# Patient Record
Sex: FEMALE | Race: BLACK OR AFRICAN AMERICAN | NOT HISPANIC OR LATINO | Employment: FULL TIME | URBAN - METROPOLITAN AREA
[De-identification: names, ages, dates, MRNs, and addresses within clinical notes are randomized per-mention and may not be internally consistent; named-entity substitution may affect disease eponyms.]

---

## 2019-05-04 ENCOUNTER — HOSPITAL ENCOUNTER (EMERGENCY)
Facility: HOSPITAL | Age: 59
Discharge: HOME | End: 2019-05-04
Attending: EMERGENCY MEDICINE
Payer: COMMERCIAL

## 2019-05-04 VITALS
BODY MASS INDEX: 28.13 KG/M2 | TEMPERATURE: 98 F | HEART RATE: 73 BPM | OXYGEN SATURATION: 96 % | WEIGHT: 179.25 LBS | HEIGHT: 67 IN | RESPIRATION RATE: 16 BRPM | DIASTOLIC BLOOD PRESSURE: 92 MMHG | SYSTOLIC BLOOD PRESSURE: 155 MMHG

## 2019-05-04 DIAGNOSIS — S80.00XA CONTUSION OF KNEE, UNSPECIFIED LATERALITY, INITIAL ENCOUNTER: Primary | ICD-10-CM

## 2019-05-04 DIAGNOSIS — S60.512A ABRASION OF LEFT HAND, INITIAL ENCOUNTER: ICD-10-CM
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
## 2019-05-04 PROCEDURE — 99284 EMERGENCY DEPT VISIT MOD MDM: CPT

## 2019-05-04 NOTE — ED PROVIDER NOTES
SCRIBE #1 NOTE: I, Ana Mcclain, am scribing for, and in the presence of, MARAH Jalloh. I have scribed the entire note.      History      Chief Complaint   Patient presents with    Fall     fell while at work onto concrete. c/o pain to both knees and arms, multiple bruises and abrasions to left arm.       Review of patient's allergies indicates:  No Known Allergies     HPI   HPI    2019, 2:57 PM   History obtained from the patient      History of Present Illness: Salvatore Phillips is a 58 y.o. female patient who presents to the Emergency Department for evaluation after fall which onset 2 days ago. Symptoms are constant and moderate in severity. Pt reports she was walking to her car, on her cell phone after work and fell onto the concrete. She is not sure what she tripped or slipped on.  No mitigating or exacerbating factors reported. Associated sxs include bilateral knee pain (L worse than R), L wrist pain, and abrasion to L wrist. Patient denies any syncope, CP, SOB, fever, vision changes, LOC, HA, abd pain, n/v/d, back pain, neck pain, numbness/weakness, bowel/bladder incontinence, and all other sxs at this time. Pt is UTD on tetanus. No further complaints or concerns at this time.           Arrival mode: Personal vehicle     PCP: Primary Doctor No       Past Medical History:  Past Medical History:   Diagnosis Date    Asthma     Hypertension     Sarcoidosis of lung     Sleep apnea     maintained on CPAP    Upper respiratory disease     URI (upper respiratory infection)        Past Surgical History:  Past Surgical History:   Procedure Laterality Date    ABDOMINAL SURGERY      BREAST SURGERY       SECTION      GASTRIC BYPASS      HYSTERECTOMY      KNEE SURGERY           Family History:  History reviewed. No pertinent family history.    Social History:  Social History     Tobacco Use    Smoking status: Current Every Day Smoker     Packs/day: 0.50     Types: Cigarettes    Tobacco comment:  started age 18   Substance and Sexual Activity    Alcohol use: Yes     Comment: occ    Drug use: Not given    Sexual activity: Not given       ROS   Review of Systems   Constitutional: Negative for chills and fever.   HENT: Negative for sore throat.    Respiratory: Negative for shortness of breath.    Cardiovascular: Negative for chest pain.   Gastrointestinal: Negative for nausea.   Genitourinary: Negative for dysuria.   Musculoskeletal: Negative for back pain and neck pain.        (+) L wrist pain  (+) Bilateral knee pain   Skin: Negative for rash.        (+) Abrasion to L wrist   Neurological: Negative for dizziness, syncope, weakness, numbness and headaches.   Hematological: Does not bruise/bleed easily.   All other systems reviewed and are negative.      Physical Exam      Initial Vitals [05/04/19 1450]   BP Pulse Resp Temp SpO2   (!) 155/92 73 16 97.9 °F (36.6 °C) 96 %      MAP       --          Physical Exam  Nursing Notes and Vital Signs Reviewed.  Constitutional: Patient is in no acute distress. Awake and alert. Appropriate for age.   Head: Atraumatic. No facial instability or step-offs.   Eyes: PERRL. EOM normal. Conjunctivae normal.   HENT: Moist mucous membranes. No epistaxis. Patent airway.   Neck: No midline bony tenderness, deformities, or step-offs   Cardiovascular: Regular rate and rhythm. Heart sounds are normal. Intact distal pulses   Pulmonary/Chest: No respiratory distress. Breath sounds are normal. No decreased breath sounds. Chest wall is stable.   Abdominal: Soft and non-distended. Non-tender.   Back: No abrasions or ecchymosis. No midline bony tenderness to the T-spine or L-spine. No deformities or step-offs.   Musculoskeletal: Full range of motion in bilateral extremities. No obvious deformities.   LUE: has no evident deformity. L upper extremity full ROM of shoulder elbow, wrist. No bony tenderness. Cap refill distally is <2 seconds. Radial pulses are equal and 2+ bilaterally. No motor  "deficit. No distal sensory deficit.  Bilateral Knee:  No obvious deformity. Bilateral knees with mild anterior tenderness L>R. Full ROM of bilateral knees. No increased warmth, erythema, induration or fluctuance.  No ligament laxity. DP and PT pulses are 2+.  Normal capillary refill.  Distal sensation is intact.  Skin: Normal color. No cyanosis. 1 x 1 cm abrasion to dorsal L hand  Neurological: Awake and alert. Appropriate for age. GCS 15. Normal speech. Motor strength is normal at 5/5 bilaterally. Non-focal neurological examination.    ED Course    Procedures  ED Vital Signs:  Vitals:    05/04/19 1450   BP: (!) 155/92   Pulse: 73   Resp: 16   Temp: 97.9 °F (36.6 °C)   TempSrc: Oral   SpO2: 96%   Weight: 81.3 kg (179 lb 3.7 oz)   Height: 5' 7" (1.702 m)       Imaging Results:  Imaging Results          X-Ray Wrist Complete Left (Final result)  Result time 05/04/19 15:37:16    Final result by Dia Lara MD (Timothy) (05/04/19 15:37:16)                 Impression:      Negative exam.      Electronically signed by: Dia Lara MD  Date:    05/04/2019  Time:    15:37             Narrative:    EXAMINATION:  XR WRIST COMPLETE 3 VIEWS LEFT    CLINICAL HISTORY:  Unspecified fall, initial encounter    TECHNIQUE:  Standard radiography performed.    COMPARISON:  None    FINDINGS:  Bone density and architecture are normal.  No acute findings.                               X-Ray Knee Complete 4 or More Views Left (Final result)  Result time 05/04/19 15:36:40    Final result by Dia Lara MD (Timothy) (05/04/19 15:36:40)                 Impression:      No fractures.  Moderate DJD.      Electronically signed by: Dia Lara MD  Date:    05/04/2019  Time:    15:36             Narrative:    EXAMINATION:  XR KNEE COMP 4 OR MORE VIEWS LEFT    CLINICAL HISTORY:  Unspecified fall, initial encounter    TECHNIQUE:  Standard radiography performed.    COMPARISON:  None    FINDINGS:  Bone density and architecture are " normal.  No acute findings.  Moderate degenerative changes.                                        The Emergency Provider reviewed the vital signs and test results, which are outlined above.    ED Discussion     3:41 PM Reassessed pt at this time.  Pt is awake, alert, and in NAD at this time. Discussed with pt all pertinent ED information and results. Discussed pt dx and plan of tx. Gave pt all f/u and return to the ED instructions. All questions and concerns were addressed at this time. Pt expresses understanding of information and instructions, and is comfortable with plan to discharge. Pt is stable for discharge.    Trauma precautions were discussed with patient and/or family/caretaker; I do not specifically detect any abdominal, thoracic, CNS, orthopedic, or other emergent or life threatening condition and that patient is safe to be discharged.  It was also discussed that despite an unrevealing examination and negative radiographic examination for serious or life threatening injury, these conditions may still exist.  As such, patient should return to ED immediately should they experience, severe or worsening pain, shortness of breath, abdominal pain, headache, vomiting, or any other concern.  It was also discussed that not infrequently, injuries may not be diagnosed during the initial ED visit (such as fractures) and that if the patient discovers a new area of concern, a new area of injury that was not evaluated in the ED, they should return for evaluation as they may have an injury that requires treatment.    ED Medication(s):  Medications - No data to display    Discharge Medication List as of 5/4/2019  3:46 PM          Follow-up Information     [facility] In 2 days.    Contact information:  [address] [facility] [address] [phone]                     Medical Decision Making    Medical Decision Making:   Clinical Tests:   Radiological Study: Ordered and Reviewed           Scribe  Attestation:   Scribe #1: I performed the above scribed service and the documentation accurately describes the services I performed. I attest to the accuracy of the note.    Attending:   Physician Attestation Statement for Scribe #1: I, MARAH Jalloh, personally performed the services described in this documentation, as scribed by Ana Mcclain, in my presence, and it is both accurate and complete.          Clinical Impression       ICD-10-CM ICD-9-CM   1. Contusion of knee, unspecified laterality, initial encounter S80.00XA 924.11   2. Fall W19.XXXA E888.9   3. Abrasion of left hand, initial encounter S60.512A 914.0       Disposition:   Disposition: Discharged  Condition: Stable         MARHA Yang-HEIDI  05/04/19 7949

## 2019-07-06 ENCOUNTER — HOSPITAL ENCOUNTER (EMERGENCY)
Facility: HOSPITAL | Age: 59
Discharge: HOME | End: 2019-07-06
Attending: EMERGENCY MEDICINE
Payer: COMMERCIAL

## 2019-07-06 VITALS
BODY MASS INDEX: 27.39 KG/M2 | TEMPERATURE: 98 F | DIASTOLIC BLOOD PRESSURE: 80 MMHG | HEART RATE: 74 BPM | WEIGHT: 174.5 LBS | SYSTOLIC BLOOD PRESSURE: 137 MMHG | OXYGEN SATURATION: 97 % | RESPIRATION RATE: 16 BRPM | HEIGHT: 67 IN

## 2019-07-06 DIAGNOSIS — R05.9 COUGH: ICD-10-CM

## 2019-07-06 DIAGNOSIS — J40 BRONCHITIS: Primary | ICD-10-CM

## 2019-07-06 DIAGNOSIS — R06.2 WHEEZE: ICD-10-CM

## 2019-07-06 PROCEDURE — 25000003 PHARM REV CODE 250: Performed by: PHYSICIAN ASSISTANT

## 2019-07-06 PROCEDURE — 63600175 PHARM REV CODE 636 W HCPCS: Performed by: PHYSICIAN ASSISTANT

## 2019-07-06 PROCEDURE — 99284 EMERGENCY DEPT VISIT MOD MDM: CPT | Mod: 25

## 2019-07-06 PROCEDURE — 25000242 PHARM REV CODE 250 ALT 637 W/ HCPCS: Performed by: PHYSICIAN ASSISTANT

## 2019-07-06 PROCEDURE — 94640 AIRWAY INHALATION TREATMENT: CPT

## 2019-07-06 RX ORDER — AZITHROMYCIN 250 MG/1
500 TABLET, FILM COATED ORAL
Status: COMPLETED | OUTPATIENT
Start: 2019-07-06 | End: 2019-07-06

## 2019-07-06 RX ORDER — AZITHROMYCIN 250 MG/1
250 TABLET, FILM COATED ORAL DAILY
Qty: 6 TABLET | Refills: 0 | Status: SHIPPED | OUTPATIENT
Start: 2019-07-06 | End: 2019-07-16

## 2019-07-06 RX ORDER — IPRATROPIUM BROMIDE AND ALBUTEROL SULFATE 2.5; .5 MG/3ML; MG/3ML
3 SOLUTION RESPIRATORY (INHALATION) EVERY 6 HOURS PRN
Qty: 1 BOX | Refills: 0 | Status: SHIPPED | OUTPATIENT
Start: 2019-07-06 | End: 2019-08-05 | Stop reason: SDUPTHER

## 2019-07-06 RX ORDER — PREDNISONE 20 MG/1
60 TABLET ORAL
Status: COMPLETED | OUTPATIENT
Start: 2019-07-06 | End: 2019-07-06

## 2019-07-06 RX ORDER — IPRATROPIUM BROMIDE AND ALBUTEROL SULFATE 2.5; .5 MG/3ML; MG/3ML
3 SOLUTION RESPIRATORY (INHALATION)
Status: COMPLETED | OUTPATIENT
Start: 2019-07-06 | End: 2019-07-06

## 2019-07-06 RX ORDER — ALBUTEROL SULFATE 90 UG/1
1-2 AEROSOL, METERED RESPIRATORY (INHALATION) EVERY 6 HOURS PRN
Qty: 1 INHALER | Refills: 0 | Status: SHIPPED | OUTPATIENT
Start: 2019-07-06 | End: 2019-08-09 | Stop reason: SDUPTHER

## 2019-07-06 RX ORDER — PREDNISONE 20 MG/1
40 TABLET ORAL DAILY
Qty: 8 TABLET | Refills: 0 | Status: SHIPPED | OUTPATIENT
Start: 2019-07-06 | End: 2019-07-10

## 2019-07-06 RX ADMIN — AZITHROMYCIN 500 MG: 250 TABLET, FILM COATED ORAL at 03:07

## 2019-07-06 RX ADMIN — IPRATROPIUM BROMIDE AND ALBUTEROL SULFATE 3 ML: .5; 3 SOLUTION RESPIRATORY (INHALATION) at 03:07

## 2019-07-06 RX ADMIN — PREDNISONE 60 MG: 20 TABLET ORAL at 03:07

## 2019-07-06 NOTE — ED PROVIDER NOTES
"SCRIBE #1 NOTE: I, Christine Kaiser, am scribing for, and in the presence of, MARAH Jalloh. I have scribed the entire note.       History     Chief Complaint   Patient presents with    Cough     productive cough with green sputum, congestion, shortness of breath "Im just sick"      Review of patient's allergies indicates:  No Known Allergies      History of Present Illness     HPI    2019, 3:00 PM  History obtained from the patient      History of Present Illness: Salvatore Phillips is a 58 y.o. female patient with a PMHx of asthma, sarcoidosis of lung, HTN who presents to the Emergency Department for evaluation of a productive cough with thick, green phlegm which she has been having for the past 2 weeks. She has not been evaluated for the cough previously. The cough has been constant in course and mild in severity. She reports nothing that improves or worsen her cough. Patient has no c/o fever, chills, CP, SOB, sore throat, vomiting, diarrhea, and all other sxs at this time. She would like a refill of her inhaler.    Arrival mode: Personal vehicle    PCP: Primary Doctor No        Past Medical History:  Past Medical History:   Diagnosis Date    Asthma     Hypertension     Sarcoidosis of lung     Sleep apnea     maintained on CPAP    Upper respiratory disease     URI (upper respiratory infection)        Past Surgical History:  Past Surgical History:   Procedure Laterality Date    ABDOMINAL SURGERY      BREAST SURGERY       SECTION      GASTRIC BYPASS      HYSTERECTOMY      KNEE SURGERY           Family History:  History reviewed. No pertinent family history.    Social History:  Social History     Tobacco Use    Smoking status: Current Every Day Smoker     Packs/day: 0.50     Types: Cigarettes    Tobacco comment: started age 18   Substance and Sexual Activity    Alcohol use: Yes     Comment: occ    Drug use: Unknown    Sexual activity: Unknown        Review of Systems     Review of Systems " "  Constitutional: Negative for chills and fever.   HENT: Negative for sore throat, trouble swallowing and voice change.    Respiratory: Positive for cough. Negative for shortness of breath.    Cardiovascular: Negative for chest pain.   Gastrointestinal: Negative for abdominal pain, diarrhea and vomiting.   Genitourinary: Negative for dysuria.   Musculoskeletal: Negative for back pain.   Skin: Negative for rash.   Neurological: Negative for dizziness, weakness and light-headedness.   Hematological: Does not bruise/bleed easily.   All other systems reviewed and are negative.     Physical Exam     Initial Vitals [07/06/19 1443]   BP Pulse Resp Temp SpO2   137/80 81 18 98.3 °F (36.8 °C) 95 %      MAP       --          Physical Exam  Nursing Notes and Vital Signs Reviewed.  Constitutional: Patient is in no acute distress. Well-developed and well-nourished.  Head: Atraumatic. Normocephalic.  Eyes: PERRL. EOM intact. Conjunctivae are not pale. No scleral icterus.  ENT: Mucous membranes are moist. Oropharynx is clear and symmetric.    Neck: Supple. Full ROM.   Cardiovascular: Regular rate. Regular rhythm. No murmurs, rubs, or gallops. Distal pulses are 2+ and symmetric.  Pulmonary/Chest: No respiratory distress. Bilateral inspiratory and expiratory wheezing. No rhonchi or rales.  Abdominal: Soft and non-distended.  There is no tenderness.  No rebound, guarding, or rigidity.  Musculoskeletal: Moves all extremities. No obvious deformities.   Skin: Warm and dry.  Neurological:  Alert, awake, and appropriate.  Normal speech.  No acute focal neurological deficits are appreciated.  Psychiatric: Normal affect. Good eye contact. Appropriate in content.     ED Course   Procedures  ED Vital Signs:  Vitals:    07/06/19 1443 07/06/19 1517   BP: 137/80    Pulse: 81 74   Resp: 18 16   Temp: 98.3 °F (36.8 °C)    TempSrc: Oral    SpO2: 95% 97%   Weight: 79.2 kg (174 lb 7.9 oz)    Height: 5' 7" (1.702 m)      Imaging Results:  Imaging " Results          X-Ray Chest PA And Lateral (Final result)  Result time 07/06/19 15:07:06    Final result by WALTER Read Sr., MD (07/06/19 15:07:06)                 Impression:      1. There is a moderate amount of scarring in both lungs. There is no evidence of an acute pulmonary process.  2. There are surgical clips in the abdomen.  .      Electronically signed by: Ramon Read MD  Date:    07/06/2019  Time:    15:07             Narrative:    EXAMINATION:  XR CHEST PA AND LATERAL    CLINICAL HISTORY:  Cough    COMPARISON:  11/25/2018    FINDINGS:  The size and contour of the heart are normal.  There is a moderate amount of scarring in both lungs.  There is no evidence of an acute pulmonary process.  There is no pneumothorax or pleural effusion.  There are surgical clips in the abdomen.                                   The Emergency Provider reviewed the vital signs and test results, which are outlined above.     ED Discussion     3:17 PM:  Breath sounds much improved since neb treatment.  Pt ready for discharge.  Pt informed of CXR results. Discussed pt dx of bronchitis and plan of tx. Informed patient plan to refill her inhaler. Will prescribe Prednisone and abx. Gave pt all f/u and return to the ED instructions. All questions and concerns were addressed at this time. Pt expresses understanding of information and instructions, and is comfortable with plan to discharge. Pt is stable for discharge.    I discussed with patient and/or family/caretaker that evaluation in the ED does not suggest any emergent or life threatening medical conditions requiring immediate intervention beyond what was provided in the ED, and I believe patient is safe for discharge.  Regardless, an unremarkable evaluation in the ED does not preclude the development or presence of a serious of life threatening condition. As such, patient was instructed to return immediately for any worsening or change in current symptoms.    ED  Medication(s):  Medications   albuterol-ipratropium 2.5 mg-0.5 mg/3 mL nebulizer solution 3 mL (3 mLs Nebulization Given 7/6/19 0257)   predniSONE tablet 60 mg (60 mg Oral Given 7/6/19 1533)   azithromycin tablet 500 mg (500 mg Oral Given 7/6/19 1533)       Discharge Medication List as of 7/6/2019  3:16 PM      START taking these medications    Details   albuterol (PROVENTIL/VENTOLIN HFA) 90 mcg/actuation inhaler Inhale 1-2 puffs into the lungs every 6 (six) hours as needed for Wheezing., Starting Sat 7/6/2019, Until Sun 7/5/2020, Print      azithromycin (Z-KAIA) 250 MG tablet Take 1 tablet (250 mg total) by mouth once daily. Take first 2 tablets together, then 1 every day until finished. for 10 days, Starting Sat 7/6/2019, Until Tue 7/16/2019, Print      predniSONE (DELTASONE) 20 MG tablet Take 2 tablets (40 mg total) by mouth once daily. for 4 days, Starting Sat 7/6/2019, Until Wed 7/10/2019, Print             Follow-up Information     [facility] In 2 days.    Specialty:  Internal Medicine  Contact information:  [address] [facility] [address] [phone]              Medical Decision Making:   Clinical Tests:   Radiological Study: Ordered and Reviewed           Scribe Attestation:   Scribe #1: I performed the above scribed service and the documentation accurately describes the services I performed. I attest to the accuracy of the note.     Attending:   Physician Attestation Statement for Scribe #1: I, MARAH Jalloh, personally performed the services described in this documentation, as scribed by Christine Kaiser, in my presence, and it is both accurate and complete.           Clinical Impression       ICD-10-CM ICD-9-CM   1. Bronchitis J40 490   2. Cough R05 786.2   3. Wheeze R06.2 786.07       Disposition:   Disposition: Discharged  Condition: Stable         Meghan Johnson PA-C  07/06/19 9467

## 2019-08-05 ENCOUNTER — OFFICE VISIT (OUTPATIENT)
Dept: INTERNAL MEDICINE | Facility: CLINIC | Age: 59
End: 2019-08-05
Payer: COMMERCIAL

## 2019-08-05 ENCOUNTER — PATIENT OUTREACH (OUTPATIENT)
Dept: ADMINISTRATIVE | Facility: HOSPITAL | Age: 59
End: 2019-08-05

## 2019-08-05 VITALS
DIASTOLIC BLOOD PRESSURE: 78 MMHG | BODY MASS INDEX: 26.89 KG/M2 | RESPIRATION RATE: 18 BRPM | HEART RATE: 87 BPM | OXYGEN SATURATION: 97 % | TEMPERATURE: 98 F | HEIGHT: 67 IN | SYSTOLIC BLOOD PRESSURE: 126 MMHG | WEIGHT: 171.31 LBS

## 2019-08-05 DIAGNOSIS — Z23 NEED FOR TETANUS BOOSTER: ICD-10-CM

## 2019-08-05 DIAGNOSIS — G47.30 SLEEP APNEA, UNSPECIFIED TYPE: ICD-10-CM

## 2019-08-05 DIAGNOSIS — I10 ESSENTIAL HYPERTENSION: ICD-10-CM

## 2019-08-05 DIAGNOSIS — Z00.00 ENCOUNTER FOR MEDICAL EXAMINATION TO ESTABLISH CARE: Primary | ICD-10-CM

## 2019-08-05 DIAGNOSIS — F51.01 PRIMARY INSOMNIA: ICD-10-CM

## 2019-08-05 DIAGNOSIS — N39.44 NOCTURNAL ENURESIS: ICD-10-CM

## 2019-08-05 DIAGNOSIS — Z12.31 SCREENING MAMMOGRAM, ENCOUNTER FOR: ICD-10-CM

## 2019-08-05 DIAGNOSIS — D86.9 SARCOIDOSIS: ICD-10-CM

## 2019-08-05 DIAGNOSIS — R06.2 WHEEZING: ICD-10-CM

## 2019-08-05 DIAGNOSIS — Z11.59 NEED FOR HEPATITIS C SCREENING TEST: ICD-10-CM

## 2019-08-05 DIAGNOSIS — J45.20 MILD INTERMITTENT ASTHMA WITHOUT COMPLICATION: ICD-10-CM

## 2019-08-05 DIAGNOSIS — R09.82 POSTNASAL DRIP: ICD-10-CM

## 2019-08-05 PROCEDURE — 3078F PR MOST RECENT DIASTOLIC BLOOD PRESSURE < 80 MM HG: ICD-10-PCS | Mod: CPTII,S$GLB,, | Performed by: FAMILY MEDICINE

## 2019-08-05 PROCEDURE — 99204 PR OFFICE/OUTPT VISIT, NEW, LEVL IV, 45-59 MIN: ICD-10-PCS | Mod: S$GLB,,, | Performed by: FAMILY MEDICINE

## 2019-08-05 PROCEDURE — 99999 PR PBB SHADOW E&M-EST. PATIENT-LVL V: CPT | Mod: PBBFAC,,, | Performed by: FAMILY MEDICINE

## 2019-08-05 PROCEDURE — 99204 OFFICE O/P NEW MOD 45 MIN: CPT | Mod: S$GLB,,, | Performed by: FAMILY MEDICINE

## 2019-08-05 PROCEDURE — 99999 PR PBB SHADOW E&M-EST. PATIENT-LVL V: ICD-10-PCS | Mod: PBBFAC,,, | Performed by: FAMILY MEDICINE

## 2019-08-05 PROCEDURE — 3008F PR BODY MASS INDEX (BMI) DOCUMENTED: ICD-10-PCS | Mod: CPTII,S$GLB,, | Performed by: FAMILY MEDICINE

## 2019-08-05 PROCEDURE — 3008F BODY MASS INDEX DOCD: CPT | Mod: CPTII,S$GLB,, | Performed by: FAMILY MEDICINE

## 2019-08-05 PROCEDURE — 3074F SYST BP LT 130 MM HG: CPT | Mod: CPTII,S$GLB,, | Performed by: FAMILY MEDICINE

## 2019-08-05 PROCEDURE — 3074F PR MOST RECENT SYSTOLIC BLOOD PRESSURE < 130 MM HG: ICD-10-PCS | Mod: CPTII,S$GLB,, | Performed by: FAMILY MEDICINE

## 2019-08-05 PROCEDURE — 3078F DIAST BP <80 MM HG: CPT | Mod: CPTII,S$GLB,, | Performed by: FAMILY MEDICINE

## 2019-08-05 RX ORDER — ZOLPIDEM TARTRATE 10 MG/1
10 TABLET ORAL NIGHTLY PRN
Qty: 30 TABLET | Refills: 0 | Status: SHIPPED | OUTPATIENT
Start: 2019-08-05 | End: 2019-09-09 | Stop reason: SDUPTHER

## 2019-08-05 RX ORDER — FLUTICASONE PROPIONATE 50 MCG
1 SPRAY, SUSPENSION (ML) NASAL DAILY
Qty: 1 BOTTLE | Refills: 0 | Status: SHIPPED | OUTPATIENT
Start: 2019-08-05 | End: 2019-08-09 | Stop reason: SDUPTHER

## 2019-08-05 RX ORDER — AMLODIPINE BESYLATE 10 MG/1
10 TABLET ORAL DAILY
Qty: 90 TABLET | Refills: 1 | Status: SHIPPED | OUTPATIENT
Start: 2019-08-05 | End: 2019-10-09 | Stop reason: SDUPTHER

## 2019-08-05 RX ORDER — PREDNISONE 20 MG/1
20 TABLET ORAL DAILY
Qty: 10 TABLET | Refills: 0 | Status: SHIPPED | OUTPATIENT
Start: 2019-08-05 | End: 2019-08-09

## 2019-08-05 RX ORDER — IPRATROPIUM BROMIDE AND ALBUTEROL SULFATE 2.5; .5 MG/3ML; MG/3ML
3 SOLUTION RESPIRATORY (INHALATION) EVERY 6 HOURS PRN
Qty: 1 BOX | Refills: 0 | Status: SHIPPED | OUTPATIENT
Start: 2019-08-05 | End: 2019-08-09 | Stop reason: SDUPTHER

## 2019-08-05 RX ORDER — OXYBUTYNIN CHLORIDE 5 MG/1
5 TABLET ORAL NIGHTLY
Qty: 90 TABLET | Refills: 0 | Status: SHIPPED | OUTPATIENT
Start: 2019-08-05 | End: 2021-01-29

## 2019-08-05 NOTE — PROGRESS NOTES
Subjective:       Patient ID: Salvatore Phillips is a 58 y.o. female.    Chief Complaint: Establish Care    HPI Ms. Phillips presents today to establish care. She has a medical history as listed below.   Recently moved back to the area from [place] to help her sister care for their mother.  Needs Refills on medication  CPAP machine it has been in storage since moving.     Urinary incontinence at night   Coughing up mucus   Using rescue more often   Seen in the ED on 19 for bronchitis   Chest isn't hurting as much   z dacia and predisone given at that time     Review of Systems   Constitutional: Positive for activity change and unexpected weight change.   HENT: Positive for rhinorrhea. Negative for hearing loss and trouble swallowing.    Eyes: Positive for visual disturbance. Negative for discharge.   Respiratory: Positive for chest tightness and wheezing.    Cardiovascular: Negative for chest pain and palpitations.   Gastrointestinal: Positive for constipation and diarrhea. Negative for blood in stool and vomiting.   Endocrine: Positive for polydipsia and polyuria.   Genitourinary: Positive for dysuria. Negative for difficulty urinating, hematuria and menstrual problem.   Musculoskeletal: Positive for arthralgias, joint swelling and neck pain.   Neurological: Positive for weakness and headaches.   Psychiatric/Behavioral: Positive for dysphoric mood. Negative for confusion.         Past Medical History:   Diagnosis Date    Asthma     Hypertension     Sarcoidosis of lung     Sleep apnea     maintained on CPAP    Upper respiratory disease     URI (upper respiratory infection)      Past Surgical History:   Procedure Laterality Date    ABDOMINAL SURGERY      BREAST SURGERY       SECTION      x3    COLON SURGERY      exploratory procedure      GASTRIC BYPASS      HYSTERECTOMY      JOINT REPLACEMENT  2013    KNEE SURGERY      SMALL INTESTINE SURGERY      tummy tuck       Family History   Problem  Relation Age of Onset    Alzheimer's disease Mother     Cancer Father         lung    Crohn's disease Brother     Kidney disease Brother     Arthritis Sister     Hypertension Son      Social History     Socioeconomic History    Marital status: Single     Spouse name: Not on file    Number of children: Not on file    Years of education: Not on file    Highest education level: Not on file   Occupational History    Not on file   Social Needs    Financial resource strain: Somewhat hard    Food insecurity:     Worry: Sometimes true     Inability: Sometimes true    Transportation needs:     Medical: No     Non-medical: No   Tobacco Use    Smoking status: Current Every Day Smoker     Packs/day: 1.50     Years: 15.00     Pack years: 22.50     Types: Cigarettes    Smokeless tobacco: Never Used    Tobacco comment: started age 18   Substance and Sexual Activity    Alcohol use: Not Currently     Frequency: Monthly or less     Drinks per session: 1 or 2     Binge frequency: Never     Comment: occ    Drug use: Never    Sexual activity: Never   Lifestyle    Physical activity:     Days per week: 0 days     Minutes per session: 0 min    Stress: Very much   Relationships    Social connections:     Talks on phone: Not on file     Gets together: Once a week     Attends [employer] service: Not on file     Active member of club or organization: No     Attends meetings of clubs or organizations: Never     Relationship status:    Other Topics Concern    Not on file   Social History Narrative    Not on file       Objective:        Physical Exam   Constitutional: She is oriented to person, place, and time. She appears well-nourished. No distress.   HENT:   Head: Normocephalic and atraumatic.   Right Ear: External ear normal.   Left Ear: External ear normal.   Nose: Nose normal.   Mouth/Throat: Oropharynx is clear and moist.   Enlarged submandibular node on the left   Eyes: Pupils are equal, round, and  reactive to light. EOM are normal. Right eye exhibits no discharge. Left eye exhibits no discharge.   Neck: Normal range of motion. Neck supple. No thyromegaly present.   Cardiovascular: Normal rate, regular rhythm and normal heart sounds.   No murmur heard.  Pulmonary/Chest: Effort normal and breath sounds normal. No respiratory distress. She has no wheezes.   Abdominal: Soft. Bowel sounds are normal. She exhibits no distension. There is no tenderness.   Musculoskeletal: Normal range of motion. She exhibits no edema.   Neurological: She is alert and oriented to person, place, and time. Coordination normal.   Skin: Skin is warm and dry. No rash noted.   Psychiatric: She has a normal mood and affect. Her behavior is normal.   Nursing note and vitals reviewed.        Results for orders placed or performed during the hospital encounter of 11/25/18   CBC auto differential   Result Value Ref Range    WBC 7.04 3.90 - 12.70 K/uL    RBC 4.66 4.00 - 5.40 M/uL    Hemoglobin 12.6 12.0 - 16.0 g/dL    Hematocrit 40.5 37.0 - 48.5 %    Mean Corpuscular Volume 87 82 - 98 fL    Mean Corpuscular Hemoglobin 27.0 27.0 - 31.0 pg    Mean Corpuscular Hemoglobin Conc 31.1 (L) 32.0 - 36.0 g/dL    RDW 16.0 (H) 11.5 - 14.5 %    Platelets 215 150 - 350 K/uL    MPV 10.3 9.2 - 12.9 fL    Gran # (ANC) 4.8 1.8 - 7.7 K/uL    Lymph # 1.5 1.0 - 4.8 K/uL    Mono # 0.5 0.3 - 1.0 K/uL    Eos # 0.3 0.0 - 0.5 K/uL    Baso # 0.03 0.00 - 0.20 K/uL    nRBC 0 0 /100 WBC    Gran% 67.6 38.0 - 73.0 %    Lymph% 21.0 18.0 - 48.0 %    Mono% 7.0 4.0 - 15.0 %    Eosinophil% 4.0 0.0 - 8.0 %    Basophil% 0.4 0.0 - 1.9 %    Differential Method Automated    Comprehensive metabolic panel (CMP)   Result Value Ref Range    Sodium 140 136 - 145 mmol/L    Potassium 4.3 3.5 - 5.1 mmol/L    Chloride 107 95 - 110 mmol/L    CO2 26 22 - 31 mmol/L    Glucose 94 70 - 110 mg/dL    BUN, Bld 17 7 - 18 mg/dL    Creatinine 0.87 0.50 - 1.40 mg/dL    Calcium 8.6 8.4 - 10.2 mg/dL    Total  Protein 7.2 6.0 - 8.4 g/dL    Albumin 3.6 3.5 - 5.2 g/dL    Total Bilirubin 0.2 0.2 - 1.3 mg/dL    Alkaline Phosphatase 108 38 - 145 U/L    AST 32 14 - 36 U/L    ALT 21 10 - 44 U/L    Anion Gap 7 (L) 8 - 16 mmol/L    eGFR if African American >60 >60 mL/min/1.73 m^2    eGFR if non African American >60 >60 mL/min/1.73 m^2   Troponin I   Result Value Ref Range    Troponin I <0.012 0.012 - 0.034 ng/mL   NT-Pro Natriuretic Peptide   Result Value Ref Range    NT-proBNP 980 (H) 5 - 900 pg/mL       Assessment/Plan:     Encounter for medical examination to establish care  -     CBC auto differential; Future; Expected date: 08/05/2019    Essential hypertension  -     Lipid panel; Future; Expected date: 08/05/2019  -     Comprehensive metabolic panel; Future; Expected date: 08/05/2019    Need for hepatitis C screening test  -     Hepatitis C antibody; Future; Expected date: 08/05/2019    Sarcoidosis  -     Ambulatory Referral to Pulmonology  -     (In Office Administered) Tdap Vaccine    Mild intermittent asthma without complication  -     Ambulatory Referral to Pulmonology  -     albuterol-ipratropium (DUO-NEB) 2.5 mg-0.5 mg/3 mL nebulizer solution; Take 3 mLs by nebulization every 6 (six) hours as needed for Wheezing. Rescue  Dispense: 1 Box; Refill: 0  -     predniSONE (DELTASONE) 20 MG tablet; Take 1 tablet (20 mg total) by mouth once daily. for 10 days  Dispense: 10 tablet; Refill: 0  -     (In Office Administered) Tdap Vaccine    Sleep apnea, unspecified type  -     Ambulatory Referral to Pulmonology    Nocturnal enuresis  -     Ambulatory Referral to Urology  -     oxybutynin (DITROPAN) 5 MG Tab; Take 1 tablet (5 mg total) by mouth every evening.  Dispense: 90 tablet; Refill: 0    Screening mammogram, encounter for  -     Mammo Digital Screening Bilateral With CAD; Future; Expected date: 08/05/2019    Primary insomnia  -     zolpidem (AMBIEN) 10 mg Tab; Take 1 tablet (10 mg total) by mouth nightly as needed (insomnia).   Dispense: 30 tablet; Refill: 0    Postnasal drip  -     fluticasone propionate (FLONASE) 50 mcg/actuation nasal spray; 1 spray (50 mcg total) by Each Nostril route once daily.  Dispense: 1 Bottle; Refill: 0    Wheezing  -     albuterol-ipratropium (DUO-NEB) 2.5 mg-0.5 mg/3 mL nebulizer solution; Take 3 mLs by nebulization every 6 (six) hours as needed for Wheezing. Rescue  Dispense: 1 Box; Refill: 0  -     predniSONE (DELTASONE) 20 MG tablet; Take 1 tablet (20 mg total) by mouth once daily. for 10 days  Dispense: 10 tablet; Refill: 0    Need for tetanus booster  -     (In Office Administered) Tdap Vaccine    Other orders  -     amLODIPine (NORVASC) 10 MG tablet; Take 1 tablet (10 mg total) by mouth once daily.  Dispense: 90 tablet; Refill: 1      Follow up in about 3 months (around 11/5/2019), or if symptoms worsen or fail to improve.    Viviana Cuba MD  [facility]   Family Medicine

## 2019-08-09 ENCOUNTER — LAB VISIT (OUTPATIENT)
Dept: LAB | Facility: HOSPITAL | Age: 59
End: 2019-08-09
Payer: COMMERCIAL

## 2019-08-09 ENCOUNTER — OFFICE VISIT (OUTPATIENT)
Dept: PULMONOLOGY | Facility: CLINIC | Age: 59
End: 2019-08-09
Payer: COMMERCIAL

## 2019-08-09 VITALS
RESPIRATION RATE: 16 BRPM | HEART RATE: 72 BPM | BODY MASS INDEX: 28.22 KG/M2 | WEIGHT: 179.81 LBS | DIASTOLIC BLOOD PRESSURE: 78 MMHG | SYSTOLIC BLOOD PRESSURE: 124 MMHG | HEIGHT: 67 IN | OXYGEN SATURATION: 97 %

## 2019-08-09 DIAGNOSIS — J45.41 MODERATE PERSISTENT ASTHMA WITH ACUTE EXACERBATION: ICD-10-CM

## 2019-08-09 DIAGNOSIS — D86.0 SARCOIDOSIS OF LUNG: Primary | ICD-10-CM

## 2019-08-09 DIAGNOSIS — G47.33 OSA (OBSTRUCTIVE SLEEP APNEA): ICD-10-CM

## 2019-08-09 DIAGNOSIS — Z00.00 ENCOUNTER FOR MEDICAL EXAMINATION TO ESTABLISH CARE: ICD-10-CM

## 2019-08-09 DIAGNOSIS — R09.82 POSTNASAL DRIP: ICD-10-CM

## 2019-08-09 DIAGNOSIS — Z11.59 NEED FOR HEPATITIS C SCREENING TEST: ICD-10-CM

## 2019-08-09 DIAGNOSIS — F17.200 SMOKING: ICD-10-CM

## 2019-08-09 DIAGNOSIS — I10 ESSENTIAL HYPERTENSION: ICD-10-CM

## 2019-08-09 DIAGNOSIS — J45.20 MILD INTERMITTENT ASTHMA WITHOUT COMPLICATION: ICD-10-CM

## 2019-08-09 DIAGNOSIS — R06.2 WHEEZING: ICD-10-CM

## 2019-08-09 DIAGNOSIS — G47.33 OSA ON CPAP: ICD-10-CM

## 2019-08-09 LAB
ALBUMIN SERPL BCP-MCNC: 3.1 G/DL (ref 3.5–5.2)
ALP SERPL-CCNC: 97 U/L (ref 55–135)
ALT SERPL W/O P-5'-P-CCNC: 16 U/L (ref 10–44)
ANION GAP SERPL CALC-SCNC: 6 MMOL/L (ref 8–16)
AST SERPL-CCNC: 22 U/L (ref 10–40)
BASOPHILS # BLD AUTO: 0.06 K/UL (ref 0–0.2)
BASOPHILS NFR BLD: 1.1 % (ref 0–1.9)
BILIRUB SERPL-MCNC: 0.2 MG/DL (ref 0.1–1)
BUN SERPL-MCNC: 16 MG/DL (ref 6–20)
CALCIUM SERPL-MCNC: 8.7 MG/DL (ref 8.7–10.5)
CHLORIDE SERPL-SCNC: 106 MMOL/L (ref 95–110)
CHOLEST SERPL-MCNC: 167 MG/DL (ref 120–199)
CHOLEST/HDLC SERPL: 2.8 {RATIO} (ref 2–5)
CO2 SERPL-SCNC: 27 MMOL/L (ref 23–29)
CREAT SERPL-MCNC: 0.8 MG/DL (ref 0.5–1.4)
DIFFERENTIAL METHOD: ABNORMAL
EOSINOPHIL # BLD AUTO: 0.3 K/UL (ref 0–0.5)
EOSINOPHIL NFR BLD: 5 % (ref 0–8)
ERYTHROCYTE [DISTWIDTH] IN BLOOD BY AUTOMATED COUNT: 19 % (ref 11.5–14.5)
EST. GFR  (AFRICAN AMERICAN): >60 ML/MIN/1.73 M^2
EST. GFR  (NON AFRICAN AMERICAN): >60 ML/MIN/1.73 M^2
GLUCOSE SERPL-MCNC: 69 MG/DL (ref 70–110)
HCT VFR BLD AUTO: 41.6 % (ref 37–48.5)
HDLC SERPL-MCNC: 60 MG/DL (ref 40–75)
HDLC SERPL: 35.9 % (ref 20–50)
HGB BLD-MCNC: 12.5 G/DL (ref 12–16)
IMM GRANULOCYTES # BLD AUTO: 0.01 K/UL (ref 0–0.04)
IMM GRANULOCYTES NFR BLD AUTO: 0.2 % (ref 0–0.5)
LDLC SERPL CALC-MCNC: 95.2 MG/DL (ref 63–159)
LYMPHOCYTES # BLD AUTO: 1.1 K/UL (ref 1–4.8)
LYMPHOCYTES NFR BLD: 20.3 % (ref 18–48)
MCH RBC QN AUTO: 27.6 PG (ref 27–31)
MCHC RBC AUTO-ENTMCNC: 30 G/DL (ref 32–36)
MCV RBC AUTO: 92 FL (ref 82–98)
MONOCYTES # BLD AUTO: 0.4 K/UL (ref 0.3–1)
MONOCYTES NFR BLD: 6.7 % (ref 4–15)
NEUTROPHILS # BLD AUTO: 3.6 K/UL (ref 1.8–7.7)
NEUTROPHILS NFR BLD: 66.7 % (ref 38–73)
NONHDLC SERPL-MCNC: 107 MG/DL
NRBC BLD-RTO: 0 /100 WBC
PLATELET # BLD AUTO: 248 K/UL (ref 150–350)
PMV BLD AUTO: 9.7 FL (ref 9.2–12.9)
POTASSIUM SERPL-SCNC: 4.2 MMOL/L (ref 3.5–5.1)
PROT SERPL-MCNC: 6.6 G/DL (ref 6–8.4)
RBC # BLD AUTO: 4.53 M/UL (ref 4–5.4)
SODIUM SERPL-SCNC: 139 MMOL/L (ref 136–145)
TRIGL SERPL-MCNC: 59 MG/DL (ref 30–150)
WBC # BLD AUTO: 5.41 K/UL (ref 3.9–12.7)

## 2019-08-09 PROCEDURE — 3008F PR BODY MASS INDEX (BMI) DOCUMENTED: ICD-10-PCS | Mod: CPTII,S$GLB,, | Performed by: INTERNAL MEDICINE

## 2019-08-09 PROCEDURE — 99205 PR OFFICE/OUTPT VISIT, NEW, LEVL V, 60-74 MIN: ICD-10-PCS | Mod: S$GLB,,, | Performed by: INTERNAL MEDICINE

## 2019-08-09 PROCEDURE — 99999 PR PBB SHADOW E&M-EST. PATIENT-LVL III: CPT | Mod: PBBFAC,,, | Performed by: INTERNAL MEDICINE

## 2019-08-09 PROCEDURE — 99999 PR PBB SHADOW E&M-EST. PATIENT-LVL III: ICD-10-PCS | Mod: PBBFAC,,, | Performed by: INTERNAL MEDICINE

## 2019-08-09 PROCEDURE — 80061 LIPID PANEL: CPT

## 2019-08-09 PROCEDURE — 3008F BODY MASS INDEX DOCD: CPT | Mod: CPTII,S$GLB,, | Performed by: INTERNAL MEDICINE

## 2019-08-09 PROCEDURE — 80053 COMPREHEN METABOLIC PANEL: CPT

## 2019-08-09 PROCEDURE — 36415 COLL VENOUS BLD VENIPUNCTURE: CPT

## 2019-08-09 PROCEDURE — 99205 OFFICE O/P NEW HI 60 MIN: CPT | Mod: S$GLB,,, | Performed by: INTERNAL MEDICINE

## 2019-08-09 PROCEDURE — 85025 COMPLETE CBC W/AUTO DIFF WBC: CPT

## 2019-08-09 PROCEDURE — 86803 HEPATITIS C AB TEST: CPT

## 2019-08-09 RX ORDER — FLUTICASONE PROPIONATE 50 MCG
2 SPRAY, SUSPENSION (ML) NASAL DAILY
Qty: 1 BOTTLE | Refills: 11 | Status: SHIPPED | OUTPATIENT
Start: 2019-08-09 | End: 2019-10-03 | Stop reason: SDUPTHER

## 2019-08-09 RX ORDER — PREDNISONE 20 MG/1
TABLET ORAL
Qty: 20 TABLET | Refills: 1 | Status: SHIPPED | OUTPATIENT
Start: 2019-08-09 | End: 2019-10-03 | Stop reason: SDUPTHER

## 2019-08-09 RX ORDER — ALBUTEROL SULFATE 90 UG/1
1-2 AEROSOL, METERED RESPIRATORY (INHALATION) EVERY 6 HOURS PRN
Qty: 1 INHALER | Refills: 0 | Status: SHIPPED | OUTPATIENT
Start: 2019-08-09 | End: 2019-08-09

## 2019-08-09 RX ORDER — IPRATROPIUM BROMIDE AND ALBUTEROL SULFATE 2.5; .5 MG/3ML; MG/3ML
3 SOLUTION RESPIRATORY (INHALATION) EVERY 6 HOURS PRN
Qty: 1 BOX | Refills: 0 | Status: SHIPPED | OUTPATIENT
Start: 2019-08-09 | End: 2019-08-09

## 2019-08-09 RX ORDER — ALBUTEROL SULFATE 90 UG/1
1-2 AEROSOL, METERED RESPIRATORY (INHALATION) EVERY 6 HOURS PRN
Qty: 1 INHALER | Refills: 11 | Status: SHIPPED | OUTPATIENT
Start: 2019-08-09 | End: 2019-08-09 | Stop reason: SDUPTHER

## 2019-08-09 RX ORDER — ALBUTEROL SULFATE 90 UG/1
1-2 AEROSOL, METERED RESPIRATORY (INHALATION) EVERY 6 HOURS PRN
Qty: 1 INHALER | Refills: 11 | Status: SHIPPED | OUTPATIENT
Start: 2019-08-09 | End: 2020-01-16 | Stop reason: SDUPTHER

## 2019-08-09 RX ORDER — ZAFIRLUKAST 20 MG/1
20 TABLET, FILM COATED ORAL
COMMUNITY
Start: 2016-01-03 | End: 2019-08-09 | Stop reason: SDUPTHER

## 2019-08-09 RX ORDER — AZITHROMYCIN 250 MG/1
250 TABLET, FILM COATED ORAL DAILY
Qty: 6 TABLET | Refills: 1 | Status: SHIPPED | OUTPATIENT
Start: 2019-08-09 | End: 2020-01-16

## 2019-08-09 RX ORDER — FLUTICASONE PROPIONATE 50 MCG
1 SPRAY, SUSPENSION (ML) NASAL DAILY
Qty: 1 BOTTLE | Refills: 0 | Status: SHIPPED | OUTPATIENT
Start: 2019-08-09 | End: 2019-08-09

## 2019-08-09 RX ORDER — FLUTICASONE PROPIONATE AND SALMETEROL 250; 50 UG/1; UG/1
1 POWDER RESPIRATORY (INHALATION) 2 TIMES DAILY
Qty: 60 EACH | Refills: 11 | Status: SHIPPED | OUTPATIENT
Start: 2019-08-09 | End: 2019-10-03 | Stop reason: SDUPTHER

## 2019-08-09 RX ORDER — IPRATROPIUM BROMIDE AND ALBUTEROL SULFATE 2.5; .5 MG/3ML; MG/3ML
3 SOLUTION RESPIRATORY (INHALATION) EVERY 6 HOURS PRN
Qty: 1 BOX | Refills: 11 | Status: SHIPPED | OUTPATIENT
Start: 2019-08-09 | End: 2019-10-03 | Stop reason: SDUPTHER

## 2019-08-09 RX ORDER — ZAFIRLUKAST 20 MG/1
20 TABLET, FILM COATED ORAL 2 TIMES DAILY
Qty: 60 TABLET | Refills: 11 | Status: SHIPPED | OUTPATIENT
Start: 2019-08-09 | End: 2020-01-16 | Stop reason: SDUPTHER

## 2019-08-09 NOTE — PROGRESS NOTES
Subjective:       Patient ID: Salvatore Phillips is a 58 y.o. female.    Chief Complaint: She       Asthma and Sarcoidosis    HPI     Sarcoidosis: diagnosis at 31 y/o  At [facility] - mediastinal lymph node biopsy    Dyspnea  Patient complains of shortness of breath. Symptoms occur after more than one flight stairs, with more than one block walking. Symptoms began 5 months ago, gradually worsening since. Associated symptoms include  constant cough, difficulty breathing, drainage from nose, dyspnea on exertion, morning cough, post nasal drip, productive cough, shortness of breath, sputum production and wheezing. He denies chest pain, located left chest. He has had recent travel. Weight has been stable. Symptoms are exacerbated by any exercise. Symptoms are alleviated by medication(s) (albuterol).     History of sarcoidosis. No iritis, eye irritation, no new skin lesions, no arthralgias.        Asthma  She presents for initial evaluation of asthma, chronic breathing problems and a history of wheezing. The patient has been previously diagnosed with asthma. [unfilled] was diagnosed with asthma at age 30 . The patient has been admitted to the hospital for asthma; the last admission was last year. The patient is currently having symptoms / an exacerbation. Current symptoms include dyspnea, productive cough and wheezing. Symptoms have been present since several months ago and have been gradually worsening. She denies dyspnea, non-productive cough and wheezing. Associated symptoms include poor exercise tolerance.  This episode appears to have been triggered by no identifiable factor. Treatments tried for the current exacerbation include inhaled corticosteroids, leukotriene inhibitors and short-acting inhaled beta-adrenergic agonists, which have provided some relief of symptoms. The patient has been having similar episodes for approximately many years.    Current Disease Severity  The patient is having daytime symptoms  throughout the day. The patient is having daytime symptoms more than once per week, but not nightly. The patient is using short-acting beta agonists for symptom control several times per day. She has exacerbations requiring oral systemic corticosteroids 2 times per year. Current limitations in activity from asthma: none. Number of days of school or work missed in the last month: not applicable. Number of urgent/emergent visit in last year: 1.  The patient is not using a spacer with MDIs. Her best peak flow rate is na. She is not monitoring peak flow rates at home.    Sleep Apnea  She presents for a sleep evaluation. She complains of snoring, snorting, periods of not breathing, decreased concentration, excessive daytime sleepiness, falling asleep while watching television, congested nose, feels sleepy during the day.  Symptoms began many years years ago, gradually worsening since that time.  She goes to sleep at 11 weekdays and  weekends. She awakens 6 weekdays and  weekends. She falls asleep in 10 minutes.  Collar size na. She denies knees buckling with laughing, completely or partially paralyzed while falling asleep or waking up. Previous evaluation and treatment has included PSG and PSG with C PAP titration.  Hx of Obstructive Sleep Apnea  Sleep study : [facility]hot[facility]  [facility] 12    Past Medical History:   Diagnosis Date    Asthma     Hypertension     Sarcoidosis of lung     Sleep apnea     maintained on CPAP    Upper respiratory disease     URI (upper respiratory infection)      Past Surgical History:   Procedure Laterality Date    ABDOMINAL SURGERY      BREAST SURGERY       SECTION      x3    COLON SURGERY      exploratory procedure      GASTRIC BYPASS      HYSTERECTOMY      JOINT REPLACEMENT  2013    KNEE SURGERY      SMALL INTESTINE SURGERY      tummy tuck       Social History     Socioeconomic History    Marital status: Single     Spouse name: Not on file    Number of children: Not on  file    Years of education: Not on file    Highest education level: Not on file   Occupational History    Not on file   Social Needs    Financial resource strain: Somewhat hard    Food insecurity:     Worry: Sometimes true     Inability: Sometimes true    Transportation needs:     Medical: No     Non-medical: No   Tobacco Use    Smoking status: Current Every Day Smoker     Packs/day: 1.50     Years: 15.00     Pack years: 22.50     Types: Cigarettes     Start date: 1/1/1979    Smokeless tobacco: Never Used    Tobacco comment: started age 18   Substance and Sexual Activity    Alcohol use: Not Currently     Frequency: Monthly or less     Drinks per session: 1 or 2     Binge frequency: Never     Comment: occ    Drug use: Never    Sexual activity: Never   Lifestyle    Physical activity:     Days per week: 0 days     Minutes per session: 0 min    Stress: Very much   Relationships    Social connections:     Talks on phone: Not on file     Gets together: Once a week     Attends Mandaeism service: Not on file     Active member of club or organization: No     Attends meetings of clubs or organizations: Never     Relationship status:    Other Topics Concern    Not on file   Social History Narrative    Not on file     Review of Systems   Constitutional: Positive for fatigue. Negative for fever.   HENT: Positive for postnasal drip, rhinorrhea and congestion.    Eyes: Negative for redness and itching.   Respiratory: Positive for cough, sputum production, shortness of breath, dyspnea on extertion, use of rescue inhaler and Paroxysmal Nocturnal Dyspnea.    Cardiovascular: Negative for chest pain, palpitations and leg swelling.   Genitourinary: Negative for difficulty urinating and hematuria.   Endocrine: Negative for cold intolerance and heat intolerance.    Skin: Negative for rash.   Gastrointestinal: Negative for nausea and abdominal pain.   Neurological: Negative for dizziness, syncope, weakness and  "light-headedness.   Hematological: Negative for adenopathy. Does not bruise/bleed easily.   Psychiatric/Behavioral: Negative for sleep disturbance. The patient is not nervous/anxious.        Objective:      /78   Pulse 72   Resp 16   Ht 5' 7" (1.702 m)   Wt 81.5 kg (179 lb 12.6 oz)   SpO2 97%   BMI 28.16 kg/m²   Physical Exam   Constitutional: She is oriented to person, place, and time. She appears well-developed and well-nourished.   HENT:   Head: Normocephalic and atraumatic.   Mouth/Throat: Oropharyngeal exudate present.   Eyes: Pupils are equal, round, and reactive to light. Conjunctivae are normal.   Neck: Neck supple. No JVD present. No tracheal deviation present. No thyromegaly present.   Cardiovascular: Normal rate, regular rhythm and normal heart sounds.   Pulmonary/Chest: Effort normal. No respiratory distress. She has decreased breath sounds. She has wheezes in the right lower field and the left lower field. She has no rhonchi. She has no rales. She exhibits no tenderness.   Abdominal: Soft. Bowel sounds are normal.   Musculoskeletal: Normal range of motion. She exhibits no edema.   Lymphadenopathy:     She has no cervical adenopathy.   Neurological: She is alert and oriented to person, place, and time.   Skin: Skin is warm and dry.   Nursing note and vitals reviewed.    Personal Diagnostic Review  Chest x-ray: pulmonary fibrosis  CT Head Without Contrast  Narrative: EXAMINATION:  CT HEAD WITHOUT CONTRAST    CLINICAL HISTORY:  Dizziness;Headache, acute, norm neuro exam;    TECHNIQUE:  Routine noncontrast head CT.    COMPARISON:  10/03/2018.    FINDINGS:  No change.  There is no acute intracranial hemorrhage or abnormal extra-axial fluid collection.  There is no abnormal increased or decreased density within the brain parenchyma.  Gray-white differentiation preserved.  Normal ventricles.  There is no intracranial mass.  The calvarium is intact.  Visualized paranasal sinuses and mastoids are " well aerated.  Impression: Normal head CT.    All CT scans at this facility are performed  using dose modulation techniques as appropriate to performed exam including the following:  automated exposure control; adjustment of mA and/or kV according to the patients size (this includes techniques or standardized protocols for targeted exams where dose is matched to indication/reason for exam: i.e. extremities or head);  iterative reconstruction technique.    Electronically signed by: Artis Cota MD  Date:    08/24/2019  Time:    16:49      Office Spirometry Results:     No flowsheet data found.  Pulmonary Studies Review 8/24/2019   SpO2 96   Height -   Weight -   BMI (Calculated) -   Predicted Distance -   Predicted Distance Meters (Calculated) -         Assessment:       Sarcoidosis of lung    Moderate persistent asthma with acute exacerbation  -     NEBULIZER FOR HOME USE  -     NEBULIZER KIT (SUPPLIES) FOR HOME USE  -     predniSONE (DELTASONE) 20 MG tablet; Prednisone 60 mg/ day for 3 days, 40 mg/day for 3 days,20 mg/ day for 3 days, (1/2 tablet )10 mg a day for 3 days.  Dispense: 20 tablet; Refill: 1  -     azithromycin (ZITHROMAX Z-KAIA) 250 MG tablet; Take 1 tablet (250 mg total) by mouth once daily. 500 mg on day 1 (two tablets) followed by 250 mg once daily on days 2-5  Dispense: 6 tablet; Refill: 1  -     Spirometry with/without bronchodilator; Future; Expected date: 02/09/2020  -     fluticasone-salmeterol diskus inhaler 250-50 mcg; Inhale 1 puff into the lungs 2 (two) times daily.  Dispense: 60 each; Refill: 11  -     Discontinue: albuterol-ipratropium (DUO-NEB) 2.5 mg-0.5 mg/3 mL nebulizer solution; Take 3 mLs by nebulization every 6 (six) hours as needed for Wheezing. Rescue  Dispense: 1 Box; Refill: 0  -     Discontinue: albuterol (PROVENTIL/VENTOLIN HFA) 90 mcg/actuation inhaler; Inhale 1-2 puffs into the lungs every 6 (six) hours as needed for Wheezing.  Dispense: 1 Inhaler; Refill: 0  -      zafirlukast (ACCOLATE) 20 MG tablet; Take 1 tablet (20 mg total) by mouth 2 (two) times daily.  Dispense: 60 tablet; Refill: 11  -     Discontinue: albuterol (PROVENTIL/VENTOLIN HFA) 90 mcg/actuation inhaler; Inhale 1-2 puffs into the lungs every 6 (six) hours as needed for Wheezing.  Dispense: 1 Inhaler; Refill: 11  -     albuterol-ipratropium (DUO-NEB) 2.5 mg-0.5 mg/3 mL nebulizer solution; Take 3 mLs by nebulization every 6 (six) hours as needed for Wheezing or Shortness of Breath. Rescue. Corrected prescription with refills  Dispense: 1 Box; Refill: 11  -     albuterol (PROVENTIL/VENTOLIN HFA) 90 mcg/actuation inhaler; Inhale 1-2 puffs into the lungs every 6 (six) hours as needed for Wheezing.  Dispense: 1 Inhaler; Refill: 11    NENA on CPAP  -     CPAP/BIPAP SUPPLIES    Postnasal drip  -     Discontinue: fluticasone propionate (FLONASE) 50 mcg/actuation nasal spray; 1 spray (50 mcg total) by Each Nostril route once daily.  Dispense: 1 Bottle; Refill: 0  -     zafirlukast (ACCOLATE) 20 MG tablet; Take 1 tablet (20 mg total) by mouth 2 (two) times daily.  Dispense: 60 tablet; Refill: 11  -     fluticasone propionate (FLONASE) 50 mcg/actuation nasal spray; 2 sprays (100 mcg total) by Each Nostril route once daily. Corrected prescription with refills  Dispense: 1 Bottle; Refill: 11    Wheezing  -     Discontinue: albuterol-ipratropium (DUO-NEB) 2.5 mg-0.5 mg/3 mL nebulizer solution; Take 3 mLs by nebulization every 6 (six) hours as needed for Wheezing. Rescue  Dispense: 1 Box; Refill: 0  -     albuterol-ipratropium (DUO-NEB) 2.5 mg-0.5 mg/3 mL nebulizer solution; Take 3 mLs by nebulization every 6 (six) hours as needed for Wheezing or Shortness of Breath. Rescue. Corrected prescription with refills  Dispense: 1 Box; Refill: 11    Smoking  -     Ambulatory referral to Smoking Cessation Program    NENA (obstructive sleep apnea)          Outpatient Encounter Medications as of 8/9/2019   Medication Sig Dispense Refill     albuterol (PROVENTIL/VENTOLIN HFA) 90 mcg/actuation inhaler Inhale 1-2 puffs into the lungs every 6 (six) hours as needed for Wheezing. 1 Inhaler 11    albuterol-ipratropium (DUO-NEB) 2.5 mg-0.5 mg/3 mL nebulizer solution Take 3 mLs by nebulization every 6 (six) hours as needed for Wheezing or Shortness of Breath. Rescue. Corrected prescription with refills 1 Box 11    amLODIPine (NORVASC) 10 MG tablet Take 1 tablet (10 mg total) by mouth once daily. 90 tablet 1    fluticasone propionate (FLONASE) 50 mcg/actuation nasal spray 2 sprays (100 mcg total) by Each Nostril route once daily. Corrected prescription with refills 1 Bottle 11    fluticasone-salmeterol diskus inhaler 250-50 mcg Inhale 1 puff into the lungs 2 (two) times daily. 60 each 11    oxybutynin (DITROPAN) 5 MG Tab Take 1 tablet (5 mg total) by mouth every evening. 90 tablet 0    zafirlukast (ACCOLATE) 20 MG tablet Take 1 tablet (20 mg total) by mouth 2 (two) times daily. 60 tablet 11    [DISCONTINUED] albuterol (PROVENTIL/VENTOLIN HFA) 90 mcg/actuation inhaler Inhale 1-2 puffs into the lungs every 6 (six) hours as needed for Wheezing. 1 Inhaler 0    [DISCONTINUED] albuterol (PROVENTIL/VENTOLIN HFA) 90 mcg/actuation inhaler Inhale 1-2 puffs into the lungs every 6 (six) hours as needed for Wheezing. 1 Inhaler 0    [DISCONTINUED] albuterol (PROVENTIL/VENTOLIN HFA) 90 mcg/actuation inhaler Inhale 1-2 puffs into the lungs every 6 (six) hours as needed for Wheezing. 1 Inhaler 11    [DISCONTINUED] albuterol-ipratropium (DUO-NEB) 2.5 mg-0.5 mg/3 mL nebulizer solution Take 3 mLs by nebulization every 6 (six) hours as needed for Wheezing. Rescue 1 Box 0    [DISCONTINUED] albuterol-ipratropium (DUO-NEB) 2.5 mg-0.5 mg/3 mL nebulizer solution Take 3 mLs by nebulization every 6 (six) hours as needed for Wheezing. Rescue 1 Box 0    [DISCONTINUED] fluticasone propionate (FLONASE) 50 mcg/actuation nasal spray 1 spray (50 mcg total) by Each Nostril route  once daily. 1 Bottle 0    [DISCONTINUED] fluticasone propionate (FLONASE) 50 mcg/actuation nasal spray 1 spray (50 mcg total) by Each Nostril route once daily. 1 Bottle 0    [DISCONTINUED] fluticasone-salmeterol 250-50 mcg/dose (ADVAIR) 250-50 mcg/dose diskus inhaler Inhale 1 puff into the lungs 2 (two) times daily.      [DISCONTINUED] predniSONE (DELTASONE) 20 MG tablet Take 1 tablet (20 mg total) by mouth once daily. for 10 days 10 tablet 0    [DISCONTINUED] zafirlukast (ACCOLATE) 20 MG tablet Take 20 mg by mouth.      [DISCONTINUED] zolpidem (AMBIEN) 10 mg Tab Take 1 tablet (10 mg total) by mouth nightly as needed (insomnia). 30 tablet 0    azithromycin (ZITHROMAX Z-KAIA) 250 MG tablet Take 1 tablet (250 mg total) by mouth once daily. 500 mg on day 1 (two tablets) followed by 250 mg once daily on days 2-5 6 tablet 1    predniSONE (DELTASONE) 20 MG tablet Prednisone 60 mg/ day for 3 days, 40 mg/day for 3 days,20 mg/ day for 3 days, (1/2 tablet )10 mg a day for 3 days. 20 tablet 1     No facility-administered encounter medications on file as of 2019.      Plan:       Requested Prescriptions     Signed Prescriptions Disp Refills    predniSONE (DELTASONE) 20 MG tablet 20 tablet 1     Sig: Prednisone 60 mg/ day for 3 days, 40 mg/day for 3 days,20 mg/ day for 3 days, (1/2 tablet )10 mg a day for 3 days.    azithromycin (ZITHROMAX Z-KAIA) 250 MG tablet 6 tablet 1     Sig: Take 1 tablet (250 mg total) by mouth once daily. 500 mg on day 1 (two tablets) followed by 250 mg once daily on days 2-5    fluticasone-salmeterol diskus inhaler 250-50 mcg 60 each 11     Sig: Inhale 1 puff into the lungs 2 (two) times daily.    zafirlukast (ACCOLATE) 20 MG tablet 60 tablet 11     Sig: Take 1 tablet (20 mg total) by mouth 2 (two) times daily.    fluticasone propionate (FLONASE) 50 mcg/actuation nasal spray 1 Bottle 11     Si sprays (100 mcg total) by Each Nostril route once daily. Corrected prescription with refills     albuterol-ipratropium (DUO-NEB) 2.5 mg-0.5 mg/3 mL nebulizer solution 1 Box 11     Sig: Take 3 mLs by nebulization every 6 (six) hours as needed for Wheezing or Shortness of Breath. Rescue. Corrected prescription with refills    albuterol (PROVENTIL/VENTOLIN HFA) 90 mcg/actuation inhaler 1 Inhaler 11     Sig: Inhale 1-2 puffs into the lungs every 6 (six) hours as needed for Wheezing.     Problem List Items Addressed This Visit     Asthma    Relevant Medications    predniSONE (DELTASONE) 20 MG tablet    azithromycin (ZITHROMAX Z-KAIA) 250 MG tablet    fluticasone-salmeterol diskus inhaler 250-50 mcg    zafirlukast (ACCOLATE) 20 MG tablet    albuterol-ipratropium (DUO-NEB) 2.5 mg-0.5 mg/3 mL nebulizer solution    albuterol (PROVENTIL/VENTOLIN HFA) 90 mcg/actuation inhaler    Other Relevant Orders    NEBULIZER FOR HOME USE    NEBULIZER KIT (SUPPLIES) FOR HOME USE    Spirometry with/without bronchodilator (Completed)    NENA on CPAP    Overview     maintained on CPAP         Relevant Orders    CPAP/BIPAP SUPPLIES    Postnasal drip    Relevant Medications    zafirlukast (ACCOLATE) 20 MG tablet    fluticasone propionate (FLONASE) 50 mcg/actuation nasal spray    Sarcoidosis of lung - Primary    Wheezing    Relevant Medications    albuterol-ipratropium (DUO-NEB) 2.5 mg-0.5 mg/3 mL nebulizer solution      Other Visit Diagnoses     Smoking        Relevant Orders    Ambulatory referral to Smoking Cessation Program    NENA (obstructive sleep apnea)                 Follow up in about 4 weeks (around 9/6/2019) for Release Medical Records - Sleep Study, Review rashida.    MEDICAL DECISION MAKING: Moderate to high complexity.  Overall, the multiple problems listed are of moderate to high severity that may impact quality of life and activities of daily living. Side effects of medications, treatment plan as well as options and alternatives reviewed and discussed with patient. There was counseling of patient concerning these  issues.    Total time spent in face to face counseling and coordination of care - 60  minutes over 50% of time was used in discussion of prognosis, risks, benefits of treatment, instructions and compliance with regimen . Discussion with other physicians or health care providers (DME, NP, pharmacy, respiratory therapy) occurred.

## 2019-08-09 NOTE — LETTER
August 9, 2019      Viviana Cuba MD  [address] [place] [address] [address] [id number]           [facility]  [address] [place] [address] [address]  [place] [address]  Phone: [phone]  Fax: [phone]          Patient: Salvatore Phillips   MR Number: [id number]   YOB: 1960   Date of Visit: 8/9/2019       Dear Dr. Viviana Cuba:    Thank you for referring Salvatore Phillips to me for evaluation. Attached you will find relevant portions of my assessment and plan of care.    If you have questions, please do not hesitate to call me. I look forward to following Salvatore Phillips along with you.    Sincerely,    Terrance Morales MD    Enclosure  CC:  No Recipients    If you would like to receive this communication electronically, please contact [email or web address] or [phone] to request more information on [employer] Link access.    For providers and/or their staff who would like to refer a patient to [facility], please contact us through our one-stop-shop provider referral line, [facility], at [phone].    If you feel you have received this communication in error or would no longer like to receive these types of communications, please e-mail [email or web address]

## 2019-08-12 LAB — HCV AB SERPL QL IA: NEGATIVE

## 2019-08-23 ENCOUNTER — HOSPITAL ENCOUNTER (EMERGENCY)
Facility: HOSPITAL | Age: 59
Discharge: HOME | End: 2019-08-24
Attending: FAMILY MEDICINE
Payer: COMMERCIAL

## 2019-08-23 DIAGNOSIS — R42 DIZZINESS: ICD-10-CM

## 2019-08-23 DIAGNOSIS — R51.9 ACUTE NONINTRACTABLE HEADACHE, UNSPECIFIED HEADACHE TYPE: Primary | ICD-10-CM

## 2019-08-23 DIAGNOSIS — R05.9 COUGH: ICD-10-CM

## 2019-08-23 LAB
ALBUMIN SERPL BCP-MCNC: 3.3 G/DL (ref 3.5–5.2)
ALP SERPL-CCNC: 80 U/L (ref 55–135)
ALT SERPL W/O P-5'-P-CCNC: 16 U/L (ref 10–44)
ANION GAP SERPL CALC-SCNC: 11 MMOL/L (ref 8–16)
AST SERPL-CCNC: 19 U/L (ref 10–40)
BASOPHILS # BLD AUTO: 0.05 K/UL (ref 0–0.2)
BASOPHILS NFR BLD: 0.7 % (ref 0–1.9)
BILIRUB SERPL-MCNC: 0.2 MG/DL (ref 0.1–1)
BUN SERPL-MCNC: 25 MG/DL (ref 6–20)
CALCIUM SERPL-MCNC: 8.7 MG/DL (ref 8.7–10.5)
CHLORIDE SERPL-SCNC: 105 MMOL/L (ref 95–110)
CO2 SERPL-SCNC: 26 MMOL/L (ref 23–29)
CREAT SERPL-MCNC: 1.3 MG/DL (ref 0.5–1.4)
DIFFERENTIAL METHOD: ABNORMAL
EOSINOPHIL # BLD AUTO: 0.1 K/UL (ref 0–0.5)
EOSINOPHIL NFR BLD: 0.7 % (ref 0–8)
ERYTHROCYTE [DISTWIDTH] IN BLOOD BY AUTOMATED COUNT: 20.4 % (ref 11.5–14.5)
EST. GFR  (AFRICAN AMERICAN): 53 ML/MIN/1.73 M^2
EST. GFR  (NON AFRICAN AMERICAN): 46 ML/MIN/1.73 M^2
GLUCOSE SERPL-MCNC: 80 MG/DL (ref 70–110)
HCT VFR BLD AUTO: 40.8 % (ref 37–48.5)
HGB BLD-MCNC: 12.7 G/DL (ref 12–16)
LYMPHOCYTES # BLD AUTO: 2.2 K/UL (ref 1–4.8)
LYMPHOCYTES NFR BLD: 30.5 % (ref 18–48)
MCH RBC QN AUTO: 27.5 PG (ref 27–31)
MCHC RBC AUTO-ENTMCNC: 31.1 G/DL (ref 32–36)
MCV RBC AUTO: 88 FL (ref 82–98)
MONOCYTES # BLD AUTO: 0.6 K/UL (ref 0.3–1)
MONOCYTES NFR BLD: 8.8 % (ref 4–15)
NEUTROPHILS # BLD AUTO: 4.2 K/UL (ref 1.8–7.7)
NEUTROPHILS NFR BLD: 59.9 % (ref 38–73)
PLATELET # BLD AUTO: 221 K/UL (ref 150–350)
PMV BLD AUTO: 9.5 FL (ref 9.2–12.9)
POTASSIUM SERPL-SCNC: 4.3 MMOL/L (ref 3.5–5.1)
PROT SERPL-MCNC: 6.9 G/DL (ref 6–8.4)
RBC # BLD AUTO: 4.62 M/UL (ref 4–5.4)
SODIUM SERPL-SCNC: 142 MMOL/L (ref 136–145)
WBC # BLD AUTO: 7.14 K/UL (ref 3.9–12.7)

## 2019-08-23 PROCEDURE — 94640 AIRWAY INHALATION TREATMENT: CPT

## 2019-08-23 PROCEDURE — 96374 THER/PROPH/DIAG INJ IV PUSH: CPT

## 2019-08-23 PROCEDURE — 93010 EKG 12-LEAD: ICD-10-PCS | Mod: ,,, | Performed by: INTERNAL MEDICINE

## 2019-08-23 PROCEDURE — 80053 COMPREHEN METABOLIC PANEL: CPT

## 2019-08-23 PROCEDURE — 63600175 PHARM REV CODE 636 W HCPCS: Performed by: FAMILY MEDICINE

## 2019-08-23 PROCEDURE — 85025 COMPLETE CBC W/AUTO DIFF WBC: CPT

## 2019-08-23 PROCEDURE — 99285 EMERGENCY DEPT VISIT HI MDM: CPT | Mod: 25

## 2019-08-23 PROCEDURE — 93010 ELECTROCARDIOGRAM REPORT: CPT | Mod: ,,, | Performed by: INTERNAL MEDICINE

## 2019-08-23 PROCEDURE — 93005 ELECTROCARDIOGRAM TRACING: CPT

## 2019-08-23 PROCEDURE — 36415 COLL VENOUS BLD VENIPUNCTURE: CPT

## 2019-08-23 PROCEDURE — 25000242 PHARM REV CODE 250 ALT 637 W/ HCPCS: Performed by: FAMILY MEDICINE

## 2019-08-23 RX ORDER — IPRATROPIUM BROMIDE AND ALBUTEROL SULFATE 2.5; .5 MG/3ML; MG/3ML
3 SOLUTION RESPIRATORY (INHALATION)
Status: COMPLETED | OUTPATIENT
Start: 2019-08-23 | End: 2019-08-23

## 2019-08-23 RX ORDER — KETOROLAC TROMETHAMINE 30 MG/ML
30 INJECTION, SOLUTION INTRAMUSCULAR; INTRAVENOUS
Status: COMPLETED | OUTPATIENT
Start: 2019-08-23 | End: 2019-08-23

## 2019-08-23 RX ADMIN — IPRATROPIUM BROMIDE AND ALBUTEROL SULFATE 3 ML: .5; 3 SOLUTION RESPIRATORY (INHALATION) at 09:08

## 2019-08-23 RX ADMIN — KETOROLAC TROMETHAMINE 30 MG: 30 INJECTION, SOLUTION INTRAMUSCULAR at 09:08

## 2019-08-24 ENCOUNTER — HOSPITAL ENCOUNTER (EMERGENCY)
Facility: HOSPITAL | Age: 59
Discharge: HOME | End: 2019-08-24
Attending: EMERGENCY MEDICINE
Payer: COMMERCIAL

## 2019-08-24 VITALS
TEMPERATURE: 97 F | DIASTOLIC BLOOD PRESSURE: 68 MMHG | OXYGEN SATURATION: 96 % | BODY MASS INDEX: 27.82 KG/M2 | HEART RATE: 90 BPM | SYSTOLIC BLOOD PRESSURE: 132 MMHG | HEIGHT: 67 IN | WEIGHT: 177.25 LBS | RESPIRATION RATE: 18 BRPM

## 2019-08-24 VITALS
TEMPERATURE: 98 F | HEART RATE: 87 BPM | BODY MASS INDEX: 28.3 KG/M2 | HEIGHT: 67 IN | SYSTOLIC BLOOD PRESSURE: 112 MMHG | DIASTOLIC BLOOD PRESSURE: 65 MMHG | RESPIRATION RATE: 18 BRPM | OXYGEN SATURATION: 94 % | WEIGHT: 180.31 LBS

## 2019-08-24 DIAGNOSIS — R51.9 SINUS HEADACHE: Primary | ICD-10-CM

## 2019-08-24 PROCEDURE — 63600175 PHARM REV CODE 636 W HCPCS: Performed by: EMERGENCY MEDICINE

## 2019-08-24 PROCEDURE — 99284 EMERGENCY DEPT VISIT MOD MDM: CPT | Mod: 25

## 2019-08-24 PROCEDURE — 96372 THER/PROPH/DIAG INJ SC/IM: CPT

## 2019-08-24 RX ORDER — BUTALBITAL, ACETAMINOPHEN AND CAFFEINE 50; 325; 40 MG/1; MG/1; MG/1
1 TABLET ORAL EVERY 4 HOURS PRN
Qty: 9 TABLET | Refills: 0 | Status: SHIPPED | OUTPATIENT
Start: 2019-08-24 | End: 2019-10-09 | Stop reason: SDUPTHER

## 2019-08-24 RX ORDER — METHYLPREDNISOLONE 4 MG/1
TABLET ORAL
Qty: 1 PACKAGE | Refills: 0 | Status: SHIPPED | OUTPATIENT
Start: 2019-08-24 | End: 2019-10-03 | Stop reason: ALTCHOICE

## 2019-08-24 RX ORDER — KETOROLAC TROMETHAMINE 30 MG/ML
60 INJECTION, SOLUTION INTRAMUSCULAR; INTRAVENOUS
Status: COMPLETED | OUTPATIENT
Start: 2019-08-24 | End: 2019-08-24

## 2019-08-24 RX ADMIN — KETOROLAC TROMETHAMINE 60 MG: 30 INJECTION, SOLUTION INTRAMUSCULAR at 04:08

## 2019-08-24 NOTE — ED PROVIDER NOTES
"SCRIBE #1 NOTE: I, Christinesamanta Kaiser, am scribing for, and in the presence of, Kentrell Frias Jr., MD. I have scribed the entire note.       History     Chief Complaint   Patient presents with    Headache     pt reports she was here last night and left "because there was too much going on" States "i still have the headache" C/O "all over" head pain. Had a concussion 2 weeks ago      Review of patient's allergies indicates:  No Known Allergies      History of Present Illness     HPI    8/24/2019, 3:51 PM  History obtained from the patient      History of Present Illness: Salvatore Phillips is a 58 y.o. female patient with a PMHx of asthma, HTN, lung sarcoidosis, sleep apnea on CPAP, upper respiratory disease who presents to the Emergency Department for evaluation of a frontal HA which onset gradually several days ago. Pt states that she had a concussion on 8/10/19. She was evaluated here last night and had labs, CXR, and EKG and was discharged home. She reports her head is still throbbing. The pain has been constant and moderate in severity. She has tried Tylenol, Tylenol Sinus, Brandee seltzer without relief. No mitigating or exacerbating factors reported. Associated sxs include nausea and nasal congestion. Patient denies fever, chills, CP, SOB, vomiting, photophobia, dizziness, extremity weakness/numbness, paresthesias, and all other sxs at this time.  Patient does no nasal congestion.  No sore throat.  She does have remote history of concussion.  There is no photophobia.  There is no neck pain or stiffness.      Arrival mode: Personal vehicle    PCP: Viviana Cuba MD        Past Medical History:  Past Medical History:   Diagnosis Date    Asthma     Hypertension     Sarcoidosis of lung     Sleep apnea     maintained on CPAP    Upper respiratory disease     URI (upper respiratory infection)        Past Surgical History:  Past Surgical History:   Procedure Laterality Date    ABDOMINAL SURGERY      BREAST SURGERY "       SECTION      x3    COLON SURGERY      exploratory procedure      GASTRIC BYPASS      HYSTERECTOMY      JOINT REPLACEMENT  2013    KNEE SURGERY      SMALL INTESTINE SURGERY      tummy tuck           Family History:  Family History   Problem Relation Age of Onset    Alzheimer's disease Mother     Cancer Father         lung    Crohn's disease Brother     Kidney disease Brother     Arthritis Sister     Hypertension Son        Social History:  Social History     Tobacco Use    Smoking status: Current Every Day Smoker     Packs/day: 1.50     Years: 15.00     Pack years: 22.50     Types: Cigarettes     Start date: 1979    Smokeless tobacco: Never Used    Tobacco comment: started age 18   Substance and Sexual Activity    Alcohol use: Not Currently     Frequency: Monthly or less     Drinks per session: 1 or 2     Binge frequency: Never     Comment: occ    Drug use: Never    Sexual activity: Never        Review of Systems     Review of Systems   Constitutional: Negative for chills and fever.   HENT: Positive for congestion. Negative for ear pain and sore throat.    Eyes: Negative for photophobia and visual disturbance.   Respiratory: Negative for cough and shortness of breath.    Cardiovascular: Negative for chest pain.   Gastrointestinal: Positive for nausea. Negative for abdominal pain and vomiting.   Genitourinary: Negative for dysuria.   Musculoskeletal: Negative for back pain, neck pain and neck stiffness.   Skin: Negative for rash.   Neurological: Positive for headaches. Negative for dizziness, seizures, syncope, facial asymmetry, speech difficulty, weakness, light-headedness and numbness.   Hematological: Does not bruise/bleed easily.   All other systems reviewed and are negative.     Physical Exam     Initial Vitals [19 1547]   BP Pulse Resp Temp SpO2   131/72 95 20 98.6 °F (37 °C) (!) 92 %      MAP       --          Physical Exam  Nursing Notes and Vital Signs  "Reviewed.  Constitutional: Patient is in no acute distress. Well-developed and well-nourished.  Head: Atraumatic. Normocephalic.  Eyes: PERRL. EOM intact. Conjunctivae are not pale. No scleral icterus.  ENT: Mucous membranes are moist. Oropharynx is clear and symmetric.  0 P is clear.  TMs are clear.  External ear canals are clean.  Tonsils are normal. There is no maxillary sinus tenderness.  There is mild tenderness of the frontal sinus.  Neck: Supple. Full ROM. No lymphadenopathy.  Cardiovascular: Regular rate. Regular rhythm. No murmurs, rubs, or gallops. Distal pulses are 2+ and symmetric.  Pulmonary/Chest: No respiratory distress. Clear to auscultation bilaterally. No wheezing or rales.  Abdominal: Soft and non-distended.  There is no tenderness.  No rebound, guarding, or rigidity. Good bowel sounds.  Genitourinary: No CVA tenderness. No suprapubic tenderness.  Musculoskeletal: Moves all extremities. No obvious deformities. No edema. No calf tenderness.  Skin: Warm and dry. No rash. No cellulitis.  Neurological:  Alert, awake, and appropriate. GCS 15.  Normal speech.  No acute focal neurological deficits are appreciated. No nuchal rigidity or meningismus. 5 x 5 strength in all extremities. 2 through 12 intact bilaterally.  Psychiatric: Normal affect. Good eye contact. Appropriate in content.     ED Course   Procedures  ED Vital Signs:  Vitals:    08/24/19 1547   BP: 131/72   Pulse: 95   Resp: 20   Temp: 98.6 °F (37 °C)   TempSrc: Oral   SpO2: (!) 92%   Weight: 80.4 kg (177 lb 4 oz)   Height: 5' 7" (1.702 m)     Imaging Results:  Imaging Results          CT Head Without Contrast (Final result)  Result time 08/24/19 16:49:42    Final result by Artis Cota MD (08/24/19 16:49:42)                 Impression:      Normal head CT.    All CT scans at this facility are performed  using dose modulation techniques as appropriate to performed exam including the following:  automated exposure control; adjustment of mA " and/or kV according to the patients size (this includes techniques or standardized protocols for targeted exams where dose is matched to indication/reason for exam: i.e. extremities or head);  iterative reconstruction technique.      Electronically signed by: Artis Cota MD  Date:    08/24/2019  Time:    16:49             Narrative:    EXAMINATION:  CT HEAD WITHOUT CONTRAST    CLINICAL HISTORY:  Dizziness;Headache, acute, norm neuro exam;    TECHNIQUE:  Routine noncontrast head CT.    COMPARISON:  10/03/2018.    FINDINGS:  No change.  There is no acute intracranial hemorrhage or abnormal extra-axial fluid collection.  There is no abnormal increased or decreased density within the brain parenchyma.  Gray-white differentiation preserved.  Normal ventricles.  There is no intracranial mass.  The calvarium is intact.  Visualized paranasal sinuses and mastoids are well aerated.                                    The Emergency Provider reviewed the vital signs and test results, which are outlined above.     ED Discussion     4:58 PM: Informed patient that CT head is negative. Discussed pt dx and plan of tx. Gave pt all f/u and return to the ED instructions. All questions and concerns were addressed at this time. Pt expresses understanding of information and instructions, and is comfortable with plan to discharge. Pt is stable for discharge.    Patient's headache is either consistent with previous headache and/or lacks features concerning for emergent or life threatening condition.  I do not suspect SAH, meningitis, increased IC pressure, infectious, toxic, vascular, CNS, or other EMC.  I have discussed this at length with patient and/or family/caretaker.      5:03 PM  Patient is stable and nontoxic.  Clinically, she has sinus headache.  Her treating this with steroids as well as breakthrough pain control.  Patient feels much better in the department is sleeping currently in the bed.  She is safe for discharge in my  opinion.  I discussed with the patient all findings well as plan of care.  She verbalized her agreement understanding.  She seems reliable.    Medical Decision Making:   Clinical Tests:   Radiological Study: Ordered and Reviewed           ED Medication(s):  Medications   ketorolac injection 60 mg (60 mg Intramuscular Given 8/24/19 1956)       New Prescriptions    No medications on file         Scribe Attestation:   Scribe #1: I performed the above scribed service and the documentation accurately describes the services I performed. I attest to the accuracy of the note.     Attending:   Physician Attestation Statement for Scribe #1: I, Kentrell Frias Jr., MD, personally performed the services described in this documentation, as scribed by Christine Kaiser, in my presence, and it is both accurate and complete.           Clinical Impression       ICD-10-CM ICD-9-CM   1. Sinus headache R51 784.0       Disposition:   Disposition: Discharged  Condition: Stable         Kentrell Frias Jr., MD  08/24/19 8307

## 2019-08-24 NOTE — ED PROVIDER NOTES
SCRIBE #1 NOTE: I, Kimberlee Rock, am scribing for, and in the presence of, Lory Carrillo MD. I have scribed the entire note.       History     Chief Complaint   Patient presents with    Dizziness     pt reports dx with concussion on 8/10; pt reports ringing in the ears, drowsiness and headaches     Review of patient's allergies indicates:  No Known Allergies      History of Present Illness     HPI    2019, 9:19 PM  History obtained from the patient      History of Present Illness: Salvatore Phillips is a 58 y.o. female patient with a PMHx of HTN, asthma, NENA (w/ CPAP), and upper respiratory diseases who presents to the Emergency Department for evaluation of dizziness which onset gradually over the last week. Pt states that she was dx with a concussion on 8/10/19. Symptoms are constant and moderate in severity. No mitigating or exacerbating factors reported. Associated sxs include frontal HA, nausea, drowsiness, ringing in the ears, productive cough, and sinus congestion. Patient denies any numbness, speech difficulty, confusion, SOB, CP, palpations, BLE edema, fever/ chills, dysuria, abdominal pain, sore throat, emesis, hematuria, lightheadedness, diarrhea, and all other sxs at this time. Prior Tx includes OTC sinus medication and prescription sinus nasal spray. No further complaints or concerns at this time.     Arrival mode: Personal vehicle    PCP: Viviana Cuba MD        Past Medical History:  Past Medical History:   Diagnosis Date    Asthma     Hypertension     Sarcoidosis of lung     Sleep apnea     maintained on CPAP    Upper respiratory disease     URI (upper respiratory infection)        Past Surgical History:  Past Surgical History:   Procedure Laterality Date    ABDOMINAL SURGERY      BREAST SURGERY       SECTION      x3    COLON SURGERY      exploratory procedure      GASTRIC BYPASS      HYSTERECTOMY      JOINT REPLACEMENT  2013    KNEE SURGERY      SMALL INTESTINE  SURGERY      tummy tuck           Family History:  Family History   Problem Relation Age of Onset    Alzheimer's disease Mother     Cancer Father         lung    Crohn's disease Brother     Kidney disease Brother     Arthritis Sister     Hypertension Son        Social History:  Social History     Tobacco Use    Smoking status: Current Every Day Smoker     Packs/day: 1.50     Years: 15.00     Pack years: 22.50     Types: Cigarettes     Start date: 1/1/1979    Smokeless tobacco: Never Used    Tobacco comment: started age 18   Substance and Sexual Activity    Alcohol use: Not Currently     Frequency: Monthly or less     Drinks per session: 1 or 2     Binge frequency: Never     Comment: occ    Drug use: Never    Sexual activity: Never        Review of Systems     Review of Systems   Constitutional: Negative for chills and fever.        + Drowsiness     HENT: Positive for congestion (sinus). Negative for ear pain, sore throat and voice change.         + Ringing in the ears   Respiratory: Positive for cough. Negative for shortness of breath.    Cardiovascular: Negative for chest pain, palpitations and leg swelling (BLE).   Gastrointestinal: Positive for nausea. Negative for abdominal pain, diarrhea and vomiting.   Genitourinary: Negative for dysuria and hematuria.   Musculoskeletal: Negative for back pain and neck pain.   Skin: Negative for rash and wound.   Neurological: Positive for dizziness and headaches. Negative for speech difficulty, weakness, light-headedness and numbness.   Hematological: Does not bruise/bleed easily.   Psychiatric/Behavioral: Negative for confusion.   All other systems reviewed and are negative.     Physical Exam     Initial Vitals [08/23/19 2000]   BP Pulse Resp Temp SpO2   (!) 109/57 73 20 97.9 °F (36.6 °C) 96 %      MAP       --          Physical Exam  Nursing Notes and Vital Signs Reviewed.   Constitutional: Patient is in no acute distress. Well-developed and  "well-nourished.  Head: Atraumatic. Normocephalic.  Eyes: PERRL. EOM intact. Conjunctivae are not pale. No scleral icterus.  ENT: Mucous membranes are moist. Oropharynx is clear and symmetric.    Neck: Supple. Full ROM. No lymphadenopathy.  Cardiovascular: Regular rate. Regular rhythm. No murmurs, rubs, or gallops. Distal pulses are 2+ and symmetric.  Pulmonary/Chest: No respiratory distress. Clear to auscultation bilaterally. No wheezing or rales.  Abdominal: Soft and non-distended.  There is no tenderness.  No rebound, guarding, or rigidity. Good bowel sounds.  Genitourinary: No CVA tenderness  Musculoskeletal: Moves all extremities. No obvious deformities. No edema. No calf tenderness.  Skin: Warm and dry.  Neurological:  Alert, awake, and appropriate.  Normal speech.  No acute focal neurological deficits are appreciated.  Psychiatric: Normal affect. Good eye contact. Appropriate in content.     ED Course   Procedures  ED Vital Signs:  Vitals:    08/23/19 2000 08/23/19 2116 08/23/19 2128 08/23/19 2130   BP: (!) 109/57  (S) 114/67 (S) 114/73   Pulse: 73 78 (S) 73 (S) 75   Resp: 20  20 20   Temp: 97.9 °F (36.6 °C)      TempSrc: Oral      SpO2: 96%  (!) 93% 95%   Weight: 81.8 kg (180 lb 5.4 oz)      Height: 5' 7" (1.702 m)       08/23/19 2132 08/23/19 2137 08/23/19 2142 08/23/19 2148   BP: (S) 118/70      Pulse: (S) 76 71 68 68   Resp: 18 (!) 22 (!) 28 (!) 28   Temp:       TempSrc:       SpO2: 95% (!) 92% 100% 100%   Weight:       Height:        08/23/19 2230 08/23/19 2300 08/23/19 2351   BP: 115/60 110/62 112/65   Pulse: 81 81 87   Resp: 18 20 18   Temp:   98 °F (36.7 °C)   TempSrc:   Oral   SpO2: (!) 92% 95% (!) 94%   Weight:      Height:          Abnormal Lab Results:  Labs Reviewed   CBC W/ AUTO DIFFERENTIAL - Abnormal; Notable for the following components:       Result Value    Mean Corpuscular Hemoglobin Conc 31.1 (*)     RDW 20.4 (*)     All other components within normal limits   COMPREHENSIVE METABOLIC PANEL " - Abnormal; Notable for the following components:    BUN, Bld 25 (*)     Albumin 3.3 (*)     eGFR if  53 (*)     eGFR if non  46 (*)     All other components within normal limits        All Lab Results:  Results for orders placed or performed during the hospital encounter of 08/23/19   CBC auto differential   Result Value Ref Range    WBC 7.14 3.90 - 12.70 K/uL    RBC 4.62 4.00 - 5.40 M/uL    Hemoglobin 12.7 12.0 - 16.0 g/dL    Hematocrit 40.8 37.0 - 48.5 %    Mean Corpuscular Volume 88 82 - 98 fL    Mean Corpuscular Hemoglobin 27.5 27.0 - 31.0 pg    Mean Corpuscular Hemoglobin Conc 31.1 (L) 32.0 - 36.0 g/dL    RDW 20.4 (H) 11.5 - 14.5 %    Platelets 221 150 - 350 K/uL    MPV 9.5 9.2 - 12.9 fL    Gran # (ANC) 4.2 1.8 - 7.7 K/uL    Lymph # 2.2 1.0 - 4.8 K/uL    Mono # 0.6 0.3 - 1.0 K/uL    Eos # 0.1 0.0 - 0.5 K/uL    Baso # 0.05 0.00 - 0.20 K/uL    Gran% 59.9 38.0 - 73.0 %    Lymph% 30.5 18.0 - 48.0 %    Mono% 8.8 4.0 - 15.0 %    Eosinophil% 0.7 0.0 - 8.0 %    Basophil% 0.7 0.0 - 1.9 %    Differential Method Automated    Comprehensive metabolic panel   Result Value Ref Range    Sodium 142 136 - 145 mmol/L    Potassium 4.3 3.5 - 5.1 mmol/L    Chloride 105 95 - 110 mmol/L    CO2 26 23 - 29 mmol/L    Glucose 80 70 - 110 mg/dL    BUN, Bld 25 (H) 6 - 20 mg/dL    Creatinine 1.3 0.5 - 1.4 mg/dL    Calcium 8.7 8.7 - 10.5 mg/dL    Total Protein 6.9 6.0 - 8.4 g/dL    Albumin 3.3 (L) 3.5 - 5.2 g/dL    Total Bilirubin 0.2 0.1 - 1.0 mg/dL    Alkaline Phosphatase 80 55 - 135 U/L    AST 19 10 - 40 U/L    ALT 16 10 - 44 U/L    Anion Gap 11 8 - 16 mmol/L    eGFR if African American 53 (A) >60 mL/min/1.73 m^2    eGFR if non African American 46 (A) >60 mL/min/1.73 m^2         Imaging Results:  Imaging Results          X-Ray Chest 1 View (Final result)  Result time 08/23/19 22:57:14    Final result by Abdelrahman Mistry MD (08/23/19 22:57:14)                 Impression:      Stable appearance of the lungs  with marked coarsening of the bronchovascular interstitium and extensive scarring throughout.      Electronically signed by: Abdelrahman Mistry MD  Date:    08/23/2019  Time:    22:57             Narrative:    EXAMINATION:  XR CHEST 1 VIEW    CLINICAL HISTORY:  Cough    COMPARISON:  Chest x-ray, 07/06/2019    FINDINGS:  There is marked chronic scarring throughout the lungs and marked coarsening of the bronchovascular interstitium.  Heart size is normal.  No pleural effusion or pneumothorax.  No evidence for pulmonary edema.                                 The EKG was ordered, reviewed, and independently interpreted by the ED provider.  Interpretation time: 21:16  Rate: 78 BPM  Rhythm: SR with premature supraventricular complexes  Interpretation: Possible left atrial enlargement. Borderline ECG. No STEMI.           The Emergency Provider reviewed the vital signs and test results, which are outlined above.     ED Discussion     11:21 PM: Reassessed pt at this time.  Pt states her condition has improved at this time. Discussed with pt all pertinent ED information and results. Discussed pt dx and plan of tx. Gave pt all f/u and return to the ED instructions. All questions and concerns were addressed at this time. Pt expresses understanding of information and instructions, and is comfortable with plan to discharge. Pt is stable for discharge.    I discussed with patient and/or family/caretaker that evaluation in the ED does not suggest any emergent or life threatening medical conditions requiring immediate intervention beyond what was provided in the ED, and I believe patient is safe for discharge.  Regardless, an unremarkable evaluation in the ED does not preclude the development or presence of a serious of life threatening condition. As such, patient was instructed to return immediately for any worsening or change in current symptoms.     Medical Decision Making:   Clinical Tests:   Lab Tests: Ordered and  Reviewed  Radiological Study: Reviewed and Ordered  Medical Tests: Ordered and Reviewed           ED Medication(s):  Medications   albuterol-ipratropium 2.5 mg-0.5 mg/3 mL nebulizer solution 3 mL (3 mLs Nebulization Given 8/23/19 2148)   ketorolac injection 30 mg (30 mg Intravenous Given 8/23/19 2137)       Discharge Medication List as of 8/23/2019 11:24 PM          Follow-up Information     Schedule an appointment as soon as possible for a visit  with Viviana Cuba MD.    Specialty:  Internal Medicine  Why:  As needed  Contact information:  [address] [place] [address] [address] [place] [address]  [phone]                       Scribe Attestation:   Scribe #1: I performed the above scribed service and the documentation accurately describes the services I performed. I attest to the accuracy of the note.     Attending:   Physician Attestation Statement for Scribe #1: I, Lory Carrillo MD, personally performed the services described in this documentation, as scribed by Kimberlee Rock, in my presence, and it is both accurate and complete.           Clinical Impression       ICD-10-CM ICD-9-CM   1. Acute nonintractable headache, unspecified headache type R51 784.0   2. Dizziness R42 780.4   3. Cough R05 786.2       Disposition:   Disposition: Discharged  Condition: Stable         Lory Carrillo MD  08/24/19 2039

## 2019-08-24 NOTE — DISCHARGE INSTRUCTIONS
Medrol Dosepak as prescribed.  Ibuprofen for headaches.  Fioricet for breakthrough headaches.  See her primary care doctor on Monday for re-evaluation.  Return as needed for any worsening symptoms, problems, questions or concerns.

## 2019-08-24 NOTE — ED NOTES
Patient identifiers verified and correct for Salvatore Phillips.    Patient presented to the ED with c/o frontal headache. Patient was seen in the ED last night and was d/c home with no relief. Patient reports she had a concussion on 8/10. Patient reports she is also having nausea, but denies any other issues.     LOC: The patient is awake, alert and aware of environment with an appropriate affect, the patient is oriented x 3 and speaking appropriately.  APPEARANCE: Patient resting comfortably and in no acute distress, patient is clean and well groomed, patient's clothing is properly fastened.  HEENT: + headache   SKIN: The skin is warm and dry, color consistent with ethnicity, patient has normal skin turgor and moist mucus membranes, skin intact, no breakdown or bruising noted.  MUSCULOSKELETAL: Patient moving all extremities spontaneously.   RESPIRATORY: Airway is open and patent, respirations are spontaneous, and unlabored. Breath sounds are clear.   CARDIAC: Patient has a normal rate, no periphreal edema noted, capillary refill < 3 seconds.   ABDOMEN: Soft and non tender to palpation. No distention noted.   : Normal urinary patterns. Urine is yellow without foul odor.

## 2019-09-09 ENCOUNTER — PATIENT MESSAGE (OUTPATIENT)
Dept: INTERNAL MEDICINE | Facility: CLINIC | Age: 59
End: 2019-09-09

## 2019-09-09 ENCOUNTER — TELEPHONE (OUTPATIENT)
Dept: PULMONOLOGY | Facility: CLINIC | Age: 59
End: 2019-09-09

## 2019-09-09 DIAGNOSIS — F51.01 PRIMARY INSOMNIA: ICD-10-CM

## 2019-09-09 DIAGNOSIS — G47.33 OSA ON CPAP: Primary | ICD-10-CM

## 2019-09-09 RX ORDER — ZOLPIDEM TARTRATE 10 MG/1
10 TABLET ORAL NIGHTLY PRN
Qty: 30 TABLET | Refills: 0 | Status: SHIPPED | OUTPATIENT
Start: 2019-09-09 | End: 2019-10-03 | Stop reason: SDUPTHER

## 2019-09-09 NOTE — TELEPHONE ENCOUNTER
----- Message from Pretty Aranda sent at 9/9/2019 12:37 PM CDT -----  Contact: [facility]  Caller needs call back rg order for cpap supplies  [phone]

## 2019-09-09 NOTE — LETTER
September 12, 2019    Salvatore Phillips  [address]  [place] [address]       [facility]  [address]  [place] [address]  Phone: [phone]  Fax: [phone] Dear Ms. Phillips:    You're insurance company has indicated that to obtain additional supplies the new CPAP machine you will need to undergo a not other sleep study.    I have placed an order for a home sleep study to be performed.  Please call [facility] to schedule sleep study at  [phone] . Usually take 1- 2 days to get insurance company approval.  You will need to schedule at follow up clinic appointment 7 days after the sleep study to review the results.    If you have any questions or concerns, please don't hesitate to call.    Sincerely,        Terrance Morales MD

## 2019-09-10 DIAGNOSIS — F51.01 PRIMARY INSOMNIA: ICD-10-CM

## 2019-09-10 RX ORDER — ZOLPIDEM TARTRATE 10 MG/1
10 TABLET ORAL NIGHTLY PRN
Qty: 30 TABLET | Refills: 0 | OUTPATIENT
Start: 2019-09-10

## 2019-09-12 ENCOUNTER — TELEPHONE (OUTPATIENT)
Dept: PULMONOLOGY | Facility: HOSPITAL | Age: 59
End: 2019-09-12

## 2019-09-17 ENCOUNTER — CLINICAL SUPPORT (OUTPATIENT)
Dept: PULMONOLOGY | Facility: CLINIC | Age: 59
End: 2019-09-17
Payer: COMMERCIAL

## 2019-09-17 DIAGNOSIS — J45.41 MODERATE PERSISTENT ASTHMA WITH ACUTE EXACERBATION: ICD-10-CM

## 2019-09-17 PROCEDURE — 94060 PR EVAL OF BRONCHOSPASM: ICD-10-PCS | Mod: S$GLB,,, | Performed by: INTERNAL MEDICINE

## 2019-09-17 PROCEDURE — 94060 EVALUATION OF WHEEZING: CPT | Mod: S$GLB,,, | Performed by: INTERNAL MEDICINE

## 2019-09-18 LAB
BRPFT: ABNORMAL
FEF 25 75 CHG: 7.9 %
FEF 25 75 LLN: 0.99
FEF 25 75 POST REF: 35.1 %
FEF 25 75 PRE REF: 32.5 %
FEF 25 75 REF: 2.22
FET100 CHG: -3.6 %
FEV1 CHG: 4.1 %
FEV1 FVC CHG: 1.6 %
FEV1 FVC LLN: 68
FEV1 FVC POST REF: 78.1 %
FEV1 FVC PRE REF: 76.9 %
FEV1 FVC REF: 80
FEV1 LLN: 1.78
FEV1 POST REF: 58.6 %
FEV1 PRE REF: 56.3 %
FEV1 REF: 2.42
FVC CHG: 2.5 %
FVC LLN: 2.28
FVC POST REF: 74.6 %
FVC PRE REF: 72.8 %
FVC REF: 3.06
PEF CHG: 20.1 %
PEF LLN: 4.09
PEF POST REF: 60 %
PEF PRE REF: 50 %
PEF REF: 6.29
POST FEF 25 75: 0.78 L/S (ref 0.99–3.46)
POST FET 100: 6.82 SEC
POST FEV1 FVC: 62.17 % (ref 68.23–91.04)
POST FEV1: 1.42 L (ref 1.78–3.06)
POST FVC: 2.28 L (ref 2.28–3.84)
POST PEF: 3.77 L/S (ref 4.09–8.48)
PRE FEF 25 75: 0.72 L/S (ref 0.99–3.46)
PRE FET 100: 7.08 SEC
PRE FEV1 FVC: 61.22 % (ref 68.23–91.04)
PRE FEV1: 1.36 L (ref 1.78–3.06)
PRE FVC: 2.23 L (ref 2.28–3.84)
PRE PEF: 3.14 L/S (ref 4.09–8.48)

## 2019-09-20 DIAGNOSIS — G47.33 OSA (OBSTRUCTIVE SLEEP APNEA): ICD-10-CM

## 2019-09-20 DIAGNOSIS — G47.33 OSA ON CPAP: Primary | ICD-10-CM

## 2019-09-21 NOTE — PROGRESS NOTES
Subjective:       Patient ID: Salvatore Phillips is a 58 y.o. female.    Chief Complaint: She       No chief complaint on file.    HPI     Sarcoidosis: diagnosis at 29 y/o  At [facility] - mediastinal lymph node biopsy    Dyspnea  Patient complains of shortness of breath. Symptoms occur after more than one flight stairs, with more than one block walking. Symptoms began 5 months ago, gradually worsening since. Associated symptoms include  constant cough, difficulty breathing, drainage from nose, dyspnea on exertion, morning cough, post nasal drip, productive cough, shortness of breath, sputum production and wheezing. He denies chest pain, located left chest. He has had recent travel. Weight has been stable. Symptoms are exacerbated by any exercise. Symptoms are alleviated by medication(s) (albuterol).     History of sarcoidosis. No iritis, eye irritation, no new skin lesions, no arthralgias.        Asthma  She presents for initial evaluation of asthma, chronic breathing problems and a history of wheezing. The patient has been previously diagnosed with asthma. [unfilled] was diagnosed with asthma at age 30 . The patient has been admitted to the hospital for asthma; the last admission was last year. The patient is currently having symptoms / an exacerbation. Current symptoms include dyspnea, productive cough and wheezing. Symptoms have been present since several months ago and have been gradually worsening. She denies dyspnea, non-productive cough and wheezing. Associated symptoms include poor exercise tolerance.  This episode appears to have been triggered by no identifiable factor. Treatments tried for the current exacerbation include inhaled corticosteroids, leukotriene inhibitors and short-acting inhaled beta-adrenergic agonists, which have provided some relief of symptoms. The patient has been having similar episodes for approximately many years.    Current Disease Severity  The patient is having daytime  symptoms throughout the day. The patient is having daytime symptoms more than once per week, but not nightly. The patient is using short-acting beta agonists for symptom control several times per day. She has exacerbations requiring oral systemic corticosteroids 2 times per year. Current limitations in activity from asthma: none. Number of days of school or work missed in the last month: not applicable. Number of urgent/emergent visit in last year: 1.  The patient is not using a spacer with MDIs. Her best peak flow rate is na. She is not monitoring peak flow rates at home.    Sleep Apnea  She presents for a sleep evaluation. She complains of snoring, snorting, periods of not breathing, decreased concentration, excessive daytime sleepiness, falling asleep while watching television, congested nose, feels sleepy during the day.  Symptoms began many years years ago, gradually worsening since that time.  She goes to sleep at 11 weekdays and  weekends. She awakens 6 weekdays and  weekends. She falls asleep in 10 minutes.  Collar size na. She denies knees buckling with laughing, completely or partially paralyzed while falling asleep or waking up. Previous evaluation and treatment has included PSG and PSG with C PAP titration.  Hx of Obstructive Sleep Apnea  Sleep study : [facility]  [facility] 12    Past Medical History:   Diagnosis Date    Asthma     Hypertension     Sarcoidosis of lung     Sleep apnea     maintained on CPAP    Upper respiratory disease     URI (upper respiratory infection)      Past Surgical History:   Procedure Laterality Date    ABDOMINAL SURGERY      BREAST SURGERY       SECTION      x3    COLON SURGERY      exploratory procedure      GASTRIC BYPASS      HYSTERECTOMY      JOINT REPLACEMENT  2013    KNEE SURGERY      SMALL INTESTINE SURGERY      tummy tuck       Social History     Socioeconomic History    Marital status: Single     Spouse name: Not on file    Number of children:  Not on file    Years of education: Not on file    Highest education level: Not on file   Occupational History    Not on file   Social Needs    Financial resource strain: Somewhat hard    Food insecurity:     Worry: Sometimes true     Inability: Sometimes true    Transportation needs:     Medical: No     Non-medical: No   Tobacco Use    Smoking status: Current Every Day Smoker     Packs/day: 1.50     Years: 15.00     Pack years: 22.50     Types: Cigarettes     Start date: 1/1/1979    Smokeless tobacco: Never Used    Tobacco comment: started age 18   Substance and Sexual Activity    Alcohol use: Not Currently     Frequency: Monthly or less     Drinks per session: 1 or 2     Binge frequency: Never     Comment: occ    Drug use: Never    Sexual activity: Never   Lifestyle    Physical activity:     Days per week: 0 days     Minutes per session: 0 min    Stress: Very much   Relationships    Social connections:     Talks on phone: Not on file     Gets together: Once a week     Attends Gnosticist service: Not on file     Active member of club or organization: No     Attends meetings of clubs or organizations: Never     Relationship status:    Other Topics Concern    Not on file   Social History Narrative    Not on file     Review of Systems   Constitutional: Positive for fatigue. Negative for fever.   HENT: Positive for postnasal drip, rhinorrhea and congestion.    Eyes: Negative for redness and itching.   Respiratory: Positive for cough, sputum production, shortness of breath, dyspnea on extertion, use of rescue inhaler and Paroxysmal Nocturnal Dyspnea.    Cardiovascular: Negative for chest pain, palpitations and leg swelling.   Genitourinary: Negative for difficulty urinating and hematuria.   Endocrine: Negative for cold intolerance and heat intolerance.    Skin: Negative for rash.   Gastrointestinal: Negative for nausea and abdominal pain.   Neurological: Negative for dizziness, syncope, weakness  and light-headedness.   Hematological: Negative for adenopathy. Does not bruise/bleed easily.   Psychiatric/Behavioral: Negative for sleep disturbance. The patient is not nervous/anxious.        Objective:      There were no vitals taken for this visit.  Physical Exam   Constitutional: She is oriented to person, place, and time. She appears well-developed and well-nourished.   HENT:   Head: Normocephalic and atraumatic.   Mouth/Throat: Oropharyngeal exudate present.   Eyes: Pupils are equal, round, and reactive to light. Conjunctivae are normal.   Neck: Neck supple. No JVD present. No tracheal deviation present. No thyromegaly present.   Cardiovascular: Normal rate, regular rhythm and normal heart sounds.   Pulmonary/Chest: Effort normal. No respiratory distress. She has decreased breath sounds. She has wheezes in the right lower field and the left lower field. She has no rhonchi. She has no rales. She exhibits no tenderness.   Abdominal: Soft. Bowel sounds are normal.   Musculoskeletal: Normal range of motion. She exhibits no edema.   Lymphadenopathy:     She has no cervical adenopathy.   Neurological: She is alert and oriented to person, place, and time.   Skin: Skin is warm and dry.   Nursing note and vitals reviewed.    Personal Diagnostic Review  Chest x-ray: pulmonary fibrosis  CT Head Without Contrast  Narrative: EXAMINATION:  CT HEAD WITHOUT CONTRAST    CLINICAL HISTORY:  Dizziness;Headache, acute, norm neuro exam;    TECHNIQUE:  Routine noncontrast head CT.    COMPARISON:  10/03/2018.    FINDINGS:  No change.  There is no acute intracranial hemorrhage or abnormal extra-axial fluid collection.  There is no abnormal increased or decreased density within the brain parenchyma.  Gray-white differentiation preserved.  Normal ventricles.  There is no intracranial mass.  The calvarium is intact.  Visualized paranasal sinuses and mastoids are well aerated.  Impression: Normal head CT.    All CT scans at this  facility are performed  using dose modulation techniques as appropriate to performed exam including the following:  automated exposure control; adjustment of mA and/or kV according to the patients size (this includes techniques or standardized protocols for targeted exams where dose is matched to indication/reason for exam: i.e. extremities or head);  iterative reconstruction technique.    Electronically signed by: Artis Cota MD  Date:    08/24/2019  Time:    16:49      Office Spirometry Results:     No flowsheet data found.  Pulmonary Studies Review 8/24/2019   SpO2 96   Height -   Weight -   BMI (Calculated) -   Predicted Distance -   Predicted Distance Meters (Calculated) -         Assessment:       NENA on CPAP    NENA (obstructive sleep apnea)          Outpatient Encounter Medications as of 9/20/2019   Medication Sig Dispense Refill    albuterol (PROVENTIL/VENTOLIN HFA) 90 mcg/actuation inhaler Inhale 1-2 puffs into the lungs every 6 (six) hours as needed for Wheezing. 1 Inhaler 11    albuterol-ipratropium (DUO-NEB) 2.5 mg-0.5 mg/3 mL nebulizer solution Take 3 mLs by nebulization every 6 (six) hours as needed for Wheezing or Shortness of Breath. Rescue. Corrected prescription with refills 1 Box 11    amLODIPine (NORVASC) 10 MG tablet Take 1 tablet (10 mg total) by mouth once daily. 90 tablet 1    azithromycin (ZITHROMAX Z-KAIA) 250 MG tablet Take 1 tablet (250 mg total) by mouth once daily. 500 mg on day 1 (two tablets) followed by 250 mg once daily on days 2-5 6 tablet 1    butalbital-acetaminophen-caffeine -40 mg (FIORICET, ESGIC) -40 mg per tablet Take 1 tablet by mouth every 4 (four) hours as needed for Pain. 9 tablet 0    fluticasone propionate (FLONASE) 50 mcg/actuation nasal spray 2 sprays (100 mcg total) by Each Nostril route once daily. Corrected prescription with refills 1 Bottle 11    fluticasone-salmeterol diskus inhaler 250-50 mcg Inhale 1 puff into the lungs 2 (two) times daily.  60 each 11    methylPREDNISolone (MEDROL DOSEPACK) 4 mg tablet Take as directed on the package. 1 Package 0    oxybutynin (DITROPAN) 5 MG Tab Take 1 tablet (5 mg total) by mouth every evening. 90 tablet 0    predniSONE (DELTASONE) 20 MG tablet Prednisone 60 mg/ day for 3 days, 40 mg/day for 3 days,20 mg/ day for 3 days, (1/2 tablet )10 mg a day for 3 days. 20 tablet 1    zafirlukast (ACCOLATE) 20 MG tablet Take 1 tablet (20 mg total) by mouth 2 (two) times daily. 60 tablet 11    zolpidem (AMBIEN) 10 mg Tab Take 1 tablet (10 mg total) by mouth nightly as needed (insomnia). 30 tablet 0     No facility-administered encounter medications on file as of 9/20/2019.      Plan:       Requested Prescriptions      No prescriptions requested or ordered in this encounter     Problem List Items Addressed This Visit     NENA on CPAP - Primary    Overview     maintained on CPAP           Other Visit Diagnoses     NENA (obstructive sleep apnea)                 Follow up for Release Medical Records - Sleep Study, Review Sleep Study.    MEDICAL DECISION MAKING: Moderate to high complexity.  Overall, the multiple problems listed are of moderate to high severity that may impact quality of life and activities of daily living. Side effects of medications, treatment plan as well as options and alternatives reviewed and discussed with patient. There was counseling of patient concerning these issues.    Total time spent in face to face counseling and coordination of care - 60  minutes over 50% of time was used in discussion of prognosis, risks, benefits of treatment, instructions and compliance with regimen . Discussion with other physicians or health care providers (DME, NP, pharmacy, respiratory therapy) occurred.

## 2019-09-24 ENCOUNTER — HOSPITAL ENCOUNTER (OUTPATIENT)
Dept: RADIOLOGY | Facility: HOSPITAL | Age: 59
Discharge: HOME | End: 2019-09-24
Attending: FAMILY MEDICINE
Payer: COMMERCIAL

## 2019-09-24 DIAGNOSIS — Z12.31 SCREENING MAMMOGRAM, ENCOUNTER FOR: ICD-10-CM

## 2019-09-24 PROCEDURE — 77067 SCR MAMMO BI INCL CAD: CPT | Mod: 26,,, | Performed by: RADIOLOGY

## 2019-09-24 PROCEDURE — 77063 BREAST TOMOSYNTHESIS BI: CPT | Mod: 26,,, | Performed by: RADIOLOGY

## 2019-09-24 PROCEDURE — 77067 SCR MAMMO BI INCL CAD: CPT | Mod: TC

## 2019-09-24 PROCEDURE — 77063 MAMMO DIGITAL SCREENING BILAT WITH TOMOSYNTHESIS_CAD: ICD-10-PCS | Mod: 26,,, | Performed by: RADIOLOGY

## 2019-09-24 PROCEDURE — 77067 MAMMO DIGITAL SCREENING BILAT WITH TOMOSYNTHESIS_CAD: ICD-10-PCS | Mod: 26,,, | Performed by: RADIOLOGY

## 2019-10-03 ENCOUNTER — TELEPHONE (OUTPATIENT)
Dept: PULMONOLOGY | Facility: CLINIC | Age: 59
End: 2019-10-03

## 2019-10-03 ENCOUNTER — OFFICE VISIT (OUTPATIENT)
Dept: PULMONOLOGY | Facility: CLINIC | Age: 59
End: 2019-10-03
Payer: COMMERCIAL

## 2019-10-03 VITALS
HEIGHT: 67 IN | SYSTOLIC BLOOD PRESSURE: 114 MMHG | RESPIRATION RATE: 18 BRPM | WEIGHT: 170.88 LBS | DIASTOLIC BLOOD PRESSURE: 58 MMHG | HEART RATE: 52 BPM | BODY MASS INDEX: 26.82 KG/M2 | OXYGEN SATURATION: 96 %

## 2019-10-03 DIAGNOSIS — R09.82 POSTNASAL DRIP: ICD-10-CM

## 2019-10-03 DIAGNOSIS — D86.0 SARCOIDOSIS OF LUNG: ICD-10-CM

## 2019-10-03 DIAGNOSIS — R06.2 WHEEZING: ICD-10-CM

## 2019-10-03 DIAGNOSIS — F51.01 PRIMARY INSOMNIA: ICD-10-CM

## 2019-10-03 DIAGNOSIS — J45.41 MODERATE PERSISTENT ASTHMA WITH ACUTE EXACERBATION: Primary | ICD-10-CM

## 2019-10-03 DIAGNOSIS — J01.81 OTHER ACUTE RECURRENT SINUSITIS: ICD-10-CM

## 2019-10-03 DIAGNOSIS — F17.200 SMOKING: ICD-10-CM

## 2019-10-03 DIAGNOSIS — J45.20 INTERMITTENT ASTHMA WITHOUT COMPLICATION, UNSPECIFIED ASTHMA SEVERITY: ICD-10-CM

## 2019-10-03 DIAGNOSIS — G47.33 OSA (OBSTRUCTIVE SLEEP APNEA): ICD-10-CM

## 2019-10-03 PROCEDURE — 99215 PR OFFICE/OUTPT VISIT, EST, LEVL V, 40-54 MIN: ICD-10-PCS | Mod: 25,S$GLB,, | Performed by: INTERNAL MEDICINE

## 2019-10-03 PROCEDURE — 3008F BODY MASS INDEX DOCD: CPT | Mod: CPTII,S$GLB,, | Performed by: INTERNAL MEDICINE

## 2019-10-03 PROCEDURE — 99999 PR PBB SHADOW E&M-EST. PATIENT-LVL IV: ICD-10-PCS | Mod: PBBFAC,,, | Performed by: INTERNAL MEDICINE

## 2019-10-03 PROCEDURE — 3008F PR BODY MASS INDEX (BMI) DOCUMENTED: ICD-10-PCS | Mod: CPTII,S$GLB,, | Performed by: INTERNAL MEDICINE

## 2019-10-03 PROCEDURE — 96372 THER/PROPH/DIAG INJ SC/IM: CPT | Mod: S$GLB,,, | Performed by: INTERNAL MEDICINE

## 2019-10-03 PROCEDURE — 99215 OFFICE O/P EST HI 40 MIN: CPT | Mod: 25,S$GLB,, | Performed by: INTERNAL MEDICINE

## 2019-10-03 PROCEDURE — 99999 PR PBB SHADOW E&M-EST. PATIENT-LVL IV: CPT | Mod: PBBFAC,,, | Performed by: INTERNAL MEDICINE

## 2019-10-03 PROCEDURE — 96372 PR INJECTION,THERAP/PROPH/DIAG2ST, IM OR SUBCUT: ICD-10-PCS | Mod: S$GLB,,, | Performed by: INTERNAL MEDICINE

## 2019-10-03 RX ORDER — FLUTICASONE PROPIONATE AND SALMETEROL 250; 50 UG/1; UG/1
1 POWDER RESPIRATORY (INHALATION) 2 TIMES DAILY
Qty: 60 EACH | Refills: 11 | Status: SHIPPED | OUTPATIENT
Start: 2019-10-03 | End: 2020-01-16 | Stop reason: ALTCHOICE

## 2019-10-03 RX ORDER — FLUTICASONE FUROATE AND VILANTEROL 200; 25 UG/1; UG/1
POWDER RESPIRATORY (INHALATION)
COMMUNITY
Start: 2019-03-27 | End: 2020-01-16 | Stop reason: SDUPTHER

## 2019-10-03 RX ORDER — FLUTICASONE PROPIONATE 50 MCG
2 SPRAY, SUSPENSION (ML) NASAL DAILY
Qty: 1 BOTTLE | Refills: 11 | Status: SHIPPED | OUTPATIENT
Start: 2019-10-03 | End: 2020-01-16 | Stop reason: SDUPTHER

## 2019-10-03 RX ORDER — AMOXICILLIN AND CLAVULANATE POTASSIUM 875; 125 MG/1; MG/1
1 TABLET, FILM COATED ORAL EVERY 12 HOURS
Qty: 20 TABLET | Refills: 0 | Status: SHIPPED | OUTPATIENT
Start: 2019-10-03 | End: 2019-10-13

## 2019-10-03 RX ORDER — ZOLPIDEM TARTRATE 5 MG/1
5 TABLET ORAL NIGHTLY PRN
Qty: 20 TABLET | Refills: 5 | Status: SHIPPED | OUTPATIENT
Start: 2019-10-03 | End: 2019-11-12 | Stop reason: SDUPTHER

## 2019-10-03 RX ORDER — IPRATROPIUM BROMIDE AND ALBUTEROL SULFATE 2.5; .5 MG/3ML; MG/3ML
3 SOLUTION RESPIRATORY (INHALATION) EVERY 6 HOURS PRN
Qty: 1 BOX | Refills: 11 | Status: SHIPPED | OUTPATIENT
Start: 2019-10-03 | End: 2020-04-20

## 2019-10-03 RX ORDER — TRIAMCINOLONE ACETONIDE 40 MG/ML
40 INJECTION, SUSPENSION INTRA-ARTICULAR; INTRAMUSCULAR ONCE
Status: COMPLETED | OUTPATIENT
Start: 2019-10-03 | End: 2019-10-03

## 2019-10-03 RX ORDER — PREDNISONE 20 MG/1
TABLET ORAL
Qty: 20 TABLET | Refills: 0 | Status: SHIPPED | OUTPATIENT
Start: 2019-10-03 | End: 2019-12-17

## 2019-10-03 RX ADMIN — TRIAMCINOLONE ACETONIDE 40 MG: 40 INJECTION, SUSPENSION INTRA-ARTICULAR; INTRAMUSCULAR at 09:10

## 2019-10-03 NOTE — PROGRESS NOTES
Subjective:       Patient ID: Salvatore Phillips is a 58 y.o. female.    Chief Complaint: She       Asthma and Sleep Apnea    Sarcoidosis:  Sarcoidosis Follow up:    Dyspnea  Patient complains of shortness of breath. Symptoms occur after more than one flight stairs, with more than one block walking. Symptoms began years ago years ago, gradually worsening since. Associated symptoms include  dyspnea on exertion, post nasal drip, productive cough, shortness of breath and sputum production. He denies cough after eating. He does not have had recent travel. Weight has been stable. Symptoms are exacerbated by moderate activity. Symptoms are alleviated by rest.     History of sarcoidosis. No iritis, eye irritation, no new skin lesions, no arthralgias.  Sarcoidosis stage:   []        Stage I Sarcoidosis  []        Stage II Sarcoidosis  [x]        Stage III Sarcoidosis  []        Stage IV Sarcoidosis    Clinical assessment:   [x]        Symptomatic  []        Assymptomatic       Indications for treatment of pulmonary sarcoidosis:   []        Evidence of progressive disease   []        Severe disease at presentation    Management options:   [x]        Observation without therapy  []        Systemic glucocorticoids  []        Methotrexate.  []        Azathioprine   []        Leuflonamide  []        Mycophenolate  []        Anti TNF alpha antagonists       HPI Chronic Obstructive Pulmonary Disease/Asthma  She presents for evaluation and treatment of COPD. The patient is currently having symptoms / an exacerbation. Current symptoms include cough productive of yellow sputum in moderate amounts. Symptoms have been present since several weeks ago and have been gradually worsening. She denies chills and fever. Associated symptoms include poor exercise tolerance.  This episode appears to have been triggered by pollens and smoke. Treatments tried for the current exacerbation: albuterol inhaler. The patient has been having similar  episodes for approximately 10 years. She uses 1 pillows at night. Patient currently is not on home oxygen therapy.. The patient is having no constitutional symptoms, denying fever, chills, anorexia, or weight loss. The patient has not been hospitalized for this condition before. She currently smokes 3 packs per week. The patient is experiencing exercise intolerance (difficulty climbing 3 flights of stairs).    Sleep Apnea  She presents for a sleep evaluation. She complains of snoring, periods of not breathing, excessive daytime sleepiness, falling asleep while reading, watching television, feels sleepy during the day, take naps during the day.  Symptoms began 10 years ago, gradually worsening since that time.  She goes to sleep at 1-2 am weekdays and weekends. She awakens 6 weekdays and 7 weekends. She falls asleep in 10 minutes.  Collar size na. She denies knees buckling with laughing, completely or partially paralyzed while falling asleep or waking up. Previous evaluation and treatment has included PSG with C PAP titration.    Past Medical History:   Diagnosis Date    Asthma     Hypertension     Sarcoidosis of lung     Sleep apnea     maintained on CPAP    Upper respiratory disease     URI (upper respiratory infection)      Past Surgical History:   Procedure Laterality Date    ABDOMINAL SURGERY      BREAST LUMPECTOMY Left 1996    BENIGN    BREAST SURGERY       SECTION      x3    COLON SURGERY      exploratory procedure      GASTRIC BYPASS      HYSTERECTOMY  2009    JOINT REPLACEMENT  2013    KNEE SURGERY      OOPHORECTOMY  2009    SMALL INTESTINE SURGERY      tummy tuck       Social History     Socioeconomic History    Marital status: Single     Spouse name: Not on file    Number of children: Not on file    Years of education: Not on file    Highest education level: Not on file   Occupational History    Not on file   Social Needs    Financial resource strain: Somewhat hard    Food  insecurity:     Worry: Sometimes true     Inability: Sometimes true    Transportation needs:     Medical: No     Non-medical: No   Tobacco Use    Smoking status: Current Every Day Smoker     Packs/day: 1.50     Years: 15.00     Pack years: 22.50     Types: Cigarettes     Start date: 1/1/1979    Smokeless tobacco: Never Used    Tobacco comment: started age 18   Substance and Sexual Activity    Alcohol use: Not Currently     Frequency: Monthly or less     Drinks per session: 1 or 2     Binge frequency: Never     Comment: occ    Drug use: Never    Sexual activity: Never   Lifestyle    Physical activity:     Days per week: 0 days     Minutes per session: 0 min    Stress: Very much   Relationships    Social connections:     Talks on phone: Not on file     Gets together: Once a week     Attends Shinto service: Not on file     Active member of club or organization: No     Attends meetings of clubs or organizations: Never     Relationship status:    Other Topics Concern    Not on file   Social History Narrative    Not on file     Review of Systems   Constitutional: Positive for fatigue. Negative for fever.   HENT: Positive for postnasal drip, rhinorrhea and congestion.    Eyes: Negative for redness and itching.   Respiratory: Positive for cough, sputum production, shortness of breath, dyspnea on extertion, use of rescue inhaler and Paroxysmal Nocturnal Dyspnea.    Cardiovascular: Negative for chest pain, palpitations and leg swelling.   Genitourinary: Negative for difficulty urinating and hematuria.   Endocrine: Negative for cold intolerance and heat intolerance.    Skin: Negative for rash.   Gastrointestinal: Negative for nausea and abdominal pain.   Neurological: Negative for dizziness, syncope, weakness and light-headedness.   Hematological: Negative for adenopathy. Does not bruise/bleed easily.   Psychiatric/Behavioral: Negative for sleep disturbance. The patient is not nervous/anxious.       "  Objective:      BP (!) 114/58   Pulse (!) 52   Resp 18   Ht 5' 7" (1.702 m)   Wt 77.5 kg (170 lb 13.7 oz)   SpO2 96%   BMI 26.76 kg/m²   Physical Exam   Constitutional: She is oriented to person, place, and time. She appears well-developed and well-nourished.   HENT:   Head: Normocephalic and atraumatic.   Mouth/Throat: Oropharyngeal exudate present.   Eyes: Pupils are equal, round, and reactive to light. Conjunctivae are normal.   Neck: Neck supple. No JVD present. No tracheal deviation present. No thyromegaly present.   Cardiovascular: Normal rate, regular rhythm and normal heart sounds.   Pulmonary/Chest: Effort normal. No respiratory distress. She has decreased breath sounds. She has wheezes in the right lower field and the left lower field. She has no rhonchi. She has no rales. She exhibits no tenderness.   Abdominal: Soft. Bowel sounds are normal.   Musculoskeletal: Normal range of motion. She exhibits no edema.   Lymphadenopathy:     She has no cervical adenopathy.   Neurological: She is alert and oriented to person, place, and time.   Skin: Skin is warm and dry.   Nursing note and vitals reviewed.    Personal Diagnostic Review  Chest x-ray:  Stable interstitial scarring      Office Spirometry Results:     No flowsheet data found.  Pulmonary Studies Review 10/3/2019   SpO2 96   Height 67.000   Weight 2733.7   BMI (Calculated) 26.8   Predicted Distance 378.46   Predicted Distance Meters (Calculated) 519.19         Assessment:       Moderate persistent asthma with acute exacerbation  -     triamcinolone acetonide injection 40 mg  -     predniSONE (DELTASONE) 20 MG tablet; Prednisone 60 mg/ day for 3 days, 40 mg/day for 3 days,20 mg/ day for 3 days, (1/2 tablet )10 mg a day for 3 days.  Dispense: 20 tablet; Refill: 0  -     albuterol-ipratropium (DUO-NEB) 2.5 mg-0.5 mg/3 mL nebulizer solution; Take 3 mLs by nebulization every 6 (six) hours as needed for Wheezing or Shortness of Breath. Rescue. Corrected " prescription with refills  Dispense: 1 Box; Refill: 11  -     fluticasone-salmeterol diskus inhaler 250-50 mcg; Inhale 1 puff into the lungs 2 (two) times daily.  Dispense: 60 each; Refill: 11  -     ipratropium (ATROVENT HFA) 17 mcg/actuation inhaler; Inhale 2 puffs into the lungs every 4 to 6 hours as needed for Wheezing. Rescue  Dispense: 1 Inhaler; Refill: 11    NENA (obstructive sleep apnea)  -     Home Sleep Studies; Future    Smoking  -     Ambulatory referral to Smoking Cessation Program    Sarcoidosis of lung    Wheezing  -     albuterol-ipratropium (DUO-NEB) 2.5 mg-0.5 mg/3 mL nebulizer solution; Take 3 mLs by nebulization every 6 (six) hours as needed for Wheezing or Shortness of Breath. Rescue. Corrected prescription with refills  Dispense: 1 Box; Refill: 11    Postnasal drip  -     fluticasone propionate (FLONASE) 50 mcg/actuation nasal spray; 2 sprays (100 mcg total) by Each Nostril route once daily. Corrected prescription with refills  Dispense: 1 Bottle; Refill: 11    Intermittent asthma without complication, unspecified asthma severity    Other acute recurrent sinusitis  -     amoxicillin-clavulanate 875-125mg (AUGMENTIN) 875-125 mg per tablet; Take 1 tablet by mouth every 12 (twelve) hours. for 10 days  Dispense: 20 tablet; Refill: 0    Primary insomnia  -     zolpidem (AMBIEN) 5 MG Tab; Take 1 tablet (5 mg total) by mouth nightly as needed (insomnia).  Dispense: 20 tablet; Refill: 5          Outpatient Encounter Medications as of 10/3/2019   Medication Sig Dispense Refill    albuterol (PROVENTIL/VENTOLIN HFA) 90 mcg/actuation inhaler Inhale 1-2 puffs into the lungs every 6 (six) hours as needed for Wheezing. 1 Inhaler 11    albuterol-ipratropium (DUO-NEB) 2.5 mg-0.5 mg/3 mL nebulizer solution Take 3 mLs by nebulization every 6 (six) hours as needed for Wheezing or Shortness of Breath. Rescue. Corrected prescription with refills 1 Box 11    amLODIPine (NORVASC) 10 MG tablet Take 1 tablet (10 mg  total) by mouth once daily. 90 tablet 1    butalbital-acetaminophen-caffeine -40 mg (FIORICET, ESGIC) -40 mg per tablet Take 1 tablet by mouth every 4 (four) hours as needed for Pain. 9 tablet 0    fluticasone furoate-vilanterol (BREO ELLIPTA) 200-25 mcg/dose DsDv diskus inhaler 1 puff      fluticasone propionate (FLONASE) 50 mcg/actuation nasal spray 2 sprays (100 mcg total) by Each Nostril route once daily. Corrected prescription with refills 1 Bottle 11    fluticasone-salmeterol diskus inhaler 250-50 mcg Inhale 1 puff into the lungs 2 (two) times daily. 60 each 11    ipratropium (ATROVENT HFA) 17 mcg/actuation inhaler Inhale 2 puffs into the lungs every 4 to 6 hours as needed for Wheezing. Rescue 1 Inhaler 11    oxybutynin (DITROPAN) 5 MG Tab Take 1 tablet (5 mg total) by mouth every evening. 90 tablet 0    predniSONE (DELTASONE) 20 MG tablet Prednisone 60 mg/ day for 3 days, 40 mg/day for 3 days,20 mg/ day for 3 days, (1/2 tablet )10 mg a day for 3 days. 20 tablet 0    zolpidem (AMBIEN) 5 MG Tab Take 1 tablet (5 mg total) by mouth nightly as needed (insomnia). 20 tablet 5    [DISCONTINUED] albuterol-ipratropium (DUO-NEB) 2.5 mg-0.5 mg/3 mL nebulizer solution Take 3 mLs by nebulization every 6 (six) hours as needed for Wheezing or Shortness of Breath. Rescue. Corrected prescription with refills 1 Box 11    [DISCONTINUED] fluticasone propionate (FLONASE) 50 mcg/actuation nasal spray 2 sprays (100 mcg total) by Each Nostril route once daily. Corrected prescription with refills 1 Bottle 11    [DISCONTINUED] fluticasone-salmeterol diskus inhaler 250-50 mcg Inhale 1 puff into the lungs 2 (two) times daily. 60 each 11    [DISCONTINUED] ipratropium (ATROVENT HFA) 17 mcg/actuation inhaler 2 puffs      [DISCONTINUED] predniSONE (DELTASONE) 20 MG tablet Prednisone 60 mg/ day for 3 days, 40 mg/day for 3 days,20 mg/ day for 3 days, (1/2 tablet )10 mg a day for 3 days. 20 tablet 1    [DISCONTINUED]  zolpidem (AMBIEN) 10 mg Tab Take 1 tablet (10 mg total) by mouth nightly as needed (insomnia). 30 tablet 0    amoxicillin-clavulanate 875-125mg (AUGMENTIN) 875-125 mg per tablet Take 1 tablet by mouth every 12 (twelve) hours. for 10 days 20 tablet 0    azithromycin (ZITHROMAX Z-KAIA) 250 MG tablet Take 1 tablet (250 mg total) by mouth once daily. 500 mg on day 1 (two tablets) followed by 250 mg once daily on days 2-5 6 tablet 1    methylPREDNISolone (MEDROL DOSEPACK) 4 mg tablet Take as directed on the package. 1 Package 0    zafirlukast (ACCOLATE) 20 MG tablet Take 1 tablet (20 mg total) by mouth 2 (two) times daily. 60 tablet 11     Facility-Administered Encounter Medications as of 10/3/2019   Medication Dose Route Frequency Provider Last Rate Last Dose    [COMPLETED] triamcinolone acetonide injection 40 mg  40 mg Intramuscular Once Terrance Morales MD   40 mg at 10/03/19 0925     Plan:       Requested Prescriptions     Signed Prescriptions Disp Refills    predniSONE (DELTASONE) 20 MG tablet 20 tablet 0     Sig: Prednisone 60 mg/ day for 3 days, 40 mg/day for 3 days,20 mg/ day for 3 days, (1/2 tablet )10 mg a day for 3 days.    amoxicillin-clavulanate 875-125mg (AUGMENTIN) 875-125 mg per tablet 20 tablet 0     Sig: Take 1 tablet by mouth every 12 (twelve) hours. for 10 days    albuterol-ipratropium (DUO-NEB) 2.5 mg-0.5 mg/3 mL nebulizer solution 1 Box 11     Sig: Take 3 mLs by nebulization every 6 (six) hours as needed for Wheezing or Shortness of Breath. Rescue. Corrected prescription with refills    fluticasone propionate (FLONASE) 50 mcg/actuation nasal spray 1 Bottle 11     Si sprays (100 mcg total) by Each Nostril route once daily. Corrected prescription with refills    fluticasone-salmeterol diskus inhaler 250-50 mcg 60 each 11     Sig: Inhale 1 puff into the lungs 2 (two) times daily.    ipratropium (ATROVENT HFA) 17 mcg/actuation inhaler 1 Inhaler 11     Sig: Inhale 2 puffs into the lungs  every 4 to 6 hours as needed for Wheezing. Rescue    zolpidem (AMBIEN) 5 MG Tab 20 tablet 5     Sig: Take 1 tablet (5 mg total) by mouth nightly as needed (insomnia).     Problem List Items Addressed This Visit     Asthma - Primary    Relevant Medications    triamcinolone acetonide injection 40 mg (Completed)    predniSONE (DELTASONE) 20 MG tablet    albuterol-ipratropium (DUO-NEB) 2.5 mg-0.5 mg/3 mL nebulizer solution    fluticasone-salmeterol diskus inhaler 250-50 mcg    ipratropium (ATROVENT HFA) 17 mcg/actuation inhaler    Postnasal drip    Relevant Medications    fluticasone propionate (FLONASE) 50 mcg/actuation nasal spray    Sarcoidosis of lung    Wheezing    Relevant Medications    albuterol-ipratropium (DUO-NEB) 2.5 mg-0.5 mg/3 mL nebulizer solution      Other Visit Diagnoses     NENA (obstructive sleep apnea)        Relevant Orders    Home Sleep Studies    Smoking        Relevant Orders    Ambulatory referral to Smoking Cessation Program    Other acute recurrent sinusitis        Relevant Medications    amoxicillin-clavulanate 875-125mg (AUGMENTIN) 875-125 mg per tablet    Primary insomnia        Relevant Medications    zolpidem (AMBIEN) 5 MG Tab             Follow up in about 4 weeks (around 10/31/2019) for Review Sleep Study, Kenalog IM today.    MEDICAL DECISION MAKING: Moderate to high complexity.  Overall, the multiple problems listed are of moderate to high severity that may impact quality of life and activities of daily living. Side effects of medications, treatment plan as well as options and alternatives reviewed and discussed with patient. There was counseling of patient concerning these issues.    Total time spent in face to face counseling and coordination of care - 45  minutes over 50% of time was used in discussion of prognosis, risks, benefits of treatment, instructions and compliance with regimen . Discussion with other physicians or health care providers (DME, NP, pharmacy, respiratory  therapy) occurred.

## 2019-10-03 NOTE — PATIENT INSTRUCTIONS
Please read Warning before using.    USE ONLY AS DIRECTED, IF SYMPTOMS PERSIST SEE YOUR DOCTOR/HEALTHCARE PROFESSIONAL. ALWAYS READ THE LABEL. IMPORTANT: NeilMed® SINUS RINSE Mixture Packets should be used with NeilMed® 240 mL (8 fl oz) NASAFLO® to achieve the best results. You may use NeilMed® packets with other irrigation devices, as long as you mix with the correct volume of water. Our recommendation is to replace Neti Pot every three months.  Step 1  Please wash your hands and rinse the device. Fill the NASAFLO® with 240 mL (8 fl oz) of lukewarm distilled, filtered or previously boiled water. Please do not use tap or faucet water to dissolve the mixture unless it has been previously boiled and cooled down. You may warm the water in a microwave, but we recommend that you warm it in increments of 5 to 10 seconds to avoid overheating, damaging the device or scalding your nasal passage. Step 2  Cut the SINUS RINSE mixture packet at the corner and pour contents into the pot. Tighten the lid on the device securely. Place one finger over the hole of the cap and shake the device gently to dissolve the mixture. Step 3  Standing in front of a sink, bend forward to your comfort level and tilt your head to one side. Keeping your mouth open, and without holding your breath, apply the tip of the device snugly against your nasal passage and ALLOW THE SOLUTION TO GENTLY FLOW until the solution starts draining from the opposite nasal passage. Use roughly half the solution in the NASAFLO® (120 mL / 4 fl oz).  It should not enter your mouth unless you are tilting your head backwards. To adjust or stop the flow, you may place your finger over the hole of the cap, and depending on the seal, you may be able to control the flow. Step 4  Blow your nose very gently, without pinching nose completely to avoid pressure on eardrums. If tolerable, sniff in gently any residual solution remaining in the nasal passage once or twice, because  this may clean out the posterior nasopharyngeal area, which is the area at the back of your nasal passage. At times, some solution will reach the back of your throat, so please spit it out.  For NASAFLO® users, to help drain any residual solution, blow your nose gently while tilting your head forward and to the same side of the nasal passage you just rinsed. Step 5  Now repeat steps 3 & 4 on your other nasal passage.  If there is any solution left over, please discard it. We recommend you make a fresh solution each time you rinse. Rinse once or twice daily OR as directed by your physician. Saline is considered safe.  The [place] FDA is recommending that common cough and cold over the counter medicines not be given to babies and toddlers, and that antihistamines should not be given to children under age 6. NeilMed® NASAFLO®: Please clean with soap & water and let air dry Please read Warning before using.    Our recommendation is to replace Neti Pot every three months.        Please call [facility] to schedule sleep study at  [phone] . Usually take 1- 2 days to get insurance company approval.  You will need to schedule at follow up clinic appointment 7 days after the sleep study to review the results.

## 2019-10-09 ENCOUNTER — OFFICE VISIT (OUTPATIENT)
Dept: INTERNAL MEDICINE | Facility: CLINIC | Age: 59
End: 2019-10-09
Payer: COMMERCIAL

## 2019-10-09 VITALS
RESPIRATION RATE: 18 BRPM | HEIGHT: 67 IN | OXYGEN SATURATION: 98 % | TEMPERATURE: 97 F | DIASTOLIC BLOOD PRESSURE: 68 MMHG | WEIGHT: 169.06 LBS | BODY MASS INDEX: 26.53 KG/M2 | HEART RATE: 93 BPM | SYSTOLIC BLOOD PRESSURE: 118 MMHG

## 2019-10-09 DIAGNOSIS — I10 ESSENTIAL HYPERTENSION: Primary | ICD-10-CM

## 2019-10-09 DIAGNOSIS — R51.9 FRONTAL HEADACHE: ICD-10-CM

## 2019-10-09 DIAGNOSIS — J01.11 ACUTE RECURRENT FRONTAL SINUSITIS: ICD-10-CM

## 2019-10-09 PROCEDURE — 99214 OFFICE O/P EST MOD 30 MIN: CPT | Mod: 25,S$GLB,, | Performed by: FAMILY MEDICINE

## 2019-10-09 PROCEDURE — 99999 PR PBB SHADOW E&M-EST. PATIENT-LVL V: CPT | Mod: PBBFAC,,, | Performed by: FAMILY MEDICINE

## 2019-10-09 PROCEDURE — 3008F PR BODY MASS INDEX (BMI) DOCUMENTED: ICD-10-PCS | Mod: CPTII,S$GLB,, | Performed by: FAMILY MEDICINE

## 2019-10-09 PROCEDURE — 99214 PR OFFICE/OUTPT VISIT, EST, LEVL IV, 30-39 MIN: ICD-10-PCS | Mod: 25,S$GLB,, | Performed by: FAMILY MEDICINE

## 2019-10-09 PROCEDURE — 3078F DIAST BP <80 MM HG: CPT | Mod: CPTII,S$GLB,, | Performed by: FAMILY MEDICINE

## 2019-10-09 PROCEDURE — 96372 THER/PROPH/DIAG INJ SC/IM: CPT | Mod: S$GLB,,, | Performed by: FAMILY MEDICINE

## 2019-10-09 PROCEDURE — 3074F PR MOST RECENT SYSTOLIC BLOOD PRESSURE < 130 MM HG: ICD-10-PCS | Mod: CPTII,S$GLB,, | Performed by: FAMILY MEDICINE

## 2019-10-09 PROCEDURE — 3074F SYST BP LT 130 MM HG: CPT | Mod: CPTII,S$GLB,, | Performed by: FAMILY MEDICINE

## 2019-10-09 PROCEDURE — 3008F BODY MASS INDEX DOCD: CPT | Mod: CPTII,S$GLB,, | Performed by: FAMILY MEDICINE

## 2019-10-09 PROCEDURE — 96372 PR INJECTION,THERAP/PROPH/DIAG2ST, IM OR SUBCUT: ICD-10-PCS | Mod: S$GLB,,, | Performed by: FAMILY MEDICINE

## 2019-10-09 PROCEDURE — 3078F PR MOST RECENT DIASTOLIC BLOOD PRESSURE < 80 MM HG: ICD-10-PCS | Mod: CPTII,S$GLB,, | Performed by: FAMILY MEDICINE

## 2019-10-09 PROCEDURE — 99999 PR PBB SHADOW E&M-EST. PATIENT-LVL V: ICD-10-PCS | Mod: PBBFAC,,, | Performed by: FAMILY MEDICINE

## 2019-10-09 RX ORDER — CEFTRIAXONE 500 MG/1
500 INJECTION, POWDER, FOR SOLUTION INTRAMUSCULAR; INTRAVENOUS
Status: COMPLETED | OUTPATIENT
Start: 2019-10-09 | End: 2019-10-09

## 2019-10-09 RX ORDER — KETOROLAC TROMETHAMINE 30 MG/ML
60 INJECTION, SOLUTION INTRAMUSCULAR; INTRAVENOUS ONCE
Status: COMPLETED | OUTPATIENT
Start: 2019-10-09 | End: 2019-10-09

## 2019-10-09 RX ORDER — BUTALBITAL, ACETAMINOPHEN AND CAFFEINE 50; 325; 40 MG/1; MG/1; MG/1
1 TABLET ORAL EVERY 4 HOURS PRN
Qty: 9 TABLET | Refills: 0 | Status: SHIPPED | OUTPATIENT
Start: 2019-10-09 | End: 2019-11-12 | Stop reason: SDUPTHER

## 2019-10-09 RX ORDER — AMLODIPINE BESYLATE 10 MG/1
10 TABLET ORAL DAILY
Qty: 90 TABLET | Refills: 1 | Status: SHIPPED | OUTPATIENT
Start: 2019-10-09 | End: 2020-01-16

## 2019-10-09 RX ADMIN — CEFTRIAXONE 500 MG: 500 INJECTION, POWDER, FOR SOLUTION INTRAMUSCULAR; INTRAVENOUS at 08:10

## 2019-10-09 RX ADMIN — KETOROLAC TROMETHAMINE 60 MG: 30 INJECTION, SOLUTION INTRAMUSCULAR; INTRAVENOUS at 08:10

## 2019-10-09 NOTE — PROGRESS NOTES
Subjective:       Patient ID: Salvatore Phillips is a 58 y.o. female.    Chief Complaint: Medication Refill    HPI Ms. Phillips presents today for medication refills.   While in clinic it was difficult to get a good Oxygen saturation.   97 -98%     Need medication for blood pressure   Dr. Morales recently filled Ambien  Augmentin given for sinusitis and prednisone    HA -still having sinus pressure   Frontal   Daily for a while.   It stopped for a few days at some point but hard to tell b/c it has been going on most days for 3 weeks.   Tylenol, BC powder, ibuprofen, Aleve  pseudofed    Currently pain is 7/10     Review of Systems   Constitutional: Negative for activity change, appetite change, fatigue and fever.   HENT: Positive for congestion, sinus pressure and sinus pain. Negative for ear pain.    Eyes: Negative for pain and visual disturbance.   Respiratory: Positive for chest tightness and shortness of breath. Negative for cough and wheezing.    Cardiovascular: Negative for chest pain, palpitations and leg swelling.   Gastrointestinal: Negative for abdominal distention, abdominal pain, constipation, diarrhea, nausea and vomiting.   Endocrine: Negative for polydipsia and polyuria.   Genitourinary: Negative for difficulty urinating, dyspareunia, dysuria, flank pain and hematuria.   Musculoskeletal: Negative for arthralgias and back pain.   Skin: Negative for rash.   Neurological: Positive for headaches. Negative for dizziness, tremors, syncope, weakness and numbness.   Psychiatric/Behavioral: Negative for agitation and confusion.         Past Medical History:   Diagnosis Date    Asthma     Hypertension     Sarcoidosis of lung     Sleep apnea     maintained on CPAP    Upper respiratory disease     URI (upper respiratory infection)      Past Surgical History:   Procedure Laterality Date    ABDOMINAL SURGERY      BREAST LUMPECTOMY Left 1996    BENIGN    BREAST SURGERY       SECTION      x3    COLON  SURGERY      exploratory procedure      GASTRIC BYPASS      HYSTERECTOMY  2009    JOINT REPLACEMENT  2013    KNEE SURGERY      OOPHORECTOMY  2009    SMALL INTESTINE SURGERY      tummy tuck       Objective:        Physical Exam   Constitutional: She is oriented to person, place, and time. She appears well-developed and well-nourished.   HENT:   Head: Normocephalic and atraumatic.   Nose: Mucosal edema present. Right sinus exhibits frontal sinus tenderness. Left sinus exhibits frontal sinus tenderness.   Eyes: EOM are normal.   Pulmonary/Chest: She has wheezes.   Neurological: She is alert and oriented to person, place, and time.   Skin: Skin is warm.   Psychiatric: She has a normal mood and affect.   Vitals reviewed.        Results for orders placed or performed in visit on 09/17/19   Spirometry with/without bronchodilator   Result Value Ref Range    Interpretation       Moderately severe obstruction. FEV1  56.13 %  predicted. (FEV1/VC< LLN and  FEV1> or equal to 50% predicted and < or equal to 59% of predicted).  Airflow is not clearly improved after bronchodilator. Clinical improvement following bronchodilator therapy may occur in the absence of spirometric improvement.           Post FVC 2.28 2.28 - 3.84 L    Post FEV1 1.42 (L) 1.78 - 3.06 L    Post FEV1 FVC 62.17 (L) 68.23 - 91.04 %    Post FEF 25 75 0.78 (L) 0.99 - 3.46 L/s    Post PEF 3.77 (L) 4.09 - 8.48 L/s    Post  6.82 sec    Pre FVC 2.23 (L) 2.28 - 3.84 L    Pre FEV1 1.36 (L) 1.78 - 3.06 L    Pre FEV1 FVC 61.22 (L) 68.23 - 91.04 %    Pre FEF 25 75 0.72 (L) 0.99 - 3.46 L/s    Pre PEF 3.14 (L) 4.09 - 8.48 L/s    Pre  7.08 sec    FVC Ref 3.06     FVC LLN 2.28     FVC Pre Ref 72.8 %    FVC Post Ref 74.6 %    FVC Chg 2.5 %    FEV1 Ref 2.42     FEV1 LLN 1.78     FEV1 Pre Ref 56.3 %    FEV1 Post Ref 58.6 %    FEV1 Chg 4.1 %    FEV1 FVC Ref 80     FEV1 FVC LLN 68     FEV1 FVC Pre Ref 76.9 %    FEV1 FVC Post Ref 78.1 %    FEV1 FVC Chg 1.6 %     FEF 25 75 Ref 2.22     FEF 25 75 LLN 0.99     FEF 25 75 Pre Ref 32.5 %    FEF 25 75 Post Ref 35.1 %    FEF 25 75 Chg 7.9 %    PEF Ref 6.29     PEF LLN 4.09     PEF Pre Ref 50.0 %    PEF Post Ref 60.0 %    PEF Chg 20.1 %    [id number] Chg -3.6 %       Assessment/Plan:     Essential hypertension-controlled  She has not had Norvasc in 1 maria guadalupe. Sent today but we have decided to discontinue   Repeat if necessary   -     amLODIPine (NORVASC) 10 MG tablet; Take 1 tablet (10 mg total) by mouth once daily.  Dispense: 90 tablet; Refill: 1    Frontal headache  -     butalbital-acetaminophen-caffeine -40 mg (FIORICET, ESGIC) -40 mg per tablet; Take 1 tablet by mouth every 4 (four) hours as needed for Pain or Headaches.  Dispense: 9 tablet; Refill: 0  -     ketorolac injection 60 mg    Acute recurrent frontal sinusitis  -     cefTRIAXone injection 500 mg  Reviewed chart she had Zpak 2 months ago this is why Dr. Morales used Augmentin  Will add Rocephin 1 time dose today    Follow up as needed     Viviana Cuba MD  [facility]   Family Medicine

## 2019-10-15 ENCOUNTER — HOSPITAL ENCOUNTER (OUTPATIENT)
Dept: SLEEP MEDICINE | Facility: HOSPITAL | Age: 59
Discharge: HOME | End: 2019-10-15
Attending: INTERNAL MEDICINE
Payer: COMMERCIAL

## 2019-10-15 DIAGNOSIS — F51.3 SLEEPWALKING: ICD-10-CM

## 2019-10-15 DIAGNOSIS — G47.33 OSA ON CPAP: ICD-10-CM

## 2019-10-15 DIAGNOSIS — Z72.821 INADEQUATE SLEEP HYGIENE: ICD-10-CM

## 2019-10-15 DIAGNOSIS — G47.33 OBSTRUCTIVE SLEEP APNEA: Primary | ICD-10-CM

## 2019-10-15 DIAGNOSIS — R06.83 PRIMARY SNORING: ICD-10-CM

## 2019-10-15 DIAGNOSIS — F51.04 PSYCHOPHYSIOLOGICAL INSOMNIA: ICD-10-CM

## 2019-10-15 PROCEDURE — 95810 POLYSOM 6/> YRS 4/> PARAM: CPT

## 2019-10-15 PROCEDURE — 95810 POLYSOM 6/> YRS 4/> PARAM: CPT | Mod: 26,,, | Performed by: PSYCHOLOGIST

## 2019-10-15 PROCEDURE — 95810 PR POLYSOMNOGRAPHY, 4 OR MORE: ICD-10-PCS | Mod: 26,,, | Performed by: PSYCHOLOGIST

## 2019-10-16 ENCOUNTER — TELEPHONE (OUTPATIENT)
Dept: RADIOLOGY | Facility: HOSPITAL | Age: 59
End: 2019-10-16

## 2019-10-16 ENCOUNTER — OFFICE VISIT (OUTPATIENT)
Dept: OPHTHALMOLOGY | Facility: CLINIC | Age: 59
End: 2019-10-16
Payer: COMMERCIAL

## 2019-10-16 ENCOUNTER — CLINICAL SUPPORT (OUTPATIENT)
Dept: SMOKING CESSATION | Facility: CLINIC | Age: 59
End: 2019-10-16
Payer: COMMERCIAL

## 2019-10-16 DIAGNOSIS — F17.200 NICOTINE DEPENDENCE: Primary | ICD-10-CM

## 2019-10-16 DIAGNOSIS — H52.7 REFRACTIVE ERROR: ICD-10-CM

## 2019-10-16 DIAGNOSIS — Z01.00 VISIT FOR EYE AND VISION EXAM: Primary | ICD-10-CM

## 2019-10-16 DIAGNOSIS — Z13.5 GLAUCOMA SCREENING: ICD-10-CM

## 2019-10-16 PROCEDURE — 92015 PR REFRACTION: ICD-10-PCS | Mod: S$GLB,,, | Performed by: OPTOMETRIST

## 2019-10-16 PROCEDURE — 99999 PR PBB SHADOW E&M-EST. PATIENT-LVL I: ICD-10-PCS | Mod: PBBFAC,,,

## 2019-10-16 PROCEDURE — 99404 PR PREVENT COUNSEL,INDIV,60 MIN: ICD-10-PCS | Mod: S$GLB,,, | Performed by: GENERAL PRACTICE

## 2019-10-16 PROCEDURE — 99999 PR PBB SHADOW E&M-EST. PATIENT-LVL II: CPT | Mod: PBBFAC,,, | Performed by: OPTOMETRIST

## 2019-10-16 PROCEDURE — 92004 PR EYE EXAM, NEW PATIENT,COMPREHESV: ICD-10-PCS | Mod: S$GLB,,, | Performed by: OPTOMETRIST

## 2019-10-16 PROCEDURE — 99999 PR PBB SHADOW E&M-EST. PATIENT-LVL II: ICD-10-PCS | Mod: PBBFAC,,, | Performed by: OPTOMETRIST

## 2019-10-16 PROCEDURE — 92004 COMPRE OPH EXAM NEW PT 1/>: CPT | Mod: S$GLB,,, | Performed by: OPTOMETRIST

## 2019-10-16 PROCEDURE — 99404 PREV MED CNSL INDIV APPRX 60: CPT | Mod: S$GLB,,, | Performed by: GENERAL PRACTICE

## 2019-10-16 PROCEDURE — 92015 DETERMINE REFRACTIVE STATE: CPT | Mod: S$GLB,,, | Performed by: OPTOMETRIST

## 2019-10-16 PROCEDURE — 99999 PR PBB SHADOW E&M-EST. PATIENT-LVL I: CPT | Mod: PBBFAC,,,

## 2019-10-16 RX ORDER — BUPROPION HYDROCHLORIDE 150 MG/1
150 TABLET, EXTENDED RELEASE ORAL 2 TIMES DAILY
Qty: 60 TABLET | Refills: 2 | Status: SHIPPED | OUTPATIENT
Start: 2019-10-16 | End: 2020-08-05

## 2019-10-16 NOTE — PROGRESS NOTES
HPI     New Patient  Screening for glaucoma  RE  CL wearer   Uses readers over lenses   Patient is in a daily lens and is over wearing lenses (sleeps in them 3-4   nights before tossing CL)   Patient is trying to get her some to send CL info    Last edited by ABRAHAM Gomes, OD on 10/16/2019 10:42 AM. (History)            Assessment /Plan     For exam results, see Encounter Report.    Visit for eye and vision exam    Glaucoma screening    Refractive error      I asked her to never sleep in her daily soft CL (naps excluded). She will call back with CL measurements and I will release RX with todays vail OD=+150;OS=+1.75.  OH OK OU.  Spec Rx given.  RTC one year.  No ocular sarcoid.

## 2019-10-17 ENCOUNTER — HOSPITAL ENCOUNTER (OUTPATIENT)
Dept: RADIOLOGY | Facility: HOSPITAL | Age: 59
Discharge: HOME | End: 2019-10-17
Attending: FAMILY MEDICINE
Payer: COMMERCIAL

## 2019-10-17 DIAGNOSIS — R92.8 ABNORMAL MAMMOGRAM: ICD-10-CM

## 2019-10-17 PROCEDURE — 77061 MAMMO DIGITAL DIAGNOSTIC RIGHT WITH TOMOSYNTHESIS_CAD: ICD-10-PCS | Mod: 26,,, | Performed by: RADIOLOGY

## 2019-10-17 PROCEDURE — 77065 DX MAMMO INCL CAD UNI: CPT | Mod: TC

## 2019-10-17 PROCEDURE — 76642 ULTRASOUND BREAST LIMITED: CPT | Mod: TC,RT

## 2019-10-17 PROCEDURE — 77065 DX MAMMO INCL CAD UNI: CPT | Mod: 26,,, | Performed by: RADIOLOGY

## 2019-10-17 PROCEDURE — 76642 US BREAST RIGHT LIMITED: ICD-10-PCS | Mod: 26,RT,, | Performed by: RADIOLOGY

## 2019-10-17 PROCEDURE — 77065 MAMMO DIGITAL DIAGNOSTIC RIGHT WITH TOMOSYNTHESIS_CAD: ICD-10-PCS | Mod: 26,,, | Performed by: RADIOLOGY

## 2019-10-17 PROCEDURE — 77061 BREAST TOMOSYNTHESIS UNI: CPT | Mod: 26,,, | Performed by: RADIOLOGY

## 2019-10-17 PROCEDURE — 76642 ULTRASOUND BREAST LIMITED: CPT | Mod: 26,RT,, | Performed by: RADIOLOGY

## 2019-10-21 NOTE — PROCEDURES
Patient Name: Salvatore Phillips   Date of Report: 10-21-19     Date of PSG:  10/15/2019   [facility] No.: [id number]   : 1960                        Time of PSG:  10:27:32 PM - 5:00:00 AM  Sex:  Female   Age:  58   Weight:  170.0 lbs Height:  5  7             Type of PSG:  Diagnostic     REASONS FOR REFERRAL: Ms Phillips is a 58 year old female, referred for diagnostic polysomnography by Dr. Terrance Morales, based on the patients reported snoring, observed respiratory pauses in sleep, and daytime hypersomnolence.  Dr. Malissa Cuba is the patients primary care physician.    STUDY PARAMETERS: This diagnostic study involved analysis of the patient's sleep pattern while breathing unassisted. The study was performed with a sleep technologist in attendance for the entire test period, with video monitoring throughout the study, and routine laboratory clinical parameters recorded:  NOTE: The polysomnography electrophysiological record for the patient has been reviewed in its entirety by Dr. Lima.    SUMMARY STATEMENTS  DIAGNOSTIC IMPRESSIONS  F51.04   /  307.42  Psychophysiological Insomnia (stress - related and conditioned)    F51.3     /  307.46  Sleepwalking  R06.83   /  780.53-1  Primary Snoring (none during polysomnography)   Z72.821 /  V69.4  Inadequate Sleep Hygiene  F51.12   /   307.44  r/o Insufficient Sleep Syndrome      TREATMENT RECOMMENDATIONS  Treat, or refer to [facility] Disorders Center.  1. The diagnostic polysomnography revealed no diagnosable obstructive sleep apnea / hypopnea syndrome (A + H Index = 2.6 events / hr asleep with only 0.2 respiratory event - related arousals / hr asleep for the study, and no RERAs (respiratory effort -  related arousals).  The mean SpO2 value was 89.9 %, significant, minimum oxygen saturation during sleep was 75.0 %, and waking baseline SpO2 was 99 %.  No snoring was noted.  A  CPAP titration polysomnography is not recommended.      2. If treatment of snoring (none during  the PSG) is desired, consider a reversible treatment such as a dental oral device.  If a permanent procedure such as UPPP is preferred, periodic polysomnography may be needed because signs of worsening apnea could be missed (silent apneas) if NENA develops or becomes more severe.  3. As respiratory events had a strong supine positional tendency, a device to discourage sleep in the supine position is recommended to reduce respiratory events and snoring.   4. Ms Phillips is taking zolpidem / Ambien, an hypnotic medication that may exacerbate OSAHS.   5. Weight loss to the normal range is recommended as it can decrease respiratory events and snoring in overweight patients.  6. The following changes in sleep hygiene / sleep - related behavior are recommended after medical treatments are successful   Set time for sleep to number of hours of sleep needed for adequate daytime functioning (7.5 to 9.0 hrs / night).  7. If  insomnia persists recommend  follow - up inquiry regarding frequency, duration and nature of reported sleep loss, delayed sleep onset, poor sleep maintenance and involuntary early awakening (stress - related and / or conditioned psychophysio-  logical insomnia) and referral for behavioral and cognitive / behavioral treatment of insomnia, as indicated.  Please see SDI.  8. Also consider behavioral and cognitive / behavioral treatments for stress and sleepwalking; sleep might be expected to further improve.    See below for a complete interpretation of data from the polysomnography and Sleep Disorders Inventory.     Thank you for referring this patient to the [facility].      Cullen Lima, Ph.D., ABPP; Diplomate, American Board of Sleep Medicine

## 2019-10-29 ENCOUNTER — CLINICAL SUPPORT (OUTPATIENT)
Dept: SMOKING CESSATION | Facility: CLINIC | Age: 59
End: 2019-10-29
Payer: COMMERCIAL

## 2019-10-29 DIAGNOSIS — F17.200 NICOTINE DEPENDENCE: Primary | ICD-10-CM

## 2019-10-29 PROCEDURE — 99407 PR TOBACCO USE CESSATION INTENSIVE >10 MINUTES: ICD-10-PCS | Mod: S$GLB,,, | Performed by: GENERAL PRACTICE

## 2019-10-29 PROCEDURE — 99407 BEHAV CHNG SMOKING > 10 MIN: CPT | Mod: S$GLB,,, | Performed by: GENERAL PRACTICE

## 2019-11-12 ENCOUNTER — CLINICAL SUPPORT (OUTPATIENT)
Dept: SMOKING CESSATION | Facility: CLINIC | Age: 59
End: 2019-11-12
Payer: COMMERCIAL

## 2019-11-12 DIAGNOSIS — F17.200 NICOTINE DEPENDENCE: Primary | ICD-10-CM

## 2019-11-12 DIAGNOSIS — I10 ESSENTIAL HYPERTENSION: ICD-10-CM

## 2019-11-12 DIAGNOSIS — R51.9 FRONTAL HEADACHE: ICD-10-CM

## 2019-11-12 DIAGNOSIS — F51.01 PRIMARY INSOMNIA: ICD-10-CM

## 2019-11-12 PROCEDURE — 99402 PREV MED CNSL INDIV APPRX 30: CPT | Mod: S$GLB,,, | Performed by: GENERAL PRACTICE

## 2019-11-12 PROCEDURE — 99402 PR PREVENT COUNSEL,INDIV,30 MIN: ICD-10-PCS | Mod: S$GLB,,, | Performed by: GENERAL PRACTICE

## 2019-11-12 PROCEDURE — 99999 PR PBB SHADOW E&M-EST. PATIENT-LVL I: CPT | Mod: PBBFAC,,,

## 2019-11-12 PROCEDURE — 99999 PR PBB SHADOW E&M-EST. PATIENT-LVL I: ICD-10-PCS | Mod: PBBFAC,,,

## 2019-11-12 RX ORDER — AMLODIPINE BESYLATE 10 MG/1
10 TABLET ORAL DAILY
Qty: 90 TABLET | Refills: 1 | Status: CANCELLED | OUTPATIENT
Start: 2019-11-12

## 2019-11-12 RX ORDER — BUTALBITAL, ACETAMINOPHEN AND CAFFEINE 50; 325; 40 MG/1; MG/1; MG/1
1 TABLET ORAL EVERY 4 HOURS PRN
Qty: 9 TABLET | Refills: 0 | Status: SHIPPED | OUTPATIENT
Start: 2019-11-12 | End: 2020-05-28 | Stop reason: SDUPTHER

## 2019-11-12 RX ORDER — DM/P-EPHED/ACETAMINOPH/DOXYLAM 30-7.5/3
2 LIQUID (ML) ORAL
Qty: 200 LOZENGE | Refills: 2 | Status: SHIPPED | OUTPATIENT
Start: 2019-11-12 | End: 2021-01-31

## 2019-11-12 NOTE — PROGRESS NOTES
Individual Follow-Up Form    11/12/2019    Clinical Status of Patient: Outpatient    Length of Service: 30 minutes    Continuing Medication: yes  Wellbutrin    Other Medications: nicotine lozenges as needed   Target Symptoms: Withdrawal and medication side effects. The following were  rated moderate (3) to severe (4) on TCRS:  · Moderate (3): irritable, desire tobacco, headaches  · Severe (4): none    Comments: Spoke with patient at length in regards to her smoking cessation progress. She stated that some days are better than others but today has not been a good day. She states that she is smoking 1/2-3/4 ppd due to personal stress. The patient remains on the prescribed tobacco cessation medication regimen of 150 mg Wellbutrin BID without any negative side effects at this time. She experiences headaches but states that her headaches are only after she smokes. Discussed a quit challenge and coping strategies. Discussed alternate cessation medications like nicotine gum or lozenges instead of smoking. She verbalized understanding and interest. Discussed proper use of nicotine lozenges and possible side effects. Encouraged a clinic follow up. The patient denies any abnormal behavioral or mental changes at this time. Will continue to encourage and monitor progress.    Diagnosis: F17.200    Next Visit: 1 week

## 2019-11-13 RX ORDER — ZOLPIDEM TARTRATE 5 MG/1
5 TABLET ORAL NIGHTLY PRN
Qty: 20 TABLET | Refills: 5 | Status: SHIPPED | OUTPATIENT
Start: 2019-11-13 | End: 2019-12-11 | Stop reason: SDUPTHER

## 2019-12-05 ENCOUNTER — OFFICE VISIT (OUTPATIENT)
Dept: UROLOGY | Facility: CLINIC | Age: 59
End: 2019-12-05
Payer: COMMERCIAL

## 2019-12-05 VITALS
BODY MASS INDEX: 26.64 KG/M2 | SYSTOLIC BLOOD PRESSURE: 136 MMHG | HEIGHT: 67 IN | WEIGHT: 169.75 LBS | HEART RATE: 78 BPM | DIASTOLIC BLOOD PRESSURE: 78 MMHG

## 2019-12-05 DIAGNOSIS — N39.44 NOCTURNAL ENURESIS: ICD-10-CM

## 2019-12-05 DIAGNOSIS — N39.0 URINARY TRACT INFECTION WITHOUT HEMATURIA, SITE UNSPECIFIED: Primary | ICD-10-CM

## 2019-12-05 LAB
BACTERIA #/AREA URNS AUTO: NORMAL /HPF
BILIRUB SERPL-MCNC: NORMAL MG/DL
BLOOD URINE, POC: NORMAL
COLOR, POC UA: YELLOW
GLUCOSE UR QL STRIP: NORMAL
HYALINE CASTS UR QL AUTO: 1 /LPF
KETONES UR QL STRIP: NORMAL
LEUKOCYTE ESTERASE URINE, POC: NORMAL
MICROSCOPIC COMMENT: NORMAL
NITRITE, POC UA: NORMAL
PH, POC UA: 5
POC RESIDUAL URINE VOLUME: 0 ML (ref 0–100)
PROTEIN, POC: NORMAL
RBC #/AREA URNS AUTO: 0 /HPF (ref 0–4)
SPECIFIC GRAVITY, POC UA: 1.02
SQUAMOUS #/AREA URNS AUTO: 1 /HPF
UROBILINOGEN, POC UA: NORMAL
WBC #/AREA URNS AUTO: 1 /HPF (ref 0–5)

## 2019-12-05 PROCEDURE — 99244 OFF/OP CNSLTJ NEW/EST MOD 40: CPT | Mod: 25,S$GLB,, | Performed by: UROLOGY

## 2019-12-05 PROCEDURE — 51798 POCT BLADDER SCAN: ICD-10-PCS | Mod: S$GLB,,, | Performed by: UROLOGY

## 2019-12-05 PROCEDURE — 51798 US URINE CAPACITY MEASURE: CPT | Mod: S$GLB,,, | Performed by: UROLOGY

## 2019-12-05 PROCEDURE — 99999 PR PBB SHADOW E&M-EST. PATIENT-LVL III: CPT | Mod: PBBFAC,,, | Performed by: UROLOGY

## 2019-12-05 PROCEDURE — 81002 URINALYSIS NONAUTO W/O SCOPE: CPT | Mod: S$GLB,,, | Performed by: UROLOGY

## 2019-12-05 PROCEDURE — 87086 URINE CULTURE/COLONY COUNT: CPT

## 2019-12-05 PROCEDURE — 81001 URINALYSIS AUTO W/SCOPE: CPT

## 2019-12-05 PROCEDURE — 99244 PR OFFICE CONSULTATION,LEVEL IV: ICD-10-PCS | Mod: 25,S$GLB,, | Performed by: UROLOGY

## 2019-12-05 PROCEDURE — 81002 POCT URINE DIPSTICK WITHOUT MICROSCOPE: ICD-10-PCS | Mod: S$GLB,,, | Performed by: UROLOGY

## 2019-12-05 PROCEDURE — 99999 PR PBB SHADOW E&M-EST. PATIENT-LVL III: ICD-10-PCS | Mod: PBBFAC,,, | Performed by: UROLOGY

## 2019-12-05 NOTE — LETTER
December 5, 2019      Viviana Cuba MD  [address] [place] [address] [address] [place] [id number]           [facility]  [address] [place] [address] [address] [place] [address]  Phone: [phone]  Fax: [phone]          Patient: Salvatore Phillips   MR Number: [id number]   YOB: 1960   Date of Visit: 12/5/2019       Dear Dr. Viviana Cuba:    Thank you for referring Salvatore Phillips to me for evaluation. Attached you will find relevant portions of my assessment and plan of care.    If you have questions, please do not hesitate to call me. I look forward to following Salvatore Phillips along with you.    Sincerely,    Abebe Ferrara IV, MD    Enclosure  CC:  No Recipients    If you would like to receive this communication electronically, please contact [email or web address] or [phone] to request more information on [employer] Link access.    For providers and/or their staff who would like to refer a patient to [facility], please contact us through our one-stop-shop provider referral line, [facility], at [phone].    If you feel you have received this communication in error or would no longer like to receive these types of communications, please e-mail [email or web address]

## 2019-12-05 NOTE — LETTER
December 5, 2019      [facility]  [address] [place] [id number]  Phone: [phone]  Fax: [phone]       Patient: Salvatore Phillips   YOB: 1960  Date of Visit: 12/05/2019    To Whom It May Concern:    Please allow Ms. Salvatore Phillips frequent restroom breaks every 2-3 hrs.  If you have any questions or concerns, or if I can be of further assistance, please do not hesitate to contact me.    Sincerely,    Cee David LPN/Dr. Abebe Ferrara

## 2019-12-05 NOTE — PROGRESS NOTES
Chief Complaint: Bedwetting    HPI:   19: 58 yo woman referred by Dr. Cuba for bedwetting that started after moving to [place] earlier this year.  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  No abd/pelvic pain and no exac/rel factors.  No hematuria.  No urolithiasis.  No urinary bother.  No  history.  Normal sexual function. Sleep study demonstrated no sleep apnea but did in the past; dropped 190+ pounds.  Takes ambien as needed.  Bedwetting not dependent on it.  Oxybutinin hasn't helped.  Anemic and takes Fe tablets.  Constipated some.  Not much ETOH.  Bottle 1-2 of water from dinner to sleep time.  As much as one gallon a day. No caffeine.  Relaxed voider. 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    Allergies:  Patient has no known allergies.    Medications: has a current medication list which includes the following prescription(s): albuterol, albuterol-ipratropium, amlodipine, bupropion, fluticasone furoate-vilanterol, fluticasone propionate, fluticasone-salmeterol 250-50 mcg/dose, ipratropium, nicotine polacrilex, zafirlukast, zolpidem, azithromycin, butalbital-acetaminophen-caffeine -40 mg, oxybutynin, and prednisone.    Review of Systems:  General: No fever, chills, fatigability, or weight loss.  Skin: No rashes, itching, or changes in color or texture of skin.  Chest: Denies GODDARD, cyanosis, wheezing, cough, and sputum production.  Abdomen: Appetite fine. No weight loss. Denies diarrhea, abdominal pain, hematemesis, or blood in stool.  Musculoskeletal: No joint stiffness or swelling. Denies back pain.  : As above.  All other review of systems negative.    PMH:   has a past medical history of Asthma, Hypertension, Sarcoidosis of lung, Sleep apnea, Upper respiratory disease, and URI (upper respiratory infection).    PSH:   has a past surgical history that includes Abdominal surgery; Knee surgery; Gastric bypass; Breast surgery;  section; Colon surgery; exploratory  procedure; tummy tuck; Small intestine surgery; Joint replacement (2013); Hysterectomy (2009); Oophorectomy (2009); and Breast lumpectomy (Left, 1996).    FamHx: family history includes Alzheimer's disease in her mother; Arthritis in her sister; Cancer in her father; Crohn's disease in her brother; Hypertension in her son; Kidney disease in her brother.    SocHx:  reports that she has been smoking cigarettes. She started smoking about 40 years ago. She has a 35.00 pack-year smoking history. She has never used smokeless tobacco. She reports that she drank alcohol. She reports that she does not use drugs.     Physical Exam:  Vitals:   Vitals:    12/05/19 1327   BP: 136/78   Pulse: 78     General: A&Ox3. No apparent distress. No deformities.  Neck: No masses. Normal thyroid.  Lungs: normal inspiration. No use of accessory muscles.  Heart: normal pulse. No arrhythmias.  Abdomen: Soft. NT. ND. No masses. No hernias. No hepatosplenomegaly.  Lymphatic: Neck and groin nodes negative.  Skin: The skin is warm and dry. No jaundice.  Ext: No c/c/e.  : External genitalia normal.     Labs/Studies:   Urinalysis performed in clinic, summary: UA normal exc tr blood    Impression/Plan:   1. Cath UA/UCx today to check.  2. Fluid restriction is likely the best way to start.  Bladder scan acceptable voids completely daytime.  Stop oxybutinin.  Probably just sleeping so hard her bladder doesn't wake her up.  3. KUB/US to screen upper tracts.  4. Timed voiding q3 hours to retrain and not have voluntary retention

## 2019-12-06 LAB — BACTERIA UR CULT: NO GROWTH

## 2019-12-11 DIAGNOSIS — F51.01 PRIMARY INSOMNIA: ICD-10-CM

## 2019-12-11 RX ORDER — ZOLPIDEM TARTRATE 5 MG/1
5 TABLET ORAL NIGHTLY PRN
Qty: 20 TABLET | Refills: 0 | Status: SHIPPED | OUTPATIENT
Start: 2019-12-11 | End: 2020-04-20 | Stop reason: SDUPTHER

## 2019-12-16 DIAGNOSIS — M25.561 PAIN IN BOTH KNEES, UNSPECIFIED CHRONICITY: Primary | ICD-10-CM

## 2019-12-16 DIAGNOSIS — M25.562 PAIN IN BOTH KNEES, UNSPECIFIED CHRONICITY: Primary | ICD-10-CM

## 2019-12-17 ENCOUNTER — PATIENT MESSAGE (OUTPATIENT)
Dept: INTERNAL MEDICINE | Facility: CLINIC | Age: 59
End: 2019-12-17

## 2019-12-17 ENCOUNTER — PATIENT MESSAGE (OUTPATIENT)
Dept: UROLOGY | Facility: CLINIC | Age: 59
End: 2019-12-17

## 2019-12-17 ENCOUNTER — HOSPITAL ENCOUNTER (EMERGENCY)
Facility: HOSPITAL | Age: 59
Discharge: HOME | End: 2019-12-17
Attending: EMERGENCY MEDICINE
Payer: COMMERCIAL

## 2019-12-17 VITALS
RESPIRATION RATE: 18 BRPM | HEIGHT: 67 IN | DIASTOLIC BLOOD PRESSURE: 81 MMHG | HEART RATE: 72 BPM | TEMPERATURE: 98 F | SYSTOLIC BLOOD PRESSURE: 139 MMHG | BODY MASS INDEX: 26.19 KG/M2 | WEIGHT: 166.88 LBS | OXYGEN SATURATION: 100 %

## 2019-12-17 DIAGNOSIS — R06.2 WHEEZING: ICD-10-CM

## 2019-12-17 DIAGNOSIS — R05.9 COUGH: ICD-10-CM

## 2019-12-17 DIAGNOSIS — J20.9 ACUTE BRONCHITIS, UNSPECIFIED ORGANISM: Primary | ICD-10-CM

## 2019-12-17 PROCEDURE — 99284 EMERGENCY DEPT VISIT MOD MDM: CPT | Mod: 25

## 2019-12-17 PROCEDURE — 63600175 PHARM REV CODE 636 W HCPCS: Performed by: REGISTERED NURSE

## 2019-12-17 PROCEDURE — 25000242 PHARM REV CODE 250 ALT 637 W/ HCPCS: Performed by: REGISTERED NURSE

## 2019-12-17 PROCEDURE — 94640 AIRWAY INHALATION TREATMENT: CPT

## 2019-12-17 RX ORDER — IPRATROPIUM BROMIDE AND ALBUTEROL SULFATE 2.5; .5 MG/3ML; MG/3ML
3 SOLUTION RESPIRATORY (INHALATION)
Status: COMPLETED | OUTPATIENT
Start: 2019-12-17 | End: 2019-12-17

## 2019-12-17 RX ORDER — PREDNISONE 20 MG/1
40 TABLET ORAL DAILY
Qty: 10 TABLET | Refills: 0 | Status: SHIPPED | OUTPATIENT
Start: 2019-12-17 | End: 2019-12-22

## 2019-12-17 RX ORDER — PREDNISONE 20 MG/1
60 TABLET ORAL
Status: COMPLETED | OUTPATIENT
Start: 2019-12-17 | End: 2019-12-17

## 2019-12-17 RX ORDER — DOXYCYCLINE 100 MG/1
100 CAPSULE ORAL 2 TIMES DAILY
Qty: 20 CAPSULE | Refills: 0 | Status: SHIPPED | OUTPATIENT
Start: 2019-12-17 | End: 2019-12-27

## 2019-12-17 RX ORDER — TRAMADOL HYDROCHLORIDE 50 MG/1
50 TABLET ORAL EVERY 6 HOURS PRN
Qty: 12 TABLET | Refills: 0 | Status: SHIPPED | OUTPATIENT
Start: 2019-12-17 | End: 2020-06-05 | Stop reason: SDUPTHER

## 2019-12-17 RX ADMIN — IPRATROPIUM BROMIDE AND ALBUTEROL SULFATE 3 ML: .5; 3 SOLUTION RESPIRATORY (INHALATION) at 12:12

## 2019-12-17 RX ADMIN — PREDNISONE 60 MG: 20 TABLET ORAL at 12:12

## 2019-12-17 NOTE — ED NOTES
"Pt c/o productive cough and SOB x 2 weeks. Reports left rib pain x 2 days. States, "I've been trying to treat myself at home with breathing treatments but the cough and shortness of breath have become unmanageable." Denies fever, n/v/d, CP, HA, weakness, dizziness, numbness, tingling or any other symptoms at this time.    Patient identifiers verified and correct for Salvatore Phillips.    LOC: The patient is awake, alert and aware of environment with an appropriate affect, the patient is oriented x 3 and speaking appropriately.  APPEARANCE: Patient resting comfortably and in no acute distress, patient is clean and well groomed, patient's clothing is properly fastened.  SKIN: The skin is warm and dry, color consistent with ethnicity, patient has normal skin turgor and moist mucus membranes, skin intact, no breakdown or bruising noted.  MUSCULOSKELETAL: Patient moving all extremities spontaneously.  RESPIRATORY: Airway is open and patent, respirations are spontaneous, productive cough - green mucous.  CARDIAC: Patient has a normal rate, no peripheral edema noted, capillary refill < 3 seconds.  ABDOMEN: Soft and non tender to palpation.    "

## 2019-12-17 NOTE — ED PROVIDER NOTES
Encounter Date: 2019       History     Chief Complaint   Patient presents with    Cough     productive cough and URI x 2 weeks L side pain      The history is provided by the patient.   Cough   This is a new problem. The current episode started several days ago. The problem occurs constantly. The problem has been unchanged. The cough is non-productive. There has been no fever. Associated symptoms include shortness of breath and wheezing. Pertinent negatives include no chest pain and no sore throat. Treatments tried: inhalers and nebulizers. The treatment provided mild relief. She is not a smoker.     Review of patient's allergies indicates:  No Known Allergies  Past Medical History:   Diagnosis Date    Asthma     Hypertension     Sarcoidosis of lung     Sleep apnea     maintained on CPAP    Upper respiratory disease     URI (upper respiratory infection)      Past Surgical History:   Procedure Laterality Date    ABDOMINAL SURGERY      BREAST LUMPECTOMY Left 1996    BENIGN    BREAST SURGERY       SECTION      x3    COLON SURGERY      exploratory procedure      GASTRIC BYPASS      HYSTERECTOMY  2009    JOINT REPLACEMENT  2013    KNEE SURGERY      OOPHORECTOMY  2009    SMALL INTESTINE SURGERY      tummy tuck       Family History   Problem Relation Age of Onset    Alzheimer's disease Mother     Cancer Father         lung    Crohn's disease Brother     Kidney disease Brother     Arthritis Sister     Hypertension Son      Social History     Tobacco Use    Smoking status: Current Every Day Smoker     Packs/day: 1.00     Years: 35.00     Pack years: 35.00     Types: Cigarettes     Start date: 1979    Smokeless tobacco: Never Used    Tobacco comment: started age 17   Substance Use Topics    Alcohol use: Not Currently     Frequency: Monthly or less     Drinks per session: 1 or 2     Binge frequency: Never     Comment: occ    Drug use: Never     Review of Systems    Constitutional: Negative for fever.   HENT: Negative for sore throat.    Respiratory: Positive for cough, chest tightness, shortness of breath and wheezing.    Cardiovascular: Negative for chest pain.   Gastrointestinal: Negative for nausea.   Genitourinary: Negative for dysuria.   Musculoskeletal: Negative for back pain.   Skin: Negative for rash.   Neurological: Negative for weakness.   Hematological: Does not bruise/bleed easily.   All other systems reviewed and are negative.      Physical Exam     Initial Vitals [12/17/19 1219]   BP Pulse Resp Temp SpO2   (!) 150/84 84 18 98.1 °F (36.7 °C) (!) 94 %      MAP       --         Physical Exam    Constitutional: She appears well-developed and well-nourished. She is not diaphoretic. No distress.   HENT:   Head: Normocephalic and atraumatic.   Eyes: Conjunctivae and EOM are normal. Pupils are equal, round, and reactive to light.   Neck: Normal range of motion. Neck supple.   Cardiovascular: Normal rate, regular rhythm and normal heart sounds.   No murmur heard.  Pulmonary/Chest: No respiratory distress. She has wheezes. She has no rales.   Abdominal: Soft. Bowel sounds are normal. There is no tenderness. There is no rebound and no guarding.   Musculoskeletal: Normal range of motion. She exhibits no edema or tenderness.   Neurological: She is alert and oriented to person, place, and time. No cranial nerve deficit. GCS score is 15. GCS eye subscore is 4. GCS verbal subscore is 5. GCS motor subscore is 6.   Skin: Skin is warm and dry. Capillary refill takes less than 2 seconds.   Psychiatric: She has a normal mood and affect. Thought content normal.         ED Course   Procedures  Labs Reviewed - No data to display       Imaging Results          X-Ray Chest PA And Lateral (Final result)  Result time 12/17/19 12:39:49    Final result by Sergio Mistry MD (12/17/19 12:39:49)                 Impression:      Stable chest x-ray with no acute findings      Electronically  signed by: Sergio Mistry MD  Date:    12/17/2019  Time:    12:39             Narrative:    EXAMINATION:  XR CHEST PA AND LATERAL    CLINICAL HISTORY:  Cough    TECHNIQUE:  PA and lateral views of the chest were performed.    COMPARISON:  08/23/2019    FINDINGS:  The cardiomediastinal silhouette is normal.    Stable areas of scarring and interstitial thickening scattered throughout the lungs.  No acute infiltrates.    No effusion.                                   1:55 PM: Reassessed pt at this time.  Pt states her condition has improved at this time. Discussed with pt all pertinent ED information and results. Discussed pt dx and plan of tx. Gave pt all f/u and return to the ED instructions. All questions and concerns were addressed at this time. Pt expresses understanding of information and instructions, and is comfortable with plan to discharge. Pt is stable for discharge.    I discussed with patient and/or family/caretaker that evaluation in the ED does not suggest any emergent or life threatening medical conditions requiring immediate intervention beyond what was provided in the ED, and I believe patient is safe for discharge.  Regardless, an unremarkable evaluation in the ED does not preclude the development or presence of a serious of life threatening condition. As such, patient was instructed to return immediately for any worsening or change in current symptoms.                                     Clinical Impression:       ICD-10-CM ICD-9-CM   1. Acute bronchitis, unspecified organism J20.9 466.0   2. Cough R05 786.2   3. Wheezing R06.2 786.07                             Jurgen Cox Jr., P  12/17/19 7512

## 2019-12-18 ENCOUNTER — TELEPHONE (OUTPATIENT)
Dept: RADIOLOGY | Facility: HOSPITAL | Age: 59
End: 2019-12-18

## 2019-12-19 ENCOUNTER — HOSPITAL ENCOUNTER (OUTPATIENT)
Dept: RADIOLOGY | Facility: HOSPITAL | Age: 59
Discharge: HOME | End: 2019-12-19
Attending: UROLOGY
Payer: COMMERCIAL

## 2019-12-19 DIAGNOSIS — N39.44 NOCTURNAL ENURESIS: ICD-10-CM

## 2019-12-19 PROCEDURE — 74018 RADEX ABDOMEN 1 VIEW: CPT | Mod: TC

## 2019-12-19 PROCEDURE — 74018 XR ABDOMEN AP 1 VIEW: ICD-10-PCS | Mod: 26,,, | Performed by: RADIOLOGY

## 2019-12-19 PROCEDURE — 76770 US RETROPERITONEAL COMPLETE: ICD-10-PCS | Mod: 26,,, | Performed by: RADIOLOGY

## 2019-12-19 PROCEDURE — 76770 US EXAM ABDO BACK WALL COMP: CPT | Mod: TC

## 2019-12-19 PROCEDURE — 76770 US EXAM ABDO BACK WALL COMP: CPT | Mod: 26,,, | Performed by: RADIOLOGY

## 2019-12-19 PROCEDURE — 74018 RADEX ABDOMEN 1 VIEW: CPT | Mod: 26,,, | Performed by: RADIOLOGY

## 2019-12-23 NOTE — PROGRESS NOTES
Dr Ferrara will discuss the results with the patient in person at the next appointment.  The patient was instructed to make an appointment if she does not already have one scheduled.

## 2020-01-09 ENCOUNTER — CLINICAL SUPPORT (OUTPATIENT)
Dept: SMOKING CESSATION | Facility: CLINIC | Age: 60
End: 2020-01-09
Payer: COMMERCIAL

## 2020-01-09 DIAGNOSIS — F17.200 NICOTINE DEPENDENCE: Primary | ICD-10-CM

## 2020-01-09 PROCEDURE — 99407 PR TOBACCO USE CESSATION INTENSIVE >10 MINUTES: ICD-10-PCS | Mod: S$GLB,,, | Performed by: GENERAL PRACTICE

## 2020-01-09 PROCEDURE — 99407 BEHAV CHNG SMOKING > 10 MIN: CPT | Mod: S$GLB,,, | Performed by: GENERAL PRACTICE

## 2020-01-13 ENCOUNTER — CLINICAL SUPPORT (OUTPATIENT)
Dept: SMOKING CESSATION | Facility: CLINIC | Age: 60
End: 2020-01-13
Payer: COMMERCIAL

## 2020-01-13 DIAGNOSIS — F17.200 NICOTINE DEPENDENCE: Primary | ICD-10-CM

## 2020-01-13 PROCEDURE — 99401 PR PREVENT COUNSEL,INDIV,15 MIN: ICD-10-PCS | Mod: S$GLB,,, | Performed by: GENERAL PRACTICE

## 2020-01-13 PROCEDURE — 99401 PREV MED CNSL INDIV APPRX 15: CPT | Mod: S$GLB,,, | Performed by: GENERAL PRACTICE

## 2020-01-13 NOTE — PROGRESS NOTES
Individual Follow-Up Form    1/13/2020    Clinical Status of Patient: Outpatient    Length of Service: 15 minutes    Continuing Medication: yes  Nicotine Lozenges     Target Symptoms: Withdrawal and medication side effects. The following were  rated moderate (3) to severe (4) on TCRS:  · Moderate (3): irritable, depressed, restless  · Severe (4): desire tobacco    Comments: Spoke with patient by telephone after multiple attempts to return 2 patient messages requesting a call back. Patient states that she did not get paid last week and does not have enough money for gas to make her appointment for today. She has a scheduled PCP appointment on Thursday and would like to coordinate clinic appointments. She states that she is feeling depressed because of her current personal situation with her family and with her job. She states that she tries not to smoke in response to feeling depressed but gets restless and feels that smoking calms her down.Discussed coping strategies and using the nicotine lozenges that she currently has. She verbalized understanding. Encouraged her to seek medical advice from her mental health counselor for her depression. She verbalized understanding. Will continue to encourage and monitor progress.    Diagnosis: F17.200    Next Visit: 1 week

## 2020-01-16 ENCOUNTER — TELEPHONE (OUTPATIENT)
Dept: SMOKING CESSATION | Facility: CLINIC | Age: 60
End: 2020-01-16

## 2020-01-16 ENCOUNTER — OFFICE VISIT (OUTPATIENT)
Dept: PULMONOLOGY | Facility: CLINIC | Age: 60
End: 2020-01-16
Payer: COMMERCIAL

## 2020-01-16 ENCOUNTER — OFFICE VISIT (OUTPATIENT)
Dept: INTERNAL MEDICINE | Facility: CLINIC | Age: 60
End: 2020-01-16
Payer: COMMERCIAL

## 2020-01-16 ENCOUNTER — HOSPITAL ENCOUNTER (OUTPATIENT)
Dept: RADIOLOGY | Facility: HOSPITAL | Age: 60
Discharge: HOME | End: 2020-01-16
Attending: FAMILY MEDICINE
Payer: COMMERCIAL

## 2020-01-16 VITALS
SYSTOLIC BLOOD PRESSURE: 128 MMHG | TEMPERATURE: 97 F | DIASTOLIC BLOOD PRESSURE: 80 MMHG | HEART RATE: 76 BPM | HEIGHT: 67 IN | OXYGEN SATURATION: 94 % | RESPIRATION RATE: 18 BRPM | WEIGHT: 164.44 LBS | BODY MASS INDEX: 25.81 KG/M2

## 2020-01-16 VITALS
DIASTOLIC BLOOD PRESSURE: 70 MMHG | HEART RATE: 94 BPM | BODY MASS INDEX: 26.01 KG/M2 | WEIGHT: 165.69 LBS | OXYGEN SATURATION: 95 % | SYSTOLIC BLOOD PRESSURE: 130 MMHG | RESPIRATION RATE: 20 BRPM | HEIGHT: 67 IN

## 2020-01-16 DIAGNOSIS — R05.3 COUGH, PERSISTENT: ICD-10-CM

## 2020-01-16 DIAGNOSIS — J45.41 MODERATE PERSISTENT ASTHMA WITH ACUTE EXACERBATION: ICD-10-CM

## 2020-01-16 DIAGNOSIS — R79.89 LOW VITAMIN B12 LEVEL: ICD-10-CM

## 2020-01-16 DIAGNOSIS — R09.82 POSTNASAL DRIP: ICD-10-CM

## 2020-01-16 DIAGNOSIS — R09.02 EXERCISE HYPOXEMIA: ICD-10-CM

## 2020-01-16 DIAGNOSIS — R06.2 WHEEZING: ICD-10-CM

## 2020-01-16 DIAGNOSIS — J32.4 PANSINUSITIS, UNSPECIFIED CHRONICITY: ICD-10-CM

## 2020-01-16 DIAGNOSIS — R53.83 OTHER FATIGUE: ICD-10-CM

## 2020-01-16 DIAGNOSIS — J45.20 INTERMITTENT ASTHMA WITHOUT COMPLICATION, UNSPECIFIED ASTHMA SEVERITY: Primary | ICD-10-CM

## 2020-01-16 DIAGNOSIS — D86.0 SARCOIDOSIS OF LUNG: ICD-10-CM

## 2020-01-16 DIAGNOSIS — R05.3 COUGH, PERSISTENT: Primary | ICD-10-CM

## 2020-01-16 DIAGNOSIS — E55.9 VITAMIN D DEFICIENCY: ICD-10-CM

## 2020-01-16 PROCEDURE — 99214 OFFICE O/P EST MOD 30 MIN: CPT | Mod: 25,S$GLB,, | Performed by: FAMILY MEDICINE

## 2020-01-16 PROCEDURE — 3074F PR MOST RECENT SYSTOLIC BLOOD PRESSURE < 130 MM HG: ICD-10-PCS | Mod: CPTII,S$GLB,, | Performed by: FAMILY MEDICINE

## 2020-01-16 PROCEDURE — 99215 OFFICE O/P EST HI 40 MIN: CPT | Mod: 25,S$GLB,, | Performed by: INTERNAL MEDICINE

## 2020-01-16 PROCEDURE — 3079F PR MOST RECENT DIASTOLIC BLOOD PRESSURE 80-89 MM HG: ICD-10-PCS | Mod: CPTII,S$GLB,, | Performed by: FAMILY MEDICINE

## 2020-01-16 PROCEDURE — 71046 X-RAY EXAM CHEST 2 VIEWS: CPT | Mod: 26,,, | Performed by: RADIOLOGY

## 2020-01-16 PROCEDURE — 3008F PR BODY MASS INDEX (BMI) DOCUMENTED: ICD-10-PCS | Mod: CPTII,S$GLB,, | Performed by: FAMILY MEDICINE

## 2020-01-16 PROCEDURE — 96372 THER/PROPH/DIAG INJ SC/IM: CPT | Mod: S$GLB,,, | Performed by: FAMILY MEDICINE

## 2020-01-16 PROCEDURE — 99999 PR PBB SHADOW E&M-EST. PATIENT-LVL IV: ICD-10-PCS | Mod: PBBFAC,,, | Performed by: INTERNAL MEDICINE

## 2020-01-16 PROCEDURE — 99214 PR OFFICE/OUTPT VISIT, EST, LEVL IV, 30-39 MIN: ICD-10-PCS | Mod: 25,S$GLB,, | Performed by: FAMILY MEDICINE

## 2020-01-16 PROCEDURE — 3008F BODY MASS INDEX DOCD: CPT | Mod: CPTII,S$GLB,, | Performed by: FAMILY MEDICINE

## 2020-01-16 PROCEDURE — 3008F BODY MASS INDEX DOCD: CPT | Mod: CPTII,S$GLB,, | Performed by: INTERNAL MEDICINE

## 2020-01-16 PROCEDURE — 99215 PR OFFICE/OUTPT VISIT, EST, LEVL V, 40-54 MIN: ICD-10-PCS | Mod: 25,S$GLB,, | Performed by: INTERNAL MEDICINE

## 2020-01-16 PROCEDURE — 3074F SYST BP LT 130 MM HG: CPT | Mod: CPTII,S$GLB,, | Performed by: FAMILY MEDICINE

## 2020-01-16 PROCEDURE — 99999 PR PBB SHADOW E&M-EST. PATIENT-LVL IV: CPT | Mod: PBBFAC,,, | Performed by: INTERNAL MEDICINE

## 2020-01-16 PROCEDURE — 3079F DIAST BP 80-89 MM HG: CPT | Mod: CPTII,S$GLB,, | Performed by: FAMILY MEDICINE

## 2020-01-16 PROCEDURE — 3075F SYST BP GE 130 - 139MM HG: CPT | Mod: CPTII,S$GLB,, | Performed by: INTERNAL MEDICINE

## 2020-01-16 PROCEDURE — 3078F PR MOST RECENT DIASTOLIC BLOOD PRESSURE < 80 MM HG: ICD-10-PCS | Mod: CPTII,S$GLB,, | Performed by: INTERNAL MEDICINE

## 2020-01-16 PROCEDURE — 3078F DIAST BP <80 MM HG: CPT | Mod: CPTII,S$GLB,, | Performed by: INTERNAL MEDICINE

## 2020-01-16 PROCEDURE — 71046 X-RAY EXAM CHEST 2 VIEWS: CPT | Mod: TC

## 2020-01-16 PROCEDURE — 99999 PR PBB SHADOW E&M-EST. PATIENT-LVL V: ICD-10-PCS | Mod: PBBFAC,,, | Performed by: FAMILY MEDICINE

## 2020-01-16 PROCEDURE — 71046 XR CHEST PA AND LATERAL: ICD-10-PCS | Mod: 26,,, | Performed by: RADIOLOGY

## 2020-01-16 PROCEDURE — 3075F PR MOST RECENT SYSTOLIC BLOOD PRESS GE 130-139MM HG: ICD-10-PCS | Mod: CPTII,S$GLB,, | Performed by: INTERNAL MEDICINE

## 2020-01-16 PROCEDURE — 96372 PR INJECTION,THERAP/PROPH/DIAG2ST, IM OR SUBCUT: ICD-10-PCS | Mod: S$GLB,,, | Performed by: FAMILY MEDICINE

## 2020-01-16 PROCEDURE — 3008F PR BODY MASS INDEX (BMI) DOCUMENTED: ICD-10-PCS | Mod: CPTII,S$GLB,, | Performed by: INTERNAL MEDICINE

## 2020-01-16 PROCEDURE — 99999 PR PBB SHADOW E&M-EST. PATIENT-LVL V: CPT | Mod: PBBFAC,,, | Performed by: FAMILY MEDICINE

## 2020-01-16 RX ORDER — ZAFIRLUKAST 20 MG/1
20 TABLET, FILM COATED ORAL 2 TIMES DAILY
Qty: 60 TABLET | Refills: 11 | Status: SHIPPED | OUTPATIENT
Start: 2020-01-16 | End: 2020-04-20 | Stop reason: SDUPTHER

## 2020-01-16 RX ORDER — ALBUTEROL SULFATE 90 UG/1
2 AEROSOL, METERED RESPIRATORY (INHALATION) EVERY 4 HOURS PRN
Qty: 1 INHALER | Refills: 11 | Status: SHIPPED | OUTPATIENT
Start: 2020-01-16 | End: 2020-04-20

## 2020-01-16 RX ORDER — FLUTICASONE FUROATE AND VILANTEROL 200; 25 UG/1; UG/1
1 POWDER RESPIRATORY (INHALATION) DAILY
Qty: 60 EACH | Refills: 11 | Status: SHIPPED | OUTPATIENT
Start: 2020-01-16 | End: 2020-04-20 | Stop reason: SDUPTHER

## 2020-01-16 RX ORDER — PREDNISONE 2.5 MG/1
5 TABLET ORAL DAILY
Qty: 60 TABLET | Refills: 5 | Status: SHIPPED | OUTPATIENT
Start: 2020-01-16 | End: 2020-04-09 | Stop reason: SDUPTHER

## 2020-01-16 RX ORDER — LEVOFLOXACIN 500 MG/1
500 TABLET, FILM COATED ORAL DAILY
Qty: 10 TABLET | Refills: 0 | Status: SHIPPED | OUTPATIENT
Start: 2020-01-16 | End: 2020-04-20 | Stop reason: SDUPTHER

## 2020-01-16 RX ORDER — AMOXICILLIN 875 MG/1
875 TABLET, FILM COATED ORAL 2 TIMES DAILY
Qty: 20 TABLET | Refills: 0 | Status: SHIPPED | OUTPATIENT
Start: 2020-01-16 | End: 2020-01-26

## 2020-01-16 RX ORDER — CYANOCOBALAMIN 1000 UG/ML
1000 INJECTION, SOLUTION INTRAMUSCULAR; SUBCUTANEOUS ONCE
Status: COMPLETED | OUTPATIENT
Start: 2020-01-16 | End: 2020-01-16

## 2020-01-16 RX ORDER — FLUTICASONE PROPIONATE 50 MCG
2 SPRAY, SUSPENSION (ML) NASAL DAILY
Qty: 1 BOTTLE | Refills: 11 | Status: SHIPPED | OUTPATIENT
Start: 2020-01-16 | End: 2020-04-20 | Stop reason: SDUPTHER

## 2020-01-16 RX ORDER — PREDNISONE 20 MG/1
TABLET ORAL
Qty: 20 TABLET | Refills: 1 | Status: SHIPPED | OUTPATIENT
Start: 2020-01-16 | End: 2020-04-20

## 2020-01-16 RX ORDER — METHYLPREDNISOLONE ACETATE 80 MG/ML
80 INJECTION, SUSPENSION INTRA-ARTICULAR; INTRALESIONAL; INTRAMUSCULAR; SOFT TISSUE
Status: COMPLETED | OUTPATIENT
Start: 2020-01-16 | End: 2020-01-16

## 2020-01-16 RX ADMIN — CYANOCOBALAMIN 1000 MCG: 1000 INJECTION, SOLUTION INTRAMUSCULAR; SUBCUTANEOUS at 09:01

## 2020-01-16 RX ADMIN — METHYLPREDNISOLONE ACETATE 80 MG: 80 INJECTION, SUSPENSION INTRA-ARTICULAR; INTRALESIONAL; INTRAMUSCULAR; SOFT TISSUE at 09:01

## 2020-01-16 NOTE — TELEPHONE ENCOUNTER
Patient called to state that she is caught up at another Dr appointment across [facility] and is unable to make her appointment for today. She would like to reschedule on 1-

## 2020-01-16 NOTE — PROGRESS NOTES
Subjective:       Patient ID: Salvatore Phillips is a 59 y.o. female.    Chief Complaint: URI    HPI Ms. Phillips presents today for URI.   She went to the ED 12/17/19 diagnosed with sinus infection and bronchitis. She was given antibiotics and a round of steroids.   She did not feel better with this.     She went to  before that and they told her it was viral she got steroids and antibiotics (azithromycin) then too.     She has not felt any better since either set of medications.     Coughing still   Productive  Wheezing   Takes her rescue inhaler 3 -4 times a day  Duoneb she is doing BID     She has been fatigued     Sinus pressure and headaches   Taking allergy medication     Review of Systems   Constitutional: Negative for fever.   HENT: Positive for rhinorrhea. Negative for ear pain and sore throat.    Eyes: Negative.    Respiratory: Positive for shortness of breath and wheezing.    Cardiovascular: Positive for leg swelling. Negative for chest pain.   Gastrointestinal: Positive for abdominal pain. Negative for vomiting.   Endocrine: Negative.    Genitourinary: Negative.    Musculoskeletal: Negative.  Negative for neck pain.   Skin: Negative for rash.   Neurological: Positive for headaches.           Past Medical History:   Diagnosis Date    Asthma     Hypertension     Sarcoidosis of lung     Sleep apnea     maintained on CPAP    Upper respiratory disease     URI (upper respiratory infection)      Objective:        Physical Exam   Constitutional: She is oriented to person, place, and time. She appears well-developed and well-nourished.   HENT:   Nose: Mucosal edema present. Right sinus exhibits maxillary sinus tenderness and frontal sinus tenderness. Left sinus exhibits maxillary sinus tenderness and frontal sinus tenderness.   Pulmonary/Chest: She has wheezes.   cough   Neurological: She is alert and oriented to person, place, and time.   Skin: Skin is warm.   Psychiatric: She has a normal mood and affect.  Her behavior is normal.       Assessment/Plan:     Cough, persistent  -     X-Ray Chest PA And Lateral; Future; Expected date: 01/16/2020  -     methylPREDNISolone acetate injection 80 mg    Other fatigue  -     Vitamin B12; Future; Expected date: 01/16/2020  -     cyanocobalamin injection 1,000 mcg    Vitamin D deficiency  -     Vitamin D; Future; Expected date: 01/16/2020    Pansinusitis, unspecified chronicity  -     amoxicillin (AMOXIL) 875 MG tablet; Take 1 tablet (875 mg total) by mouth 2 (two) times daily. for 10 days  Dispense: 20 tablet; Refill: 0    Wheezing  -     methylPREDNISolone acetate injection 80 mg    Low vitamin B12 level  -     cyanocobalamin injection 1,000 mcg          Follow up in about 4 weeks (around 2/13/2020), or if symptoms worsen or fail to improve.    Viviana Cuba MD  [facility]   Family Medicine

## 2020-01-16 NOTE — PROGRESS NOTES
Subjective:       Patient ID: Salvatore Phillips is a 59 y.o. female.    Chief Complaint: She       Asthma (rev cxr); Sleep Apnea; and Sarcoidosis    Sarcoidosis:  Sarcoidosis Follow up: Not using advair disk    Asthma Follow-up  The patient has previously been evaluated here for asthma and presents for an asthma follow-up. The patient is currently having symptoms / an exacerbation. Current symptoms include dyspnea, productive cough and wheezing. Symptoms have been present since several months ago and have been gradually worsening. She denies chest pain and chest tightness. Associated symptoms include fatigue, poor exercise tolerance and shortness of breath.  This episode appears to have been triggered by cold air, infection and upper respiratory infection. Treatments tried for the current exacerbation include short-acting inhaled beta-adrenergic agonists, which have provided no relief of symptoms. The patient has been having similar episodes for approximately 1 year.    Current Disease Severity  The patient is having daytime symptoms throughout the day. The patient is having daytime symptoms often 7 times per week. The patient is using short-acting beta agonists for symptom control several times per day. She has exacerbations requiring oral systemic corticosteroids 2 times per year. Current limitations in activity from asthma: unable to exercise. Number of days of school or work missed in the last month: 3 weeks. Number of urgent/emergent visit in last year: 2.  The patient is not using a spacer with MDIs. Her best peak flow rate is na. She is not monitoring peak flow rates at home.    Still smoking    Dyspnea  Patient complains of shortness of breath. Symptoms occur after more than one flight stairs, with more than one block walking. Symptoms began years ago years ago, gradually worsening since. Associated symptoms include  dyspnea on exertion, post nasal drip, productive cough, shortness of breath and sputum  production. He denies cough after eating. He does not have had recent travel. Weight has been stable. Symptoms are exacerbated by moderate activity. Symptoms are alleviated by rest.     History of sarcoidosis. No iritis, eye irritation, no new skin lesions, no arthralgias.      Sarcoidosis stage:   []        Stage I Sarcoidosis  []        Stage II Sarcoidosis  [x]        Stage III Sarcoidosis  []        Stage IV Sarcoidosis    Clinical assessment:   [x]        Symptomatic  []        Assymptomatic       Indications for treatment of pulmonary sarcoidosis:   []        Evidence of progressive disease   []        Severe disease at presentation    Management options:   []        Observation without therapy  [x]        Systemic glucocorticoids  []        Methotrexate.  []        Azathioprine   []        Leuflonamide  []        Mycophenolate  []        Anti TNF alpha antagonists       Sleep Apnea  She presents for a sleep evaluation. She complains of snoring, periods of not breathing, excessive daytime sleepiness, falling asleep while reading, watching television, feels sleepy during the day, take naps during the day.  Symptoms began 10 years ago, gradually worsening since that time.  She goes to sleep at 1-2 am weekdays and weekends. She awakens 6 weekdays and 7 weekends. She falls asleep in 10 minutes.  Collar size na. She denies knees buckling with laughing, completely or partially paralyzed while falling asleep or waking up. Previous evaluation and treatment has included PSG with C PAP titration.    Past Medical History:   Diagnosis Date    Asthma     Hypertension     Sarcoidosis of lung     Sleep apnea     maintained on CPAP    Upper respiratory disease     URI (upper respiratory infection)      Past Surgical History:   Procedure Laterality Date    ABDOMINAL SURGERY      BREAST LUMPECTOMY Left 1996    BENIGN    BREAST SURGERY       SECTION      x3    COLON SURGERY      exploratory procedure       GASTRIC BYPASS      HYSTERECTOMY  2009    JOINT REPLACEMENT  2013    KNEE SURGERY      OOPHORECTOMY  2009    SMALL INTESTINE SURGERY      tummy tuck       Social History     Socioeconomic History    Marital status: Single     Spouse name: Not on file    Number of children: Not on file    Years of education: Not on file    Highest education level: Not on file   Occupational History    Not on file   Social Needs    Financial resource strain: Somewhat hard    Food insecurity:     Worry: Sometimes true     Inability: Sometimes true    Transportation needs:     Medical: No     Non-medical: No   Tobacco Use    Smoking status: Current Every Day Smoker     Packs/day: 1.00     Years: 35.00     Pack years: 35.00     Types: Cigarettes     Start date: 1/1/1979    Smokeless tobacco: Never Used    Tobacco comment: started age 17   Substance and Sexual Activity    Alcohol use: Not Currently     Frequency: Monthly or less     Drinks per session: 1 or 2     Binge frequency: Never     Comment: occ    Drug use: Never    Sexual activity: Never   Lifestyle    Physical activity:     Days per week: 0 days     Minutes per session: 0 min    Stress: Very much   Relationships    Social connections:     Talks on phone: Not on file     Gets together: Once a week     Attends [employer] service: Not on file     Active member of club or organization: No     Attends meetings of clubs or organizations: Never     Relationship status:    Other Topics Concern    Not on file   Social History Narrative    Not on file     Review of Systems   Constitutional: Positive for fatigue. Negative for fever.   HENT: Positive for postnasal drip, rhinorrhea and congestion.    Eyes: Negative for redness and itching.   Respiratory: Positive for cough, sputum production, shortness of breath, dyspnea on extertion, use of rescue inhaler and Paroxysmal Nocturnal Dyspnea.    Cardiovascular: Negative for chest pain, palpitations and leg  "swelling.   Genitourinary: Negative for difficulty urinating and hematuria.   Endocrine: Negative for cold intolerance and heat intolerance.    Skin: Negative for rash.   Gastrointestinal: Negative for nausea and abdominal pain.   Neurological: Negative for dizziness, syncope, weakness and light-headedness.   Hematological: Negative for adenopathy. Does not bruise/bleed easily.   Psychiatric/Behavioral: Negative for sleep disturbance. The patient is not nervous/anxious.        Objective:      /70   Pulse 94   Resp 20   Ht 5' 7" (1.702 m)   Wt 75.1 kg (165 lb 10.8 oz)   SpO2 95%   BMI 25.95 kg/m²   Physical Exam   Constitutional: She is oriented to person, place, and time. She appears well-developed and well-nourished.   HENT:   Head: Normocephalic and atraumatic.   Mouth/Throat: Oropharyngeal exudate present.   Eyes: Pupils are equal, round, and reactive to light. Conjunctivae are normal.   Neck: Neck supple. No JVD present. No tracheal deviation present. No thyromegaly present.   Cardiovascular: Normal rate, regular rhythm and normal heart sounds.   Pulmonary/Chest: Effort normal. No respiratory distress. She has decreased breath sounds. She has wheezes in the right lower field and the left lower field. She has no rhonchi. She has no rales. She exhibits no tenderness.   Abdominal: Soft. Bowel sounds are normal.   Musculoskeletal: Normal range of motion. She exhibits no edema.   Lymphadenopathy:     She has no cervical adenopathy.   Neurological: She is alert and oriented to person, place, and time.   Skin: Skin is warm and dry.   Nursing note and vitals reviewed.    Personal Diagnostic Review  Chest x-ray:  Stable interstitial scarring      Office Spirometry Results:     No flowsheet data found.  Pulmonary Studies Review 1/16/2020   SpO2 95   Height 67.000   Weight 2650.81   BMI (Calculated) 25.9   Predicted Distance 378.24   Predicted Distance Meters (Calculated) 518.79         Assessment:     "   Intermittent asthma without complication, unspecified asthma severity  -     fluticasone furoate-vilanterol (BREO ELLIPTA) 200-25 mcg/dose DsDv diskus inhaler; Inhale 1 puff into the lungs once daily. Controller  Dispense: 60 each; Refill: 11    Moderate persistent asthma with acute exacerbation  -     predniSONE (DELTASONE) 20 MG tablet; 60 mg/ day for 3 days, 40 mg/day for 3 days,20 mg/ day for 3 days, (1/2 tablet )10 mg a day for 3 days. Then start 5 mg/day  Dispense: 20 tablet; Refill: 1  -     predniSONE (DELTASONE) 2.5 MG tablet; Take 2 tablets (5 mg total) by mouth once daily.  Dispense: 60 tablet; Refill: 5  -     albuterol (PROVENTIL/VENTOLIN HFA) 90 mcg/actuation inhaler; Inhale 2 puffs into the lungs every 4 (four) hours as needed for Wheezing or Shortness of Breath.  Dispense: 1 Inhaler; Refill: 11  -     zafirlukast (ACCOLATE) 20 MG tablet; Take 1 tablet (20 mg total) by mouth 2 (two) times daily.  Dispense: 60 tablet; Refill: 11  -     levoFLOXacin (LEVAQUIN) 500 MG tablet; Take 1 tablet (500 mg total) by mouth once daily.  Dispense: 10 tablet; Refill: 0    Postnasal drip  -     zafirlukast (ACCOLATE) 20 MG tablet; Take 1 tablet (20 mg total) by mouth 2 (two) times daily.  Dispense: 60 tablet; Refill: 11  -     fluticasone propionate (FLONASE) 50 mcg/actuation nasal spray; 2 sprays (100 mcg total) by Each Nostril route once daily. Corrected prescription with refills  Dispense: 1 Bottle; Refill: 11    Sarcoidosis of lung  -     predniSONE (DELTASONE) 20 MG tablet; 60 mg/ day for 3 days, 40 mg/day for 3 days,20 mg/ day for 3 days, (1/2 tablet )10 mg a day for 3 days. Then start 5 mg/day  Dispense: 20 tablet; Refill: 1  -     predniSONE (DELTASONE) 2.5 MG tablet; Take 2 tablets (5 mg total) by mouth once daily.  Dispense: 60 tablet; Refill: 5    Wheezing          Outpatient Encounter Medications as of 1/16/2020   Medication Sig Dispense Refill    albuterol (PROVENTIL/VENTOLIN HFA) 90 mcg/actuation  inhaler Inhale 2 puffs into the lungs every 4 (four) hours as needed for Wheezing or Shortness of Breath. 1 Inhaler 11    albuterol-ipratropium (DUO-NEB) 2.5 mg-0.5 mg/3 mL nebulizer solution Take 3 mLs by nebulization every 6 (six) hours as needed for Wheezing or Shortness of Breath. Rescue. Corrected prescription with refills 1 Box 11    amoxicillin (AMOXIL) 875 MG tablet Take 1 tablet (875 mg total) by mouth 2 (two) times daily. for 10 days 20 tablet 0    buPROPion (WELLBUTRIN SR) 150 MG TBSR 12 hr tablet Take 1 tablet (150 mg total) by mouth 2 (two) times daily. 60 tablet 2    butalbital-acetaminophen-caffeine -40 mg (FIORICET, ESGIC) -40 mg per tablet Take 1 tablet by mouth every 4 (four) hours as needed for Pain or Headaches. 9 tablet 0    fluticasone furoate-vilanterol (BREO ELLIPTA) 200-25 mcg/dose DsDv diskus inhaler Inhale 1 puff into the lungs once daily. Controller 60 each 11    fluticasone propionate (FLONASE) 50 mcg/actuation nasal spray 2 sprays (100 mcg total) by Each Nostril route once daily. Corrected prescription with refills 1 Bottle 11    ipratropium (ATROVENT HFA) 17 mcg/actuation inhaler Inhale 2 puffs into the lungs every 4 to 6 hours as needed for Wheezing. Rescue 1 Inhaler 11    nicotine polacrilex 2 MG Lozg Take 1 lozenge (2 mg total) by mouth as needed. 200 lozenge 2    oxybutynin (DITROPAN) 5 MG Tab Take 1 tablet (5 mg total) by mouth every evening. 90 tablet 0    traMADol (ULTRAM) 50 mg tablet Take 1 tablet (50 mg total) by mouth every 6 (six) hours as needed for Pain. 12 tablet 0    zafirlukast (ACCOLATE) 20 MG tablet Take 1 tablet (20 mg total) by mouth 2 (two) times daily. 60 tablet 11    zolpidem (AMBIEN) 5 MG Tab Take 1 tablet (5 mg total) by mouth nightly as needed (insomnia). 20 tablet 0    [DISCONTINUED] albuterol (PROVENTIL/VENTOLIN HFA) 90 mcg/actuation inhaler Inhale 1-2 puffs into the lungs every 6 (six) hours as needed for Wheezing. 1 Inhaler 11     [DISCONTINUED] fluticasone furoate-vilanterol (BREO ELLIPTA) 200-25 mcg/dose DsDv diskus inhaler 1 puff      [DISCONTINUED] fluticasone propionate (FLONASE) 50 mcg/actuation nasal spray 2 sprays (100 mcg total) by Each Nostril route once daily. Corrected prescription with refills 1 Bottle 11    [DISCONTINUED] fluticasone-salmeterol diskus inhaler 250-50 mcg Inhale 1 puff into the lungs 2 (two) times daily. 60 each 11    [DISCONTINUED] zafirlukast (ACCOLATE) 20 MG tablet Take 1 tablet (20 mg total) by mouth 2 (two) times daily. 60 tablet 11    levoFLOXacin (LEVAQUIN) 500 MG tablet Take 1 tablet (500 mg total) by mouth once daily. 10 tablet 0    predniSONE (DELTASONE) 2.5 MG tablet Take 2 tablets (5 mg total) by mouth once daily. 60 tablet 5    predniSONE (DELTASONE) 20 MG tablet 60 mg/ day for 3 days, 40 mg/day for 3 days,20 mg/ day for 3 days, (1/2 tablet )10 mg a day for 3 days. Then start 5 mg/day 20 tablet 1    [DISCONTINUED] amLODIPine (NORVASC) 10 MG tablet Take 1 tablet (10 mg total) by mouth once daily. (Patient not taking: Reported on 1/16/2020) 90 tablet 1    [DISCONTINUED] azithromycin (ZITHROMAX Z-KAIA) 250 MG tablet Take 1 tablet (250 mg total) by mouth once daily. 500 mg on day 1 (two tablets) followed by 250 mg once daily on days 2-5 (Patient not taking: Reported on 10/16/2019) 6 tablet 1     Facility-Administered Encounter Medications as of 1/16/2020   Medication Dose Route Frequency Provider Last Rate Last Dose    [COMPLETED] cyanocobalamin injection 1,000 mcg  1,000 mcg Intramuscular Once Viviana Cuba MD   1,000 mcg at 01/16/20 0914    [COMPLETED] methylPREDNISolone acetate injection 80 mg  80 mg Intramuscular 1 time in Clinic/HOD Viviana Cuba MD   80 mg at 01/16/20 0915     Plan:       Requested Prescriptions     Signed Prescriptions Disp Refills    fluticasone furoate-vilanterol (BREO ELLIPTA) 200-25 mcg/dose DsDv diskus inhaler 60 each 11     Sig: Inhale 1 puff into the lungs  once daily. Controller    predniSONE (DELTASONE) 20 MG tablet 20 tablet 1     Si mg/ day for 3 days, 40 mg/day for 3 days,20 mg/ day for 3 days, (1/2 tablet )10 mg a day for 3 days. Then start 5 mg/day    predniSONE (DELTASONE) 2.5 MG tablet 60 tablet 5     Sig: Take 2 tablets (5 mg total) by mouth once daily.    albuterol (PROVENTIL/VENTOLIN HFA) 90 mcg/actuation inhaler 1 Inhaler 11     Sig: Inhale 2 puffs into the lungs every 4 (four) hours as needed for Wheezing or Shortness of Breath.    zafirlukast (ACCOLATE) 20 MG tablet 60 tablet 11     Sig: Take 1 tablet (20 mg total) by mouth 2 (two) times daily.    fluticasone propionate (FLONASE) 50 mcg/actuation nasal spray 1 Bottle 11     Si sprays (100 mcg total) by Each Nostril route once daily. Corrected prescription with refills    levoFLOXacin (LEVAQUIN) 500 MG tablet 10 tablet 0     Sig: Take 1 tablet (500 mg total) by mouth once daily.     Problem List Items Addressed This Visit     Asthma - Primary    Relevant Medications    fluticasone furoate-vilanterol (BREO ELLIPTA) 200-25 mcg/dose DsDv diskus inhaler    predniSONE (DELTASONE) 20 MG tablet    predniSONE (DELTASONE) 2.5 MG tablet    albuterol (PROVENTIL/VENTOLIN HFA) 90 mcg/actuation inhaler    zafirlukast (ACCOLATE) 20 MG tablet    levoFLOXacin (LEVAQUIN) 500 MG tablet    Postnasal drip    Relevant Medications    zafirlukast (ACCOLATE) 20 MG tablet    fluticasone propionate (FLONASE) 50 mcg/actuation nasal spray    Sarcoidosis of lung    Relevant Medications    predniSONE (DELTASONE) 20 MG tablet    predniSONE (DELTASONE) 2.5 MG tablet    Wheezing             No follow-ups on file.    MEDICAL DECISION MAKING: Moderate to high complexity.  Overall, the multiple problems listed are of moderate to high severity that may impact quality of life and activities of daily living. Side effects of medications, treatment plan as well as options and alternatives reviewed and discussed with patient. There  was counseling of patient concerning these issues.    Total time spent in face to face counseling and coordination of care - 45  minutes over 50% of time was used in discussion of prognosis, risks, benefits of treatment, instructions and compliance with regimen . Discussion with other physicians or health care providers (DME, NP, pharmacy, respiratory therapy) occurred.  Answers for HPI/ROS submitted by the patient on 1/16/2020   Asthma  In the past 4 weeks, how much of the time did your asthma keep you from getting as much done at work, school, or at home?: most of the time  During the past 4 weeks, how often have you had shortness of breath?: more than once a day  During the past 4 weeks, how often did your asthma symptoms (Wheezing, coughing, shortness of breath, chest tightness or pain) wake you up at night or earlier that usual in the morning?: 4 or more nights a week  During the past 4 weeks, how often have you used your rescue inhaler or nebulizer medication (such as albuterol)?: 3 or more times per day  How would you rate your asthma control during the past 4 weeks?: not controlled at all   : 6

## 2020-01-20 ENCOUNTER — TELEPHONE (OUTPATIENT)
Dept: INTERNAL MEDICINE | Facility: CLINIC | Age: 60
End: 2020-01-20

## 2020-01-20 DIAGNOSIS — E53.8 LOW SERUM VITAMIN B12: Primary | ICD-10-CM

## 2020-01-20 RX ORDER — CYANOCOBALAMIN 1000 UG/ML
1000 INJECTION, SOLUTION INTRAMUSCULAR; SUBCUTANEOUS ONCE
Status: ACTIVE | OUTPATIENT
Start: 2020-01-20

## 2020-01-20 NOTE — TELEPHONE ENCOUNTER
Notified pt of lab results  Pt states the b12 injection did help her  When can she come in to get another one

## 2020-01-20 NOTE — TELEPHONE ENCOUNTER
----- Message from Viviana Cuba MD sent at 1/17/2020  3:37 PM CST -----  B12 was in a normal range but on the low side. If the injection helped we can give another one.   Vitamin D is low and this can be a reason she is tired as well.

## 2020-01-21 ENCOUNTER — TELEPHONE (OUTPATIENT)
Dept: INTERNAL MEDICINE | Facility: CLINIC | Age: 60
End: 2020-01-21

## 2020-01-21 NOTE — TELEPHONE ENCOUNTER
Please inform patient that Dr Morales responded as below.   Would she like me to send medication to mail order or send some to local pharmacy?      ----- Message from Terrance Morales MD sent at 1/21/2020  9:52 AM CST -----  Needs to take an inhaled steroid.  Send in prescription for budesonide inhalation solution. - Usually is cheaper if sent to mail in pharmacy  Wash out mouth after using  Use Budesonide jet nebs twice a day - no more or no less.  Use Albuterol prior to budesonide at least twice a day. May use albuterol every 4 - 6 hours if needed for shortness of breath, cough or chest congestion        ----- Message -----  From: Viviana Milton MD  Sent: 1/16/2020   8:20 AM CST  To: MD Dr. Andrew Carr  Ms Phillips has been going through URI, bronchitis type symptoms since November.     She went to  in November got z dacia and steroids-did not feel any better  Went to ED 12/17 and got doxycycline and tapered steroids.     She is seeing me today not feeling any better.   Productive cough, fatigue, wheezing, headaches.     I will address possible sinus infection and repeat Xray today.   Looking at medication list   She was not able to afford Advair and Breo is off the list (not sure why this was changed)   She only has Albuterol and Duo Neb.   I have her following up with you however in the meantime do you have a recommendation on another controller medication for her?     JAY MILTON MD

## 2020-01-22 ENCOUNTER — CLINICAL SUPPORT (OUTPATIENT)
Dept: SMOKING CESSATION | Facility: CLINIC | Age: 60
End: 2020-01-22
Payer: COMMERCIAL

## 2020-01-22 DIAGNOSIS — F17.200 NICOTINE DEPENDENCE: Primary | ICD-10-CM

## 2020-01-22 PROCEDURE — 99404 PR PREVENT COUNSEL,INDIV,60 MIN: ICD-10-PCS | Mod: S$GLB,,, | Performed by: GENERAL PRACTICE

## 2020-01-22 PROCEDURE — 99404 PREV MED CNSL INDIV APPRX 60: CPT | Mod: S$GLB,,, | Performed by: GENERAL PRACTICE

## 2020-01-22 PROCEDURE — 99999 PR PBB SHADOW E&M-EST. PATIENT-LVL I: ICD-10-PCS | Mod: PBBFAC,,,

## 2020-01-22 PROCEDURE — 99999 PR PBB SHADOW E&M-EST. PATIENT-LVL I: CPT | Mod: PBBFAC,,,

## 2020-01-22 RX ORDER — VARENICLINE TARTRATE 0.5 (11)-1
KIT ORAL
Qty: 53 TABLET | Refills: 0 | Status: SHIPPED | OUTPATIENT
Start: 2020-01-22 | End: 2020-04-09 | Stop reason: SDUPTHER

## 2020-01-22 RX ORDER — BUDESONIDE 0.5 MG/2ML
0.5 INHALANT ORAL 2 TIMES DAILY
Qty: 120 ML | Refills: 0 | Status: SHIPPED | OUTPATIENT
Start: 2020-01-22 | End: 2020-04-20 | Stop reason: ALTCHOICE

## 2020-01-22 NOTE — PROGRESS NOTES
Individual Follow-Up Form    1/22/2020    `Clinical Status of Patient: Outpatient    Length of Service: 60 minutes    Comments: Patient was seen today for a smoking cessation progress update. Talked at length with patient in regards to her continued smoking. She stated that she has been feeling very depressed due to family issues and has stopped using Wellbutrin and is currently smoking 1 pack per day. She inquired using Chantix at this time because she wants to quit smoking as soon as possible. Discussed how Chantix works, possible side effects and coping strategies to quit smoking. Discussed using Wellbutrin in combination of Chantix if needed. Discussed combination therapy and possible side effects. Discussed an aggressive tapering plan. Encouraged seeking mental advice for her continued depression. She stated that she has been seeking advice from a mental health counselor and is scheduled to meet with a psychiatrist. Discussed weekly follow up for progress. She verbalized understanding. Discussed expiration of benefits and renewal. Will continue to encourage and monitor progress.    Diagnosis: F17.200    Next Visit: 1 week

## 2020-01-23 ENCOUNTER — PATIENT MESSAGE (OUTPATIENT)
Dept: INTERNAL MEDICINE | Facility: CLINIC | Age: 60
End: 2020-01-23

## 2020-04-01 ENCOUNTER — TELEPHONE (OUTPATIENT)
Dept: RADIOLOGY | Facility: HOSPITAL | Age: 60
End: 2020-04-01

## 2020-04-03 ENCOUNTER — TELEPHONE (OUTPATIENT)
Dept: RADIOLOGY | Facility: HOSPITAL | Age: 60
End: 2020-04-03

## 2020-04-09 DIAGNOSIS — J45.41 MODERATE PERSISTENT ASTHMA WITH ACUTE EXACERBATION: ICD-10-CM

## 2020-04-09 DIAGNOSIS — D86.0 SARCOIDOSIS OF LUNG: ICD-10-CM

## 2020-04-09 DIAGNOSIS — F17.200 NICOTINE DEPENDENCE: ICD-10-CM

## 2020-04-09 DIAGNOSIS — F51.01 PRIMARY INSOMNIA: ICD-10-CM

## 2020-04-09 RX ORDER — VARENICLINE TARTRATE 0.5 (11)-1
KIT ORAL
Qty: 53 TABLET | Refills: 0 | Status: SHIPPED | OUTPATIENT
Start: 2020-04-09 | End: 2020-04-20

## 2020-04-09 RX ORDER — ZOLPIDEM TARTRATE 5 MG/1
5 TABLET ORAL NIGHTLY PRN
Qty: 20 TABLET | Refills: 0 | Status: CANCELLED | OUTPATIENT
Start: 2020-04-09

## 2020-04-09 RX ORDER — PREDNISONE 2.5 MG/1
5 TABLET ORAL DAILY
Qty: 60 TABLET | Refills: 5 | OUTPATIENT
Start: 2020-04-09 | End: 2020-05-28

## 2020-04-15 ENCOUNTER — TELEPHONE (OUTPATIENT)
Dept: PULMONOLOGY | Facility: CLINIC | Age: 60
End: 2020-04-15

## 2020-04-19 ENCOUNTER — PATIENT OUTREACH (OUTPATIENT)
Dept: ADMINISTRATIVE | Facility: OTHER | Age: 60
End: 2020-04-19

## 2020-04-20 ENCOUNTER — TELEPHONE (OUTPATIENT)
Dept: PULMONOLOGY | Facility: CLINIC | Age: 60
End: 2020-04-20

## 2020-04-20 ENCOUNTER — OFFICE VISIT (OUTPATIENT)
Dept: PULMONOLOGY | Facility: CLINIC | Age: 60
End: 2020-04-20
Payer: COMMERCIAL

## 2020-04-20 DIAGNOSIS — F51.01 PRIMARY INSOMNIA: ICD-10-CM

## 2020-04-20 DIAGNOSIS — J45.41 MODERATE PERSISTENT ASTHMA WITH ACUTE EXACERBATION: ICD-10-CM

## 2020-04-20 DIAGNOSIS — J45.20 INTERMITTENT ASTHMA WITHOUT COMPLICATION, UNSPECIFIED ASTHMA SEVERITY: ICD-10-CM

## 2020-04-20 DIAGNOSIS — R06.2 WHEEZING: ICD-10-CM

## 2020-04-20 DIAGNOSIS — D86.0 SARCOIDOSIS OF LUNG: ICD-10-CM

## 2020-04-20 DIAGNOSIS — R09.02 EXERCISE HYPOXEMIA: ICD-10-CM

## 2020-04-20 DIAGNOSIS — F17.200 SMOKING: Primary | ICD-10-CM

## 2020-04-20 DIAGNOSIS — R09.82 POSTNASAL DRIP: ICD-10-CM

## 2020-04-20 PROCEDURE — 99215 PR OFFICE/OUTPT VISIT, EST, LEVL V, 40-54 MIN: ICD-10-PCS | Mod: 95,,, | Performed by: INTERNAL MEDICINE

## 2020-04-20 PROCEDURE — 99215 OFFICE O/P EST HI 40 MIN: CPT | Mod: 95,,, | Performed by: INTERNAL MEDICINE

## 2020-04-20 RX ORDER — PREDNISONE 20 MG/1
TABLET ORAL
Qty: 20 TABLET | Refills: 0 | Status: SHIPPED | OUTPATIENT
Start: 2020-04-20 | End: 2020-05-28

## 2020-04-20 RX ORDER — FLUTICASONE PROPIONATE 50 MCG
2 SPRAY, SUSPENSION (ML) NASAL DAILY
Qty: 48 ML | Refills: 3 | Status: SHIPPED | OUTPATIENT
Start: 2020-04-20

## 2020-04-20 RX ORDER — QUETIAPINE FUMARATE 50 MG/1
50 TABLET, FILM COATED ORAL NIGHTLY
COMMUNITY
Start: 2020-03-20

## 2020-04-20 RX ORDER — LEVOFLOXACIN 500 MG/1
500 TABLET, FILM COATED ORAL DAILY
Qty: 10 TABLET | Refills: 0 | Status: SHIPPED | OUTPATIENT
Start: 2020-04-20 | End: 2020-08-05

## 2020-04-20 RX ORDER — ESCITALOPRAM OXALATE 10 MG/1
10 TABLET ORAL DAILY
COMMUNITY
Start: 2020-02-08 | End: 2020-08-05

## 2020-04-20 RX ORDER — ALBUTEROL SULFATE 90 UG/1
2 AEROSOL, METERED RESPIRATORY (INHALATION) EVERY 4 HOURS PRN
Qty: 54 G | Refills: 3 | Status: SHIPPED | OUTPATIENT
Start: 2020-04-20 | End: 2021-07-19 | Stop reason: SDUPTHER

## 2020-04-20 RX ORDER — VARENICLINE TARTRATE 1 MG/1
1 TABLET, FILM COATED ORAL 2 TIMES DAILY
Qty: 60 TABLET | Refills: 3 | Status: SHIPPED | OUTPATIENT
Start: 2020-04-20 | End: 2020-06-10 | Stop reason: ALTCHOICE

## 2020-04-20 RX ORDER — FLUTICASONE FUROATE AND VILANTEROL 200; 25 UG/1; UG/1
1 POWDER RESPIRATORY (INHALATION) DAILY
Qty: 60 EACH | Refills: 11 | Status: SHIPPED | OUTPATIENT
Start: 2020-04-20 | End: 2021-03-12

## 2020-04-20 RX ORDER — ZAFIRLUKAST 20 MG/1
20 TABLET, FILM COATED ORAL 2 TIMES DAILY
Qty: 180 TABLET | Refills: 3 | Status: SHIPPED | OUTPATIENT
Start: 2020-04-20 | End: 2020-12-11 | Stop reason: SDUPTHER

## 2020-04-20 RX ORDER — ZOLPIDEM TARTRATE 5 MG/1
5 TABLET ORAL NIGHTLY PRN
Qty: 20 TABLET | Refills: 1 | Status: SHIPPED | OUTPATIENT
Start: 2020-04-20 | End: 2020-12-11 | Stop reason: SDUPTHER

## 2020-04-20 RX ORDER — IPRATROPIUM BROMIDE AND ALBUTEROL SULFATE 2.5; .5 MG/3ML; MG/3ML
3 SOLUTION RESPIRATORY (INHALATION) EVERY 6 HOURS PRN
Qty: 1 BOX | Refills: 11 | Status: SHIPPED | OUTPATIENT
Start: 2020-04-20 | End: 2021-04-30

## 2020-04-20 NOTE — PATIENT INSTRUCTIONS
BREO COUPON    [email or web address]

## 2020-04-20 NOTE — TELEPHONE ENCOUNTER
----- Message from Terrance Morales MD sent at 4/20/2020 12:12 PM CDT -----  No problem, please call them back  ----- Message -----  From: Nelli Campbell MA  Sent: 4/20/2020  11:36 AM CDT  To: Terrance Morales MD    Spoke with pharmacist. She was calling about possible drug interactions with the Levaquin and prednisone. Is this ok? Please advise.   ----- Message -----  From: Viola Casanova  Sent: 4/20/2020  10:16 AM CDT  To: Andrew TOBIN Staff      Caller:        Type:  Pharmacy Calling to Clarify an RX     Name of Caller:Deidra   Pharmacy Name: [facility]     Prescription Name: levoFLOXacin (LEVAQUIN) 500 MG tablet    What do they need to clarify? Poss drug interaction. Pt is also on predniSONE (DELTASONE) 2.5 MG tablet  Best Call Back Number: [phone]

## 2020-04-20 NOTE — PROGRESS NOTES
Subjective:      The patient location is: [place], [place]  The chief complaint leading to consultation is: cough and greenphlegm  Visit type: Virtual visit with synchronous audio and video  Total time spent with patient: 40  Each patient to whom he or she provides medical services by telemedicine is:  (1) informed of the relationship between the physician and patient and the respective role of any other health care provider with respect to management of the patient; and (2) notified that he or she may decline to receive medical services by telemedicine and may withdraw from such care at any time.    Notes: worsening symptoms of Chronic Obstructive Pulmonary Disease, did not get BREO coupon on last visit. Still smoking 1/2 ppd       Patient ID: Salvatore Phillips is a 59 y.o. female.    Chief Complaint:      No chief complaint on file.    COPD  She presents for evaluation and treatment of COPD. The patient is currently having symptoms / an exacerbation. Current symptoms include chronic dyspnea, cough productive of green sputum in moderate amounts and wheezing. Symptoms have been present since several weeks ago and have been gradually worsening. She denies chills and fever. Associated symptoms include poor exercise tolerance.  This episode appears to have been triggered by infection. Treatments tried for the current exacerbation: albuterol nebulizer. The patient has been having similar episodes for approximately 5 years. She uses 1 pillows at night. Patient currently is not on home oxygen therapy.. The patient is having no constitutional symptoms, denying fever, chills, anorexia, or weight loss. The patient has been hospitalized for this condition before. She has a history of 40 pack years. The patient is experiencing exercise intolerance (difficulty walking 3 blocks on flat ground).    Sarcoidosis: diagnosis at 31 y/o  At [facility] - mediastinal lymph node biopsy     Dyspnea  Patient complains of shortness of  breath. Symptoms occur after more than one flight stairs, with more than three block walking. Symptoms began 5 months ago, gradually worsening since. Associated symptoms include  constant cough, difficulty breathing, drainage from nose, dyspnea on exertion, morning cough, post nasal drip, productive cough, shortness of breath, sputum production and wheezing. He denies chest pain, located left chest. He has had recent travel. Weight has been stable. Symptoms are exacerbated by any exercise. Symptoms are alleviated by medication(s) (albuterol).      History of sarcoidosis. No iritis, eye irritation, no new skin lesions, no arthralgias.     Sarcoidosis Follow up:    History of sarcoidosis. No iritis, eye irritation, no new skin lesions, no arthralgias.  Sarcoidosis stage:   []        Stage I Sarcoidosis  [x]        Stage II Sarcoidosis  []        Stage III Sarcoidosis  []        Stage IV Sarcoidosis    Clinical assessment:   []        Symptomatic  [x]        Assymptomatic       Indications for treatment of pulmonary sarcoidosis:   []        Evidence of progressive disease   []        Severe disease at presentation    Management options:   [x]        Observation without therapy  []        Systemic glucocorticoids  []        Methotrexate.  []        Azathioprine   []        Leuflonamide  []        Mycophenolate  []        Anti TNF alpha antagonists         HPI  Past Medical History:   Diagnosis Date    Asthma     Hypertension     Sarcoidosis of lung     Sleep apnea     maintained on CPAP    Upper respiratory disease     URI (upper respiratory infection)      Past Surgical History:   Procedure Laterality Date    ABDOMINAL SURGERY      BREAST LUMPECTOMY Left 1996    BENIGN    BREAST SURGERY       SECTION      x3    COLON SURGERY      exploratory procedure      GASTRIC BYPASS      HYSTERECTOMY  2009    JOINT REPLACEMENT  2013    KNEE SURGERY      OOPHORECTOMY  2009    SMALL INTESTINE SURGERY       tummy tuck       Social History     Socioeconomic History    Marital status: Single     Spouse name: Not on file    Number of children: Not on file    Years of education: Not on file    Highest education level: Not on file   Occupational History    Not on file   Social Needs    Financial resource strain: Somewhat hard    Food insecurity:     Worry: Sometimes true     Inability: Sometimes true    Transportation needs:     Medical: No     Non-medical: No   Tobacco Use    Smoking status: Current Every Day Smoker     Packs/day: 1.00     Years: 35.00     Pack years: 35.00     Types: Cigarettes     Start date: 1/1/1979    Smokeless tobacco: Never Used    Tobacco comment: started age 17   Substance and Sexual Activity    Alcohol use: Not Currently     Frequency: Monthly or less     Drinks per session: 1 or 2     Binge frequency: Never     Comment: occ    Drug use: Never    Sexual activity: Never   Lifestyle    Physical activity:     Days per week: 0 days     Minutes per session: 0 min    Stress: Very much   Relationships    Social connections:     Talks on phone: Not on file     Gets together: Once a week     Attends Congregational service: Not on file     Active member of club or organization: No     Attends meetings of clubs or organizations: Never     Relationship status:    Other Topics Concern    Not on file   Social History Narrative    Not on file     Review of Systems   Constitutional: Positive for fatigue. Negative for fever.   HENT: Positive for postnasal drip, rhinorrhea and congestion.    Eyes: Negative for redness and itching.   Respiratory: Positive for cough, sputum production, shortness of breath, dyspnea on extertion, use of rescue inhaler and Paroxysmal Nocturnal Dyspnea.    Cardiovascular: Negative for chest pain, palpitations and leg swelling.   Genitourinary: Negative for difficulty urinating and hematuria.   Endocrine: Negative for cold intolerance and heat intolerance.    Skin:  Negative for rash.   Gastrointestinal: Negative for nausea and abdominal pain.   Neurological: Negative for dizziness, syncope, weakness and light-headedness.   Hematological: Negative for adenopathy. Does not bruise/bleed easily.   Psychiatric/Behavioral: Negative for sleep disturbance. The patient is not nervous/anxious.        Objective:        Personal Diagnostic Review  Chest x-ray: mild interstitial changes  X-Ray Chest PA And Lateral  Narrative: EXAMINATION:  XR CHEST PA AND LATERAL    CLINICAL HISTORY:  Cough    TECHNIQUE:  PA and lateral views of the chest were performed.    COMPARISON:  12/17/2019    FINDINGS:  Chronic scarring changes seen scattered throughout both lungs and most prominent within the bilateral upper lung zones.  The overall appearance is similar to the prior exam.  The heart is not enlarged. There is mild tortuosity of the descending thoracic aorta with calcification of the aortic knob.  Surgical clips noted in the right upper quadrant.  Surgical clips also noted to overlie the lower neck to the right of midline.  Impression: 1.  No acute cardiopulmonary process.    Electronically signed by: Rik Nuñez DO  Date:    01/16/2020  Time:    09:10      Office Spirometry Results:     No flowsheet data found.  Pulmonary Studies Review 1/16/2020   SpO2 95   Height 67.000   Weight 2650.81   BMI (Calculated) 25.9   Predicted Distance 378.24   Predicted Distance Meters (Calculated) 518.79         Assessment:            Smoking  -     varenicline (CHANTIX) 1 mg Tab; Take 1 tablet (1 mg total) by mouth 2 (two) times daily. Start this medication after completing the the starting dose pack. Stop smoking  Dispense: 60 tablet; Refill: 3    Wheezing  -     albuterol-ipratropium (DUO-NEB) 2.5 mg-0.5 mg/3 mL nebulizer solution; Take 3 mLs by nebulization every 6 (six) hours as needed for Wheezing or Shortness of Breath. Rescue. Corrected prescription with refills  Dispense: 1 Box; Refill: 11    Moderate  persistent asthma with acute exacerbation  -     albuterol-ipratropium (DUO-NEB) 2.5 mg-0.5 mg/3 mL nebulizer solution; Take 3 mLs by nebulization every 6 (six) hours as needed for Wheezing or Shortness of Breath. Rescue. Corrected prescription with refills  Dispense: 1 Box; Refill: 11  -     albuterol (PROVENTIL/VENTOLIN HFA) 90 mcg/actuation inhaler; Inhale 2 puffs into the lungs every 4 (four) hours as needed for Wheezing or Shortness of Breath.  Dispense: 54 g; Refill: 3  -     predniSONE (DELTASONE) 20 MG tablet; 60 mg/ day for 3 days, 40 mg/day for 3 days,20 mg/ day for 3 days, (1/2 tablet )10 mg a day for 3 days. Then start 5 mg/day  Dispense: 20 tablet; Refill: 0  -     levoFLOXacin (LEVAQUIN) 500 MG tablet; Take 1 tablet (500 mg total) by mouth once daily.  Dispense: 10 tablet; Refill: 0  -     zafirlukast (ACCOLATE) 20 MG tablet; Take 1 tablet (20 mg total) by mouth 2 (two) times daily.  Dispense: 180 tablet; Refill: 3  -     Stress test, pulmonary; Future; Expected date: 04/20/2020    Sarcoidosis of lung  -     predniSONE (DELTASONE) 20 MG tablet; 60 mg/ day for 3 days, 40 mg/day for 3 days,20 mg/ day for 3 days, (1/2 tablet )10 mg a day for 3 days. Then start 5 mg/day  Dispense: 20 tablet; Refill: 0    Postnasal drip  -     fluticasone propionate (FLONASE) 50 mcg/actuation nasal spray; 2 sprays (100 mcg total) by Each Nostril route once daily. Corrected prescription with refills  Dispense: 48 mL; Refill: 3  -     zafirlukast (ACCOLATE) 20 MG tablet; Take 1 tablet (20 mg total) by mouth 2 (two) times daily.  Dispense: 180 tablet; Refill: 3    Intermittent asthma without complication, unspecified asthma severity  -     fluticasone furoate-vilanteroL (BREO ELLIPTA) 200-25 mcg/dose DsDv diskus inhaler; Inhale 1 puff into the lungs once daily. Controller  Dispense: 60 each; Refill: 11    Primary insomnia  -     zolpidem (AMBIEN) 5 MG Tab; Take 1 tablet (5 mg total) by mouth nightly as needed (insomnia).   Dispense: 20 tablet; Refill: 1    Exercise hypoxemia  -     Stress test, pulmonary; Future; Expected date: 04/20/2020          Outpatient Encounter Medications as of 4/20/2020   Medication Sig Dispense Refill    albuterol (PROVENTIL/VENTOLIN HFA) 90 mcg/actuation inhaler Inhale 2 puffs into the lungs every 4 (four) hours as needed for Wheezing or Shortness of Breath. 54 g 3    albuterol-ipratropium (DUO-NEB) 2.5 mg-0.5 mg/3 mL nebulizer solution Take 3 mLs by nebulization every 6 (six) hours as needed for Wheezing or Shortness of Breath. Rescue. Corrected prescription with refills 1 Box 11    buPROPion (WELLBUTRIN SR) 150 MG TBSR 12 hr tablet Take 1 tablet (150 mg total) by mouth 2 (two) times daily. 60 tablet 2    butalbital-acetaminophen-caffeine -40 mg (FIORICET, ESGIC) -40 mg per tablet Take 1 tablet by mouth every 4 (four) hours as needed for Pain or Headaches. 9 tablet 0    escitalopram oxalate (LEXAPRO) 10 MG tablet Take 10 mg by mouth once daily.      fluticasone propionate (FLONASE) 50 mcg/actuation nasal spray 2 sprays (100 mcg total) by Each Nostril route once daily. Corrected prescription with refills 48 mL 3    ipratropium (ATROVENT HFA) 17 mcg/actuation inhaler Inhale 2 puffs into the lungs every 4 to 6 hours as needed for Wheezing. Rescue 1 Inhaler 11    nicotine polacrilex 2 MG Lozg Take 1 lozenge (2 mg total) by mouth as needed. 200 lozenge 2    predniSONE (DELTASONE) 2.5 MG tablet Take 2 tablets (5 mg total) by mouth once daily. 60 tablet 5    predniSONE (DELTASONE) 20 MG tablet 60 mg/ day for 3 days, 40 mg/day for 3 days,20 mg/ day for 3 days, (1/2 tablet )10 mg a day for 3 days. Then start 5 mg/day 20 tablet 0    traMADol (ULTRAM) 50 mg tablet Take 1 tablet (50 mg total) by mouth every 6 (six) hours as needed for Pain. 12 tablet 0    zafirlukast (ACCOLATE) 20 MG tablet Take 1 tablet (20 mg total) by mouth 2 (two) times daily. 180 tablet 3    zolpidem (AMBIEN) 5 MG Tab  Take 1 tablet (5 mg total) by mouth nightly as needed (insomnia). 20 tablet 1    [DISCONTINUED] albuterol (PROVENTIL/VENTOLIN HFA) 90 mcg/actuation inhaler Inhale 2 puffs into the lungs every 4 (four) hours as needed for Wheezing or Shortness of Breath. 1 Inhaler 11    [DISCONTINUED] albuterol-ipratropium (DUO-NEB) 2.5 mg-0.5 mg/3 mL nebulizer solution Take 3 mLs by nebulization every 6 (six) hours as needed for Wheezing or Shortness of Breath. Rescue. Corrected prescription with refills 1 Box 11    [DISCONTINUED] budesonide (PULMICORT) 0.5 mg/2 mL nebulizer solution Take 2 mLs (0.5 mg total) by nebulization 2 (two) times daily. Controller 120 mL 0    [DISCONTINUED] fluticasone propionate (FLONASE) 50 mcg/actuation nasal spray 2 sprays (100 mcg total) by Each Nostril route once daily. Corrected prescription with refills 1 Bottle 11    [DISCONTINUED] predniSONE (DELTASONE) 20 MG tablet 60 mg/ day for 3 days, 40 mg/day for 3 days,20 mg/ day for 3 days, (1/2 tablet )10 mg a day for 3 days. Then start 5 mg/day 20 tablet 1    [DISCONTINUED] varenicline (CHANTIX STARTING MONTH BOX) 0.5 mg (11)- 1 mg (42) tablet Follow package directions. 53 tablet 0    [DISCONTINUED] zafirlukast (ACCOLATE) 20 MG tablet Take 1 tablet (20 mg total) by mouth 2 (two) times daily. 60 tablet 11    [DISCONTINUED] zolpidem (AMBIEN) 5 MG Tab Take 1 tablet (5 mg total) by mouth nightly as needed (insomnia). 20 tablet 0    fluticasone furoate-vilanteroL (BREO ELLIPTA) 200-25 mcg/dose DsDv diskus inhaler Inhale 1 puff into the lungs once daily. Controller 60 each 11    levoFLOXacin (LEVAQUIN) 500 MG tablet Take 1 tablet (500 mg total) by mouth once daily. 10 tablet 0    oxybutynin (DITROPAN) 5 MG Tab Take 1 tablet (5 mg total) by mouth every evening. (Patient not taking: Reported on 1/22/2020) 90 tablet 0    QUEtiapine (SEROQUEL) 50 MG tablet Take 50 mg by mouth every evening.      varenicline (CHANTIX) 1 mg Tab Take 1 tablet (1 mg  total) by mouth 2 (two) times daily. Start this medication after completing the the starting dose pack. Stop smoking 60 tablet 3    [DISCONTINUED] fluticasone furoate-vilanterol (BREO ELLIPTA) 200-25 mcg/dose DsDv diskus inhaler Inhale 1 puff into the lungs once daily. Controller (Patient not taking: Reported on 2020) 60 each 11    [DISCONTINUED] levoFLOXacin (LEVAQUIN) 500 MG tablet Take 1 tablet (500 mg total) by mouth once daily. (Patient not taking: Reported on 2020) 10 tablet 0     Facility-Administered Encounter Medications as of 2020   Medication Dose Route Frequency Provider Last Rate Last Dose    cyanocobalamin injection 1,000 mcg  1,000 mcg Intramuscular Once Viviana Cuba MD         Plan:       Requested Prescriptions     Signed Prescriptions Disp Refills    albuterol-ipratropium (DUO-NEB) 2.5 mg-0.5 mg/3 mL nebulizer solution 1 Box 11     Sig: Take 3 mLs by nebulization every 6 (six) hours as needed for Wheezing or Shortness of Breath. Rescue. Corrected prescription with refills    albuterol (PROVENTIL/VENTOLIN HFA) 90 mcg/actuation inhaler 54 g 3     Sig: Inhale 2 puffs into the lungs every 4 (four) hours as needed for Wheezing or Shortness of Breath.    predniSONE (DELTASONE) 20 MG tablet 20 tablet 0     Si mg/ day for 3 days, 40 mg/day for 3 days,20 mg/ day for 3 days, (1/2 tablet )10 mg a day for 3 days. Then start 5 mg/day    levoFLOXacin (LEVAQUIN) 500 MG tablet 10 tablet 0     Sig: Take 1 tablet (500 mg total) by mouth once daily.    fluticasone propionate (FLONASE) 50 mcg/actuation nasal spray 48 mL 3     Si sprays (100 mcg total) by Each Nostril route once daily. Corrected prescription with refills    fluticasone furoate-vilanteroL (BREO ELLIPTA) 200-25 mcg/dose DsDv diskus inhaler 60 each 11     Sig: Inhale 1 puff into the lungs once daily. Controller    zafirlukast (ACCOLATE) 20 MG tablet 180 tablet 3     Sig: Take 1 tablet (20 mg total) by mouth 2 (two)  times daily.    zolpidem (AMBIEN) 5 MG Tab 20 tablet 1     Sig: Take 1 tablet (5 mg total) by mouth nightly as needed (insomnia).    varenicline (CHANTIX) 1 mg Tab 60 tablet 3     Sig: Take 1 tablet (1 mg total) by mouth 2 (two) times daily. Start this medication after completing the the starting dose pack. Stop smoking     Problem List Items Addressed This Visit     Asthma    Relevant Medications    albuterol-ipratropium (DUO-NEB) 2.5 mg-0.5 mg/3 mL nebulizer solution    albuterol (PROVENTIL/VENTOLIN HFA) 90 mcg/actuation inhaler    predniSONE (DELTASONE) 20 MG tablet    levoFLOXacin (LEVAQUIN) 500 MG tablet    fluticasone furoate-vilanteroL (BREO ELLIPTA) 200-25 mcg/dose DsDv diskus inhaler    zafirlukast (ACCOLATE) 20 MG tablet    Other Relevant Orders    Stress test, pulmonary    Postnasal drip    Relevant Medications    fluticasone propionate (FLONASE) 50 mcg/actuation nasal spray    zafirlukast (ACCOLATE) 20 MG tablet    Sarcoidosis of lung    Relevant Medications    predniSONE (DELTASONE) 20 MG tablet    Wheezing    Relevant Medications    albuterol-ipratropium (DUO-NEB) 2.5 mg-0.5 mg/3 mL nebulizer solution      Other Visit Diagnoses     Smoking    -  Primary    Relevant Medications    varenicline (CHANTIX) 1 mg Tab    Primary insomnia        Relevant Medications    zolpidem (AMBIEN) 5 MG Tab    Exercise hypoxemia        Relevant Orders    Stress test, pulmonary             Follow up in about 6 months (around 10/20/2020) for 6 min walk.    MEDICAL DECISION MAKING: Moderate to high complexity.  Overall, the multiple problems listed are of moderate to high severity that may impact quality of life and activities of daily living. Side effects of medications, treatment plan as well as options and alternatives reviewed and discussed with patient. There was counseling of patient concerning these issues.    Total time spent in face to face counseling and coordination of care - 45  minutes over 50% of time was used  in discussion of prognosis, risks, benefits of treatment, instructions and compliance with regimen . Discussion with other physicians or health care providers (DME, NP, pharmacy, respiratory therapy) occurred.

## 2020-04-22 DIAGNOSIS — F17.200 NICOTINE DEPENDENCE: ICD-10-CM

## 2020-04-22 RX ORDER — VARENICLINE TARTRATE 0.5 (11)-1
KIT ORAL
Qty: 53 TABLET | Refills: 0 | Status: SHIPPED | OUTPATIENT
Start: 2020-04-22 | End: 2020-06-10 | Stop reason: ALTCHOICE

## 2020-04-23 ENCOUNTER — TELEPHONE (OUTPATIENT)
Dept: INTERNAL MEDICINE | Facility: CLINIC | Age: 60
End: 2020-04-23

## 2020-04-23 NOTE — TELEPHONE ENCOUNTER
Notified pharmacy that pt needs to go through the chantix program to get medication ordered  If not pt will have to pay out of pocket for medication

## 2020-05-06 ENCOUNTER — TELEPHONE (OUTPATIENT)
Dept: INTERNAL MEDICINE | Facility: CLINIC | Age: 60
End: 2020-05-06

## 2020-05-06 ENCOUNTER — TELEPHONE (OUTPATIENT)
Dept: RADIOLOGY | Facility: HOSPITAL | Age: 60
End: 2020-05-06

## 2020-05-06 DIAGNOSIS — F17.200 NICOTINE DEPENDENCE: ICD-10-CM

## 2020-05-06 RX ORDER — VARENICLINE TARTRATE 0.5 (11)-1
KIT ORAL
Qty: 53 TABLET | Refills: 0 | OUTPATIENT
Start: 2020-05-06

## 2020-05-06 NOTE — TELEPHONE ENCOUNTER
Is Dr. Nuñez who is listed as her PCP?   If this is the case please inform patient, she can either contact Dr. Nuñez or her insurance to change her PCP

## 2020-05-06 NOTE — TELEPHONE ENCOUNTER
Notified pharmacy to contact pt PCP Dr. Nuñez to see if that provider could write the rx for the patient

## 2020-05-06 NOTE — TELEPHONE ENCOUNTER
Called patient to reschedule mammogram and ultrasound, patient stated the she have too much going on at this time and that she will call back to reschedule.

## 2020-05-06 NOTE — TELEPHONE ENCOUNTER
Spoke with Deonna from [facility] mail order stating Chantix rx need to be written by Dr. Nuñez to have it for free. Please advise.

## 2020-05-06 NOTE — TELEPHONE ENCOUNTER
----- Message from Bessie Bergman sent at 5/6/2020 10:56 AM CDT -----  Contact: [facility]  Calling concerning a RX [facility] have a program for free medication for patient that Dr Cuba is not a part of. States Dr. Nuñez is a part of that program and can write RX for patient. Please call Deonna @ [phone]. thanks

## 2020-05-28 ENCOUNTER — HOSPITAL ENCOUNTER (EMERGENCY)
Facility: HOSPITAL | Age: 60
Discharge: HOME | End: 2020-05-28
Attending: EMERGENCY MEDICINE
Payer: COMMERCIAL

## 2020-05-28 VITALS
TEMPERATURE: 99 F | SYSTOLIC BLOOD PRESSURE: 131 MMHG | BODY MASS INDEX: 24.15 KG/M2 | OXYGEN SATURATION: 95 % | HEART RATE: 82 BPM | DIASTOLIC BLOOD PRESSURE: 69 MMHG | WEIGHT: 154.19 LBS | RESPIRATION RATE: 20 BRPM

## 2020-05-28 DIAGNOSIS — J45.41 MODERATE PERSISTENT ASTHMA WITH EXACERBATION: ICD-10-CM

## 2020-05-28 DIAGNOSIS — R51.9 FRONTAL HEADACHE: Primary | ICD-10-CM

## 2020-05-28 DIAGNOSIS — Z86.2 HISTORY OF SARCOIDOSIS: ICD-10-CM

## 2020-05-28 DIAGNOSIS — Z72.0 TOBACCO ABUSE DISORDER: ICD-10-CM

## 2020-05-28 PROCEDURE — 25000003 PHARM REV CODE 250: Performed by: EMERGENCY MEDICINE

## 2020-05-28 PROCEDURE — 99285 EMERGENCY DEPT VISIT HI MDM: CPT | Mod: 25

## 2020-05-28 PROCEDURE — 94640 AIRWAY INHALATION TREATMENT: CPT

## 2020-05-28 PROCEDURE — 63600175 PHARM REV CODE 636 W HCPCS: Performed by: EMERGENCY MEDICINE

## 2020-05-28 PROCEDURE — 25000242 PHARM REV CODE 250 ALT 637 W/ HCPCS: Performed by: EMERGENCY MEDICINE

## 2020-05-28 RX ORDER — IPRATROPIUM BROMIDE AND ALBUTEROL SULFATE 2.5; .5 MG/3ML; MG/3ML
3 SOLUTION RESPIRATORY (INHALATION)
Status: COMPLETED | OUTPATIENT
Start: 2020-05-28 | End: 2020-05-28

## 2020-05-28 RX ORDER — PREDNISONE 20 MG/1
60 TABLET ORAL
Status: COMPLETED | OUTPATIENT
Start: 2020-05-28 | End: 2020-05-28

## 2020-05-28 RX ORDER — BUTALBITAL, ACETAMINOPHEN AND CAFFEINE 50; 325; 40 MG/1; MG/1; MG/1
1 TABLET ORAL EVERY 4 HOURS PRN
Qty: 18 TABLET | Refills: 0 | OUTPATIENT
Start: 2020-05-28 | End: 2020-07-30

## 2020-05-28 RX ORDER — PREDNISONE 20 MG/1
40 TABLET ORAL DAILY
Qty: 10 TABLET | Refills: 0 | Status: SHIPPED | OUTPATIENT
Start: 2020-05-28 | End: 2020-06-02

## 2020-05-28 RX ORDER — BUTALBITAL, ACETAMINOPHEN AND CAFFEINE 50; 325; 40 MG/1; MG/1; MG/1
1 TABLET ORAL
Status: COMPLETED | OUTPATIENT
Start: 2020-05-28 | End: 2020-05-28

## 2020-05-28 RX ORDER — PROCHLORPERAZINE MALEATE 5 MG
10 TABLET ORAL
Status: COMPLETED | OUTPATIENT
Start: 2020-05-28 | End: 2020-05-28

## 2020-05-28 RX ADMIN — PREDNISONE 60 MG: 20 TABLET ORAL at 04:05

## 2020-05-28 RX ADMIN — IPRATROPIUM BROMIDE AND ALBUTEROL SULFATE 3 ML: .5; 3 SOLUTION RESPIRATORY (INHALATION) at 04:05

## 2020-05-28 RX ADMIN — PROCHLORPERAZINE MALEATE 10 MG: 5 TABLET ORAL at 04:05

## 2020-05-28 RX ADMIN — BUTALBITAL, ACETAMINOPHEN AND CAFFEINE 1 TABLET: 50; 325; 40 TABLET ORAL at 04:05

## 2020-05-28 NOTE — ED PROVIDER NOTES
SCRIBE #1 NOTE: I, Gayle Wilkins, am scribing for, and in the presence of, Kesha Ruelas MD. I have scribed the entire note.       History     Chief Complaint   Patient presents with    Headache     Pt reports headache since Monday that is worsening to the front of her head; denies hx of migraines +photophobia, denies N/V    Shortness of Breath     hx of asthma and reports SOB increased since this morning      Review of patient's allergies indicates:  No Known Allergies      History of Present Illness     HPI    2020, 4:15 PM  History obtained from the patient      History of Present Illness: Salvatore Phillips is a 59 y.o. female patient with a PMHx of asthma, HTN, sarcoidosis of lung, and sleep apnea who presents to the Emergency Department for evaluation of frontal HA radiating to neck which onset gradually x3 days ago. Symptoms are constant and moderate in severity. Sxs exacerbated by light and noise. No mitigating factors reported. Associated sxs include photophobia. Pt also reports increased SOB with wheezing onset this morning at this time. Patient denies any light-headedness, dizziness, n/v/d, fever, chills, numbness, weakness, visual disturbance, palpitations, CP, leg swelling, cough, and all other sxs at this time. Prior tx includes aleve, Excedrin, and BC powder. She last took BC power at 12PM today. Pt notes she is a smoker. Pt denies a history of migraines. No further complaints or concerns at this time.       Arrival mode: Personal vehicle     PCP: Viviana Cuba MD        Past Medical History:  Past Medical History:   Diagnosis Date    Asthma     Hypertension     Sarcoidosis of lung     Sleep apnea     maintained on CPAP    Upper respiratory disease     URI (upper respiratory infection)        Past Surgical History:  Past Surgical History:   Procedure Laterality Date    ABDOMINAL SURGERY      BREAST LUMPECTOMY Left 1996    BENIGN    BREAST SURGERY       SECTION      x3     COLON SURGERY      exploratory procedure      GASTRIC BYPASS      HYSTERECTOMY  2009    JOINT REPLACEMENT  2013    KNEE SURGERY      OOPHORECTOMY  2009    SMALL INTESTINE SURGERY      tummy tuck           Family History:  Family History   Problem Relation Age of Onset    Alzheimer's disease Mother     Cancer Father         lung    Crohn's disease Brother     Kidney disease Brother     Arthritis Sister     Hypertension Son        Social History:  Social History     Tobacco Use    Smoking status: Current Every Day Smoker     Packs/day: 1.00     Years: 35.00     Pack years: 35.00     Types: Cigarettes     Start date: 1/1/1979    Smokeless tobacco: Never Used    Tobacco comment: started age 17   Substance and Sexual Activity    Alcohol use: Not Currently     Frequency: Monthly or less     Drinks per session: 1 or 2     Binge frequency: Never     Comment: occ    Drug use: Never    Sexual activity: Never        Review of Systems     Review of Systems   Constitutional: Negative for chills and fever.   HENT: Negative for sore throat.    Eyes: Positive for photophobia. Negative for visual disturbance.   Respiratory: Positive for shortness of breath and wheezing. Negative for cough.    Cardiovascular: Negative for chest pain, palpitations and leg swelling.   Gastrointestinal: Negative for diarrhea, nausea and vomiting.   Genitourinary: Negative for dysuria.   Musculoskeletal: Negative for back pain.   Skin: Negative for rash.   Neurological: Positive for headaches (frontal radiating to neck). Negative for dizziness, weakness, light-headedness and numbness.   Hematological: Does not bruise/bleed easily.   All other systems reviewed and are negative.     Physical Exam     Initial Vitals [05/28/20 1518]   BP Pulse Resp Temp SpO2   120/70 96 18 99 °F (37.2 °C) (!) 93 %      MAP       --          Physical Exam  Nursing Notes and Vital Signs Reviewed.  Constitutional: Patient is in no acute distress.  Well-developed and well-nourished.  Head: Atraumatic. Normocephalic.  Eyes: PERRL. EOM intact. Conjunctivae are not pale. No scleral icterus.  ENT: Mucous membranes are moist. Oropharynx is clear and symmetric.    Neck: Supple. Full ROM. No lymphadenopathy.  Cardiovascular: Regular rate. Regular rhythm. No murmurs, rubs, or gallops. Distal pulses are 2+ and symmetric.  Pulmonary/Chest: No respiratory distress. Faint expiratory wheezing bilat. No rales.   Abdominal: Soft and non-distended.  There is no tenderness.  No rebound, guarding, or rigidity. Good bowel sounds.  Genitourinary: No CVA tenderness  Musculoskeletal: Moves all extremities. No obvious deformities. No edema. No calf tenderness.  Skin: Warm and dry.  Neurological:  Alert, awake, and appropriate.  Normal speech.  No acute focal neurological deficits are appreciated.  Psychiatric: Normal affect. Good eye contact. Appropriate in content.     ED Course   Procedures  ED Vital Signs:  Vitals:    05/28/20 1518 05/28/20 1616 05/28/20 1621 05/28/20 1626   BP: 120/70      Pulse: 96 82 82 77   Resp: 18 20 20 20   Temp: 99 °F (37.2 °C)      TempSrc: Oral      SpO2: (!) 93% 97% 97% 97%   Weight: 70 kg (154 lb 3.4 oz)       05/28/20 1639   BP: 131/69   Pulse: 82   Resp:    Temp:    TempSrc:    SpO2: 95%   Weight:        Abnormal Lab Results:  Labs Reviewed - No data to display       Imaging Results:  Imaging Results    None                The Emergency Provider reviewed the vital signs and test results, which are outlined above.     ED Discussion     5:09 PM: Reassessed pt at this time.  Pt states her condition has improved at this time. Discussed with pt all pertinent ED information and results. Discussed pt dx and plan of tx. Gave pt all f/u and return to the ED instructions. All questions and concerns were addressed at this time. Pt expresses understanding of information and instructions, and is comfortable with plan to discharge. Pt is stable for  discharge.    I discussed with patient and/or family/caretaker that evaluation in the ED does not suggest any emergent or life threatening medical conditions requiring immediate intervention beyond what was provided in the ED, and I believe patient is safe for discharge.  Regardless, an unremarkable evaluation in the ED does not preclude the development or presence of a serious of life threatening condition. As such, patient was instructed to return immediately for any worsening or change in current symptoms.               Additional MDM:   Smoking Cessation: The patient is a smoker. The patient was counseled on smoking cessation for: 3 minutes.        ED Medication(s):  Medications   albuterol-ipratropium 2.5 mg-0.5 mg/3 mL nebulizer solution 3 mL (3 mLs Nebulization Given 5/28/20 1626)   predniSONE tablet 60 mg (60 mg Oral Given 5/28/20 1634)   butalbital-acetaminophen-caffeine -40 mg per tablet 1 tablet (1 tablet Oral Given 5/28/20 1634)   prochlorperazine tablet 10 mg (10 mg Oral Given 5/28/20 1634)       Discharge Medication List as of 5/28/2020  5:11 PM      START taking these medications    Details   predniSONE (DELTASONE) 20 MG tablet Take 2 tablets (40 mg total) by mouth once daily. for 5 days, Starting u 5/28/2020, Until Tue 6/2/2020, Print             Follow-up Information     Viviana Cuba MD. Schedule an appointment as soon as possible for a visit in 2 days.    Specialty:  Internal Medicine  Why:  Return to the Emergency Room, If symptoms worsen  Contact information:  [address] [place] [address]  [phone]             Terrance Morales MD. Schedule an appointment as soon as possible for a visit in 2 days.    Specialty:  Pulmonary Disease  Contact information:  [address]  [place] [address]  [phone]                       Scribe Attestation:   Scribe #1: I performed the above scribed service and the documentation accurately describes the services I performed.  I attest to the accuracy of the note.     Attending:   Physician Attestation Statement for Scribe #1: I, Kesha Ruelas MD, personally performed the services described in this documentation, as scribed by Gayle Wilkins, in my presence, and it is both accurate and complete.           Clinical Impression       ICD-10-CM ICD-9-CM   1. Frontal headache R51 784.0   2. Moderate persistent asthma with exacerbation J45.41 493.92   3. Tobacco abuse disorder Z72.0 305.1   4. History of sarcoidosis Z86.2 V12.29       Disposition:   Disposition: Discharged  Condition: Stable       Kesha Ruelas MD  05/28/20 1558

## 2020-06-05 ENCOUNTER — PATIENT MESSAGE (OUTPATIENT)
Dept: SMOKING CESSATION | Facility: CLINIC | Age: 60
End: 2020-06-05

## 2020-06-05 ENCOUNTER — LAB VISIT (OUTPATIENT)
Dept: LAB | Facility: HOSPITAL | Age: 60
End: 2020-06-05
Attending: FAMILY MEDICINE
Payer: COMMERCIAL

## 2020-06-05 ENCOUNTER — OFFICE VISIT (OUTPATIENT)
Dept: INTERNAL MEDICINE | Facility: CLINIC | Age: 60
End: 2020-06-05
Payer: COMMERCIAL

## 2020-06-05 ENCOUNTER — HOSPITAL ENCOUNTER (OUTPATIENT)
Dept: RADIOLOGY | Facility: HOSPITAL | Age: 60
Discharge: HOME | End: 2020-06-05
Attending: FAMILY MEDICINE
Payer: COMMERCIAL

## 2020-06-05 VITALS
TEMPERATURE: 99 F | WEIGHT: 162.5 LBS | RESPIRATION RATE: 16 BRPM | HEART RATE: 89 BPM | BODY MASS INDEX: 25.51 KG/M2 | HEIGHT: 67 IN

## 2020-06-05 DIAGNOSIS — M79.89 SWELLING OF BOTH LOWER EXTREMITIES: ICD-10-CM

## 2020-06-05 DIAGNOSIS — O28.0 LOW MATERNAL SERUM VITAMIN B12: ICD-10-CM

## 2020-06-05 DIAGNOSIS — R06.2 WHEEZING: ICD-10-CM

## 2020-06-05 DIAGNOSIS — J45.20 MILD INTERMITTENT ASTHMA WITHOUT COMPLICATION: ICD-10-CM

## 2020-06-05 DIAGNOSIS — R06.2 WHEEZING: Primary | ICD-10-CM

## 2020-06-05 DIAGNOSIS — R06.02 SHORTNESS OF BREATH: ICD-10-CM

## 2020-06-05 DIAGNOSIS — H65.93 BILATERAL NON-SUPPURATIVE OTITIS MEDIA: ICD-10-CM

## 2020-06-05 DIAGNOSIS — R51.9 PERSISTENT HEADACHES: ICD-10-CM

## 2020-06-05 PROCEDURE — 3008F BODY MASS INDEX DOCD: CPT | Mod: CPTII,S$GLB,, | Performed by: FAMILY MEDICINE

## 2020-06-05 PROCEDURE — 94640 AIRWAY INHALATION TREATMENT: CPT | Mod: S$GLB,,, | Performed by: FAMILY MEDICINE

## 2020-06-05 PROCEDURE — 71046 X-RAY EXAM CHEST 2 VIEWS: CPT | Mod: 26,,, | Performed by: RADIOLOGY

## 2020-06-05 PROCEDURE — 99999 PR PBB SHADOW E&M-EST. PATIENT-LVL V: CPT | Mod: PBBFAC,,, | Performed by: FAMILY MEDICINE

## 2020-06-05 PROCEDURE — 3008F PR BODY MASS INDEX (BMI) DOCUMENTED: ICD-10-PCS | Mod: CPTII,S$GLB,, | Performed by: FAMILY MEDICINE

## 2020-06-05 PROCEDURE — 99999 PR PBB SHADOW E&M-EST. PATIENT-LVL V: ICD-10-PCS | Mod: PBBFAC,,, | Performed by: FAMILY MEDICINE

## 2020-06-05 PROCEDURE — 96372 PR INJECTION,THERAP/PROPH/DIAG2ST, IM OR SUBCUT: ICD-10-PCS | Mod: 59,S$GLB,, | Performed by: FAMILY MEDICINE

## 2020-06-05 PROCEDURE — 83880 ASSAY OF NATRIURETIC PEPTIDE: CPT

## 2020-06-05 PROCEDURE — 94640 PR INHAL RX, AIRWAY OBST/DX SPUTUM INDUCT: ICD-10-PCS | Mod: S$GLB,,, | Performed by: FAMILY MEDICINE

## 2020-06-05 PROCEDURE — 99214 PR OFFICE/OUTPT VISIT, EST, LEVL IV, 30-39 MIN: ICD-10-PCS | Mod: 25,S$GLB,, | Performed by: FAMILY MEDICINE

## 2020-06-05 PROCEDURE — 71046 XR CHEST PA AND LATERAL: ICD-10-PCS | Mod: 26,,, | Performed by: RADIOLOGY

## 2020-06-05 PROCEDURE — 36415 COLL VENOUS BLD VENIPUNCTURE: CPT

## 2020-06-05 PROCEDURE — 71046 X-RAY EXAM CHEST 2 VIEWS: CPT | Mod: TC

## 2020-06-05 PROCEDURE — 96372 THER/PROPH/DIAG INJ SC/IM: CPT | Mod: 59,S$GLB,, | Performed by: FAMILY MEDICINE

## 2020-06-05 PROCEDURE — 80053 COMPREHEN METABOLIC PANEL: CPT

## 2020-06-05 PROCEDURE — 99214 OFFICE O/P EST MOD 30 MIN: CPT | Mod: 25,S$GLB,, | Performed by: FAMILY MEDICINE

## 2020-06-05 RX ORDER — MONTELUKAST SODIUM 10 MG/1
10 TABLET ORAL NIGHTLY
Qty: 30 TABLET | Refills: 0 | Status: SHIPPED | OUTPATIENT
Start: 2020-06-05 | End: 2020-07-05

## 2020-06-05 RX ORDER — TRAMADOL HYDROCHLORIDE 50 MG/1
50 TABLET ORAL EVERY 6 HOURS PRN
Qty: 12 TABLET | Refills: 0 | Status: SHIPPED | OUTPATIENT
Start: 2020-06-05

## 2020-06-05 RX ORDER — CYANOCOBALAMIN 1000 UG/ML
1000 INJECTION, SOLUTION INTRAMUSCULAR; SUBCUTANEOUS ONCE
Status: COMPLETED | OUTPATIENT
Start: 2020-06-05 | End: 2020-06-05

## 2020-06-05 RX ORDER — FUROSEMIDE 40 MG/1
40 TABLET ORAL DAILY
Qty: 5 TABLET | Refills: 0 | Status: SHIPPED | OUTPATIENT
Start: 2020-06-05 | End: 2020-06-18 | Stop reason: SDUPTHER

## 2020-06-05 RX ORDER — LEVOCETIRIZINE DIHYDROCHLORIDE 5 MG/1
5 TABLET, FILM COATED ORAL NIGHTLY
Qty: 10 TABLET | Refills: 0 | Status: SHIPPED | OUTPATIENT
Start: 2020-06-05 | End: 2020-08-05

## 2020-06-05 RX ORDER — AMOXICILLIN AND CLAVULANATE POTASSIUM 875; 125 MG/1; MG/1
1 TABLET, FILM COATED ORAL 2 TIMES DAILY
Qty: 14 TABLET | Refills: 0 | Status: SHIPPED | OUTPATIENT
Start: 2020-06-05 | End: 2020-06-12

## 2020-06-05 RX ORDER — IPRATROPIUM BROMIDE AND ALBUTEROL SULFATE 2.5; .5 MG/3ML; MG/3ML
3 SOLUTION RESPIRATORY (INHALATION)
Status: COMPLETED | OUTPATIENT
Start: 2020-06-05 | End: 2020-06-05

## 2020-06-05 RX ORDER — PREDNISONE 20 MG/1
40 TABLET ORAL DAILY
Qty: 10 TABLET | Refills: 0 | Status: ON HOLD | OUTPATIENT
Start: 2020-06-05 | End: 2020-12-18 | Stop reason: HOSPADM

## 2020-06-05 RX ADMIN — CYANOCOBALAMIN 1000 MCG: 1000 INJECTION, SOLUTION INTRAMUSCULAR; SUBCUTANEOUS at 02:06

## 2020-06-05 RX ADMIN — IPRATROPIUM BROMIDE AND ALBUTEROL SULFATE 3 ML: 2.5; .5 SOLUTION RESPIRATORY (INHALATION) at 02:06

## 2020-06-05 NOTE — PROGRESS NOTES
Subjective:       Patient ID: Salvatore Phillips is a 59 y.o. female.    Chief Complaint: Joint Swelling    HPI Ms. Phillips presents today with multiple complaints.   She was seen in the hospital on 5/28/2020 for HA and shortness of breath.  Her headaches at that time had been present for a few days, she denied hx of migraines but did have photophobia   Hx of asthma     She was given Fiorcet which made her sleepy.   It eases the pain.   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 She felt she lost control.   She held her face   The sensation lasted about 5 minutes    She has a lot of wheezing today  Breo she recently got a month ago    Has ringing in both ears.   The louder the noise the more of a headache she has.    She is stressed with certain family stress issues     Review of Systems   Constitutional: Negative.    HENT: Negative.    Respiratory: Positive for chest tightness, shortness of breath and wheezing.    Cardiovascular: Positive for leg swelling.   Genitourinary: Negative.    Musculoskeletal: Negative.    Skin: Negative.    Psychiatric/Behavioral: The patient is nervous/anxious.          Past Medical History:   Diagnosis Date    Asthma     Hypertension     Sarcoidosis of lung     Sleep apnea     maintained on CPAP    Upper respiratory disease     URI (upper respiratory infection)        Objective:        Physical Exam   Constitutional: She is oriented to person, place, and time. She appears well-developed and well-nourished.   HENT:   Head: Normocephalic and atraumatic.   Cardiovascular: Normal rate and regular rhythm.   Pulmonary/Chest: She has wheezes.   Neurological: She is alert and oriented to person, place, and time.   Psychiatric: She has a normal mood and  affect. Her behavior is normal.         Results for orders placed or performed in visit on 01/16/20   Vitamin D   Result Value Ref Range    Vit D, 25-Hydroxy 16 (L) 30 - 96 ng/mL   Vitamin B12   Result Value Ref Range    Vitamin B-12 272 210 - 950 pg/mL       Assessment/Plan:     Wheezing  -     albuterol-ipratropium 2.5 mg-0.5 mg/3 mL nebulizer solution 3 mL  -     X-Ray Chest PA And Lateral; Future; Expected date: 06/05/2020  -     albuterol-ipratropium 2.5 mg-0.5 mg/3 mL nebulizer solution 3 mL  -     predniSONE (DELTASONE) 20 MG tablet; Take 2 tablets (40 mg total) by mouth once daily.  Dispense: 10 tablet; Refill: 0    Swelling of both lower extremities  -     Comprehensive metabolic panel; Future; Expected date: 06/05/2020  -     Brain Natriuretic Peptide; Future; Expected date: 06/05/2020  -     furosemide (LASIX) 40 MG tablet; Take 1 tablet (40 mg total) by mouth once daily.  Dispense: 5 tablet; Refill: 0  -     X-Ray Chest PA And Lateral; Future; Expected date: 06/05/2020    Shortness of breath  -     Comprehensive metabolic panel; Future; Expected date: 06/05/2020  -     X-Ray Chest PA And Lateral; Future; Expected date: 06/05/2020  -     albuterol-ipratropium 2.5 mg-0.5 mg/3 mL nebulizer solution 3 mL  -     predniSONE (DELTASONE) 20 MG tablet; Take 2 tablets (40 mg total) by mouth once daily.  Dispense: 10 tablet; Refill: 0    Low maternal serum vitamin B12  -     cyanocobalamin injection 1,000 mcg    Persistent headaches  -     traMADoL (ULTRAM) 50 mg tablet; Take 1 tablet (50 mg total) by mouth every 6 (six) hours as needed for Pain.  Dispense: 12 tablet; Refill: 0    Mild intermittent asthma without complication    Bilateral non-suppurative otitis media  -     amoxicillin-clavulanate 875-125mg (AUGMENTIN) 875-125 mg per tablet; Take 1 tablet by mouth 2 (two) times daily. for 7 days  Dispense: 14 tablet; Refill: 0    Other orders  -     montelukast (SINGULAIR) 10 mg tablet; Take 1 tablet (10 mg total)  by mouth every evening.  Dispense: 30 tablet; Refill: 0  -     levocetirizine (XYZAL) 5 MG tablet; Take 1 tablet (5 mg total) by mouth every evening.  Dispense: 10 tablet; Refill: 0        Follow up as needed     Viviana Cuba MD  [facility]   Family Medicine

## 2020-06-06 LAB
ALBUMIN SERPL BCP-MCNC: 3.3 G/DL (ref 3.5–5.2)
ALP SERPL-CCNC: 85 U/L (ref 55–135)
ALT SERPL W/O P-5'-P-CCNC: 11 U/L (ref 10–44)
ANION GAP SERPL CALC-SCNC: 10 MMOL/L (ref 8–16)
AST SERPL-CCNC: 19 U/L (ref 10–40)
BILIRUB SERPL-MCNC: 0.2 MG/DL (ref 0.1–1)
BNP SERPL-MCNC: 130 PG/ML (ref 0–99)
BUN SERPL-MCNC: 22 MG/DL (ref 6–20)
CALCIUM SERPL-MCNC: 8.5 MG/DL (ref 8.7–10.5)
CHLORIDE SERPL-SCNC: 106 MMOL/L (ref 95–110)
CO2 SERPL-SCNC: 26 MMOL/L (ref 23–29)
CREAT SERPL-MCNC: 1.2 MG/DL (ref 0.5–1.4)
EST. GFR  (AFRICAN AMERICAN): 57.6 ML/MIN/1.73 M^2
EST. GFR  (NON AFRICAN AMERICAN): 49.9 ML/MIN/1.73 M^2
GLUCOSE SERPL-MCNC: 81 MG/DL (ref 70–110)
POTASSIUM SERPL-SCNC: 4.3 MMOL/L (ref 3.5–5.1)
PROT SERPL-MCNC: 7.3 G/DL (ref 6–8.4)
SODIUM SERPL-SCNC: 142 MMOL/L (ref 136–145)

## 2020-06-08 ENCOUNTER — TELEPHONE (OUTPATIENT)
Dept: PULMONOLOGY | Facility: CLINIC | Age: 60
End: 2020-06-08

## 2020-06-08 ENCOUNTER — PATIENT OUTREACH (OUTPATIENT)
Dept: ADMINISTRATIVE | Facility: OTHER | Age: 60
End: 2020-06-08

## 2020-06-08 ENCOUNTER — TELEPHONE (OUTPATIENT)
Dept: SMOKING CESSATION | Facility: CLINIC | Age: 60
End: 2020-06-08

## 2020-06-08 DIAGNOSIS — Z12.11 SCREEN FOR COLON CANCER: Primary | ICD-10-CM

## 2020-06-08 DIAGNOSIS — J45.20 INTERMITTENT ASTHMA WITHOUT COMPLICATION, UNSPECIFIED ASTHMA SEVERITY: Primary | ICD-10-CM

## 2020-06-08 NOTE — TELEPHONE ENCOUNTER
----- Message from Terrance Morales MD sent at 6/7/2020  6:30 PM CDT -----  I will ask my staff to schedule her this week  She can be seen by me or NP  Needs Chest X Ray on day of visit  ----- Message -----  From: Viviana Cuba MD  Sent: 6/5/2020   2:59 PM CDT  To: Terrance Morales MD    Ms. Phillips saw you for a video visit in April.   She saw me today for an ER follow up where she was treated for shortness of breath and headache.     She had an O2 sat of 92 which improved to 95 with 2 duoneb treatments.   She was visually working to breath although she said she did not feel that she was.   You could hear her wheezing without auscultation.  This also improved with breathing treatments but she still was noisy throughout all lung fields.   I am sending you this message to see if you thought she needed to follow up with you sooner than later. She doesn't have anything scheduled at this time.   If so please have staff reach out to her.     She is taking steroids that you gave her she says and the ER discharged her with some    JAY CUBA MD

## 2020-06-10 ENCOUNTER — CLINICAL SUPPORT (OUTPATIENT)
Dept: SMOKING CESSATION | Facility: CLINIC | Age: 60
End: 2020-06-10
Payer: COMMERCIAL

## 2020-06-10 ENCOUNTER — HOSPITAL ENCOUNTER (OUTPATIENT)
Dept: RADIOLOGY | Facility: HOSPITAL | Age: 60
Discharge: HOME | End: 2020-06-10
Attending: INTERNAL MEDICINE
Payer: COMMERCIAL

## 2020-06-10 ENCOUNTER — OFFICE VISIT (OUTPATIENT)
Dept: PULMONOLOGY | Facility: CLINIC | Age: 60
End: 2020-06-10
Payer: COMMERCIAL

## 2020-06-10 ENCOUNTER — LAB VISIT (OUTPATIENT)
Dept: LAB | Facility: HOSPITAL | Age: 60
End: 2020-06-10
Attending: INTERNAL MEDICINE
Payer: COMMERCIAL

## 2020-06-10 VITALS
HEART RATE: 87 BPM | OXYGEN SATURATION: 95 % | HEIGHT: 67 IN | BODY MASS INDEX: 25.8 KG/M2 | RESPIRATION RATE: 18 BRPM | SYSTOLIC BLOOD PRESSURE: 140 MMHG | DIASTOLIC BLOOD PRESSURE: 68 MMHG | WEIGHT: 164.38 LBS

## 2020-06-10 DIAGNOSIS — F17.200 HEAVY TOBACCO SMOKER: ICD-10-CM

## 2020-06-10 DIAGNOSIS — D86.0 SARCOIDOSIS OF LUNG: Primary | ICD-10-CM

## 2020-06-10 DIAGNOSIS — R60.0 EDEMA OF LEG: ICD-10-CM

## 2020-06-10 DIAGNOSIS — J45.20 INTERMITTENT ASTHMA WITHOUT COMPLICATION, UNSPECIFIED ASTHMA SEVERITY: ICD-10-CM

## 2020-06-10 DIAGNOSIS — R79.89 ELEVATED BRAIN NATRIURETIC PEPTIDE (BNP) LEVEL: ICD-10-CM

## 2020-06-10 DIAGNOSIS — J45.40 MODERATE PERSISTENT ASTHMA WITHOUT COMPLICATION: ICD-10-CM

## 2020-06-10 DIAGNOSIS — F17.200 NICOTINE DEPENDENCE: Primary | ICD-10-CM

## 2020-06-10 DIAGNOSIS — D86.0 SARCOIDOSIS OF LUNG: ICD-10-CM

## 2020-06-10 DIAGNOSIS — I10 ESSENTIAL HYPERTENSION: ICD-10-CM

## 2020-06-10 LAB — D DIMER PPP IA.FEU-MCNC: 0.47 MG/L FEU

## 2020-06-10 PROCEDURE — 36415 COLL VENOUS BLD VENIPUNCTURE: CPT

## 2020-06-10 PROCEDURE — 99999 PR PBB SHADOW E&M-EST. PATIENT-LVL V: ICD-10-PCS | Mod: PBBFAC,,, | Performed by: INTERNAL MEDICINE

## 2020-06-10 PROCEDURE — 3078F PR MOST RECENT DIASTOLIC BLOOD PRESSURE < 80 MM HG: ICD-10-PCS | Mod: CPTII,S$GLB,, | Performed by: INTERNAL MEDICINE

## 2020-06-10 PROCEDURE — 3008F PR BODY MASS INDEX (BMI) DOCUMENTED: ICD-10-PCS | Mod: CPTII,S$GLB,, | Performed by: INTERNAL MEDICINE

## 2020-06-10 PROCEDURE — 99214 PR OFFICE/OUTPT VISIT, EST, LEVL IV, 30-39 MIN: ICD-10-PCS | Mod: S$GLB,,, | Performed by: INTERNAL MEDICINE

## 2020-06-10 PROCEDURE — 99999 PR PBB SHADOW E&M-EST. PATIENT-LVL V: CPT | Mod: PBBFAC,,, | Performed by: INTERNAL MEDICINE

## 2020-06-10 PROCEDURE — 3078F DIAST BP <80 MM HG: CPT | Mod: CPTII,S$GLB,, | Performed by: INTERNAL MEDICINE

## 2020-06-10 PROCEDURE — 3008F BODY MASS INDEX DOCD: CPT | Mod: CPTII,S$GLB,, | Performed by: INTERNAL MEDICINE

## 2020-06-10 PROCEDURE — 99999 PR PBB SHADOW E&M-EST. PATIENT-LVL I: CPT | Mod: PBBFAC,,,

## 2020-06-10 PROCEDURE — 99404 PREV MED CNSL INDIV APPRX 60: CPT | Mod: S$GLB,,, | Performed by: GENERAL PRACTICE

## 2020-06-10 PROCEDURE — 82164 ANGIOTENSIN I ENZYME TEST: CPT

## 2020-06-10 PROCEDURE — 3077F SYST BP >= 140 MM HG: CPT | Mod: CPTII,S$GLB,, | Performed by: INTERNAL MEDICINE

## 2020-06-10 PROCEDURE — 3077F PR MOST RECENT SYSTOLIC BLOOD PRESSURE >= 140 MM HG: ICD-10-PCS | Mod: CPTII,S$GLB,, | Performed by: INTERNAL MEDICINE

## 2020-06-10 PROCEDURE — 99404 PR PREVENT COUNSEL,INDIV,60 MIN: ICD-10-PCS | Mod: S$GLB,,, | Performed by: GENERAL PRACTICE

## 2020-06-10 PROCEDURE — 99214 OFFICE O/P EST MOD 30 MIN: CPT | Mod: S$GLB,,, | Performed by: INTERNAL MEDICINE

## 2020-06-10 PROCEDURE — 85379 FIBRIN DEGRADATION QUANT: CPT

## 2020-06-10 PROCEDURE — 99999 PR PBB SHADOW E&M-EST. PATIENT-LVL I: ICD-10-PCS | Mod: PBBFAC,,,

## 2020-06-10 NOTE — PATIENT INSTRUCTIONS
Pulmonary Sarcoidosis  Sarcoidosis is a disease that causes inflammation of the body tissues. This leads to small lumps called granulomas. The disease can affect any organ in the body. But it often starts in the lungs and lymph nodes.  What causes sarcoidosis?  It is not known what causes sarcoidosis. It results from a problem with the bodys immune system. The main job of the immune system is to help the body fight infection. People of any age, race, or gender can have it. But it happens most often in people between the ages of 20 and 40. It is also more common in women than in men and in people exposed to kristen or moldy conditions.  How it affects the lungs  When you breathe in, you inhale air that is full of oxygen. The oxygen travels from the lungs to the blood. Then it is carried to the rest of the body. In some cases of pulmonary sarcoidosis, the lungs become scarred. This is called pulmonary fibrosis. This scarring can make it hard to take a full breath. The damage can also make it hard for oxygen to pass from the lungs into the blood.  Symptoms of pulmonary sarcoidosis  Most people have no symptoms at all. If symptoms do occur, they can include dry cough, tightness in the chest, and tiredness. Wheezing, shortness of breath, skin rashes, joint pain, kidney stones, vision problems, and loss of appetite can also occur. In some cases, pulmonary sarcoidosis stops getting worse. It may even go away. In other cases, the disease is long-lasting (chronic).  Treatment of pulmonary sarcoidosis  Many people dont need treatment. The disease may not cause symptoms. And it may go away on its own. But treatment can be done to help relieve symptoms. It can reduce inflammation and help prevent damage. Medicines are used to treat the disease. More than one may be used. They may be taken in pill form or by injection. Some common ones are listed below.  · Prednisone. This is an anti-inflammatory steroid (corticosteroid). It  helps prevent or reduce inflammation that can harm lungs.  · Methotrexate. This medicine is commonly the first one used so that you can cut back on the dose of prednisone. This helps lower the long-term side effects of the steroid.  · Azathioprine. This medicine suppresses the immune system. It may be used alone or with prednisone. It helps reduce lung inflammation.  · Cyclophosphamide. This is another type of medicine that suppresses the immune system. It may be used with prednisone. You may be given it as a single dose if you have problems with prednisone.  Other medicines that may be used include anti-TNF medicines such as inflizimab and etanercept. Antimalaria medicines such as hydroxychloroquine may help when skin, joints or brain are affected.  Note: These medicines can have serious side effects. Talk with your healthcare provider about them. Don't stop taking a medicine unless your provider says its OK. And tell your healthcare provider or pharmacist about all medicines you use. This includes over-the-counter medicines. It also includes herbs or supplements.  Date Last Reviewed: 9/1/2016 © 2000-2017 [employer]. [address] [place] [address], [place] [address]. All rights reserved. This information is not intended as a substitute for professional medical care. Always follow your healthcare professional's instructions.

## 2020-06-10 NOTE — PROGRESS NOTES
Pulmonary Outpatient Follow Up Visit     Subjective:       Patient ID: Salvatore Phillips is a 59 y.o. female.  Answers for HPI/ROS submitted by the patient on 6/10/2020   Asthma  In the past 4 weeks, how much of the time did your asthma keep you from getting as much done at work, school, or at home?: some of the time  During the past 4 weeks, how often have you had shortness of breath?: more than once a day  During the past 4 weeks, how often did your asthma symptoms (Wheezing, coughing, shortness of breath, chest tightness or pain) wake you up at night or earlier that usual in the morning?: 4 or more nights a week  During the past 4 weeks, how often have you used your rescue inhaler or nebulizer medication (such as albuterol)?: 2 or 3 times a week  How would you rate your asthma control during the past 4 weeks?: poorly controlled   : 10    Chief Complaint: Hospital Follow Up; Sarcoidosis; Leg Swelling; and Fatigue      Salvatore Phillips is 59 y.o.   New to me: recent seen in ER:  Sinus infection: currently on Prednisone, lasix and Augmentin  Known Sarcoidosis Lung after Biopsy at 29 years, Most care in [place] and [place]  ecently seen PCP , edema, BNP was elevated  No recent echo, Ambulates well, although left work today because felt ill  Never been on oxygen  Hx of NENA has a CPAP but stopped using after most recent PSG: Hx of Lap band surgery and drastic weight loss Atkiins diet.  Smoking> 30 pack year: meets LDCT criteria, last CT was 2016 and f/u was recommended  Last PFT FEV1: 1.36l( 56%), Bronchodilaros: duoneb, Albuterol, BREO , Montelukast, Accolate  She is currently on Prednisone 20 mg    Asthma   She complains of shortness of breath. Associated symptoms include headaches. Her past medical history is significant for asthma.     Social History     Tobacco Use   Smoking Status Current Every Day Smoker    Packs/day: 1.00    Years: 35.00    Pack years: 35.00     Types: Cigarettes    Start date: 1/1/1979   Smokeless Tobacco Never Used   Tobacco Comment    started age 17           Immunization History   Administered Date(s) Administered    Influenza - Quadrivalent - PF (6 months and older) 10/24/2018    Influenza - Trivalent (ADULT) 12/20/2018    Pneumococcal Polysaccharide - 23 Valent 04/09/2019    Td (ADULT) 04/09/2019            Review of Systems   Constitutional: Positive for fatigue.   Respiratory: Positive for shortness of breath and dyspnea on extertion.    Cardiovascular: Positive for leg swelling.   Neurological: Positive for weakness and headaches.   Psychiatric/Behavioral: Positive for sleep disturbance.   All other systems reviewed and are negative.      Outpatient Encounter Medications as of 6/10/2020   Medication Sig Dispense Refill    albuterol (PROVENTIL/VENTOLIN HFA) 90 mcg/actuation inhaler Inhale 2 puffs into the lungs every 4 (four) hours as needed for Wheezing or Shortness of Breath. 54 g 3    albuterol-ipratropium (DUO-NEB) 2.5 mg-0.5 mg/3 mL nebulizer solution Take 3 mLs by nebulization every 6 (six) hours as needed for Wheezing or Shortness of Breath. Rescue. Corrected prescription with refills 1 Box 11    amoxicillin-clavulanate 875-125mg (AUGMENTIN) 875-125 mg per tablet Take 1 tablet by mouth 2 (two) times daily. for 7 days 14 tablet 0    buPROPion (WELLBUTRIN SR) 150 MG TBSR 12 hr tablet Take 1 tablet (150 mg total) by mouth 2 (two) times daily. (Patient not taking: Reported on 6/10/2020) 60 tablet 2    butalbital-acetaminophen-caffeine -40 mg (FIORICET, ESGIC) -40 mg per tablet Take 1 tablet by mouth every 4 (four) hours as needed for Pain or Headaches. 18 tablet 0    escitalopram oxalate (LEXAPRO) 10 MG tablet Take 10 mg by mouth once daily.      fluticasone furoate-vilanteroL (BREO ELLIPTA) 200-25 mcg/dose DsDv diskus inhaler Inhale 1 puff into the lungs once daily. Controller 60 each 11    fluticasone propionate  (FLONASE) 50 mcg/actuation nasal spray 2 sprays (100 mcg total) by Each Nostril route once daily. Corrected prescription with refills 48 mL 3    furosemide (LASIX) 40 MG tablet Take 1 tablet (40 mg total) by mouth once daily. 5 tablet 0    ipratropium (ATROVENT HFA) 17 mcg/actuation inhaler Inhale 2 puffs into the lungs every 4 to 6 hours as needed for Wheezing. Rescue 1 Inhaler 11    levocetirizine (XYZAL) 5 MG tablet Take 1 tablet (5 mg total) by mouth every evening. 10 tablet 0    levoFLOXacin (LEVAQUIN) 500 MG tablet Take 1 tablet (500 mg total) by mouth once daily. 10 tablet 0    montelukast (SINGULAIR) 10 mg tablet Take 1 tablet (10 mg total) by mouth every evening. 30 tablet 0    nicotine polacrilex 2 MG Lozg Take 1 lozenge (2 mg total) by mouth as needed. (Patient not taking: Reported on 6/10/2020) 200 lozenge 2    oxybutynin (DITROPAN) 5 MG Tab Take 1 tablet (5 mg total) by mouth every evening. 90 tablet 0    predniSONE (DELTASONE) 20 MG tablet Take 2 tablets (40 mg total) by mouth once daily. 10 tablet 0    QUEtiapine (SEROQUEL) 50 MG tablet Take 50 mg by mouth every evening.      traMADoL (ULTRAM) 50 mg tablet Take 1 tablet (50 mg total) by mouth every 6 (six) hours as needed for Pain. 12 tablet 0    zafirlukast (ACCOLATE) 20 MG tablet Take 1 tablet (20 mg total) by mouth 2 (two) times daily. 180 tablet 3    zolpidem (AMBIEN) 5 MG Tab Take 1 tablet (5 mg total) by mouth nightly as needed (insomnia). 20 tablet 1    [DISCONTINUED] varenicline (CHANTIX STARTING MONTH BOX) 0.5 mg (11)- 1 mg (42) tablet Follow package directions (Patient not taking: Reported on 6/10/2020) 53 tablet 0    [DISCONTINUED] varenicline (CHANTIX) 1 mg Tab Take 1 tablet (1 mg total) by mouth 2 (two) times daily. Start this medication after completing the the starting dose pack. Stop smoking (Patient not taking: Reported on 6/10/2020) 60 tablet 3     Facility-Administered Encounter Medications as of 6/10/2020   Medication  "Dose Route Frequency Provider Last Rate Last Dose    cyanocobalamin injection 1,000 mcg  1,000 mcg Intramuscular Once Viviana Cuba MD           Objective:     Vital Signs (Most Recent)  Vital Signs  Pulse: 87  Resp: 18  SpO2: 95 %  BP: (!) 140/68  Height and Weight  Height: 5' 7" (170.2 cm)  Weight: 74.6 kg (164 lb 5.7 oz)  BSA (Calculated - sq m): 1.88 sq meters  BMI (Calculated): 25.7  Weight in (lb) to have BMI = 25: 159.3]  Wt Readings from Last 2 Encounters:   06/10/20 74.6 kg (164 lb 5.7 oz)   06/05/20 73.7 kg (162 lb 7.7 oz)       Physical Exam   Constitutional: Vital signs are normal. She appears well-developed and well-nourished.       HENT:   Head: Normocephalic.   Nose: Nose normal.   Mouth/Throat: Oropharynx is clear and moist. No oropharyngeal exudate. Mallampati Score: II.   Neck: Normal range of motion. Neck supple.   Cardiovascular: Normal rate and normal heart sounds.   No murmur heard.  Pulmonary/Chest: Normal expansion, symmetric chest wall expansion and effort normal.   Abdominal: Soft. Bowel sounds are normal.   Musculoskeletal: She exhibits edema.   Neurological: She is alert.   Skin: Skin is warm.   Nursing note and vitals reviewed.      Laboratory  Lab Results   Component Value Date    WBC 7.14 08/23/2019    RBC 4.62 08/23/2019    HGB 12.7 08/23/2019    HCT 40.8 08/23/2019    MCV 88 08/23/2019    MCH 27.5 08/23/2019    MCHC 31.1 (L) 08/23/2019    RDW 20.4 (H) 08/23/2019     08/23/2019    MPV 9.5 08/23/2019    GRAN 4.2 08/23/2019    GRAN 59.9 08/23/2019    LYMPH 2.2 08/23/2019    LYMPH 30.5 08/23/2019    MONO 0.6 08/23/2019    MONO 8.8 08/23/2019    EOS 0.1 08/23/2019    BASO 0.05 08/23/2019    EOSINOPHIL 0.7 08/23/2019    BASOPHIL 0.7 08/23/2019       BMP  Lab Results   Component Value Date     06/05/2020    K 4.3 06/05/2020     06/05/2020    CO2 26 06/05/2020    BUN 22 (H) 06/05/2020    CREATININE 1.2 06/05/2020    CALCIUM 8.5 (L) 06/05/2020    ANIONGAP 10 06/05/2020 "    ESTGFRAFRICA 57.6 (A) 06/05/2020    EGFRNONAA 49.9 (A) 06/05/2020    AST 19 06/05/2020    ALT 11 06/05/2020    PROT 7.3 06/05/2020       Lab Results   Component Value Date     (H) 06/05/2020       No results found for: TSH    No results found for: SEDRATE    No results found for: CRP  No results found for: IGE     No results found for: ASPERGILLUS  No results found for: AFUMIGATUSCL     No results found for: ACE     Diagnostic Results:  I have personally reviewed today the following studies:    CXR 06/05/2020  CLINICAL HISTORY:  Wheezing    TECHNIQUE:  PA and lateral views of the chest were performed.    COMPARISON:  Chest x-ray from 01/16/2020    FINDINGS:  Bilateral chronic pleuroparenchymal changes are seen with findings not substantially changed when compared to the most recent study.  No new superimposed focal pulmonary consolidation.  No effusion.  Cardiomediastinal silhouette is not enlarged.  Degenerative changes of the thoracic spine are noted.  Surgical clips project along the upper thorax to the right of midline.  Patient is status post cholecystectomy.      Impression       Stable chest.  No acute disease.           Spirometry  FEV1: 1.36L ( 56%), FVC 2.23L( 72.8%), FEV1/FVC 61      Prior polysomnography  SUMMARY STATEMENTS  DIAGNOSTIC IMPRESSIONS  F51.04   /  307.42  Psychophysiological Insomnia (stress -   related and conditioned)    F51.3     /  307.46  Sleepwalking  R06.83   /  780.53-1  Primary Snoring (none during   polysomnography)   Z72.821 /  V69.4  Inadequate Sleep Hygiene  F51.12   /   307.44  r/o Insufficient Sleep Syndrome      TREATMENT RECOMMENDATIONS  Treat, or refer to Sleep Disorders   Center.  1. The diagnostic polysomnography revealed no diagnosable   obstructive sleep apnea / hypopnea syndrome (A + H Index = 2.6   events / hr asleep with only 0.2 respiratory event - related   arousals / hr asleep for the study, and no RERAs (respiratory   effort -  related arousals).  The  mean SpO2 value was 89.9 %,   significant, minimum oxygen saturation during sleep was 75.0 %,   and waking baseline SpO2 was 99 %.  No snoring was noted.  A    CPAP titration polysomnography is not recommended.     Component      Latest Ref Rng & Units 6/10/2020 6/5/2020   Sodium      136 - 145 mmol/L  142   Potassium      3.5 - 5.1 mmol/L  4.3   Chloride      95 - 110 mmol/L  106   CO2      23 - 29 mmol/L  26   Glucose      70 - 110 mg/dL  81   BUN, Bld      6 - 20 mg/dL  22 (H)   Creatinine      0.5 - 1.4 mg/dL  1.2   Calcium      8.7 - 10.5 mg/dL  8.5 (L)   PROTEIN TOTAL      6.0 - 8.4 g/dL  7.3   Albumin      3.5 - 5.2 g/dL  3.3 (L)   BILIRUBIN TOTAL      0.1 - 1.0 mg/dL  0.2   Alkaline Phosphatase      55 - 135 U/L  85   AST      10 - 40 U/L  19   ALT      10 - 44 U/L  11   Anion Gap      8 - 16 mmol/L  10   eGFR if African American      >60 mL/min/1.73 m:2  57.6 (A)   eGFR if non African American      >60 mL/min/1.73 m:2  49.9 (A)   BNP      0 - 99 pg/mL  130 (H)   D-Dimer      <0.50 mg/L FEU 0.47        Assessment/Plan:     Problem List Items Addressed This Visit     Hypertension    Sarcoidosis of lung - Primary    Current Assessment & Plan     Disease restratification  · PFT  · 6MWD  · ONSAT  · ACE level  · Vit D  · Cardiology referal  · Bone scan  · immunisation          Relevant Orders    D dimer, quantitative (Completed)    Ambulatory referral/consult to Cardiology    ANGIOTENSIN CONVERTING ENZYME    Complete PFT without bronchodilator - Clinic    Stress test, pulmonary    Echo Color Flow Doppler? Yes    PULSE OXIMETRY OVERNIGHT    PULM - Arterial Blood Gases--in addition to PFT only    MyChart Patient Entered Blood Pressure    MyChart Patient Entered Temperature    MyChart Patient Entered Respiratory    MyChart Patient Entered Pulse    MyChart Patient Entered Height    MyChart Patient Entered Body Measurements    MyChart Patient Entered Blood Pressure    CT Chest With Contrast    Moderate persistent  asthma    Current Assessment & Plan     IGE  Complete prednisone  Repeat spirometry  Chest CT for central airway encroachment and parenchymal disease  Cont BREO         Relevant Orders    IgE    Edema of leg    Relevant Orders    D dimer, quantitative (Completed)    ANGIOTENSIN CONVERTING ENZYME    Complete PFT without bronchodilator - Clinic    Stress test, pulmonary    Echo Color Flow Doppler? Yes    Elevated brain natriuretic peptide (BNP) level    Current Assessment & Plan     Lasix  Check echo for Diastolic dysfunction  Ref cardiology         Relevant Orders    D dimer, quantitative (Completed)    ANGIOTENSIN CONVERTING ENZYME    Complete PFT without bronchodilator - Clinic    Stress test, pulmonary    Echo Color Flow Doppler? Yes    Heavy tobacco smoker    Current Assessment & Plan     Meets criteria for LDCT         Relevant Orders    CT Chest With Contrast          Follow up in about 4 weeks (around 7/8/2020), or With Dr Morales: Labs today, PFT, 6MWD, ONSAT, echo, ref cardiology , for chest CTA for PE for GODDARD/SOB/+ve Dimer, Chest CT: Eval abnormality.    This note was prepared using voice recognition system and is likely to have sound alike errors that may have been overlooked even after proof reading.  Please call me with any questions    Discussed diagnosis, its evaluation, treatment and usual course. All questions answered.      Adrian Keating MD

## 2020-06-10 NOTE — ASSESSMENT & PLAN NOTE
IGE  Complete prednisone  Repeat spirometry  Chest CT for central airway encroachment and parenchymal disease  Cont BREO

## 2020-06-10 NOTE — ASSESSMENT & PLAN NOTE
Disease restratification  · PFT  · 6MWD  · ONSAT  · ACE level  · Vit D  · Cardiology referal  · Bone scan  · immunisation

## 2020-06-11 DIAGNOSIS — R60.0 EDEMA OF LEG: ICD-10-CM

## 2020-06-11 DIAGNOSIS — R79.89 ELEVATED BRAIN NATRIURETIC PEPTIDE (BNP) LEVEL: ICD-10-CM

## 2020-06-11 DIAGNOSIS — D86.0 SARCOIDOSIS OF LUNG: Primary | ICD-10-CM

## 2020-06-11 RX ORDER — VARENICLINE TARTRATE 1 MG/1
1 TABLET, FILM COATED ORAL 2 TIMES DAILY
Qty: 60 TABLET | Refills: 2 | Status: SHIPPED | OUTPATIENT
Start: 2020-06-11

## 2020-06-12 LAB — ACE SERPL-CCNC: 26 U/L (ref 16–85)

## 2020-06-17 ENCOUNTER — PATIENT OUTREACH (OUTPATIENT)
Dept: ADMINISTRATIVE | Facility: OTHER | Age: 60
End: 2020-06-17

## 2020-06-17 DIAGNOSIS — I10 ESSENTIAL HYPERTENSION: Primary | ICD-10-CM

## 2020-06-17 NOTE — PROGRESS NOTES
Subjective:   Patient ID:  Salvatore Phillips is a 59 y.o. female who presents for cardiac consult of Essential hypertension and Sarcoidosis of lung    Referring Physician: Adrian Keating MD   Reason for consult: Sarcoidosis, CV eval    HPI  The patient came in today for cardiac consult of Essential hypertension and Sarcoidosis of lung    20  Salvatore Phillips is a 59 y.o. female pt with HTN, Sarcoidosis, NENA, asthma presents for initial CV eval of sarcoidosis.     She was diagnosed with sarcoid in her 20s. She used to live in [place] and was on oxygen [place]   She has experienced significant SOB last 2-3 months, has been using pillows.   ECHO ordered, BNP 2 weeks ago elevated at 130, pt was on lasix 40 mg x for week. She does have occ chest tightness, when she coughs it hurts more.     Patient feels no leg swelling, no PND, no palpitation, no dizziness, no syncope, no CNS symptoms.    Patient has fairly good exercise tolerance. Works at [employer].     Patient is compliant with medications.    ECG - NSR, LAE    Past Medical History:   Diagnosis Date    Asthma     Hypertension     Sarcoidosis of lung     Sleep apnea     maintained on CPAP    Upper respiratory disease     URI (upper respiratory infection)        Past Surgical History:   Procedure Laterality Date    ABDOMINAL SURGERY      BREAST LUMPECTOMY Left 1996    BENIGN    BREAST SURGERY       SECTION      x3    COLON SURGERY      exploratory procedure      GASTRIC BYPASS      HYSTERECTOMY  2009    JOINT REPLACEMENT  2013    KNEE SURGERY      OOPHORECTOMY  2009    SMALL INTESTINE SURGERY      tummy tuck         Social History     Tobacco Use    Smoking status: Current Every Day Smoker     Packs/day: 1.00     Years: 35.00     Pack years: 35.00     Types: Cigarettes     Start date: 1979    Smokeless tobacco: Never Used    Tobacco comment: started age 17   Substance Use Topics     Alcohol use: Not Currently     Frequency: Monthly or less     Drinks per session: 1 or 2     Binge frequency: Never     Comment: occ    Drug use: Never       Family History   Problem Relation Age of Onset    Alzheimer's disease Mother     Cancer Father         lung    Crohn's disease Brother     Kidney disease Brother     Arthritis Sister     Hypertension Son        Patient's Medications   New Prescriptions    No medications on file   Previous Medications    ALBUTEROL (PROVENTIL/VENTOLIN HFA) 90 MCG/ACTUATION INHALER    Inhale 2 puffs into the lungs every 4 (four) hours as needed for Wheezing or Shortness of Breath.    ALBUTEROL-IPRATROPIUM (DUO-NEB) 2.5 MG-0.5 MG/3 ML NEBULIZER SOLUTION    Take 3 mLs by nebulization every 6 (six) hours as needed for Wheezing or Shortness of Breath. Rescue. Corrected prescription with refills    BUPROPION (WELLBUTRIN SR) 150 MG TBSR 12 HR TABLET    Take 1 tablet (150 mg total) by mouth 2 (two) times daily.    BUTALBITAL-ACETAMINOPHEN-CAFFEINE -40 MG (FIORICET, ESGIC) -40 MG PER TABLET    Take 1 tablet by mouth every 4 (four) hours as needed for Pain or Headaches.    ESCITALOPRAM OXALATE (LEXAPRO) 10 MG TABLET    Take 10 mg by mouth once daily.    FLUTICASONE FUROATE-VILANTEROL (BREO ELLIPTA) 200-25 MCG/DOSE DSDV DISKUS INHALER    Inhale 1 puff into the lungs once daily. Controller    FLUTICASONE PROPIONATE (FLONASE) 50 MCG/ACTUATION NASAL SPRAY    2 sprays (100 mcg total) by Each Nostril route once daily. Corrected prescription with refills    IPRATROPIUM (ATROVENT HFA) 17 MCG/ACTUATION INHALER    Inhale 2 puffs into the lungs every 4 to 6 hours as needed for Wheezing. Rescue    LEVOCETIRIZINE (XYZAL) 5 MG TABLET    Take 1 tablet (5 mg total) by mouth every evening.    LEVOFLOXACIN (LEVAQUIN) 500 MG TABLET    Take 1 tablet (500 mg total) by mouth once daily.    MONTELUKAST (SINGULAIR) 10 MG TABLET    Take 1 tablet (10 mg total) by mouth every evening.    NICOTINE  POLACRILEX 2 MG LOZG    Take 1 lozenge (2 mg total) by mouth as needed.    OXYBUTYNIN (DITROPAN) 5 MG TAB    Take 1 tablet (5 mg total) by mouth every evening.    PREDNISONE (DELTASONE) 20 MG TABLET    Take 2 tablets (40 mg total) by mouth once daily.    QUETIAPINE (SEROQUEL) 50 MG TABLET    Take 50 mg by mouth every evening.    TRAMADOL (ULTRAM) 50 MG TABLET    Take 1 tablet (50 mg total) by mouth every 6 (six) hours as needed for Pain.    VARENICLINE (CHANTIX) 1 MG TAB    Take 1 tablet (1 mg total) by mouth 2 (two) times daily.    ZAFIRLUKAST (ACCOLATE) 20 MG TABLET    Take 1 tablet (20 mg total) by mouth 2 (two) times daily.    ZOLPIDEM (AMBIEN) 5 MG TAB    Take 1 tablet (5 mg total) by mouth nightly as needed (insomnia).   Modified Medications    Modified Medication Previous Medication    FUROSEMIDE (LASIX) 40 MG TABLET furosemide (LASIX) 40 MG tablet       Take 1 tablet (40 mg total) by mouth daily as needed (for leg swelling).    Take 1 tablet (40 mg total) by mouth once daily.   Discontinued Medications    No medications on file       Review of Systems   Constitutional: Positive for malaise/fatigue.   HENT: Negative.    Eyes: Negative.    Respiratory: Positive for shortness of breath.    Cardiovascular: Positive for leg swelling.   Gastrointestinal: Negative.    Genitourinary: Negative.    Musculoskeletal: Negative.    Skin: Negative.    Neurological: Negative.    Endo/Heme/Allergies: Negative.    Psychiatric/Behavioral: Negative.    All 12 systems otherwise negative.      Wt Readings from Last 3 Encounters:   06/18/20 71.1 kg (156 lb 12 oz)   06/10/20 74.6 kg (164 lb 5.7 oz)   06/05/20 73.7 kg (162 lb 7.7 oz)     Temp Readings from Last 3 Encounters:   06/05/20 98.8 °F (37.1 °C) (Tympanic)   05/28/20 99 °F (37.2 °C) (Oral)   01/16/20 96.5 °F (35.8 °C) (Tympanic)     BP Readings from Last 3 Encounters:   06/18/20 130/80   06/10/20 (!) 140/68   05/28/20 131/69     Pulse Readings from Last 3 Encounters:  "  06/18/20 75   06/10/20 87   06/05/20 89       /80 (BP Location: Left arm, Patient Position: Sitting, BP Method: Large (Manual))   Pulse 75   Ht 5' 7" (1.702 m)   Wt 71.1 kg (156 lb 12 oz)   SpO2 96%   BMI 24.55 kg/m²     Objective:   Physical Exam   Constitutional: She is oriented to person, place, and time. She appears well-developed and well-nourished. No distress.   HENT:   Head: Normocephalic and atraumatic.   Nose: Nose normal.   Mouth/Throat: Oropharynx is clear and moist.   Eyes: Conjunctivae and EOM are normal. No scleral icterus.   Neck: Normal range of motion. Neck supple. No JVD present. No thyromegaly present.   Cardiovascular: Normal rate, regular rhythm, S1 normal and S2 normal. Exam reveals no gallop, no S3, no S4 and no friction rub.   No murmur heard.  Pulmonary/Chest: Effort normal and breath sounds normal. No stridor. No respiratory distress. She has no wheezes. She has no rales. She exhibits no tenderness.   Abdominal: Soft. Bowel sounds are normal. She exhibits no distension and no mass. There is no abdominal tenderness. There is no rebound.   Genitourinary:    Genitourinary Comments: Deferred     Musculoskeletal: Normal range of motion.         General: Edema present. No tenderness or deformity.   Lymphadenopathy:     She has no cervical adenopathy.   Neurological: She is alert and oriented to person, place, and time. She exhibits normal muscle tone. Coordination normal.   Skin: Skin is warm and dry. No rash noted. She is not diaphoretic. No erythema. No pallor.   Psychiatric: She has a normal mood and affect. Her behavior is normal. Judgment and thought content normal.   Nursing note and vitals reviewed.      Lab Results   Component Value Date     06/05/2020    K 4.3 06/05/2020     06/05/2020    CO2 26 06/05/2020    BUN 22 (H) 06/05/2020    CREATININE 1.2 06/05/2020    GLU 81 06/05/2020    AST 19 06/05/2020    ALT 11 06/05/2020    ALBUMIN 3.3 (L) 06/05/2020    PROT 7.3 " 06/05/2020    BILITOT 0.2 06/05/2020    WBC 7.14 08/23/2019    HGB 12.7 08/23/2019    HCT 40.8 08/23/2019    MCV 88 08/23/2019     08/23/2019    CHOL 167 08/09/2019    HDL 60 08/09/2019    LDLCALC 95.2 08/09/2019    TRIG 59 08/09/2019     (H) 06/05/2020     Assessment:      1. Other chest pain    2. Essential hypertension    3. Sarcoidosis of lung    4. Moderate persistent asthma without complication    5. Heavy tobacco smoker    6. Edema of leg    7. GODDARD (dyspnea on exertion)    8. Swelling of both lower extremities        Plan:   1. Chest pain, GODDARD with sarcoid  - pharm nuclear stress test ordered, pt cannot walk on treadmill due to dyspnea  - ECHO ordered    2. Asthma, Sarcoid   - cont tx per pulm    3. Tobacco use  - needs to quit    4. Edema  - rec lasix PRN  - compression stockings PRN    Thank you for allowing me to participate in this patient's care. Please do not hesitate to contact me with any questions or concerns. Consult note has been forwarded to the referral physician.

## 2020-06-18 ENCOUNTER — HOSPITAL ENCOUNTER (OUTPATIENT)
Dept: CARDIOLOGY | Facility: HOSPITAL | Age: 60
Discharge: HOME | End: 2020-06-18
Attending: INTERNAL MEDICINE
Payer: COMMERCIAL

## 2020-06-18 ENCOUNTER — OFFICE VISIT (OUTPATIENT)
Dept: CARDIOLOGY | Facility: CLINIC | Age: 60
End: 2020-06-18
Payer: COMMERCIAL

## 2020-06-18 VITALS
HEIGHT: 67 IN | HEART RATE: 75 BPM | SYSTOLIC BLOOD PRESSURE: 130 MMHG | WEIGHT: 156.75 LBS | DIASTOLIC BLOOD PRESSURE: 80 MMHG | OXYGEN SATURATION: 96 % | BODY MASS INDEX: 24.6 KG/M2

## 2020-06-18 DIAGNOSIS — I10 ESSENTIAL HYPERTENSION: ICD-10-CM

## 2020-06-18 DIAGNOSIS — J45.40 MODERATE PERSISTENT ASTHMA WITHOUT COMPLICATION: ICD-10-CM

## 2020-06-18 DIAGNOSIS — D86.0 SARCOIDOSIS OF LUNG: ICD-10-CM

## 2020-06-18 DIAGNOSIS — F17.200 HEAVY TOBACCO SMOKER: ICD-10-CM

## 2020-06-18 DIAGNOSIS — R60.0 EDEMA OF LEG: ICD-10-CM

## 2020-06-18 DIAGNOSIS — M79.89 SWELLING OF BOTH LOWER EXTREMITIES: ICD-10-CM

## 2020-06-18 DIAGNOSIS — R06.09 DOE (DYSPNEA ON EXERTION): ICD-10-CM

## 2020-06-18 DIAGNOSIS — R07.89 OTHER CHEST PAIN: Primary | ICD-10-CM

## 2020-06-18 PROCEDURE — 99999 PR PBB SHADOW E&M-EST. PATIENT-LVL V: CPT | Mod: PBBFAC,,, | Performed by: INTERNAL MEDICINE

## 2020-06-18 PROCEDURE — 99245 PR OFFICE CONSULTATION,LEVEL V: ICD-10-PCS | Mod: 25,S$GLB,, | Performed by: INTERNAL MEDICINE

## 2020-06-18 PROCEDURE — 93005 ELECTROCARDIOGRAM TRACING: CPT

## 2020-06-18 PROCEDURE — 99999 PR PBB SHADOW E&M-EST. PATIENT-LVL V: ICD-10-PCS | Mod: PBBFAC,,, | Performed by: INTERNAL MEDICINE

## 2020-06-18 PROCEDURE — 93010 ELECTROCARDIOGRAM REPORT: CPT | Mod: ,,, | Performed by: INTERNAL MEDICINE

## 2020-06-18 PROCEDURE — 93010 EKG 12-LEAD: ICD-10-PCS | Mod: ,,, | Performed by: INTERNAL MEDICINE

## 2020-06-18 PROCEDURE — 99245 OFF/OP CONSLTJ NEW/EST HI 55: CPT | Mod: 25,S$GLB,, | Performed by: INTERNAL MEDICINE

## 2020-06-18 RX ORDER — FUROSEMIDE 40 MG/1
40 TABLET ORAL DAILY PRN
Qty: 30 TABLET | Refills: 3 | Status: SHIPPED | OUTPATIENT
Start: 2020-06-18 | End: 2021-01-29 | Stop reason: SDUPTHER

## 2020-06-18 NOTE — LETTER
June 18, 2020      Adrian Keating MD  [address]  [place] [address]           [facility]  [address] [facility] [address]  Phone: [phone]  Fax: [phone]          Patient: Salvatore Phillips   MR Number: [id number]   YOB: 1960   Date of Visit: 6/18/2020       Dear Dr. Adrian Keating:    Thank you for referring Salvatore Phillips to me for evaluation. Attached you will find relevant portions of my assessment and plan of care.    If you have questions, please do not hesitate to call me. I look forward to following Salvatore Phillips along with you.    Sincerely,    Sergei Bullock MD    Enclosure  CC:  No Recipients    If you would like to receive this communication electronically, please contact [email or web address] or [phone] to request more information on [employer] access.    For providers and/or their staff who would like to refer a patient to [facility], please contact us through our one-stop-shop provider referral line, [facility] , at [phone].    If you feel you have received this communication in error or would no longer like to receive these types of communications, please e-mail [email or web address]

## 2020-06-18 NOTE — PATIENT INSTRUCTIONS
Understanding Chronic Venous Insufficiency  Problems with the veins in the legs may lead to chronic venous insufficiency (CVI). CVI means that there is a long-term problem with the veins not being able to pump blood back to your heart. When this happens, blood stays in the legs and causes swelling and aching.   Two problems that may lead to chronic venous insufficiency are:  · Damaged valves. Valves keep blood flowing from the legs through the blood vessels and back to the heart. When the valves are damaged, blood does not flow as well.   · Deep vein thrombosis (DVT). Blood clots may form in the deep veins of the legs. This may cause pain, redness, and swelling in the legs. It may also block the flow of blood back to the heart. Seek immediate medical care if you have these symptoms.  · A blood clot in the leg can also break off and travel to the lungs. This is called pulmonary embolism (PE). In the lungs, the clot can cut off the flow of blood. This may cause chest pain, trouble breathing, sweating, a fast heartbeat, coughing (may cough up blood), and fainting. It is a medical emergency and may cause death. Call 911 if you have these symptoms.  · Healthcare providers call the two conditions, DVT and PE, venous thromboembolism (VTE).  CVI cant be cured, but you can control leg swelling to reduce the likelihood of ulcers (sores).  Recognizing the symptoms  Be aware of the following:  · If you stand or sit with your feet down for long periods, your legs may ache or feel heavy.  · Swollen ankles are possibly the most common symptom of CVI.  · As swelling increases, the skin over your ankles may show red spots or a brownish tinge. The skin may feel leathery or scaly, and may start to itch.  · If swelling is not controlled, an ulcer (open wound) may form.  What you can do  Reduce your risk of developing ulcers by doing the following:  · Increase blood flow back to your heart by elevating your legs, exercising daily,  and wearing elastic stockings.  · Boost blood flow in your legs by losing excess weight.  · If you must stand or sit in one place for a period of time, keep your blood moving by wiggling your toes, shifting your body position, and rising up on the balls of your feet.    Date Last Reviewed: 5/1/2016  © 4373-1247 [employer]. [address] [place] [address], [place] [address]. All rights reserved. This information is not intended as a substitute for professional medical care. Always follow your healthcare professional's instructions.

## 2020-06-26 ENCOUNTER — LAB VISIT (OUTPATIENT)
Dept: OTOLARYNGOLOGY | Facility: CLINIC | Age: 60
End: 2020-06-26
Payer: COMMERCIAL

## 2020-06-26 DIAGNOSIS — R79.89 ELEVATED BRAIN NATRIURETIC PEPTIDE (BNP) LEVEL: ICD-10-CM

## 2020-06-26 DIAGNOSIS — R60.0 EDEMA OF LEG: ICD-10-CM

## 2020-06-26 DIAGNOSIS — D86.0 SARCOIDOSIS OF LUNG: ICD-10-CM

## 2020-06-26 PROCEDURE — U0003 INFECTIOUS AGENT DETECTION BY NUCLEIC ACID (DNA OR RNA); SEVERE ACUTE RESPIRATORY SYNDROME CORONAVIRUS 2 (SARS-COV-2) (CORONAVIRUS DISEASE [COVID-19]), AMPLIFIED PROBE TECHNIQUE, MAKING USE OF HIGH THROUGHPUT TECHNOLOGIES AS DESCRIBED BY CMS-2020-01-R: HCPCS

## 2020-06-29 ENCOUNTER — CLINICAL SUPPORT (OUTPATIENT)
Dept: PULMONOLOGY | Facility: CLINIC | Age: 60
End: 2020-06-29
Payer: COMMERCIAL

## 2020-06-29 VITALS — HEIGHT: 67 IN | WEIGHT: 155.44 LBS | BODY MASS INDEX: 24.4 KG/M2

## 2020-06-29 DIAGNOSIS — D86.0 SARCOIDOSIS OF LUNG: ICD-10-CM

## 2020-06-29 DIAGNOSIS — D86.0 SARCOIDOSIS OF LUNG: Primary | ICD-10-CM

## 2020-06-29 DIAGNOSIS — R79.89 ELEVATED BRAIN NATRIURETIC PEPTIDE (BNP) LEVEL: ICD-10-CM

## 2020-06-29 DIAGNOSIS — R60.0 EDEMA OF LEG: ICD-10-CM

## 2020-06-29 LAB
ALLENS TEST: ABNORMAL
DELSYS: ABNORMAL
HCO3 UR-SCNC: 25.7 MMOL/L (ref 24–28)
PCO2 BLDA: 47.3 MMHG (ref 35–45)
PH SMN: 7.34 [PH] (ref 7.35–7.45)
PO2 BLDA: 72 MMHG (ref 80–100)
POC BE: 0 MMOL/L
POC SATURATED O2: 93 % (ref 95–100)
SAMPLE: ABNORMAL
SARS-COV-2 RNA RESP QL NAA+PROBE: NOT DETECTED
SITE: ABNORMAL

## 2020-06-29 PROCEDURE — 36600 PR WITHDRAWAL OF ARTERIAL BLOOD: ICD-10-PCS | Mod: 59,S$GLB,, | Performed by: INTERNAL MEDICINE

## 2020-06-29 PROCEDURE — 94762 N-INVAS EAR/PLS OXIMTRY CONT: CPT | Mod: 59,S$GLB,, | Performed by: INTERNAL MEDICINE

## 2020-06-29 PROCEDURE — 36600 WITHDRAWAL OF ARTERIAL BLOOD: CPT | Mod: 59,S$GLB,, | Performed by: INTERNAL MEDICINE

## 2020-06-29 PROCEDURE — 94618 PULMONARY STRESS TESTING: CPT | Mod: S$GLB,,, | Performed by: INTERNAL MEDICINE

## 2020-06-29 PROCEDURE — 94618 PULMONARY STRESS TESTING: ICD-10-PCS | Mod: S$GLB,,, | Performed by: INTERNAL MEDICINE

## 2020-06-29 PROCEDURE — 82803 PR  BLOOD GASES: PH, PO2 & PCO2: ICD-10-PCS | Mod: S$GLB,,, | Performed by: INTERNAL MEDICINE

## 2020-06-29 PROCEDURE — 94762 PR NONINVASV OXYGEN SATUR, CONT: ICD-10-PCS | Mod: 59,S$GLB,, | Performed by: INTERNAL MEDICINE

## 2020-06-29 PROCEDURE — 82803 BLOOD GASES ANY COMBINATION: CPT | Mod: S$GLB,,, | Performed by: INTERNAL MEDICINE

## 2020-06-29 NOTE — PROCEDURES
See ABG Results.     Primary Respiratory Acidosis, Chronic, with: Secondary Metabolic Acidosis      A-a Itldsnkz19.6 mm Hg    Expected A-a Gradient for Age17.5 mm Hg

## 2020-06-29 NOTE — PROCEDURES
"[facility]  Six Minute Walk     SUMMARY     Ordering Provider: Dr. Keating   Interpreting Provider: Dr. Keating  Performing nurse/tech/RT: MARITZA Stewart CRT  Diagnosis: (Exercise Hypoxemia)  Height: 5' 7" (170.2 cm)  Weight: 70.5 kg (155 lb 6.8 oz)  BMI (Calculated): 24.3   Patient Race:             Phase Oxygen Assessment Supplemental O2 Heart   Rate Blood Pressure Meeta Dyspnea Scale Rating   Resting 96 % Room Air 80 bpm 133/74 2   Exercise        Minute        1 98 % Room Air 92 bpm     2 94 % Room Air 94 bpm     3 94 % Room Air 94 bpm     4 97 % Room Air 96 bpm     5 94 % Room Air 98 bpm     6  95 % Room Air 101 bpm 139/85 4   Recovery        Minute        1 94 % Room Air 86 bpm     2 96 % Room Air 85 bpm     3 97 % Room Air 86 bpm     4 96 % Room Air 84 bpm 130/76 2     Six Minute Walk Summary  6MWT Status: completed without stopping  Patient Reported: Dyspnea(Knee Pain)     Interpretation:  Did the patient stop or pause?: No            Total Time Walked (Calculated): 360 seconds  Final Partial Lap Distance (feet): 125 feet  Total Distance Meters (Calculated): 403.86 meters  Predicted Distance Meters (Calculated): 529.44 meters  Percentage of Predicted (Calculated): 76.28  Peak VO2 (Calculated): 16.1  Mets: 4.6  Has The Patient Had a Previous Six Minute Walk Test?: No       Previous 6MWT Results  Has The Patient Had a Previous Six Minute Walk Test?: No         CLINICAL INTERPRETATION:  Six minute walk distance is 403.86m (76.28 % predicted) with moderate dyspnea.  During exercise, there was slight desaturation while breathing room air.  Blood pressure remained stable and Heart rate remained stable with walking.  The patient reported non-pulmonary symptoms during exercise. Knee Pain.  The patient did complete the study, walking 360 seconds of the 360 second test.  No previous study performed.  Based upon age and body mass index, exercise capacity is normal.     "

## 2020-06-30 ENCOUNTER — HOSPITAL ENCOUNTER (OUTPATIENT)
Dept: CARDIOLOGY | Facility: HOSPITAL | Age: 60
Discharge: HOME | End: 2020-06-30
Attending: INTERNAL MEDICINE
Payer: COMMERCIAL

## 2020-06-30 ENCOUNTER — CLINICAL SUPPORT (OUTPATIENT)
Dept: PULMONOLOGY | Facility: CLINIC | Age: 60
End: 2020-06-30
Payer: COMMERCIAL

## 2020-06-30 ENCOUNTER — HOSPITAL ENCOUNTER (OUTPATIENT)
Dept: RADIOLOGY | Facility: HOSPITAL | Age: 60
Discharge: HOME | End: 2020-06-30
Attending: INTERNAL MEDICINE
Payer: COMMERCIAL

## 2020-06-30 DIAGNOSIS — R60.0 EDEMA OF LEG: ICD-10-CM

## 2020-06-30 DIAGNOSIS — R79.89 ELEVATED BRAIN NATRIURETIC PEPTIDE (BNP) LEVEL: ICD-10-CM

## 2020-06-30 DIAGNOSIS — J45.909 NOCTURNAL HYPOXEMIA DUE TO ASTHMA: ICD-10-CM

## 2020-06-30 DIAGNOSIS — D86.0 SARCOIDOSIS OF LUNG: Primary | ICD-10-CM

## 2020-06-30 DIAGNOSIS — R06.09 DOE (DYSPNEA ON EXERTION): ICD-10-CM

## 2020-06-30 DIAGNOSIS — R09.02 NOCTURNAL HYPOXEMIA DUE TO ASTHMA: ICD-10-CM

## 2020-06-30 DIAGNOSIS — D86.0 SARCOIDOSIS OF LUNG: ICD-10-CM

## 2020-06-30 DIAGNOSIS — I10 ESSENTIAL HYPERTENSION: ICD-10-CM

## 2020-06-30 DIAGNOSIS — R07.89 OTHER CHEST PAIN: ICD-10-CM

## 2020-06-30 DIAGNOSIS — J45.40 MODERATE PERSISTENT ASTHMA WITHOUT COMPLICATION: ICD-10-CM

## 2020-06-30 DIAGNOSIS — G47.33 OSA (OBSTRUCTIVE SLEEP APNEA): ICD-10-CM

## 2020-06-30 LAB
BRPFT: ABNORMAL
CV STRESS BASE HR: 81 BPM
DIASTOLIC BLOOD PRESSURE: 87 MMHG
DLCO ADJ PRE: 13.69 ML/(MIN*MMHG) (ref 19.12–30.59)
DLCO SINGLE BREATH LLN: 19.12
DLCO SINGLE BREATH PRE REF: 55.1 %
DLCO SINGLE BREATH REF: 24.86
DLCOC SBVA LLN: 3.23
DLCOC SBVA PRE REF: 91.9 %
DLCOC SBVA REF: 4.58
DLCOC SINGLE BREATH LLN: 19.12
DLCOC SINGLE BREATH PRE REF: 55.1 %
DLCOC SINGLE BREATH REF: 24.86
DLCOVA LLN: 3.23
DLCOVA PRE REF: 91.9 %
DLCOVA PRE: 4.21 ML/(MIN*MMHG*L) (ref 3.23–5.92)
DLCOVA REF: 4.58
DLVAADJ PRE: 4.21 ML/(MIN*MMHG*L) (ref 3.23–5.92)
ERV LLN: -16449.14
ERV PRE REF: 83.6 %
ERV REF: 0.86
FEF 25 75 LLN: 0.96
FEF 25 75 PRE REF: 22.4 %
FEF 25 75 REF: 2.19
FEV1 FVC LLN: 68
FEV1 FVC PRE REF: 68.5 %
FEV1 FVC REF: 80
FEV1 LLN: 1.76
FEV1 PRE REF: 46 %
FEV1 REF: 2.4
FRCPLETH LLN: 2.04
FRCPLETH PREREF: 104.2 %
FRCPLETH REF: 2.87
FVC LLN: 2.25
FVC PRE REF: 66.8 %
FVC REF: 3.04
IVC PRE: 2.11 L (ref 2.25–3.82)
IVC SINGLE BREATH LLN: 2.25
IVC SINGLE BREATH PRE REF: 69.5 %
IVC SINGLE BREATH REF: 3.04
MVV LLN: 91
MVV PRE REF: 35.6 %
MVV REF: 107
NUC REST EJECTION FRACTION: 82
NUC STRESS EJECTION FRACTION: 68 %
OHS CV CPX 85 PERCENT MAX PREDICTED HEART RATE MALE: 132
OHS CV CPX MAX PREDICTED HEART RATE: 155
OHS CV CPX PATIENT IS FEMALE: 1
OHS CV CPX PATIENT IS MALE: 0
OHS CV CPX PEAK DIASTOLIC BLOOD PRESSURE: 84 MMHG
OHS CV CPX PEAK HEAR RATE: 101 BPM
OHS CV CPX PEAK RATE PRESSURE PRODUCT: NORMAL
OHS CV CPX PEAK SYSTOLIC BLOOD PRESSURE: 151 MMHG
OHS CV CPX PERCENT MAX PREDICTED HEART RATE ACHIEVED: 65
OHS CV CPX RATE PRESSURE PRODUCT PRESENTING: NORMAL
PEF LLN: 4.03
PEF PRE REF: 46.4 %
PEF REF: 6.22
PRE DLCO: 13.69 ML/(MIN*MMHG) (ref 19.12–30.59)
PRE ERV: 0.72 L (ref -16449.14–16450.86)
PRE FEF 25 75: 0.49 L/S (ref 0.96–3.42)
PRE FET 100: 7.06 SEC
PRE FEV1 FVC: 54.46 % (ref 68.06–91.03)
PRE FEV1: 1.11 L (ref 1.76–3.04)
PRE FRC PL: 2.98 L
PRE FVC: 2.03 L (ref 2.25–3.82)
PRE MVV: 38 L/MIN (ref 90.61–122.59)
PRE PEF: 2.89 L/S (ref 4.03–8.41)
PRE RV: 2.19 L (ref 1.43–2.58)
PRE TLC: 4.3 L (ref 4.44–6.42)
RAW LLN: 3.06
RAW PRE REF: 169.5 %
RAW PRE: 5.18 CMH2O*S/L (ref 3.06–3.06)
RAW REF: 3.06
RV LLN: 1.43
RV PRE REF: 109 %
RV REF: 2
RVTLC LLN: 29
RVTLC PRE REF: 131.2 %
RVTLC PRE: 50.75 % (ref 29.09–48.27)
RVTLC REF: 39
STRESS ECHO POST EXERCISE DUR MIN: 1 MINUTES
STRESS ECHO POST EXERCISE DUR SEC: 1 SECONDS
SYSTOLIC BLOOD PRESSURE: 145 MMHG
TLC LLN: 4.44
TLC PRE REF: 79.3 %
TLC REF: 5.43
VA PRE: 3.25 L (ref 5.28–5.28)
VA SINGLE BREATH LLN: 5.28
VA SINGLE BREATH PRE REF: 61.6 %
VA SINGLE BREATH REF: 5.28
VC LLN: 2.25
VC PRE REF: 69.8 %
VC PRE: 2.12 L (ref 2.25–3.82)
VC REF: 3.04
VTGRAWPRE: 3.44 L

## 2020-06-30 PROCEDURE — 78452 HT MUSCLE IMAGE SPECT MULT: CPT | Mod: 26,,, | Performed by: INTERNAL MEDICINE

## 2020-06-30 PROCEDURE — 94729 DIFFUSING CAPACITY: CPT | Mod: S$GLB,,, | Performed by: INTERNAL MEDICINE

## 2020-06-30 PROCEDURE — 94726 PLETHYSMOGRAPHY LUNG VOLUMES: CPT | Mod: S$GLB,,, | Performed by: INTERNAL MEDICINE

## 2020-06-30 PROCEDURE — A9502 TC99M TETROFOSMIN: HCPCS

## 2020-06-30 PROCEDURE — 94726 PULM FUNCT TST PLETHYSMOGRAP: ICD-10-PCS | Mod: S$GLB,,, | Performed by: INTERNAL MEDICINE

## 2020-06-30 PROCEDURE — 93018 CV STRESS TEST I&R ONLY: CPT | Mod: ,,, | Performed by: INTERNAL MEDICINE

## 2020-06-30 PROCEDURE — 93018 STRESS TEST WITH MYOCARDIAL PERFUSION (CUPID ONLY): ICD-10-PCS | Mod: ,,, | Performed by: INTERNAL MEDICINE

## 2020-06-30 PROCEDURE — 93016 CV STRESS TEST SUPVJ ONLY: CPT | Mod: ,,, | Performed by: INTERNAL MEDICINE

## 2020-06-30 PROCEDURE — 94729 PR C02/MEMBANE DIFFUSE CAPACITY: ICD-10-PCS | Mod: S$GLB,,, | Performed by: INTERNAL MEDICINE

## 2020-06-30 PROCEDURE — 93016 STRESS TEST WITH MYOCARDIAL PERFUSION (CUPID ONLY): ICD-10-PCS | Mod: ,,, | Performed by: INTERNAL MEDICINE

## 2020-06-30 PROCEDURE — 78452 STRESS TEST WITH MYOCARDIAL PERFUSION (CUPID ONLY): ICD-10-PCS | Mod: 26,,, | Performed by: INTERNAL MEDICINE

## 2020-06-30 PROCEDURE — 93017 CV STRESS TEST TRACING ONLY: CPT

## 2020-06-30 PROCEDURE — 94010 BREATHING CAPACITY TEST: ICD-10-PCS | Mod: S$GLB,,, | Performed by: INTERNAL MEDICINE

## 2020-06-30 PROCEDURE — 94010 BREATHING CAPACITY TEST: CPT | Mod: S$GLB,,, | Performed by: INTERNAL MEDICINE

## 2020-06-30 RX ORDER — REGADENOSON 0.08 MG/ML
0.4 INJECTION, SOLUTION INTRAVENOUS ONCE
Status: COMPLETED | OUTPATIENT
Start: 2020-06-30 | End: 2020-06-30

## 2020-06-30 RX ADMIN — REGADENOSON 0.4 MG: 0.08 INJECTION, SOLUTION INTRAVENOUS at 10:06

## 2020-06-30 NOTE — PROGRESS NOTES
OVERNIGHT OXIMETRY REPORT:    Dictated by: Adrian Keating MD  Test date: 2020  Dictated on: 2020      Comment: This test was performed on Room Air     A desaturation event was defined as a decrease of saturation by 4 or more.    REPORT SUMMARY  Total valid samplin:46:52   High SpO2: 95%    Low SpO2: 64%    Mean SpO2  87.3 %  Cumulative time with oxygen saturation less than 88% (TC88): 2:36:20    CLINICAL INTERPRETATION   There is  significant nocturnal oxygen desaturation,  Clinical correlation is advised and  Recommend overnight polysomnography if clinically indicated    Medicare Criteria Comments:   Oximetry test results suggest the patient falls under Medicare Group 1 Criteria. ( Arterial oxygen saturation at or below 88% for at least 5 minutes taken during sleep)  Adrian Keating MD    Details about Medicare Group Criteria coverage can be found at http://www.cms.hhs.gov/manuals/downloads/        Orders Placed This Encounter   Procedures    OXYGEN FOR HOME USE     OVERNIGHT OXIMETRY REPORT:    Dictated by: Adrian Keating MD  Test date: 2020  Dictated on: 2020      Comment: This test was performed on Room Air     A desaturation event was defined as a decrease of saturation by 4 or more.    REPORT SUMMARY  Total valid samplin:46:52   High SpO2: 95%    Low SpO2: 64%    Mean SpO2  87.3 %  Cumulative time with oxygen saturation less than 88% (TC88): 2:36:20    CLINICAL INTERPRETATION   There is  significant nocturnal oxygen desaturation,  Clinical correlation is advised and  Recommend overnight polysomnography if clinically indicated    Medicare Criteria Comments:   Oximetry test results suggest the patient falls under Medicare Group 1 Criteria. ( Arterial oxygen saturation at or below 88% for at least 5 minutes taken during sleep)  Adrian Keating MD    Details about Medicare Group Criteria coverage can be found at http://www.cms.hhs.gov/manuals/downloads/      "Order Specific Question:   Liter Flow     Answer:   2     Order Specific Question:   Duration     Answer:   With sleep     Order Specific Question:   Qualifying SpO2:     Answer:   64     Order Specific Question:   Testing done at:     Answer:   Sleep     Order Specific Question:   Route     Answer:   nasal cannula     Order Specific Question:   Portable mode:     Answer:   continuous     Order Specific Question:   Device     Answer:   home concentrator     Order Specific Question:   Length of need (in months):     Answer:   99 mos     Order Specific Question:   Patient condition with qualifying saturation     Answer:   COPD     Order Specific Question:   Height:     Answer:   5'7"     Order Specific Question:   Weight:     Answer:   155lb     Order Specific Question:   Alternative treatment measures have been tried or considered and deemed clinically ineffective.     Answer:   Yes    Polysomnogram (CPAP will be added if patient meets diagnostic criteria.)     Standing Status:   Future     Standing Expiration Date:   6/30/2021      "

## 2020-06-30 NOTE — PROCEDURES
[facility]  [address] * [place] [address]  Telephone: [phone]  Test date: 20 Start: 20 00:06:13 Salvatore Phillips  Doctor: Dr. Keating End: 20 04:55:17 [id number]  Oximetry: Summary Report  Comments: Room Air  Recording time: 04:49:04 Highest pulse: 133 Highest SpO2: 95%  Excluded samplin:02:12 Lowest pulse: 46 Lowest SpO2: 64%  Total valid samplin:46:52 Mean pulse: 86 Mean SpO2: 87.3%  1 S.D.: 3.6 1 S.D.: 3.0  Time with SpO2<90: 3:44:24, 78.2%  Time with SpO2<80: 0:06:28, 2.3%  Time with SpO2<70: 0:00:08, 0.0%  Time with SpO2<60: 0:00:00, 0.0%  Time with SpO2<89: 2:36:20, 54.5%  Time with SpO2 =>90: 1:02:28, 21.8%  Time with SpO2=>80 & <90: 3:37:56, 76.0%  Time with SpO2=>70 & <80: 0:06:20, 2.2%  Time with SpO2=>60 & <70: 0:00:08, 0.0%  The longest continuous time with saturation <=88 was 00:28:44, which started at  20 02:02:41.  A desaturation event was defined as a decrease of saturation by 4 or more.  One event was excluded due to artifact.  There were 7 desaturation events over 3 minutes duration.  There were 17 desaturation events of less than 3 minutes duration during which:  The mean high was 85.1%. The mean low was 79.6%.  The number of these events that were:  > 0 & <10 seconds: 0 > 0 seconds: 17  =>10 & <20 seconds: 0 =>10 seconds: 17  =>20 & <30 seconds: 4 =>20 seconds: 17  =>30 & <40 seconds: 1 =>30 seconds: 13  =>40 & <50 seconds: 3 =>40 seconds: 12  =>50 & <60 seconds: 0 =>50 seconds: 9  =>60 seconds: 9 =>60 seconds: 9  The mean length of desaturation events that were >=10 sec & <=3 mins was: 80.7 sec.  Desaturation event index (events >=10 sec per sampled hour): 3.6  Desaturation event index (events >= 0 sec per sampled hour): 3.6    OVERNIGHT OXIMETRY REPORT:    Dictated by: Adrian Keating MD  Test date: 2020  Dictated on: 2020      Comment: This test was performed on Room Air     A desaturation event was defined as a  decrease of saturation by 4 or more.    REPORT SUMMARY  Total valid samplin:46:52   High SpO2: 95%    Low SpO2: 64%    Mean SpO2  87.3 %  Cumulative time with oxygen saturation less than 88% (TC88): 2:36:20    CLINICAL INTERPRETATION   There is  significant nocturnal oxygen desaturation,  Clinical correlation is advised and  Recommend overnight polysomnography if clinically indicated    Medicare Criteria Comments:   Oximetry test results suggest the patient falls under Medicare Group 1 Criteria. ( Arterial oxygen saturation at or below 88% for at least 5 minutes taken during sleep)  Adrian Keating MD    Details about Medicare Group Criteria coverage can be found at http://www.cms.hhs.gov/manuals/downloads/

## 2020-07-01 ENCOUNTER — TELEPHONE (OUTPATIENT)
Dept: PULMONOLOGY | Facility: CLINIC | Age: 60
End: 2020-07-01

## 2020-07-01 NOTE — TELEPHONE ENCOUNTER
----- Message from Adrian Keating MD sent at 2020  2:31 PM CDT -----   Please inform patient overnight saturation study was abnormal.  She needs oxygen and she needs a sleep study    OVERNIGHT OXIMETRY REPORT:    Dictated by: Adrian Keating MD  Test date: 2020  Dictated on: 2020      Comment: This test was performed on Room Air     A desaturation event was defined as a decrease of saturation by 4 or more.    REPORT SUMMARY  Total valid samplin:46:52   High SpO2: 95%    Low SpO2: 64%    Mean SpO2  87.3 %  Cumulative time with oxygen saturation less than 88% (TC88): 2:36:20    CLINICAL INTERPRETATION   There is  significant nocturnal oxygen desaturation,  Clinical correlation is advised and  Recommend overnight polysomnography if clinically indicated    Medicare Criteria Comments:   Oximetry test results suggest the patient falls under Medicare Group 1 Criteria. ( Arterial oxygen saturation at or below 88% for at least 5 minutes taken during sleep)  Adrian Keating MD    Details about Medicare Group Criteria coverage can be found at http://www.cms.hhs.gov/manuals/downloads/

## 2020-07-02 ENCOUNTER — TELEPHONE (OUTPATIENT)
Dept: PULMONOLOGY | Facility: CLINIC | Age: 60
End: 2020-07-02

## 2020-07-02 NOTE — TELEPHONE ENCOUNTER
----- Message from Jared Pizarro sent at 7/2/2020  8:32 AM CDT -----  Hello,    Called patient to schedule sleep study, She declined. Does not want to test for Covid again. Was tested on 06/26.    Thanks

## 2020-07-09 ENCOUNTER — CLINICAL SUPPORT (OUTPATIENT)
Dept: SMOKING CESSATION | Facility: CLINIC | Age: 60
End: 2020-07-09
Payer: COMMERCIAL

## 2020-07-09 ENCOUNTER — TELEPHONE (OUTPATIENT)
Dept: SMOKING CESSATION | Facility: CLINIC | Age: 60
End: 2020-07-09

## 2020-07-09 ENCOUNTER — PATIENT OUTREACH (OUTPATIENT)
Dept: ADMINISTRATIVE | Facility: OTHER | Age: 60
End: 2020-07-09

## 2020-07-09 DIAGNOSIS — F17.200 NICOTINE DEPENDENCE: Primary | ICD-10-CM

## 2020-07-09 PROCEDURE — 99407 PR TOBACCO USE CESSATION INTENSIVE >10 MINUTES: ICD-10-PCS | Mod: S$GLB,,, | Performed by: GENERAL PRACTICE

## 2020-07-09 PROCEDURE — 99407 BEHAV CHNG SMOKING > 10 MIN: CPT | Mod: S$GLB,,, | Performed by: GENERAL PRACTICE

## 2020-07-09 NOTE — TELEPHONE ENCOUNTER
Spoke with patient briefly in regards to her smoking cessation progress. She stated that she is on the other line and will call back in a few minutes.

## 2020-07-09 NOTE — PROGRESS NOTES
Requested updates within Care Everywhere.  Patient's chart was reviewed for overdue DAMARIS topics.  Immunizations reconciled.

## 2020-07-10 ENCOUNTER — OFFICE VISIT (OUTPATIENT)
Dept: PULMONOLOGY | Facility: CLINIC | Age: 60
End: 2020-07-10
Payer: COMMERCIAL

## 2020-07-10 ENCOUNTER — HOSPITAL ENCOUNTER (OUTPATIENT)
Dept: CARDIOLOGY | Facility: HOSPITAL | Age: 60
Discharge: HOME | End: 2020-07-10
Attending: INTERNAL MEDICINE
Payer: COMMERCIAL

## 2020-07-10 ENCOUNTER — LAB VISIT (OUTPATIENT)
Dept: LAB | Facility: HOSPITAL | Age: 60
End: 2020-07-10
Attending: INTERNAL MEDICINE
Payer: COMMERCIAL

## 2020-07-10 VITALS
SYSTOLIC BLOOD PRESSURE: 124 MMHG | SYSTOLIC BLOOD PRESSURE: 130 MMHG | WEIGHT: 155 LBS | BODY MASS INDEX: 24.63 KG/M2 | DIASTOLIC BLOOD PRESSURE: 78 MMHG | RESPIRATION RATE: 16 BRPM | DIASTOLIC BLOOD PRESSURE: 80 MMHG | BODY MASS INDEX: 24.33 KG/M2 | HEIGHT: 67 IN | OXYGEN SATURATION: 95 % | WEIGHT: 156.94 LBS | HEIGHT: 67 IN | HEART RATE: 79 BPM

## 2020-07-10 DIAGNOSIS — D86.0 SARCOIDOSIS OF LUNG: ICD-10-CM

## 2020-07-10 DIAGNOSIS — J45.40 MODERATE PERSISTENT ASTHMA WITHOUT COMPLICATION: ICD-10-CM

## 2020-07-10 DIAGNOSIS — R79.89 ELEVATED BRAIN NATRIURETIC PEPTIDE (BNP) LEVEL: ICD-10-CM

## 2020-07-10 DIAGNOSIS — J45.40 MODERATE PERSISTENT ASTHMA WITHOUT COMPLICATION: Primary | ICD-10-CM

## 2020-07-10 DIAGNOSIS — R06.2 WHEEZING: ICD-10-CM

## 2020-07-10 DIAGNOSIS — R60.0 EDEMA OF LEG: ICD-10-CM

## 2020-07-10 DIAGNOSIS — G47.36 NOCTURNAL HYPOXEMIA DUE TO OBSTRUCTIVE CHRONIC BRONCHITIS: ICD-10-CM

## 2020-07-10 DIAGNOSIS — I10 ESSENTIAL HYPERTENSION: ICD-10-CM

## 2020-07-10 DIAGNOSIS — Z03.818 ENCOUNTER FOR OBSERVATION FOR SUSPECTED EXPOSURE TO OTHER BIOLOGICAL AGENTS RULED OUT: Primary | ICD-10-CM

## 2020-07-10 DIAGNOSIS — J44.89 NOCTURNAL HYPOXEMIA DUE TO OBSTRUCTIVE CHRONIC BRONCHITIS: ICD-10-CM

## 2020-07-10 DIAGNOSIS — F17.200 HEAVY TOBACCO SMOKER: ICD-10-CM

## 2020-07-10 LAB
AORTIC ROOT ANNULUS: 3.03 CM
AV INDEX (PROSTH): 0.73
AV MEAN GRADIENT: 6 MMHG
AV PEAK GRADIENT: 9 MMHG
AV VALVE AREA: 2.26 CM2
AV VELOCITY RATIO: 0.76
BSA FOR ECHO PROCEDURE: 1.82 M2
CV ECHO LV RWT: 0.86 CM
DOP CALC AO PEAK VEL: 1.48 M/S
DOP CALC AO VTI: 33.84 CM
DOP CALC LVOT AREA: 3.1 CM2
DOP CALC LVOT DIAMETER: 1.99 CM
DOP CALC LVOT PEAK VEL: 1.13 M/S
DOP CALC LVOT STROKE VOLUME: 76.63 CM3
DOP CALC RVOT PEAK VEL: 0.81 M/S
DOP CALC RVOT VTI: 18.01 CM
DOP CALCLVOT PEAK VEL VTI: 24.65 CM
E WAVE DECELERATION TIME: 185.85 MSEC
E/A RATIO: 0.74
E/E' RATIO: 11.85 M/S
ECHO LV POSTERIOR WALL: 1.47 CM (ref 0.6–1.1)
FRACTIONAL SHORTENING: 17 % (ref 28–44)
INTERVENTRICULAR SEPTUM: 1.85 CM (ref 0.6–1.1)
IVRT: 79.92 MSEC
LA MAJOR: 3.87 CM
LA MINOR: 4.05 CM
LA WIDTH: 3.47 CM
LEFT ATRIUM SIZE: 3.34 CM
LEFT ATRIUM VOLUME INDEX: 21.5 ML/M2
LEFT ATRIUM VOLUME: 38.99 CM3
LEFT INTERNAL DIMENSION IN SYSTOLE: 2.85 CM (ref 2.1–4)
LEFT VENTRICLE DIASTOLIC VOLUME INDEX: 26.48 ML/M2
LEFT VENTRICLE DIASTOLIC VOLUME: 48.05 ML
LEFT VENTRICLE MASS INDEX: 122 G/M2
LEFT VENTRICLE SYSTOLIC VOLUME INDEX: 17.1 ML/M2
LEFT VENTRICLE SYSTOLIC VOLUME: 30.99 ML
LEFT VENTRICULAR INTERNAL DIMENSION IN DIASTOLE: 3.42 CM (ref 3.5–6)
LEFT VENTRICULAR MASS: 222.06 G
LV LATERAL E/E' RATIO: 11 M/S
LV SEPTAL E/E' RATIO: 12.83 M/S
MV PEAK A VEL: 1.04 M/S
MV PEAK E VEL: 0.77 M/S
MV STENOSIS PRESSURE HALF TIME: 53.9 MS
MV VALVE AREA P 1/2 METHOD: 4.08 CM2
PISA TR MAX VEL: 2.52 M/S
PV MEAN GRADIENT: 1.47 MMHG
PV PEAK VELOCITY: 1.21 CM/S
RA MAJOR: 3.17 CM
RA PRESSURE: 3 MMHG
RA WIDTH: 2.72 CM
RIGHT VENTRICULAR END-DIASTOLIC DIMENSION: 2.57 CM
SINUS: 2.78 CM
STJ: 2.21 CM
TDI LATERAL: 0.07 M/S
TDI SEPTAL: 0.06 M/S
TDI: 0.07 M/S
TR MAX PG: 25 MMHG
TRICUSPID ANNULAR PLANE SYSTOLIC EXCURSION: 1.78 CM
TV REST PULMONARY ARTERY PRESSURE: 28 MMHG

## 2020-07-10 PROCEDURE — 3008F PR BODY MASS INDEX (BMI) DOCUMENTED: ICD-10-PCS | Mod: CPTII,S$GLB,, | Performed by: INTERNAL MEDICINE

## 2020-07-10 PROCEDURE — 93306 TTE W/DOPPLER COMPLETE: CPT | Mod: 26,,, | Performed by: INTERNAL MEDICINE

## 2020-07-10 PROCEDURE — 99999 PR PBB SHADOW E&M-EST. PATIENT-LVL V: ICD-10-PCS | Mod: PBBFAC,,, | Performed by: INTERNAL MEDICINE

## 2020-07-10 PROCEDURE — 93306 TTE W/DOPPLER COMPLETE: CPT

## 2020-07-10 PROCEDURE — 82785 ASSAY OF IGE: CPT

## 2020-07-10 PROCEDURE — 99214 OFFICE O/P EST MOD 30 MIN: CPT | Mod: 25,S$GLB,, | Performed by: INTERNAL MEDICINE

## 2020-07-10 PROCEDURE — 99214 PR OFFICE/OUTPT VISIT, EST, LEVL IV, 30-39 MIN: ICD-10-PCS | Mod: 25,S$GLB,, | Performed by: INTERNAL MEDICINE

## 2020-07-10 PROCEDURE — 3074F SYST BP LT 130 MM HG: CPT | Mod: CPTII,S$GLB,, | Performed by: INTERNAL MEDICINE

## 2020-07-10 PROCEDURE — 93306 ECHO (CUPID ONLY): ICD-10-PCS | Mod: 26,,, | Performed by: INTERNAL MEDICINE

## 2020-07-10 PROCEDURE — 3078F PR MOST RECENT DIASTOLIC BLOOD PRESSURE < 80 MM HG: ICD-10-PCS | Mod: CPTII,S$GLB,, | Performed by: INTERNAL MEDICINE

## 2020-07-10 PROCEDURE — 3078F DIAST BP <80 MM HG: CPT | Mod: CPTII,S$GLB,, | Performed by: INTERNAL MEDICINE

## 2020-07-10 PROCEDURE — 96372 THER/PROPH/DIAG INJ SC/IM: CPT | Mod: S$GLB,,, | Performed by: INTERNAL MEDICINE

## 2020-07-10 PROCEDURE — 99999 PR PBB SHADOW E&M-EST. PATIENT-LVL V: CPT | Mod: PBBFAC,,, | Performed by: INTERNAL MEDICINE

## 2020-07-10 PROCEDURE — 36415 COLL VENOUS BLD VENIPUNCTURE: CPT

## 2020-07-10 PROCEDURE — 3074F PR MOST RECENT SYSTOLIC BLOOD PRESSURE < 130 MM HG: ICD-10-PCS | Mod: CPTII,S$GLB,, | Performed by: INTERNAL MEDICINE

## 2020-07-10 PROCEDURE — 96372 PR INJECTION,THERAP/PROPH/DIAG2ST, IM OR SUBCUT: ICD-10-PCS | Mod: S$GLB,,, | Performed by: INTERNAL MEDICINE

## 2020-07-10 PROCEDURE — 3008F BODY MASS INDEX DOCD: CPT | Mod: CPTII,S$GLB,, | Performed by: INTERNAL MEDICINE

## 2020-07-10 RX ORDER — PREDNISONE 20 MG/1
20 TABLET ORAL SEE ADMIN INSTRUCTIONS
Qty: 25 TABLET | Refills: 0 | Status: ON HOLD | OUTPATIENT
Start: 2020-07-10 | End: 2020-12-18 | Stop reason: HOSPADM

## 2020-07-10 RX ORDER — TRIAMCINOLONE ACETONIDE 40 MG/ML
40 INJECTION, SUSPENSION INTRA-ARTICULAR; INTRAMUSCULAR ONCE
Status: COMPLETED | OUTPATIENT
Start: 2020-07-10 | End: 2020-07-10

## 2020-07-10 RX ADMIN — TRIAMCINOLONE ACETONIDE 40 MG: 40 INJECTION, SUSPENSION INTRA-ARTICULAR; INTRAMUSCULAR at 10:07

## 2020-07-10 NOTE — PROGRESS NOTES
Pulmonary Outpatient Follow Up Visit     Subjective:       Patient ID: Salvatore Phillips is a 59 y.o. female.    Patient Active Problem List   Diagnosis    Foot drop, right    Asthma    Hypertension    Sarcoidosis of lung    Postnasal drip    Wheezing    Psychophysiological insomnia    Sleepwalking    Primary snoring    Inadequate sleep hygiene    Moderate persistent asthma    Edema of leg    Elevated brain natriuretic peptide (BNP) level    Heavy tobacco smoker    Nocturnal hypoxemia due to obstructive chronic bronchitis      Immunization History   Administered Date(s) Administered    Influenza - Quadrivalent - PF (6 months and older) 10/24/2018    Influenza - Trivalent (ADULT) 12/20/2018    Pneumococcal Polysaccharide - 23 Valent 04/09/2019    Td (ADULT) 04/09/2019      Social History     Tobacco Use   Smoking Status Current Every Day Smoker    Packs/day: 1.00    Years: 35.00    Pack years: 35.00    Types: Cigarettes    Start date: 1/1/1979   Smokeless Tobacco Never Used   Tobacco Comment    started age 17      Answers for HPI/ROS submitted by the patient on 7/10/2020   Asthma  In the past 4 weeks, how much of the time did your asthma keep you from getting as much done at work, school, or at home?: most of the time  During the past 4 weeks, how often have you had shortness of breath?: more than once a day  During the past 4 weeks, how often did your asthma symptoms (Wheezing, coughing, shortness of breath, chest tightness or pain) wake you up at night or earlier that usual in the morning?: 4 or more nights a week  During the past 4 weeks, how often have you used your rescue inhaler or nebulizer medication (such as albuterol)?: 2 or 3 times a week  How would you rate your asthma control during the past 4 weeks?: poorly controlled   : 9        Chief Complaint: Sarcoidosis      Salvatore Phillips is 59 y.o.   Last visit 06/10/2020  Follow up to  review;  PFT:  Obstructive defect with reduced DLCO  6MWD:  Reduced exercise capacity  ONSAT :  Nocturnal oxygen desaturation and sleep study was ordered.  Oxygen was also ordered patient awaiting insurance approval  ECHO:  Pending, ordered for elevated BNP, suspicion for heart failure  ACT score 9:   Today she is coughing and wheezing.  Asthma exacerbation  No fever  Known Sarcoidosis Lung after Biopsy at 29 years, Most care in [place] and [place]  ecently seen PCP , edema, BNP was elevated  Hx of NENA has a CPAP but stopped using after most recent PSG: Hx of Lap band surgery and drastic weight loss [employer] diet.  Smoking> 30 pack year: meets LDCT criteria, last CT was 2016 and f/u was recommended  Last PFT FEV1:  1.11 L ( 46 %), Bronchodilaros: duoneb, Albuterol, BREO elevated , Montelukast, Accolate  Kenalog will be given IM and prednisone taper    Asthma  She complains of cough and wheezing. There is no shortness of breath. Pertinent negatives include no headaches. Her past medical history is significant for asthma.     Social History     Tobacco Use   Smoking Status Current Every Day Smoker    Packs/day: 1.00    Years: 35.00    Pack years: 35.00    Types: Cigarettes    Start date: 1/1/1979   Smokeless Tobacco Never Used   Tobacco Comment    started age 17           Immunization History   Administered Date(s) Administered    Influenza - Quadrivalent - PF (6 months and older) 10/24/2018    Influenza - Trivalent (ADULT) 12/20/2018    Pneumococcal Polysaccharide - 23 Valent 04/09/2019    Td (ADULT) 04/09/2019            Review of Systems   Constitutional: Negative for fatigue.   Respiratory: Positive for cough, wheezing, dyspnea on extertion and use of rescue inhaler. Negative for shortness of breath.    Cardiovascular: Negative for leg swelling.   Neurological: Negative for weakness and headaches.   Psychiatric/Behavioral: Negative for sleep disturbance.   All other systems reviewed and are  negative.      Outpatient Encounter Medications as of 7/10/2020   Medication Sig Dispense Refill    albuterol (PROVENTIL/VENTOLIN HFA) 90 mcg/actuation inhaler Inhale 2 puffs into the lungs every 4 (four) hours as needed for Wheezing or Shortness of Breath. 54 g 3    albuterol-ipratropium (DUO-NEB) 2.5 mg-0.5 mg/3 mL nebulizer solution Take 3 mLs by nebulization every 6 (six) hours as needed for Wheezing or Shortness of Breath. Rescue. Corrected prescription with refills 1 Box 11    fluticasone furoate-vilanteroL (BREO ELLIPTA) 200-25 mcg/dose DsDv diskus inhaler Inhale 1 puff into the lungs once daily. Controller 60 each 11    fluticasone propionate (FLONASE) 50 mcg/actuation nasal spray 2 sprays (100 mcg total) by Each Nostril route once daily. Corrected prescription with refills 48 mL 3    furosemide (LASIX) 40 MG tablet Take 1 tablet (40 mg total) by mouth daily as needed (for leg swelling). 30 tablet 3    ipratropium (ATROVENT HFA) 17 mcg/actuation inhaler Inhale 2 puffs into the lungs every 4 to 6 hours as needed for Wheezing. Rescue 1 Inhaler 11    levocetirizine (XYZAL) 5 MG tablet Take 1 tablet (5 mg total) by mouth every evening. 10 tablet 0    oxybutynin (DITROPAN) 5 MG Tab Take 1 tablet (5 mg total) by mouth every evening. 90 tablet 0    QUEtiapine (SEROQUEL) 50 MG tablet Take 50 mg by mouth every evening.      traMADoL (ULTRAM) 50 mg tablet Take 1 tablet (50 mg total) by mouth every 6 (six) hours as needed for Pain. 12 tablet 0    varenicline (CHANTIX) 1 mg Tab Take 1 tablet (1 mg total) by mouth 2 (two) times daily. 60 tablet 2    zafirlukast (ACCOLATE) 20 MG tablet Take 1 tablet (20 mg total) by mouth 2 (two) times daily. 180 tablet 3    zolpidem (AMBIEN) 5 MG Tab Take 1 tablet (5 mg total) by mouth nightly as needed (insomnia). 20 tablet 1    buPROPion (WELLBUTRIN SR) 150 MG TBSR 12 hr tablet Take 1 tablet (150 mg total) by mouth 2 (two) times daily. (Patient not taking: Reported on  "7/10/2020) 60 tablet 2    butalbital-acetaminophen-caffeine -40 mg (FIORICET, ESGIC) -40 mg per tablet Take 1 tablet by mouth every 4 (four) hours as needed for Pain or Headaches. (Patient not taking: Reported on 7/10/2020) 18 tablet 0    escitalopram oxalate (LEXAPRO) 10 MG tablet Take 10 mg by mouth once daily.      levoFLOXacin (LEVAQUIN) 500 MG tablet Take 1 tablet (500 mg total) by mouth once daily. (Patient not taking: Reported on 7/10/2020) 10 tablet 0    nicotine polacrilex 2 MG Lozg Take 1 lozenge (2 mg total) by mouth as needed. (Patient not taking: Reported on 7/10/2020) 200 lozenge 2    predniSONE (DELTASONE) 20 MG tablet Take 2 tablets (40 mg total) by mouth once daily. (Patient not taking: Reported on 7/10/2020) 10 tablet 0    predniSONE (DELTASONE) 20 MG tablet Take 1 tablet (20 mg total) by mouth As instructed. 3tab po daily x3days, 2tabs po daily x3days, 1tab po daily x3days, 1/2tab po daily x3days 25 tablet 0     Facility-Administered Encounter Medications as of 7/10/2020   Medication Dose Route Frequency Provider Last Rate Last Dose    cyanocobalamin injection 1,000 mcg  1,000 mcg Intramuscular Once Viviana Cuba MD        [COMPLETED] triamcinolone acetonide injection 40 mg  40 mg Intramuscular Once Adrian Keating MD   40 mg at 07/10/20 1056       Objective:     Vital Signs (Most Recent)  Vital Signs  Pulse: 79  Resp: 16  SpO2: 95 %  BP: 124/78  Height and Weight  Height: 5' 7" (170.2 cm)  Weight: 71.2 kg (156 lb 15.5 oz)  BSA (Calculated - sq m): 1.83 sq meters  BMI (Calculated): 24.6  Weight in (lb) to have BMI = 25: 159.3]  Wt Readings from Last 2 Encounters:   07/10/20 71.2 kg (156 lb 15.5 oz)   07/10/20 70.3 kg (155 lb)       Physical Exam   Constitutional: She is oriented to person, place, and time. Vital signs are normal. She appears well-developed and well-nourished.       HENT:   Head: Normocephalic.   Nose: Nose normal.   Mouth/Throat: Oropharynx is clear and " moist. No oropharyngeal exudate. Mallampati Score: II.   Neck: Normal range of motion. Neck supple.   Cardiovascular: Normal rate and normal heart sounds.   No murmur heard.  Pulmonary/Chest: Normal expansion, symmetric chest wall expansion and effort normal. She has decreased breath sounds. She has wheezes.   Abdominal: Soft. Bowel sounds are normal.   Musculoskeletal:         General: Edema present.   Neurological: She is alert and oriented to person, place, and time.   Skin: Skin is warm.   Nursing note and vitals reviewed.      Laboratory  Lab Results   Component Value Date    WBC 7.14 08/23/2019    RBC 4.62 08/23/2019    HGB 12.7 08/23/2019    HCT 40.8 08/23/2019    MCV 88 08/23/2019    MCH 27.5 08/23/2019    MCHC 31.1 (L) 08/23/2019    RDW 20.4 (H) 08/23/2019     08/23/2019    MPV 9.5 08/23/2019    GRAN 4.2 08/23/2019    GRAN 59.9 08/23/2019    LYMPH 2.2 08/23/2019    LYMPH 30.5 08/23/2019    MONO 0.6 08/23/2019    MONO 8.8 08/23/2019    EOS 0.1 08/23/2019    BASO 0.05 08/23/2019    EOSINOPHIL 0.7 08/23/2019    BASOPHIL 0.7 08/23/2019       BMP  Lab Results   Component Value Date     06/05/2020    K 4.3 06/05/2020     06/05/2020    CO2 26 06/05/2020    BUN 22 (H) 06/05/2020    CREATININE 1.2 06/05/2020    CALCIUM 8.5 (L) 06/05/2020    ANIONGAP 10 06/05/2020    ESTGFRAFRICA 57.6 (A) 06/05/2020    EGFRNONAA 49.9 (A) 06/05/2020    AST 19 06/05/2020    ALT 11 06/05/2020    PROT 7.3 06/05/2020       Lab Results   Component Value Date     (H) 06/05/2020       No results found for: TSH    No results found for: SEDRATE    No results found for: CRP  No results found for: IGE     No results found for: ASPERGILLUS  No results found for: AFUMIGATUSCL     Lab Results   Component Value Date    ACE 26 06/10/2020        Diagnostic Results:  I have personally reviewed today the following studies:     ONSAT:  OVERNIGHT OXIMETRY REPORT:     Dictated by: Adrian Keating MD   Test date: 06/30/2020  "  Dictated on: 2020       Comment: This test was performed on Room Air      A desaturation event was defined as a decrease of saturation by   4 or more.     REPORT SUMMARY   Total valid samplin:46:52   High SpO2: 95%    Low SpO2: 64%    Mean SpO2  87.3 %   Cumulative time with oxygen saturation less than 88% (TC88):   2:36:20     CLINICAL INTERPRETATION    There is  significant nocturnal oxygen desaturation,  Clinical   correlation is advised and  Recommend overnight polysomnography   if clinically indicated     Medicare Criteria Comments:   Oximetry test results suggest the patient falls under Medicare   Group 1 Criteria. ( Arterial oxygen saturation at or below 88%   for at least 5 minutes taken during sleep)   Adrian Keating MD         PFT  FEV1: 1.11L ( 46.0%), FVC 2.03L( 66.8%), FEV1/FVC 54  TLC 4.30L( 79.3%)  DLCo 13.69( 55.1%)  Severe obs, reduced DLCO       Component      Latest Ref Rng & Units 2020   POC PH      7.35 - 7.45 7.343 (L)   POC PCO2      35 - 45 mmHg 47.3 (H)   POC PO2      80 - 100 mmHg 72 (L)   POC HCO3      24 - 28 mmol/L 25.7   POC BE      -2 to 2 mmol/L 0   POC SATURATED O2      95 - 100 % 93 (L)   Sample       ARTERIAL   Site       LR   Allens Test       Pass   DelSys       Room Air     Component      Latest Ref Rng & Units 6/10/2020   Angio Convert Enzyme      16 - 85 U/L 26     Component      Latest Ref Rng & Units 6/10/2020   D-Dimer      <0.50 mg/L FEU 0.47     6MW Test  Height: 5' 7" (170.2 cm)  Weight: 71.2 kg (156 lb 15.5 oz)  BMI (Calculated): 24.6  Predicted Distance: 386.36  Interpretation  Predicted Distance Meters (Calculated): 527.83 meters   Knee pain.  SpO2 jj 94%  Meeta score 4  Distance was 403.86m( 76.28%)    Assessment/Plan:     Problem List Items Addressed This Visit     Moderate persistent asthma - Primary    Relevant Medications    predniSONE (DELTASONE) 20 MG tablet    triamcinolone acetonide injection 40 mg (Completed)    Other Relevant Orders "    IgE    Hypertension    Current Assessment & Plan     Stable         Sarcoidosis of lung    Current Assessment & Plan     Await chest CT scan and echo for disease stratification.  May need bronchoscopy         Wheezing    Current Assessment & Plan     IM Kenalog  Prednsione taper  Need to complete CT Chest         Heavy tobacco smoker    Current Assessment & Plan     Smoking cessation and f/u chest CT         Relevant Orders    Ambulatory referral/consult to Smoking Cessation Program    Nocturnal hypoxemia due to obstructive chronic bronchitis    Current Assessment & Plan     ONSAT: 06/30/2020  TC 88 was 2 hr 36 mins  Oxygen ordered  Sleep study ordered               Requested Prescriptions     Signed Prescriptions Disp Refills    predniSONE (DELTASONE) 20 MG tablet 25 tablet 0     Sig: Take 1 tablet (20 mg total) by mouth As instructed. 3tab po daily x3days, 2tabs po daily x3days, 1tab po daily x3days, 1/2tab po daily x3days     Need chest CT to characterize her pulmonary parenchyma:  If she has ground-glass opacity she may need bronchoscopy  Smoking cessation  Sleep study pending  Normal exercise capacity    Follow up in about 7 weeks (around 8/28/2020), or Kenalog today, CHEST CT as ordered, Sleep study as ordered, prednisone taper, Labs.    This note was prepared using voice recognition system and is likely to have sound alike errors that may have been overlooked even after proof reading.  Please call me with any questions    Discussed diagnosis, its evaluation, treatment and usual course. All questions answered.      Adrian Keating MD

## 2020-07-11 LAB — IGE SERPL-ACNC: <35 IU/ML (ref 0–100)

## 2020-07-16 ENCOUNTER — PATIENT MESSAGE (OUTPATIENT)
Dept: INTERNAL MEDICINE | Facility: CLINIC | Age: 60
End: 2020-07-16

## 2020-07-16 NOTE — TELEPHONE ENCOUNTER
Had spoken with patient and informed her that she was 15 minutes late for a 20 minute appt. I informed her that she would have to wait a few minutes or she could reschedule and she stated that she would not come back and hung up in my face. I had apologized several times.

## 2020-07-21 ENCOUNTER — LAB VISIT (OUTPATIENT)
Dept: OTOLARYNGOLOGY | Facility: CLINIC | Age: 60
End: 2020-07-21
Payer: COMMERCIAL

## 2020-07-21 DIAGNOSIS — Z03.818 ENCOUNTER FOR OBSERVATION FOR SUSPECTED EXPOSURE TO OTHER BIOLOGICAL AGENTS RULED OUT: ICD-10-CM

## 2020-07-21 PROCEDURE — U0003 INFECTIOUS AGENT DETECTION BY NUCLEIC ACID (DNA OR RNA); SEVERE ACUTE RESPIRATORY SYNDROME CORONAVIRUS 2 (SARS-COV-2) (CORONAVIRUS DISEASE [COVID-19]), AMPLIFIED PROBE TECHNIQUE, MAKING USE OF HIGH THROUGHPUT TECHNOLOGIES AS DESCRIBED BY CMS-2020-01-R: HCPCS

## 2020-07-23 LAB — SARS-COV-2 RNA RESP QL NAA+PROBE: NOT DETECTED

## 2020-07-24 ENCOUNTER — HOSPITAL ENCOUNTER (OUTPATIENT)
Dept: SLEEP MEDICINE | Facility: HOSPITAL | Age: 60
Discharge: HOME | End: 2020-07-24
Attending: INTERNAL MEDICINE
Payer: COMMERCIAL

## 2020-07-24 DIAGNOSIS — D86.0 SARCOIDOSIS OF LUNG: ICD-10-CM

## 2020-07-24 DIAGNOSIS — G47.34 NOCTURNAL HYPOXEMIA: ICD-10-CM

## 2020-07-24 DIAGNOSIS — G47.00 INSOMNIA, UNSPECIFIED TYPE: ICD-10-CM

## 2020-07-24 DIAGNOSIS — G47.33 OSA (OBSTRUCTIVE SLEEP APNEA): ICD-10-CM

## 2020-07-24 PROCEDURE — 95810 PR POLYSOMNOGRAPHY, 4 OR MORE: ICD-10-PCS | Mod: 26,,, | Performed by: INTERNAL MEDICINE

## 2020-07-24 PROCEDURE — 95810 POLYSOM 6/> YRS 4/> PARAM: CPT | Mod: 26,,, | Performed by: INTERNAL MEDICINE

## 2020-07-24 NOTE — Clinical Note
No Significant Obstructive Sleep apnea(NENA): Overall AHI was 3.9 events per hour. REM AHI was 31.3  events per hour. Very little supine sleep  EKG showed no cardiac abnormalities.  Severe Oxygen Desaturation: Supplemental oxygen required.  EEG did not show alpha intrusion.  No snoring was audible during this study.  Significant periodic leg movements(PLMs) during sleep : Index 67.0 events per hour.. However, no significant  associated arousals. Index 0.8 events per hour.  Salvatore Phillips - 1960  Page 2 of 3  Electronically Authenticated By Adrian Keating MD on 07/27/2020 08:49 AM, from [phone]  Adrian Keating MD  NPI: [id number]  DIAGNOSIS  RECOMMENDATIONS  MISCELLANEOUS  No Significant Central Sleep Apnea (CSA)  Reduced sleep efficiency, short primary sleep latency, long REM sleep latency and short slow wave latency.  Reduced testing in Supine position may underestimate NENA severity

## 2020-07-27 ENCOUNTER — PATIENT OUTREACH (OUTPATIENT)
Dept: ADMINISTRATIVE | Facility: HOSPITAL | Age: 60
End: 2020-07-27

## 2020-07-27 NOTE — PROCEDURES
"No Significant Obstructive Sleep apnea(NENA): Overall AHI was 3.9 events per hour. REM AHI was 31.3  events per hour. Very little supine sleep  EKG showed no cardiac abnormalities.  Severe Oxygen Desaturation: Supplemental oxygen required.  EEG did not show alpha intrusion.  No snoring was audible during this study.  Significant periodic leg movements(PLMs) during sleep : Index 67.0 events per hour.. However, no significant  associated arousals. Index 0.8 events per hour.  Salvatore Phillips - 1960  Page 2 of 3  Electronically Authenticated By Adrian Keating MD on 07/27/2020 08:49 AM, from [phone]  Adrian Keating MD  NPI: [id number]  DIAGNOSIS  RECOMMENDATIONS  MISCELLANEOUS  No Significant Central Sleep Apnea (CSA)  Reduced sleep efficiency, short primary sleep latency, long REM sleep latency and short slow wave latency.  Reduced testing in Supine position may underestimate NENA severity  Nocturnal Hypoxemia (G47.36)  Circardian rhythm disorder  Insomnia related to Sleep initiation/sleep maintaince/terminal      See imported Sleep Study result in "Chart Review" under the   "Media tab".      (This Sleep Study was interpreted by a Board Certified Sleep   Specialist who conducted an epoch-by-epoch review of the entire   raw data recording.)     (The indication for this sleep study was reviewed and deemed   appropriate by [facility] Practice Parameters or other reasons by a   Board Certified Sleep Specialist.)    Adrian Keating MD     "

## 2020-07-27 NOTE — PROGRESS NOTES
Contacted patient to follow up on overdue fit kit. Patient states she will complete some time this week

## 2020-07-30 ENCOUNTER — HOSPITAL ENCOUNTER (EMERGENCY)
Facility: HOSPITAL | Age: 60
Discharge: HOME | End: 2020-07-30
Attending: EMERGENCY MEDICINE
Payer: COMMERCIAL

## 2020-07-30 VITALS
HEART RATE: 76 BPM | RESPIRATION RATE: 16 BRPM | DIASTOLIC BLOOD PRESSURE: 80 MMHG | SYSTOLIC BLOOD PRESSURE: 140 MMHG | OXYGEN SATURATION: 98 % | TEMPERATURE: 97 F

## 2020-07-30 DIAGNOSIS — R53.83 FATIGUE, UNSPECIFIED TYPE: ICD-10-CM

## 2020-07-30 DIAGNOSIS — R51.9 CHRONIC NONINTRACTABLE HEADACHE, UNSPECIFIED HEADACHE TYPE: Primary | ICD-10-CM

## 2020-07-30 DIAGNOSIS — G89.29 CHRONIC NONINTRACTABLE HEADACHE, UNSPECIFIED HEADACHE TYPE: Primary | ICD-10-CM

## 2020-07-30 LAB
ALBUMIN SERPL BCP-MCNC: 3.2 G/DL (ref 3.5–5.2)
ALP SERPL-CCNC: 93 U/L (ref 55–135)
ALT SERPL W/O P-5'-P-CCNC: 10 U/L (ref 10–44)
ANION GAP SERPL CALC-SCNC: 9 MMOL/L (ref 8–16)
AST SERPL-CCNC: 19 U/L (ref 10–40)
BASOPHILS # BLD AUTO: 0.06 K/UL (ref 0–0.2)
BASOPHILS NFR BLD: 1 % (ref 0–1.9)
BILIRUB SERPL-MCNC: 0.2 MG/DL (ref 0.1–1)
BUN SERPL-MCNC: 15 MG/DL (ref 6–20)
CALCIUM SERPL-MCNC: 9 MG/DL (ref 8.7–10.5)
CHLORIDE SERPL-SCNC: 104 MMOL/L (ref 95–110)
CO2 SERPL-SCNC: 26 MMOL/L (ref 23–29)
CREAT SERPL-MCNC: 1 MG/DL (ref 0.5–1.4)
DIFFERENTIAL METHOD: ABNORMAL
EOSINOPHIL # BLD AUTO: 0.2 K/UL (ref 0–0.5)
EOSINOPHIL NFR BLD: 3.2 % (ref 0–8)
ERYTHROCYTE [DISTWIDTH] IN BLOOD BY AUTOMATED COUNT: 18.1 % (ref 11.5–14.5)
EST. GFR  (AFRICAN AMERICAN): >60 ML/MIN/1.73 M^2
EST. GFR  (NON AFRICAN AMERICAN): >60 ML/MIN/1.73 M^2
GLUCOSE SERPL-MCNC: 74 MG/DL (ref 70–110)
HCT VFR BLD AUTO: 46.7 % (ref 37–48.5)
HGB BLD-MCNC: 13.9 G/DL (ref 12–16)
IMM GRANULOCYTES # BLD AUTO: 0.02 K/UL (ref 0–0.04)
IMM GRANULOCYTES NFR BLD AUTO: 0.3 % (ref 0–0.5)
LYMPHOCYTES # BLD AUTO: 0.9 K/UL (ref 1–4.8)
LYMPHOCYTES NFR BLD: 14.8 % (ref 18–48)
MCH RBC QN AUTO: 26.9 PG (ref 27–31)
MCHC RBC AUTO-ENTMCNC: 29.8 G/DL (ref 32–36)
MCV RBC AUTO: 90 FL (ref 82–98)
MONOCYTES # BLD AUTO: 0.4 K/UL (ref 0.3–1)
MONOCYTES NFR BLD: 6.5 % (ref 4–15)
NEUTROPHILS # BLD AUTO: 4.5 K/UL (ref 1.8–7.7)
NEUTROPHILS NFR BLD: 74.2 % (ref 38–73)
NRBC BLD-RTO: 0 /100 WBC
PLATELET # BLD AUTO: 246 K/UL (ref 150–350)
PMV BLD AUTO: 10.1 FL (ref 9.2–12.9)
POTASSIUM SERPL-SCNC: 4.2 MMOL/L (ref 3.5–5.1)
PROT SERPL-MCNC: 7.4 G/DL (ref 6–8.4)
RBC # BLD AUTO: 5.17 M/UL (ref 4–5.4)
SALICYLATES SERPL-MCNC: 9.6 MG/DL (ref 15–30)
SODIUM SERPL-SCNC: 139 MMOL/L (ref 136–145)
WBC # BLD AUTO: 6.02 K/UL (ref 3.9–12.7)

## 2020-07-30 PROCEDURE — 80307 DRUG TEST PRSMV CHEM ANLYZR: CPT

## 2020-07-30 PROCEDURE — 96374 THER/PROPH/DIAG INJ IV PUSH: CPT

## 2020-07-30 PROCEDURE — 63600175 PHARM REV CODE 636 W HCPCS: Performed by: EMERGENCY MEDICINE

## 2020-07-30 PROCEDURE — 96375 TX/PRO/DX INJ NEW DRUG ADDON: CPT

## 2020-07-30 PROCEDURE — 85025 COMPLETE CBC W/AUTO DIFF WBC: CPT

## 2020-07-30 PROCEDURE — 99284 EMERGENCY DEPT VISIT MOD MDM: CPT | Mod: 25

## 2020-07-30 PROCEDURE — 80053 COMPREHEN METABOLIC PANEL: CPT

## 2020-07-30 RX ORDER — DIPHENHYDRAMINE HYDROCHLORIDE 50 MG/ML
12.5 INJECTION INTRAMUSCULAR; INTRAVENOUS
Status: COMPLETED | OUTPATIENT
Start: 2020-07-30 | End: 2020-07-30

## 2020-07-30 RX ORDER — METOCLOPRAMIDE HYDROCHLORIDE 5 MG/ML
10 INJECTION INTRAMUSCULAR; INTRAVENOUS
Status: COMPLETED | OUTPATIENT
Start: 2020-07-30 | End: 2020-07-30

## 2020-07-30 RX ORDER — BUTALBITAL, ACETAMINOPHEN AND CAFFEINE 50; 325; 40 MG/1; MG/1; MG/1
1 TABLET ORAL EVERY 6 HOURS PRN
Qty: 10 TABLET | Refills: 0 | Status: ON HOLD | OUTPATIENT
Start: 2020-07-30 | End: 2020-12-18 | Stop reason: HOSPADM

## 2020-07-30 RX ORDER — KETOROLAC TROMETHAMINE 30 MG/ML
15 INJECTION, SOLUTION INTRAMUSCULAR; INTRAVENOUS
Status: COMPLETED | OUTPATIENT
Start: 2020-07-30 | End: 2020-07-30

## 2020-07-30 RX ADMIN — DIPHENHYDRAMINE HYDROCHLORIDE 12.5 MG: 50 INJECTION, SOLUTION INTRAMUSCULAR; INTRAVENOUS at 10:07

## 2020-07-30 RX ADMIN — KETOROLAC TROMETHAMINE 15 MG: 30 INJECTION, SOLUTION INTRAMUSCULAR at 10:07

## 2020-07-30 RX ADMIN — METOCLOPRAMIDE 10 MG: 5 INJECTION, SOLUTION INTRAMUSCULAR; INTRAVENOUS at 10:07

## 2020-07-30 NOTE — ED NOTES
Pt resting in bed in no acute distress. Respirations even and unlabored. Pt denies needs. Bed in lowest position. Call bell in reach. Side rails up x2. Will continue to monitor.

## 2020-07-30 NOTE — ED PROVIDER NOTES
SCRIBE #1 NOTE: I, Umer Mahajan, am scribing for, and in the presence of, Reilly Treviño MD. I have scribed the entire note.      History      Chief Complaint   Patient presents with    Altered Mental Status     pt states she had an episode of confusion yesterday, complaining of headache       Review of patient's allergies indicates:  No Known Allergies     HPI   HPI    2020, 10:11 AM   History obtained from the patient      History of Present Illness: Salvatore Phillips is a 59 y.o. female patient with a PMHx of sarcoidosis who presents to the Emergency Department for evaluation following an episode of confusion yesterday. Pt works remotely from home, and states that she was on the phone with a customer when she became fatigued and went to take a nap. She accidentally left her computer screen video chat on and didn't realize it. After waking up, pt states that she still felt drowsy and didn't recall leaving her video chat on. She slept for the rest of the day. Symptoms are moderate in severity. No mitigating or exacerbating factors reported. Associated sxs include headache, bilateral tinnitus, rhinorrhea, congestion. Pt states that she tested negative for COVID 10 days ago. Patient denies any fever, chills, n/v/d, SOB, CP, weakness, numbness, dizziness, and all other sxs at this time. Pt notes that she takes BC powder daily for chronic headaches. No further complaints or concerns at this time.     Arrival mode: Personal vehicle    PCP: Viviana Cuba MD       Past Medical History:  Past Medical History:   Diagnosis Date    Asthma     Hypertension     Sarcoidosis of lung     Sleep apnea     maintained on CPAP    Upper respiratory disease     URI (upper respiratory infection)        Past Surgical History:  Past Surgical History:   Procedure Laterality Date    ABDOMINAL SURGERY      BREAST LUMPECTOMY Left     BENIGN    BREAST SURGERY       SECTION      x3    COLON SURGERY      exploratory  procedure      GASTRIC BYPASS      HYSTERECTOMY  2009    JOINT REPLACEMENT  2013    KNEE SURGERY      OOPHORECTOMY  2009    SMALL INTESTINE SURGERY      tummy tuck           Family History:  Family History   Problem Relation Age of Onset    Alzheimer's disease Mother     Cancer Father         lung    Crohn's disease Brother     Kidney disease Brother     Arthritis Sister     Hypertension Son        Social History:  Social History     Tobacco Use    Smoking status: Current Every Day Smoker     Packs/day: 1.00     Years: 35.00     Pack years: 35.00     Types: Cigarettes     Start date: 1/1/1979    Smokeless tobacco: Never Used    Tobacco comment: started age 17   Substance and Sexual Activity    Alcohol use: Not Currently     Frequency: Monthly or less     Drinks per session: 1 or 2     Binge frequency: Never     Comment: occ    Drug use: Never    Sexual activity: Never       ROS   Review of Systems   Constitutional: Negative for chills and fever.   HENT: Positive for congestion, rhinorrhea and tinnitus (bilateral). Negative for sore throat.    Respiratory: Negative for shortness of breath.    Cardiovascular: Negative for chest pain.   Gastrointestinal: Negative for diarrhea, nausea and vomiting.   Genitourinary: Negative for dysuria.   Musculoskeletal: Negative for back pain.   Skin: Negative for rash.   Neurological: Positive for headaches. Negative for dizziness, seizures, weakness, light-headedness and numbness.   Hematological: Does not bruise/bleed easily.   Psychiatric/Behavioral: Positive for confusion.   All other systems reviewed and are negative.    Physical Exam      Initial Vitals [07/30/20 0914]   BP Pulse Resp Temp SpO2   137/77 60 16 97 °F (36.1 °C) 98 %      MAP       --          Physical Exam  Nursing Notes and Vital Signs Reviewed.  Constitutional: Patient is in no acute distress. Well-developed and well-nourished.  Head: Atraumatic. Normocephalic.  Eyes: PERRL. EOM intact.  Conjunctivae are not pale. No scleral icterus.  ENT: Mucous membranes are moist. Oropharynx is clear and symmetric.  TM WNL bilaterally  Neck: Supple. Full ROM. No lymphadenopathy.  Cardiovascular: Regular rate. Regular rhythm. No murmurs, rubs, or gallops. Distal pulses are 2+ and symmetric.  Pulmonary/Chest: No respiratory distress. Clear to auscultation bilaterally. No wheezing or rales.  Abdominal: Soft and non-distended.  There is no tenderness.  No rebound, guarding, or rigidity.  Musculoskeletal: Moves all extremities. No obvious deformities. No edema.  Skin: Warm and dry.  Neurological:  Alert, awake, and appropriate.  Normal speech.  No acute focal neurological deficits are appreciated. GCS 15  Psychiatric: Normal affect. Good eye contact. Appropriate in content.    ED Course    Procedures  ED Vital Signs:  Vitals:    07/30/20 0914 07/30/20 1016 07/30/20 1213   BP: 137/77 (!) 145/97 (!) 140/80   Pulse: 60 74 76   Resp: 16     Temp: 97 °F (36.1 °C)     TempSrc: Oral     SpO2: 98%         Abnormal Lab Results:  Labs Reviewed   CBC W/ AUTO DIFFERENTIAL - Abnormal; Notable for the following components:       Result Value    Mean Corpuscular Hemoglobin 26.9 (*)     Mean Corpuscular Hemoglobin Conc 29.8 (*)     RDW 18.1 (*)     Lymph # 0.9 (*)     Gran% 74.2 (*)     Lymph% 14.8 (*)     All other components within normal limits   COMPREHENSIVE METABOLIC PANEL - Abnormal; Notable for the following components:    Albumin 3.2 (*)     All other components within normal limits   SALICYLATE LEVEL - Abnormal; Notable for the following components:    Salicylate Lvl 9.6 (*)     All other components within normal limits        All Lab Results:  Results for orders placed or performed during the hospital encounter of 07/30/20   CBC auto differential   Result Value Ref Range    WBC 6.02 3.90 - 12.70 K/uL    RBC 5.17 4.00 - 5.40 M/uL    Hemoglobin 13.9 12.0 - 16.0 g/dL    Hematocrit 46.7 37.0 - 48.5 %    Mean Corpuscular Volume  90 82 - 98 fL    Mean Corpuscular Hemoglobin 26.9 (L) 27.0 - 31.0 pg    Mean Corpuscular Hemoglobin Conc 29.8 (L) 32.0 - 36.0 g/dL    RDW 18.1 (H) 11.5 - 14.5 %    Platelets 246 150 - 350 K/uL    MPV 10.1 9.2 - 12.9 fL    Immature Granulocytes 0.3 0.0 - 0.5 %    Gran # (ANC) 4.5 1.8 - 7.7 K/uL    Immature Grans (Abs) 0.02 0.00 - 0.04 K/uL    Lymph # 0.9 (L) 1.0 - 4.8 K/uL    Mono # 0.4 0.3 - 1.0 K/uL    Eos # 0.2 0.0 - 0.5 K/uL    Baso # 0.06 0.00 - 0.20 K/uL    nRBC 0 0 /100 WBC    Gran% 74.2 (H) 38.0 - 73.0 %    Lymph% 14.8 (L) 18.0 - 48.0 %    Mono% 6.5 4.0 - 15.0 %    Eosinophil% 3.2 0.0 - 8.0 %    Basophil% 1.0 0.0 - 1.9 %    Differential Method Automated    Comprehensive metabolic panel   Result Value Ref Range    Sodium 139 136 - 145 mmol/L    Potassium 4.2 3.5 - 5.1 mmol/L    Chloride 104 95 - 110 mmol/L    CO2 26 23 - 29 mmol/L    Glucose 74 70 - 110 mg/dL    BUN, Bld 15 6 - 20 mg/dL    Creatinine 1.0 0.5 - 1.4 mg/dL    Calcium 9.0 8.7 - 10.5 mg/dL    Total Protein 7.4 6.0 - 8.4 g/dL    Albumin 3.2 (L) 3.5 - 5.2 g/dL    Total Bilirubin 0.2 0.1 - 1.0 mg/dL    Alkaline Phosphatase 93 55 - 135 U/L    AST 19 10 - 40 U/L    ALT 10 10 - 44 U/L    Anion Gap 9 8 - 16 mmol/L    eGFR if African American >60 >60 mL/min/1.73 m^2    eGFR if non African American >60 >60 mL/min/1.73 m^2   Salicylate level   Result Value Ref Range    Salicylate Lvl 9.6 (L) 15.0 - 30.0 mg/dL     Imaging Results:  Imaging Results    None                 The Emergency Provider reviewed the vital signs and test results, which are outlined above.    ED Discussion     11:55 AM: Reassessed pt at this time.  Pt states her sxs have resolved at this time. Discussed with pt all pertinent ED information and results. Discussed pt dx and plan of tx. Gave pt all f/u and return to the ED instructions. All questions and concerns were addressed at this time. Pt expresses understanding of information and instructions, and is comfortable with plan to  discharge. Pt is stable for discharge.    I discussed with patient and/or family/caretaker that evaluation in the ED does not suggest any emergent or life threatening medical conditions requiring immediate intervention beyond what was provided in the ED, and I believe patient is safe for discharge.  Regardless, an unremarkable evaluation in the ED does not preclude the development or presence of a serious of life threatening condition. As such, patient was instructed to return immediately for any worsening or change in current symptoms.         ED Medication(s):  Medications   metoclopramide HCl injection 10 mg (10 mg Intravenous Given 7/30/20 1044)   diphenhydrAMINE injection 12.5 mg (12.5 mg Intravenous Given 7/30/20 1044)   ketorolac injection 15 mg (15 mg Intravenous Given 7/30/20 1043)       Follow-up Information     Call  Viviana Cuba MD.    Specialty: Internal Medicine  Contact information:  [address] [place] [address]  [phone]             [facility]    Specialty: Emergency Medicine  Why: As needed, If symptoms worsen  Contact information:  [address]  [place] [facility] [place] [id number] [phone]                Discharge Medication List as of 7/30/2020 11:55 AM            Medical Decision Making    Medical Decision Making:   Clinical Tests:   Lab Tests: Ordered and Reviewed           Scribe Attestation:   Scribe #1: I performed the above scribed service and the documentation accurately describes the services I performed. I attest to the accuracy of the note.    Attending:   Physician Attestation Statement for Scribe #1: I, Reilly Treviño MD, personally performed the services described in this documentation, as scribed by Umer Mahajan, in my presence, and it is both accurate and complete.          Clinical Impression       ICD-10-CM ICD-9-CM   1. Chronic nonintractable headache, unspecified headache type  R51 784.0   2. Fatigue, unspecified type   R53.83 780.79       Disposition:   Disposition: Discharged  Condition: Stable         Reilly Treviño MD  08/01/20 1002

## 2020-08-03 ENCOUNTER — PATIENT MESSAGE (OUTPATIENT)
Dept: SLEEP MEDICINE | Facility: CLINIC | Age: 60
End: 2020-08-03

## 2020-08-03 DIAGNOSIS — J45.909 ASTHMA, UNSPECIFIED ASTHMA SEVERITY, UNSPECIFIED WHETHER COMPLICATED, UNSPECIFIED WHETHER PERSISTENT: Primary | ICD-10-CM

## 2020-08-05 ENCOUNTER — HOSPITAL ENCOUNTER (EMERGENCY)
Facility: HOSPITAL | Age: 60
Discharge: HOME | End: 2020-08-05
Attending: EMERGENCY MEDICINE
Payer: COMMERCIAL

## 2020-08-05 VITALS
SYSTOLIC BLOOD PRESSURE: 145 MMHG | HEART RATE: 74 BPM | BODY MASS INDEX: 24.58 KG/M2 | RESPIRATION RATE: 18 BRPM | HEIGHT: 67 IN | TEMPERATURE: 98 F | OXYGEN SATURATION: 99 % | DIASTOLIC BLOOD PRESSURE: 88 MMHG

## 2020-08-05 DIAGNOSIS — H93.13 TINNITUS, BILATERAL: ICD-10-CM

## 2020-08-05 DIAGNOSIS — F41.9 ANXIOUSNESS: ICD-10-CM

## 2020-08-05 DIAGNOSIS — R20.2 PARESTHESIAS: Primary | ICD-10-CM

## 2020-08-05 DIAGNOSIS — R51.9 FRONTAL HEADACHE: ICD-10-CM

## 2020-08-05 DIAGNOSIS — Z86.2 HISTORY OF SARCOIDOSIS: ICD-10-CM

## 2020-08-05 LAB
ABO + RH BLD: NORMAL
ALBUMIN SERPL BCP-MCNC: 2.8 G/DL (ref 3.5–5.2)
ALP SERPL-CCNC: 91 U/L (ref 55–135)
ALT SERPL W/O P-5'-P-CCNC: 10 U/L (ref 10–44)
ANION GAP SERPL CALC-SCNC: 8 MMOL/L (ref 8–16)
APTT BLDCRRT: 36.8 SEC (ref 21–32)
AST SERPL-CCNC: 21 U/L (ref 10–40)
BASOPHILS # BLD AUTO: 0.05 K/UL (ref 0–0.2)
BASOPHILS NFR BLD: 0.6 % (ref 0–1.9)
BILIRUB SERPL-MCNC: 0.2 MG/DL (ref 0.1–1)
BLD GP AB SCN CELLS X3 SERPL QL: NORMAL
BUN SERPL-MCNC: 22 MG/DL (ref 6–20)
CALCIUM SERPL-MCNC: 8.4 MG/DL (ref 8.7–10.5)
CHLORIDE SERPL-SCNC: 107 MMOL/L (ref 95–110)
CHOLEST SERPL-MCNC: 146 MG/DL (ref 120–199)
CHOLEST/HDLC SERPL: 2.6 {RATIO} (ref 2–5)
CO2 SERPL-SCNC: 27 MMOL/L (ref 23–29)
CREAT SERPL-MCNC: 1 MG/DL (ref 0.5–1.4)
DIFFERENTIAL METHOD: ABNORMAL
EOSINOPHIL # BLD AUTO: 0.2 K/UL (ref 0–0.5)
EOSINOPHIL NFR BLD: 2.2 % (ref 0–8)
ERYTHROCYTE [DISTWIDTH] IN BLOOD BY AUTOMATED COUNT: 19 % (ref 11.5–14.5)
ERYTHROCYTE [SEDIMENTATION RATE] IN BLOOD BY WESTERGREN METHOD: 15 MM/HR (ref 0–20)
EST. GFR  (AFRICAN AMERICAN): >60 ML/MIN/1.73 M^2
EST. GFR  (NON AFRICAN AMERICAN): >60 ML/MIN/1.73 M^2
GLUCOSE SERPL-MCNC: 74 MG/DL (ref 70–110)
HCT VFR BLD AUTO: 43.5 % (ref 37–48.5)
HCV AB SERPL QL IA: NEGATIVE
HDLC SERPL-MCNC: 56 MG/DL (ref 40–75)
HDLC SERPL: 38.4 % (ref 20–50)
HGB BLD-MCNC: 12.9 G/DL (ref 12–16)
HIV 1+2 AB+HIV1 P24 AG SERPL QL IA: NEGATIVE
IMM GRANULOCYTES # BLD AUTO: 0.04 K/UL (ref 0–0.04)
IMM GRANULOCYTES NFR BLD AUTO: 0.5 % (ref 0–0.5)
INR PPP: 1 (ref 0.8–1.2)
LDLC SERPL CALC-MCNC: 77.8 MG/DL (ref 63–159)
LYMPHOCYTES # BLD AUTO: 0.8 K/UL (ref 1–4.8)
LYMPHOCYTES NFR BLD: 9.2 % (ref 18–48)
MCH RBC QN AUTO: 26.7 PG (ref 27–31)
MCHC RBC AUTO-ENTMCNC: 29.7 G/DL (ref 32–36)
MCV RBC AUTO: 90 FL (ref 82–98)
MONOCYTES # BLD AUTO: 0.4 K/UL (ref 0.3–1)
MONOCYTES NFR BLD: 5.1 % (ref 4–15)
NEUTROPHILS # BLD AUTO: 7.1 K/UL (ref 1.8–7.7)
NEUTROPHILS NFR BLD: 82.4 % (ref 38–73)
NONHDLC SERPL-MCNC: 90 MG/DL
NRBC BLD-RTO: 0 /100 WBC
PLATELET # BLD AUTO: 238 K/UL (ref 150–350)
PMV BLD AUTO: 9.4 FL (ref 9.2–12.9)
POCT GLUCOSE: 104 MG/DL (ref 70–110)
POCT GLUCOSE: 69 MG/DL (ref 70–110)
POTASSIUM SERPL-SCNC: 4.4 MMOL/L (ref 3.5–5.1)
PROT SERPL-MCNC: 6.8 G/DL (ref 6–8.4)
PROTHROMBIN TIME: 10.7 SEC (ref 9–12.5)
RBC # BLD AUTO: 4.84 M/UL (ref 4–5.4)
SARS-COV-2 RDRP RESP QL NAA+PROBE: NEGATIVE
SODIUM SERPL-SCNC: 142 MMOL/L (ref 136–145)
TRIGL SERPL-MCNC: 61 MG/DL (ref 30–150)
TROPONIN I SERPL DL<=0.01 NG/ML-MCNC: 0.01 NG/ML (ref 0–0.03)
TSH SERPL DL<=0.005 MIU/L-ACNC: 0.61 UIU/ML (ref 0.4–4)
WBC # BLD AUTO: 8.56 K/UL (ref 3.9–12.7)

## 2020-08-05 PROCEDURE — 86703 HIV-1/HIV-2 1 RESULT ANTBDY: CPT

## 2020-08-05 PROCEDURE — 99285 EMERGENCY DEPT VISIT HI MDM: CPT | Mod: 25

## 2020-08-05 PROCEDURE — 93005 ELECTROCARDIOGRAM TRACING: CPT

## 2020-08-05 PROCEDURE — 85610 PROTHROMBIN TIME: CPT

## 2020-08-05 PROCEDURE — 25000003 PHARM REV CODE 250: Performed by: EMERGENCY MEDICINE

## 2020-08-05 PROCEDURE — 82962 GLUCOSE BLOOD TEST: CPT

## 2020-08-05 PROCEDURE — 84443 ASSAY THYROID STIM HORMONE: CPT

## 2020-08-05 PROCEDURE — 86803 HEPATITIS C AB TEST: CPT

## 2020-08-05 PROCEDURE — 85730 THROMBOPLASTIN TIME PARTIAL: CPT

## 2020-08-05 PROCEDURE — 84484 ASSAY OF TROPONIN QUANT: CPT

## 2020-08-05 PROCEDURE — 85025 COMPLETE CBC W/AUTO DIFF WBC: CPT

## 2020-08-05 PROCEDURE — 96374 THER/PROPH/DIAG INJ IV PUSH: CPT

## 2020-08-05 PROCEDURE — U0002 COVID-19 LAB TEST NON-CDC: HCPCS

## 2020-08-05 PROCEDURE — 93010 ELECTROCARDIOGRAM REPORT: CPT | Mod: ,,, | Performed by: INTERNAL MEDICINE

## 2020-08-05 PROCEDURE — 86850 RBC ANTIBODY SCREEN: CPT

## 2020-08-05 PROCEDURE — 80053 COMPREHEN METABOLIC PANEL: CPT

## 2020-08-05 PROCEDURE — 36415 COLL VENOUS BLD VENIPUNCTURE: CPT

## 2020-08-05 PROCEDURE — 99242 OFF/OP CONSLTJ NEW/EST SF 20: CPT | Mod: GT,,, | Performed by: PSYCHIATRY & NEUROLOGY

## 2020-08-05 PROCEDURE — 80061 LIPID PANEL: CPT

## 2020-08-05 PROCEDURE — 99242 PR OFFICE CONSULTATION,LEVEL II: ICD-10-PCS | Mod: GT,,, | Performed by: PSYCHIATRY & NEUROLOGY

## 2020-08-05 PROCEDURE — 85651 RBC SED RATE NONAUTOMATED: CPT

## 2020-08-05 PROCEDURE — 93010 EKG 12-LEAD: ICD-10-PCS | Mod: ,,, | Performed by: INTERNAL MEDICINE

## 2020-08-05 RX ORDER — BUDESONIDE 0.5 MG/2ML
0.5 INHALANT ORAL 2 TIMES DAILY
Qty: 120 ML | Refills: 0 | Status: SHIPPED | OUTPATIENT
Start: 2020-08-05 | End: 2021-03-12

## 2020-08-05 RX ADMIN — DEXTROSE MONOHYDRATE 25 G: 500 INJECTION PARENTERAL at 09:08

## 2020-08-05 NOTE — ED PROVIDER NOTES
"SCRIBE #1 NOTE: I, Rekha Zuleta, am scribing for, and in the presence of, Kesha Ruelas MD. I have scribed the entire note.       History     Chief Complaint   Patient presents with    Numbness     Pt states, "I can't feel anything on my left side." Pt also c/o severe headache that started yesterday     Review of patient's allergies indicates:  No Known Allergies      History of Present Illness     HPI    2020, 9:02 AM  History obtained from the patient      History of Present Illness: Salvatore Phillips is a 59 y.o. female patient with a PMHx of HTN and sarcoidosis of lung who presents to the Emergency Department for evaluation of left sided weakness/numbness which onset suddenly this morning. Patient got out of bed at 7:45 AM and reports difficulty walking secondary to symptoms. Symptoms are constant and moderate in severity. No mitigating or exacerbating factors reported. Associated sxs include dizziness and HA which onset last night. States she took some medicine and went to bed around 11:00 PM. HA has not improved. Patient denies any head injury/trauma, LOC, speech difficulty, facial asymmetry, confusion, fever, chills, SOB, CP, cough, n/v/d, and all other sxs at this time. No further complaints or concerns at this time.       Arrival mode: Personal vehicle    PCP: Viviana Cuba MD        Past Medical History:  Past Medical History:   Diagnosis Date    Asthma     Hypertension     Sarcoidosis of lung     Sleep apnea     maintained on CPAP    Upper respiratory disease     URI (upper respiratory infection)        Past Surgical History:  Past Surgical History:   Procedure Laterality Date    ABDOMINAL SURGERY      BREAST LUMPECTOMY Left 1996    BENIGN    BREAST SURGERY       SECTION      x3    COLON SURGERY      exploratory procedure      GASTRIC BYPASS      HYSTERECTOMY  2009    JOINT REPLACEMENT  2013    KNEE SURGERY      OOPHORECTOMY  2009    SMALL INTESTINE SURGERY   "    tummy tuck           Family History:  Family History   Problem Relation Age of Onset    Alzheimer's disease Mother     Cancer Father         lung    Crohn's disease Brother     Kidney disease Brother     Arthritis Sister     Hypertension Son        Social History:  Social History     Tobacco Use    Smoking status: Current Every Day Smoker     Packs/day: 1.00     Years: 35.00     Pack years: 35.00     Types: Cigarettes     Start date: 1/1/1979    Smokeless tobacco: Never Used    Tobacco comment: started age 17   Substance and Sexual Activity    Alcohol use: Not Currently     Frequency: Monthly or less     Drinks per session: 1 or 2     Binge frequency: Never     Comment: occ    Drug use: Never    Sexual activity: Never        Review of Systems     Review of Systems   Constitutional: Negative for activity change, appetite change, chills, diaphoresis, fatigue and fever.   HENT: Negative for congestion, ear pain, nosebleeds, rhinorrhea, sinus pain, sore throat and trouble swallowing.    Eyes: Negative for pain and discharge.   Respiratory: Negative for cough, chest tightness, shortness of breath, wheezing and stridor.    Cardiovascular: Negative for chest pain, palpitations and leg swelling.   Gastrointestinal: Negative for abdominal distention, abdominal pain, blood in stool, constipation, diarrhea, nausea and vomiting.   Genitourinary: Negative for difficulty urinating, dysuria, flank pain, frequency, hematuria and urgency.   Musculoskeletal: Positive for gait problem. Negative for arthralgias, back pain, myalgias and neck pain.   Skin: Negative for pallor, rash and wound.   Neurological: Positive for dizziness, weakness (L), numbness (L) and headaches. Negative for seizures, syncope, facial asymmetry, speech difficulty and light-headedness.        (-) head injury/trauma   Hematological: Does not bruise/bleed easily.   Psychiatric/Behavioral: Negative for confusion and self-injury.   All other systems  "reviewed and are negative.     Physical Exam     Initial Vitals [08/05/20 0852]   BP Pulse Resp Temp SpO2   122/73 78 18 98.3 °F (36.8 °C) (!) 93 %      MAP       --          Physical Exam  Nursing Notes and Vital Signs Reviewed.  Constitutional: Patient is in no acute distress. Well-developed and well-nourished.  Head: Atraumatic. Normocephalic.  Eyes: PERRL. EOM intact. Conjunctivae are not pale. No scleral icterus.  ENT: Mucous membranes are moist. Oropharynx is clear and symmetric.    Neck: Supple. Full ROM. No lymphadenopathy.  Cardiovascular: Regular rate. Regular rhythm. No murmurs, rubs, or gallops. Distal pulses are 2+ and symmetric.  Pulmonary/Chest: No respiratory distress. Clear to auscultation bilaterally. No wheezing or rales.  Abdominal: Soft and non-distended.  There is no tenderness.  No rebound, guarding, or rigidity. Good bowel sounds.  Genitourinary: No CVA tenderness  Musculoskeletal: Moves all extremities. No obvious deformities. No edema. No calf tenderness.  Skin: Warm and dry.  Neurological:  Alert, awake, and appropriate. Speech is slurred. No obvious facial droop. Decreased  strength on the left. Positive pronator drift in LUE and LLE.  Psychiatric: Tearful. Anxious.     ED Course   Procedures  ED Vital Signs:  Vitals:    08/05/20 0852 08/05/20 0920 08/05/20 0928 08/05/20 1001   BP: 122/73 (!) 144/77  132/68   Pulse: 78 76 76 81   Resp: 18   (!) 21   Temp: 98.3 °F (36.8 °C)      TempSrc: Oral      SpO2: (!) 93% (!) 92% 100% 98%   Height: 5' 7" (1.702 m)       08/05/20 1043 08/05/20 1257   BP:  (!) 145/88   Pulse: 78 74   Resp:  18   Temp:  98.2 °F (36.8 °C)   TempSrc:  Oral   SpO2: 100% 99%   Height:         Abnormal Lab Results:  Labs Reviewed   CBC W/ AUTO DIFFERENTIAL - Abnormal; Notable for the following components:       Result Value    Mean Corpuscular Hemoglobin 26.7 (*)     Mean Corpuscular Hemoglobin Conc 29.7 (*)     RDW 19.0 (*)     Lymph # 0.8 (*)     Gran% 82.4 (*)     " Lymph% 9.2 (*)     All other components within normal limits   COMPREHENSIVE METABOLIC PANEL - Abnormal; Notable for the following components:    BUN, Bld 22 (*)     Calcium 8.4 (*)     Albumin 2.8 (*)     All other components within normal limits   APTT - Abnormal; Notable for the following components:    aPTT 36.8 (*)     All other components within normal limits   POCT GLUCOSE - Abnormal; Notable for the following components:    POCT Glucose 69 (*)     All other components within normal limits   HIV 1 / 2 ANTIBODY   HEPATITIS C ANTIBODY   PROTIME-INR   TSH   LIPID PANEL   TROPONIN I   SARS-COV-2 RNA AMPLIFICATION, QUAL   SEDIMENTATION RATE   SEDIMENTATION RATE   TYPE & SCREEN   POCT GLUCOSE        All Lab Results:  Results for orders placed or performed during the hospital encounter of 08/05/20   HIV 1/2 Ag/Ab (4th Gen)   Result Value Ref Range    HIV 1/2 Ag/Ab Negative Negative   Hepatitis C antibody   Result Value Ref Range    Hepatitis C Ab Negative Negative   CBC W/ AUTO DIFFERENTIAL   Result Value Ref Range    WBC 8.56 3.90 - 12.70 K/uL    RBC 4.84 4.00 - 5.40 M/uL    Hemoglobin 12.9 12.0 - 16.0 g/dL    Hematocrit 43.5 37.0 - 48.5 %    Mean Corpuscular Volume 90 82 - 98 fL    Mean Corpuscular Hemoglobin 26.7 (L) 27.0 - 31.0 pg    Mean Corpuscular Hemoglobin Conc 29.7 (L) 32.0 - 36.0 g/dL    RDW 19.0 (H) 11.5 - 14.5 %    Platelets 238 150 - 350 K/uL    MPV 9.4 9.2 - 12.9 fL    Immature Granulocytes 0.5 0.0 - 0.5 %    Gran # (ANC) 7.1 1.8 - 7.7 K/uL    Immature Grans (Abs) 0.04 0.00 - 0.04 K/uL    Lymph # 0.8 (L) 1.0 - 4.8 K/uL    Mono # 0.4 0.3 - 1.0 K/uL    Eos # 0.2 0.0 - 0.5 K/uL    Baso # 0.05 0.00 - 0.20 K/uL    nRBC 0 0 /100 WBC    Gran% 82.4 (H) 38.0 - 73.0 %    Lymph% 9.2 (L) 18.0 - 48.0 %    Mono% 5.1 4.0 - 15.0 %    Eosinophil% 2.2 0.0 - 8.0 %    Basophil% 0.6 0.0 - 1.9 %    Differential Method Automated    Comprehensive metabolic panel   Result Value Ref Range    Sodium 142 136 - 145 mmol/L     Potassium 4.4 3.5 - 5.1 mmol/L    Chloride 107 95 - 110 mmol/L    CO2 27 23 - 29 mmol/L    Glucose 74 70 - 110 mg/dL    BUN, Bld 22 (H) 6 - 20 mg/dL    Creatinine 1.0 0.5 - 1.4 mg/dL    Calcium 8.4 (L) 8.7 - 10.5 mg/dL    Total Protein 6.8 6.0 - 8.4 g/dL    Albumin 2.8 (L) 3.5 - 5.2 g/dL    Total Bilirubin 0.2 0.1 - 1.0 mg/dL    Alkaline Phosphatase 91 55 - 135 U/L    AST 21 10 - 40 U/L    ALT 10 10 - 44 U/L    Anion Gap 8 8 - 16 mmol/L    eGFR if African American >60 >60 mL/min/1.73 m^2    eGFR if non African American >60 >60 mL/min/1.73 m^2   Protime-INR   Result Value Ref Range    Prothrombin Time 10.7 9.0 - 12.5 sec    INR 1.0 0.8 - 1.2   TSH   Result Value Ref Range    TSH 0.610 0.400 - 4.000 uIU/mL   LDL - Lipid Panel   Result Value Ref Range    Cholesterol 146 120 - 199 mg/dL    Triglycerides 61 30 - 150 mg/dL    HDL 56 40 - 75 mg/dL    LDL Cholesterol 77.8 63.0 - 159.0 mg/dL    Hdl/Cholesterol Ratio 38.4 20.0 - 50.0 %    Total Cholesterol/HDL Ratio 2.6 2.0 - 5.0    Non-HDL Cholesterol 90 mg/dL   APTT   Result Value Ref Range    aPTT 36.8 (H) 21.0 - 32.0 sec   Troponin I   Result Value Ref Range    Troponin I 0.006 0.000 - 0.026 ng/mL   COVID-19 Rapid Screening   Result Value Ref Range    SARS-CoV-2 RNA, Amplification, Qual Negative Negative   Sedimentation rate   Result Value Ref Range    Sed Rate 15 0 - 20 mm/Hr   Type & Screen   Result Value Ref Range    Group & Rh O POS     Indirect Alex NEG    POCT glucose   Result Value Ref Range    POCT Glucose 69 (L) 70 - 110 mg/dL   POCT glucose   Result Value Ref Range    POCT Glucose 104 70 - 110 mg/dL         Imaging Results:  Imaging Results          MRI Brain Without Contrast (Final result)  Result time 08/05/20 12:04:05    Final result by Ke Shaver MD (08/05/20 12:04:05)                 Impression:      No acute intracranial infarct or hemorrhage.    Minor chronic microvascular ischemic changes.    Low-lying cerebellar tonsils.      Electronically  signed by: Ke Shaver  Date:    08/05/2020  Time:    12:04             Narrative:    EXAMINATION:  MRI BRAIN WITHOUT CONTRAST    CLINICAL HISTORY:  Headache, chronic, with new features;.    TECHNIQUE:  Multiplanar multisequence MR imaging of the brain was performed without contrast.    COMPARISON:  Head CT earlier same date with priors    FINDINGS:  Examination is limited by patient motion artifact.    Intracranial compartment:    Ventricles and sulci are normal in size for age without evidence of hydrocephalus. No extra-axial blood or fluid collections.    Few small foci of periventricular and subcortical T2 white matter signal hyperintensity are nonspecific, but likely reflect chronic microvascular ischemic change.  No acute intracranial infarct.  Mineralization of the bilateral basal ganglia.  No abnormal gradient susceptibility artifact otherwise.  There are low-lying cerebellar tonsils extending roughly 0.4 cm below the level of foramen magnum.  Visualized portions of the sella appear within normal limits.    Normal vascular flow voids are preserved.    Skull/extracranial contents (limited evaluation): Bone marrow signal intensity is normal.  Tiny mucous retention cyst or polyp in the left maxillary sinus.  Remaining visualized paranasal sinuses are predominantly clear.  Mastoid air cells are clear.                               X-Ray Chest AP Portable (Final result)  Result time 08/05/20 10:09:19    Final result by Ke Shaver MD (08/05/20 10:09:19)                 Impression:      Chronic pleuroparenchymal changes in the bilateral upper lung zones.  No acute or new infiltrate.    Slightly more conspicuous rounded opacity in the right hilar region may reflect prominent vascularity but lymphadenopathy not entirely excluded.  Continued radiographic follow-up or chest CT could be utilized for further evaluation.      Electronically signed by: Ke Shaver  Date:    08/05/2020  Time:    10:09              Narrative:    EXAMINATION:  XR CHEST AP PORTABLE    CLINICAL HISTORY:  Stroke;    TECHNIQUE:  Single frontal view of the chest was performed.    COMPARISON:  Chest radiograph 06/05/2020 with priors    FINDINGS:  Cardiac leads project over the chest.  Cardiomediastinal silhouette is within normal limits and unchanged.  Unchanged rounded opacity within the right hilar region.  There are chronic pleuroparenchymal changes within the bilateral upper lung zones, similar to the prior.  No definite new acute infiltrate or large effusion.  No pneumothorax.  Visualized osseous structures appear intact.                               CT Head Without Contrast (Final result)  Result time 08/05/20 09:21:43    Final result by Ke Shaver MD (08/05/20 09:21:43)                 Impression:      No CT evidence of acute intracranial abnormality.    Findings were discussed with Dr. Ruelas via telephone at 09:21 on 08/05/2020.    All CT scans at this facility use dose modulation, iterative reconstruction, and/or weight base dosing when appropriate to reduce radiation dose to as low as reasonably achievable.      Electronically signed by: Ke Shaver  Date:    08/05/2020  Time:    09:21             Narrative:    EXAMINATION:  CT HEAD WITHOUT CONTRAST    CLINICAL HISTORY:  Headache, chronic, with new features;    TECHNIQUE:  Contiguous axial images were obtained from the skull base through the vertex without intravenous contrast.    COMPARISON:  Head CT 08/24/2019 and 10/03/2018    FINDINGS:  Motion or streak artifact slightly obscures evaluation of the skull base.  No intracranial hemorrhage. No mass effect or midline shift. Normal parenchymal attenuation. The ventricles and sulci are normal in size and configuration. The paranasal sinuses and mastoid air cells are clear.  No concerning osseous findings.                                 The EKG was ordered, reviewed, and independently interpreted by the ED  provider.  Interpretation time: 9:19  Rate: 75 BPM  Rhythm: normal sinus rhythm  Interpretation: Possible LAE. No STEMI.         The Emergency Provider reviewed the vital signs and test results, which are outlined above.     ED Discussion     9:46 AM: Discussed pt's case with Dr. Conrad (vascular neurology) who does not feel that pt is having an acute stroke. Recommends MRI and consult her again if any abnormal findings. If MRI is normal, recommends follow up with ENT. Regardless, pt is out of window for TPA due to last known well time being last night.    12:32 PM: Reassessed pt at this time. Discussed with pt all pertinent ED information and results. Discussed pt dx and plan of tx. Gave pt all f/u and return to the ED instructions. All questions and concerns were addressed at this time. Pt expresses understanding of information and instructions, and is comfortable with plan to discharge. Pt is stable for discharge.    I discussed with patient that evaluation in the ED does not suggest any emergent or life threatening medical conditions requiring immediate intervention beyond what was provided in the ED, and I believe patient is safe for discharge.  Regardless, an unremarkable evaluation in the ED does not preclude the development or presence of a serious of life threatening condition. As such, patient was instructed to return immediately for any worsening or change in current symptoms.           Medical Decision Making:   Clinical Tests:   Lab Tests: Ordered and Reviewed  Radiological Study: Ordered and Reviewed  Medical Tests: Ordered and Reviewed           ED Medication(s):  Medications   dextrose 50% injection 25 g (25 g Intravenous Given 8/5/20 0926)       Discharge Medication List as of 8/5/2020 12:30 PM      START taking these medications    Details   budesonide (PULMICORT) 0.5 mg/2 mL nebulizer solution Take 2 mLs (0.5 mg total) by nebulization 2 (two) times daily. Controller, Starting Wed 8/5/2020, Until  Thu 8/5/2021, Normal             Follow-up Information     Viviana Cuba MD. Schedule an appointment as soon as possible for a visit in 1 day.    Specialty: Internal Medicine  Why: Return to the Emergency Room, If symptoms worsen  Contact information:  [id number] [place] [id number]  [phone]                       Scribe Attestation:   Scribe #1: I performed the above scribed service and the documentation accurately describes the services I performed. I attest to the accuracy of the note.     Attending:   Physician Attestation Statement for Scribe #1: I, Kesha Ruelas MD, personally performed the services described in this documentation, as scribed by Rekha Zuleta, in my presence, and it is both accurate and complete.           Clinical Impression       ICD-10-CM ICD-9-CM   1. Paresthesias  R20.2 782.0   2. Anxiousness  F41.9 300.00   3. History of sarcoidosis  Z86.2 V12.29   4. Frontal headache  R51 784.0   5. Tinnitus, bilateral  H93.13 388.30       Disposition:   Disposition: Discharged  Condition: Stable         Kesha Ruelas MD  08/06/20 3931

## 2020-08-05 NOTE — TELEPHONE ENCOUNTER
Requested Prescriptions     Signed Prescriptions Disp Refills    budesonide (PULMICORT) 0.5 mg/2 mL nebulizer solution 120 mL 0     Sig: Take 2 mLs (0.5 mg total) by nebulization 2 (two) times daily. Controller     Authorizing Provider: SAMANTHA MORGAN

## 2020-08-06 PROBLEM — R51.9 WORSENING HEADACHES: Status: ACTIVE | Noted: 2020-08-06

## 2020-08-06 NOTE — SUBJECTIVE & OBJECTIVE
"  Woke up with symptoms?: yes    Recent bleeding noted: no  Does the patient take any Blood Thinners? no  Medications: No relevant medications      Past Medical History: Sarcoidosis    Past Surgical History: no major surgeries within the last 2 weeks    Family History: no relevant history    Social History: drinking and smoker (active)    Allergies: No Known Allergies     Review of Systems   HENT: Positive for tinnitus.    Gastrointestinal: Negative for abdominal pain.   Neurological: Positive for facial asymmetry, speech difficulty, weakness and headaches.   All other systems reviewed and are negative.    Objective:   Vitals: Blood pressure (!) 145/88, pulse 74, temperature 98.2 °F (36.8 °C), temperature source Oral, resp. rate 18, height 5' 7" (1.702 m), SpO2 99 %.     CT READ: Yes  No hemmorhage. No mass effect. No early infarct signs.     Physical Exam  Vitals signs reviewed.   Constitutional:       Appearance: She is well-developed.   HENT:      Head: Normocephalic and atraumatic.   Eyes:      Pupils: Pupils are equal, round, and reactive to light.   Cardiovascular:      Rate and Rhythm: Normal rate and regular rhythm.   Pulmonary:      Effort: Pulmonary effort is normal.   Neurological:      Mental Status: She is alert and oriented to person, place, and time.      Cranial Nerves: Cranial nerve deficit present.      Motor: Weakness present.           "

## 2020-08-06 NOTE — ASSESSMENT & PLAN NOTE
Worsening headaches with subtle neurological changes. MRI head to r/o stroke. If negative home with outpatient work-up.

## 2020-08-06 NOTE — CONSULTS
"      [facility]  Vascular Neurology  Comprehensive Stroke Center  Tele-Consultation Note      Consults    Consulting Provider: LOR PINEDA  Current Providers  No providers found    Patient Location:  [facility] EMERGENCY DEPARTMENT Emergency Department  Spoke hospital nurse at bedside with patient assisting consultant.     Patient information was obtained from patient.         Assessment/Plan:     STROKE DOCUMENTATION     Acute Stroke Times:   Acute Stroke Times   Last Known Normal Date: 08/04/20  Last Known Normal Time: 2300  Symptom Onset Date: 08/04/20  Symptom Onset Time: 2300  Stroke Team Called Date: 08/05/20  Stroke Team Called Time: 0911  Stroke Team Arrival Date: 08/05/20  Stroke Team Arrival Time: 0925  CT Interpretation Time: 0925    NIH Scale:        Modified Lemhi Score: 0  Aldo Coma Scale:    ABCD2 Score:    [id number] Score:   HAS -BLED Score:   ICH Score:   Hunt & Leo Classification:       Diagnoses:   Worsening headaches  Worsening headaches with subtle neurological changes. MRI head to r/o stroke. If negative home with outpatient work-up.        Blood pressure (!) 145/88, pulse 74, temperature 98.2 °F (36.8 °C), temperature source Oral, resp. rate 18, height 5' 7" (1.702 m), SpO2 99 %.  Alteplase Eligible?: No  Alteplase Recommendation: Alteplase not recommended due to Outside of treatment window  and Suspected stroke mimic   Possible Interventional Revascularization Candidate? No; No large vessel occlusion    Disposition Recommendation: pending further studies  discharge from ED    Subjective:     History of Present Illness:  This is a 59-year-old female who was in her usual state of health until 2300 on August 4.  She woke up on August 5th at 7:15 a.m. with complaints of left hemiparesis, slurred speech, dizziness.  She also complains of headaches which have been present since July 30 of 2020.  She was seen in the emergency room on July 30th for the " "same and was treated and released.  Headache is described as right temporal throbbing which goes across the head to the left temple.  She complains of tinnitus bilaterally that precipitates the headache. The tinnitus is described as a chirping sound.      Woke up with symptoms?: yes    Recent bleeding noted: no  Does the patient take any Blood Thinners? no  Medications: No relevant medications      Past Medical History: Sarcoidosis    Past Surgical History: no major surgeries within the last 2 weeks    Family History: no relevant history    Social History: drinking and smoker (active)    Allergies: No Known Allergies     Review of Systems   HENT: Positive for tinnitus.    Gastrointestinal: Negative for abdominal pain.   Neurological: Positive for facial asymmetry, speech difficulty, weakness and headaches.   All other systems reviewed and are negative.    Objective:   Vitals: Blood pressure (!) 145/88, pulse 74, temperature 98.2 °F (36.8 °C), temperature source Oral, resp. rate 18, height 5' 7" (1.702 m), SpO2 99 %.     CT READ: Yes  No hemmorhage. No mass effect. No early infarct signs.     Physical Exam  Vitals signs reviewed.   Constitutional:       Appearance: She is well-developed.   HENT:      Head: Normocephalic and atraumatic.   Eyes:      Pupils: Pupils are equal, round, and reactive to light.   Cardiovascular:      Rate and Rhythm: Normal rate and regular rhythm.   Pulmonary:      Effort: Pulmonary effort is normal.   Neurological:      Mental Status: She is alert and oriented to person, place, and time.      Cranial Nerves: Cranial nerve deficit present.      Motor: Weakness present.               Recommended the emergency room physician to have a brief discussion with the patient and/or family if available regarding the risks and benefits of treatment, and to briefly document the occurrence of that discussion in his clinical encounter note.     The attending portion of this evaluation, treatment, and " documentation was performed per Annmarie Conrad MD via audiovisual.    Billing code:  (non-intervention mild to moderate stroke, TIA, some mimics)    · This patient has a critical neurological condition/illness, with some potential for high morbidity and mortality.  · There is a moderate probability for acute neurological change leading to clinical and possibly life-threatening deterioration requiring highest level of physician preparedness for urgent intervention.  · Care was coordinated with other physicians involved in the patient's care.  · Radiologic studies and laboratory data were reviewed and interpreted, and plan of care was re-assessed based on the results.  · Diagnosis, treatment options and prognosis may have been discussed with the patient and/or family members or caregiver.      In your opinion, this was a: Tier 2 Van Negative    Consult End Time: 1000     Annmarie Conrad MD  Comprehensive Stroke Center  Vascular Neurology   [facility]

## 2020-08-06 NOTE — HPI
This is a 59-year-old female who was in her usual state of health until 2300 on August 4.  She woke up on August 5th at 7:15 a.m. with complaints of left hemiparesis, slurred speech, dizziness.  She also complains of headaches which have been present since July 30 of 2020.  She was seen in the emergency room on July 30th for the same and was treated and released.  Headache is described as right temporal throbbing which goes across the head to the left temple.  She complains of tinnitus bilaterally that precipitates the headache. The tinnitus is described as a chirping sound.

## 2020-08-12 ENCOUNTER — PATIENT OUTREACH (OUTPATIENT)
Dept: ADMINISTRATIVE | Facility: OTHER | Age: 60
End: 2020-08-12

## 2020-08-13 ENCOUNTER — OFFICE VISIT (OUTPATIENT)
Dept: CARDIOLOGY | Facility: CLINIC | Age: 60
End: 2020-08-13
Payer: COMMERCIAL

## 2020-08-13 VITALS
SYSTOLIC BLOOD PRESSURE: 130 MMHG | HEIGHT: 67 IN | BODY MASS INDEX: 25.88 KG/M2 | WEIGHT: 164.88 LBS | DIASTOLIC BLOOD PRESSURE: 76 MMHG | HEART RATE: 55 BPM | OXYGEN SATURATION: 99 %

## 2020-08-13 DIAGNOSIS — I10 ESSENTIAL HYPERTENSION: ICD-10-CM

## 2020-08-13 DIAGNOSIS — I50.32 CHRONIC HEART FAILURE WITH PRESERVED EJECTION FRACTION: Primary | ICD-10-CM

## 2020-08-13 DIAGNOSIS — J45.40 MODERATE PERSISTENT ASTHMA WITHOUT COMPLICATION: ICD-10-CM

## 2020-08-13 DIAGNOSIS — G47.36 NOCTURNAL HYPOXEMIA DUE TO OBSTRUCTIVE CHRONIC BRONCHITIS: ICD-10-CM

## 2020-08-13 DIAGNOSIS — R60.0 EDEMA OF LEG: ICD-10-CM

## 2020-08-13 DIAGNOSIS — J44.89 NOCTURNAL HYPOXEMIA DUE TO OBSTRUCTIVE CHRONIC BRONCHITIS: ICD-10-CM

## 2020-08-13 DIAGNOSIS — D86.0 SARCOIDOSIS OF LUNG: ICD-10-CM

## 2020-08-13 DIAGNOSIS — R79.89 ELEVATED BRAIN NATRIURETIC PEPTIDE (BNP) LEVEL: ICD-10-CM

## 2020-08-13 PROCEDURE — 3075F SYST BP GE 130 - 139MM HG: CPT | Mod: CPTII,S$GLB,, | Performed by: INTERNAL MEDICINE

## 2020-08-13 PROCEDURE — 99999 PR PBB SHADOW E&M-EST. PATIENT-LVL III: CPT | Mod: PBBFAC,,, | Performed by: INTERNAL MEDICINE

## 2020-08-13 PROCEDURE — 3008F PR BODY MASS INDEX (BMI) DOCUMENTED: ICD-10-PCS | Mod: CPTII,S$GLB,, | Performed by: INTERNAL MEDICINE

## 2020-08-13 PROCEDURE — 3078F PR MOST RECENT DIASTOLIC BLOOD PRESSURE < 80 MM HG: ICD-10-PCS | Mod: CPTII,S$GLB,, | Performed by: INTERNAL MEDICINE

## 2020-08-13 PROCEDURE — 3078F DIAST BP <80 MM HG: CPT | Mod: CPTII,S$GLB,, | Performed by: INTERNAL MEDICINE

## 2020-08-13 PROCEDURE — 99214 OFFICE O/P EST MOD 30 MIN: CPT | Mod: S$GLB,,, | Performed by: INTERNAL MEDICINE

## 2020-08-13 PROCEDURE — 99214 PR OFFICE/OUTPT VISIT, EST, LEVL IV, 30-39 MIN: ICD-10-PCS | Mod: S$GLB,,, | Performed by: INTERNAL MEDICINE

## 2020-08-13 PROCEDURE — 3075F PR MOST RECENT SYSTOLIC BLOOD PRESS GE 130-139MM HG: ICD-10-PCS | Mod: CPTII,S$GLB,, | Performed by: INTERNAL MEDICINE

## 2020-08-13 PROCEDURE — 99999 PR PBB SHADOW E&M-EST. PATIENT-LVL III: ICD-10-PCS | Mod: PBBFAC,,, | Performed by: INTERNAL MEDICINE

## 2020-08-13 PROCEDURE — 3008F BODY MASS INDEX DOCD: CPT | Mod: CPTII,S$GLB,, | Performed by: INTERNAL MEDICINE

## 2020-08-13 RX ORDER — POTASSIUM CHLORIDE 20 MEQ/1
20 TABLET, EXTENDED RELEASE ORAL DAILY PRN
Qty: 60 TABLET | Refills: 3 | Status: SHIPPED | OUTPATIENT
Start: 2020-08-13 | End: 2021-01-29 | Stop reason: SDUPTHER

## 2020-08-13 RX ORDER — TOPIRAMATE 50 MG/1
50 TABLET, FILM COATED ORAL NIGHTLY
COMMUNITY

## 2020-08-13 NOTE — PROGRESS NOTES
Subjective:   Patient ID:  Salvatore Phillips is a 59 y.o. female who presents for cardiac consult of Hypertension    Referring Physician: Adrian Keating MD   Reason for consult: Sarcoidosis, CV eval    HPI  The patient came in today for cardiac consult of Hypertension      Salvatore Phillips is a 59 y.o. female pt with HTN, Sarcoidosis, NENA, asthma presents for follow up CV eval of sarcoidosis.     6/17/20  She was diagnosed with sarcoid in her 20s. She used to live in [place] and [place]   She has experienced significant SOB last 2-3 months, has been using pillows.   ECHO ordered, BNP 2 weeks ago elevated at 130, pt was on lasix 40 mg x for week. She does have occ chest tightness, when she coughs it hurts more.     8/13/20  ECHO with grade 1 DD and mild MVP, moderate LVH. Nuclear stress neg for ischemia. She was on lasix for a week and improved.   She is on oxygen now feels migraines and felt like she was getting strokes. She went to ER here and sent home, and then admitted at [facility] will get a neurologist.     Patient feels no leg swelling, no PND, no palpitation, no dizziness, no syncope, no CNS symptoms.    Patient has fairly good exercise tolerance. Works at [employer].     Patient is compliant with medications.    ECG - NSR, LAE      Results for orders placed during the hospital encounter of 07/10/20   Echo Color Flow Doppler? Yes    Narrative · Moderate concentric left ventricular hypertrophy.  · Small left ventricular cavity size.  · Normal left ventricular systolic function. The estimated ejection   fraction is 60%.  · Grade I (mild) left ventricular diastolic dysfunction consistent with   impaired relaxation.  · Normal right ventricular systolic function.  · Mild tricuspid regurgitation.  · Mild pulmonic regurgitation.  · Normal central venous pressure (3 mmHg).  · The estimated PA systolic pressure is 28 mmHg.  · Mild mitral prolapse of the anterior  mitral leaflet.          Results for orders placed during the hospital encounter of 20   Nuclear Stress - Cardiology Interpreted    Narrative   The perfusion scan is free of evidence from myocardial ischemia or   injury.    Gated perfusion images showed an ejection fraction of 82% at rest and   68% post stress.    The EKG portion of this study is negative for ischemia.    The patient reported no chest pain during the stress test.    Arrhythmias during stress: rare PVCs.         Past Medical History:   Diagnosis Date    Asthma     Hypertension     Sarcoidosis of lung     Sleep apnea     maintained on CPAP    Upper respiratory disease     URI (upper respiratory infection)        Past Surgical History:   Procedure Laterality Date    ABDOMINAL SURGERY      BREAST LUMPECTOMY Left 1996    BENIGN    BREAST SURGERY       SECTION      x3    COLON SURGERY      exploratory procedure      GASTRIC BYPASS      HYSTERECTOMY  2009    JOINT REPLACEMENT  2013    KNEE SURGERY      OOPHORECTOMY  2009    SMALL INTESTINE SURGERY      tummy tuck         Social History     Tobacco Use    Smoking status: Current Every Day Smoker     Packs/day: 1.00     Years: 35.00     Pack years: 35.00     Types: Cigarettes     Start date: 1979    Smokeless tobacco: Never Used    Tobacco comment: started age 17   Substance Use Topics    Alcohol use: Not Currently     Frequency: Monthly or less     Drinks per session: 1 or 2     Binge frequency: Never     Comment: occ    Drug use: Never       Family History   Problem Relation Age of Onset    Alzheimer's disease Mother     Cancer Father         lung    Crohn's disease Brother     Kidney disease Brother     Arthritis Sister     Hypertension Son        Patient's Medications   New Prescriptions    No medications on file   Previous Medications    ALBUTEROL (PROVENTIL/VENTOLIN HFA) 90 MCG/ACTUATION INHALER    Inhale 2 puffs into the lungs every 4 (four) hours  as needed for Wheezing or Shortness of Breath.    ALBUTEROL-IPRATROPIUM (DUO-NEB) 2.5 MG-0.5 MG/3 ML NEBULIZER SOLUTION    Take 3 mLs by nebulization every 6 (six) hours as needed for Wheezing or Shortness of Breath. Rescue. Corrected prescription with refills    BUDESONIDE (PULMICORT) 0.5 MG/2 ML NEBULIZER SOLUTION    Take 2 mLs (0.5 mg total) by nebulization 2 (two) times daily. Controller    BUTALBITAL-ACETAMINOPHEN-CAFFEINE -40 MG (FIORICET, ESGIC) -40 MG PER TABLET    Take 1 tablet by mouth every 6 (six) hours as needed for Pain.    FLUTICASONE FUROATE-VILANTEROL (BREO ELLIPTA) 200-25 MCG/DOSE DSDV DISKUS INHALER    Inhale 1 puff into the lungs once daily. Controller    FLUTICASONE PROPIONATE (FLONASE) 50 MCG/ACTUATION NASAL SPRAY    2 sprays (100 mcg total) by Each Nostril route once daily. Corrected prescription with refills    FUROSEMIDE (LASIX) 40 MG TABLET    Take 1 tablet (40 mg total) by mouth daily as needed (for leg swelling).    IPRATROPIUM (ATROVENT HFA) 17 MCG/ACTUATION INHALER    Inhale 2 puffs into the lungs every 4 to 6 hours as needed for Wheezing. Rescue    NICOTINE POLACRILEX 2 MG LOZG    Take 1 lozenge (2 mg total) by mouth as needed.    OXYBUTYNIN (DITROPAN) 5 MG TAB    Take 1 tablet (5 mg total) by mouth every evening.    PREDNISONE (DELTASONE) 20 MG TABLET    Take 2 tablets (40 mg total) by mouth once daily.    PREDNISONE (DELTASONE) 20 MG TABLET    Take 1 tablet (20 mg total) by mouth As instructed. 3tab po daily x3days, 2tabs po daily x3days, 1tab po daily x3days, 1/2tab po daily x3days    QUETIAPINE (SEROQUEL) 50 MG TABLET    Take 50 mg by mouth every evening.    TOPIRAMATE (TOPAMAX) 50 MG TABLET    Take 50 mg by mouth every evening.    TRAMADOL (ULTRAM) 50 MG TABLET    Take 1 tablet (50 mg total) by mouth every 6 (six) hours as needed for Pain.    VARENICLINE (CHANTIX) 1 MG TAB    Take 1 tablet (1 mg total) by mouth 2 (two) times daily.    ZAFIRLUKAST (ACCOLATE) 20 MG  "TABLET    Take 1 tablet (20 mg total) by mouth 2 (two) times daily.    ZOLPIDEM (AMBIEN) 5 MG TAB    Take 1 tablet (5 mg total) by mouth nightly as needed (insomnia).   Modified Medications    No medications on file   Discontinued Medications    No medications on file       Review of Systems   Constitutional: Positive for malaise/fatigue.   HENT: Negative.    Eyes: Negative.    Respiratory: Positive for shortness of breath.    Cardiovascular: Positive for leg swelling.   Gastrointestinal: Negative.    Genitourinary: Negative.    Musculoskeletal: Negative.    Skin: Negative.    Neurological: Negative.    Endo/Heme/Allergies: Negative.    Psychiatric/Behavioral: Negative.    All 12 systems otherwise negative.      Wt Readings from Last 3 Encounters:   08/13/20 74.8 kg (164 lb 14.5 oz)   07/10/20 70.3 kg (155 lb)   07/10/20 71.2 kg (156 lb 15.5 oz)     Temp Readings from Last 3 Encounters:   08/05/20 98.2 °F (36.8 °C) (Oral)   07/30/20 97 °F (36.1 °C) (Oral)   06/05/20 98.8 °F (37.1 °C) (Tympanic)     BP Readings from Last 3 Encounters:   08/13/20 130/76   08/05/20 (!) 145/88   07/30/20 (!) 140/80     Pulse Readings from Last 3 Encounters:   08/13/20 (!) 55   08/05/20 74   07/30/20 76       /76 (BP Location: Left arm, Patient Position: Sitting, BP Method: Medium (Manual))   Pulse (!) 55   Ht 5' 7" (1.702 m)   Wt 74.8 kg (164 lb 14.5 oz)   SpO2 99%   BMI 25.83 kg/m²     Objective:   Physical Exam   Constitutional: She is oriented to person, place, and time. She appears well-developed and well-nourished. No distress.   HENT:   Head: Normocephalic and atraumatic.   Nose: Nose normal.   Mouth/Throat: Oropharynx is clear and moist.   Eyes: Conjunctivae and EOM are normal. No scleral icterus.   Neck: Normal range of motion. Neck supple. No JVD present. No thyromegaly present.   Cardiovascular: Normal rate, regular rhythm, S1 normal and S2 normal. Exam reveals no gallop, no S3, no S4 and no friction rub.   No " murmur heard.  Pulmonary/Chest: Effort normal and breath sounds normal. No stridor. No respiratory distress. She has no wheezes. She has no rales. She exhibits no tenderness.   Abdominal: Soft. Bowel sounds are normal. She exhibits no distension and no mass. There is no abdominal tenderness. There is no rebound.   Genitourinary:    Genitourinary Comments: Deferred     Musculoskeletal: Normal range of motion.         General: Edema present. No tenderness or deformity.   Lymphadenopathy:     She has no cervical adenopathy.   Neurological: She is alert and oriented to person, place, and time. She exhibits normal muscle tone. Coordination normal.   Skin: Skin is warm and dry. No rash noted. She is not diaphoretic. No erythema. No pallor.   Psychiatric: She has a normal mood and affect. Her behavior is normal. Judgment and thought content normal.   Nursing note and vitals reviewed.      Lab Results   Component Value Date     08/05/2020    K 4.4 08/05/2020     08/05/2020    CO2 27 08/05/2020    BUN 22 (H) 08/05/2020    CREATININE 1.0 08/05/2020    GLU 74 08/05/2020    AST 21 08/05/2020    ALT 10 08/05/2020    ALBUMIN 2.8 (L) 08/05/2020    PROT 6.8 08/05/2020    BILITOT 0.2 08/05/2020    WBC 8.56 08/05/2020    HGB 12.9 08/05/2020    HCT 43.5 08/05/2020    MCV 90 08/05/2020     08/05/2020    INR 1.0 08/05/2020    TSH 0.610 08/05/2020    CHOL 146 08/05/2020    HDL 56 08/05/2020    LDLCALC 77.8 08/05/2020    TRIG 61 08/05/2020     (H) 06/05/2020     Assessment:      1. Chronic heart failure with preserved ejection fraction    2. Sarcoidosis of lung    3. Nocturnal hypoxemia due to obstructive chronic bronchitis    4. Moderate persistent asthma without complication    5. Essential hypertension    6. Elevated brain natriuretic peptide (BNP) level    7. Edema of leg        Plan:   1.HFpEF with sarcoid  - pharm nuclear stress test - neg  - ECHO - grade 1 DD  - cont lasix    2. Asthma, Sarcoid   - cont tx  per pulm    3. Tobacco use  - needs to quit    4. Edema  - rec lasix PRN  - compression stockings PRN    Thank you for allowing me to participate in this patient's care. Please do not hesitate to contact me with any questions or concerns. Consult note has been forwarded to the referral physician.

## 2020-08-13 NOTE — PROGRESS NOTES
Chart reviewed.   Immunizations: Triggered Imm Registry     Orders placed: n/a  Upcoming appts to satisfy DAMARIS topics: n/a

## 2020-08-31 ENCOUNTER — TELEPHONE (OUTPATIENT)
Dept: SMOKING CESSATION | Facility: CLINIC | Age: 60
End: 2020-08-31

## 2020-09-01 ENCOUNTER — CLINICAL SUPPORT (OUTPATIENT)
Dept: SMOKING CESSATION | Facility: CLINIC | Age: 60
End: 2020-09-01
Payer: COMMERCIAL

## 2020-09-01 DIAGNOSIS — F17.200 NICOTINE DEPENDENCE: Primary | ICD-10-CM

## 2020-09-01 PROCEDURE — 99999 PR PBB SHADOW E&M-EST. PATIENT-LVL I: CPT | Mod: PBBFAC,,,

## 2020-09-01 PROCEDURE — 99402 PREV MED CNSL INDIV APPRX 30: CPT | Mod: S$GLB,,, | Performed by: GENERAL PRACTICE

## 2020-09-01 PROCEDURE — 99402 PR PREVENT COUNSEL,INDIV,30 MIN: ICD-10-PCS | Mod: S$GLB,,, | Performed by: GENERAL PRACTICE

## 2020-09-01 PROCEDURE — 99999 PR PBB SHADOW E&M-EST. PATIENT-LVL I: ICD-10-PCS | Mod: PBBFAC,,,

## 2020-09-01 NOTE — PROGRESS NOTES
Individual Follow-Up Form    9/1/2020    Quit Date: 8-4-2020    Clinical Status of Patient: Outpatient    Length of Service: 30 minutes    Continuing Medication: yes  Chantix     Target Symptoms: Withdrawal and medication side effects. The following were  rated moderate (3) to severe (4) on TCRS:  · Moderate (3): desire tobacco, increased appetite  · Severe (4): none    Comments: Spoke with patient at length in regards to her smoking cessation progress. She states that she has been having intense urges to smoke. She has not smoked since her last hospitalization. She ran out of Chantix and is interested in restarting. Reviewed proper medication use and possible side effects. She states that she has been eating more often. Discussed healthy eating habits and increased physical activities. She states that she is on oxygen 24 hours per day and is limited to her mobility. The patient denies any abnormal behavioral or mental changes at this time. Discussed weekly appointments for progress updates. She verbalized understanding. Will continue to encourage and monitor progress.    Diagnosis: F17.200    Next Visit: 1 week

## 2020-09-16 ENCOUNTER — TELEPHONE (OUTPATIENT)
Dept: RADIOLOGY | Facility: HOSPITAL | Age: 60
End: 2020-09-16

## 2020-09-17 ENCOUNTER — OFFICE VISIT (OUTPATIENT)
Dept: SLEEP MEDICINE | Facility: CLINIC | Age: 60
End: 2020-09-17
Payer: COMMERCIAL

## 2020-09-17 ENCOUNTER — PATIENT OUTREACH (OUTPATIENT)
Dept: ADMINISTRATIVE | Facility: OTHER | Age: 60
End: 2020-09-17

## 2020-09-17 ENCOUNTER — TELEPHONE (OUTPATIENT)
Dept: PULMONOLOGY | Facility: CLINIC | Age: 60
End: 2020-09-17

## 2020-09-17 ENCOUNTER — HOSPITAL ENCOUNTER (OUTPATIENT)
Dept: RADIOLOGY | Facility: HOSPITAL | Age: 60
Discharge: HOME | End: 2020-09-17
Attending: INTERNAL MEDICINE
Payer: COMMERCIAL

## 2020-09-17 ENCOUNTER — TELEPHONE (OUTPATIENT)
Dept: CARDIOLOGY | Facility: CLINIC | Age: 60
End: 2020-09-17

## 2020-09-17 VITALS
HEART RATE: 80 BPM | RESPIRATION RATE: 16 BRPM | SYSTOLIC BLOOD PRESSURE: 120 MMHG | HEIGHT: 67 IN | WEIGHT: 165.81 LBS | DIASTOLIC BLOOD PRESSURE: 82 MMHG | OXYGEN SATURATION: 96 % | BODY MASS INDEX: 26.02 KG/M2

## 2020-09-17 DIAGNOSIS — I50.9 HEART FAILURE, UNSPECIFIED HF CHRONICITY, UNSPECIFIED HEART FAILURE TYPE: ICD-10-CM

## 2020-09-17 DIAGNOSIS — F17.200 HEAVY TOBACCO SMOKER: ICD-10-CM

## 2020-09-17 DIAGNOSIS — D86.0 SARCOIDOSIS OF LUNG: Primary | ICD-10-CM

## 2020-09-17 DIAGNOSIS — D86.0 SARCOIDOSIS OF LUNG: ICD-10-CM

## 2020-09-17 DIAGNOSIS — G47.34 NOCTURNAL HYPOXEMIA: ICD-10-CM

## 2020-09-17 DIAGNOSIS — F51.04 PSYCHOPHYSIOLOGICAL INSOMNIA: ICD-10-CM

## 2020-09-17 DIAGNOSIS — J44.89 NOCTURNAL HYPOXEMIA DUE TO OBSTRUCTIVE CHRONIC BRONCHITIS: ICD-10-CM

## 2020-09-17 DIAGNOSIS — J45.40 MODERATE PERSISTENT ASTHMA WITHOUT COMPLICATION: ICD-10-CM

## 2020-09-17 DIAGNOSIS — I10 ESSENTIAL HYPERTENSION: ICD-10-CM

## 2020-09-17 DIAGNOSIS — U07.1 COVID-19: ICD-10-CM

## 2020-09-17 DIAGNOSIS — I50.32 CHRONIC HEART FAILURE WITH PRESERVED EJECTION FRACTION: ICD-10-CM

## 2020-09-17 DIAGNOSIS — I42.8 CARDIOMYOPATHY, NONISCHEMIC: ICD-10-CM

## 2020-09-17 DIAGNOSIS — G47.36 NOCTURNAL HYPOXEMIA DUE TO OBSTRUCTIVE CHRONIC BRONCHITIS: ICD-10-CM

## 2020-09-17 PROCEDURE — 3074F PR MOST RECENT SYSTOLIC BLOOD PRESSURE < 130 MM HG: ICD-10-PCS | Mod: CPTII,S$GLB,, | Performed by: INTERNAL MEDICINE

## 2020-09-17 PROCEDURE — 99214 PR OFFICE/OUTPT VISIT, EST, LEVL IV, 30-39 MIN: ICD-10-PCS | Mod: 25,S$GLB,, | Performed by: INTERNAL MEDICINE

## 2020-09-17 PROCEDURE — 71260 CT THORAX DX C+: CPT | Mod: 26,,, | Performed by: RADIOLOGY

## 2020-09-17 PROCEDURE — 99999 PR PBB SHADOW E&M-EST. PATIENT-LVL V: ICD-10-PCS | Mod: PBBFAC,,, | Performed by: INTERNAL MEDICINE

## 2020-09-17 PROCEDURE — 3008F PR BODY MASS INDEX (BMI) DOCUMENTED: ICD-10-PCS | Mod: CPTII,S$GLB,, | Performed by: INTERNAL MEDICINE

## 2020-09-17 PROCEDURE — 71260 CT THORAX DX C+: CPT | Mod: TC

## 2020-09-17 PROCEDURE — 3079F DIAST BP 80-89 MM HG: CPT | Mod: CPTII,S$GLB,, | Performed by: INTERNAL MEDICINE

## 2020-09-17 PROCEDURE — 25500020 PHARM REV CODE 255: Performed by: INTERNAL MEDICINE

## 2020-09-17 PROCEDURE — 99214 OFFICE O/P EST MOD 30 MIN: CPT | Mod: 25,S$GLB,, | Performed by: INTERNAL MEDICINE

## 2020-09-17 PROCEDURE — 3074F SYST BP LT 130 MM HG: CPT | Mod: CPTII,S$GLB,, | Performed by: INTERNAL MEDICINE

## 2020-09-17 PROCEDURE — 99999 PR PBB SHADOW E&M-EST. PATIENT-LVL V: CPT | Mod: PBBFAC,,, | Performed by: INTERNAL MEDICINE

## 2020-09-17 PROCEDURE — 3008F BODY MASS INDEX DOCD: CPT | Mod: CPTII,S$GLB,, | Performed by: INTERNAL MEDICINE

## 2020-09-17 PROCEDURE — 3079F PR MOST RECENT DIASTOLIC BLOOD PRESSURE 80-89 MM HG: ICD-10-PCS | Mod: CPTII,S$GLB,, | Performed by: INTERNAL MEDICINE

## 2020-09-17 PROCEDURE — 71260 CT CHEST WITH CONTRAST: ICD-10-PCS | Mod: 26,,, | Performed by: RADIOLOGY

## 2020-09-17 PROCEDURE — 90471 IMMUNIZATION ADMIN: CPT | Mod: S$GLB,,, | Performed by: INTERNAL MEDICINE

## 2020-09-17 PROCEDURE — 90686 FLU VACCINE (QUAD) GREATER THAN OR EQUAL TO 3YO PRESERVATIVE FREE IM: ICD-10-PCS | Mod: S$GLB,,, | Performed by: INTERNAL MEDICINE

## 2020-09-17 PROCEDURE — 90471 FLU VACCINE (QUAD) GREATER THAN OR EQUAL TO 3YO PRESERVATIVE FREE IM: ICD-10-PCS | Mod: S$GLB,,, | Performed by: INTERNAL MEDICINE

## 2020-09-17 PROCEDURE — 90686 IIV4 VACC NO PRSV 0.5 ML IM: CPT | Mod: S$GLB,,, | Performed by: INTERNAL MEDICINE

## 2020-09-17 RX ADMIN — IOHEXOL 75 ML: 350 INJECTION, SOLUTION INTRAVENOUS at 03:09

## 2020-09-17 NOTE — ASSESSMENT & PLAN NOTE
IGE < 35  On Prednisone taper  FEV1: 1.11L( 46%)  Repeat spirometry next visit : likely change to TRELEGY  Chest CT : scarring: no airway disease

## 2020-09-17 NOTE — PROGRESS NOTES
Health Maintenance Due   Topic Date Due    Shingles Vaccine (1 of 2) 10/23/2011    Colorectal Cancer Screening  10/23/2011    LDCT Lung Screen  10/23/2016    Influenza Vaccine (1) 08/01/2020     Updates were requested from care everywhere.  Chart was reviewed for overdue Proactive [facility] Encounters (DAMARIS) topics (CRS, Breast Cancer Screening, Eye exam)  Health Maintenance has been updated.  LINKS immunization registry triggered.  Immunizations were reconciled.

## 2020-09-17 NOTE — ASSESSMENT & PLAN NOTE
ASSESSMENT:     Sarcoidosis stage:   []        Stage I Sarcoidosis  []        Stage II Sarcoidosis  [x]        Stage III Sarcoidosis  []        Stage IV Srcoidosis     Clinical assessment:   [x]        Symptomatic  []        Assymptomatic         Indications for treatment of pulmonary sarcoidosis:   [x]        Evidence of progressive disease   []        Severe disease at presentation     Management options:   []        Observation without therapy  [x]        Systemic glucocorticoids  []        Methotrexate.  []        Azathioprine   []        Leuflonamide  []        Mycophenolate  []        Anti TNF alpha antagonists

## 2020-09-17 NOTE — PROGRESS NOTES
Pulmonary Outpatient Follow Up Visit     Subjective:       Patient ID: Salvatore Phillips is a 59 y.o. female.    Patient Active Problem List   Diagnosis    Foot drop, right    Asthma    Hypertension    Sarcoidosis of lung    Postnasal drip    Wheezing    Psychophysiological insomnia    Sleepwalking    Primary snoring    Inadequate sleep hygiene    Moderate persistent asthma    Edema of leg    Elevated brain natriuretic peptide (BNP) level    Heavy tobacco smoker    Nocturnal hypoxemia due to obstructive chronic bronchitis    Nocturnal hypoxemia    Insomnia    Worsening headaches    Chronic heart failure with preserved ejection fraction      Immunization History   Administered Date(s) Administered    Influenza - Quadrivalent - PF *Preferred* (6 months and older) 10/24/2018, 2020    Influenza - Trivalent (ADULT) 2018    Pneumococcal Polysaccharide - 23 Valent 2019    Td (ADULT) 2019      Social History     Tobacco Use   Smoking Status Former Smoker    Packs/day: 1.00    Years: 35.00    Pack years: 35.00    Types: Cigarettes    Start date: 1979    Quit date: 2020    Years since quittin.1   Smokeless Tobacco Never Used   Tobacco Comment    started age 17       Asthma Control Test  In the past 4  weeks, how much of the time did your asthma keep you from getting as much done at work, school or at home?: Most of the time  During the past 4 weeks, how often have you had shortness of breath?: More than once a day  During the past 4 weeks, how often did your asthma symptoms (wheezing, couging, shortness of breath, chest tightness or pain) wake you up at night or earlier than usual in the morning?: 2 or 3 nights a week  During the past 4 weeks, how often have you used your rescue inhaler or nebulizer medication (such as albuterol)?: 1 or 2 times per day  How would you rate your asthma control during the past 4 weeks?:  Somewhat controlled  If your score is 19 or less, your asthma may not be under control: 10      Please rate the CURRENT (i.e. LAST 2 WEEKS) SEVERITY of your insomnia problem(s).  1. Difficulty falling asleep: Moderate  2. Difficulty staying asleep: Severe  3. Problem waking up too early: Moderate  4. How SATISFIED/DISSATISFIED are you with your CURRENT sleep pattern?: Dissatisfied  5. How NOTICEABLE to others do you think your sleep problem is in terms of impairing the quality of your life?: Very much noticeable  6.  How WORRIED/DISTRESSED are you about your current sleep problem?: Somewhat  7. To what extent do you consider your sleep problem to INTERFERE with your daily functioning (e.g. daytime fatigue, mood, ability to function at work/daily chores, concentration, memory, mood, etc.) CURRENTLY?: Much  Total Score: 19  Interpretation: Clinical insomnia (moderate severity)        Chief Complaint: Asthma and Sarcoidosis      Salvatore Phillips is 59 y.o.   Last visit 07/10/2020  Follow up to review;Sleep study and chest CT  Was recently in hospital [facility] , discharge 08/07/2020  Discharge summary not available although records care everywhere : seen in ER [facility]: Headache; left sided numbness   MRI reviewed, symptoms resolved  She was discharged on 2.0 LPM oxygen from [facility] [place], pulse flow  RA spO2 was 96% today  No cough, No fever, No wheezing  Asks if needs to return to work on Oxygen: will repeat 6MWD  Sleep study: NO NENA, nocturnal hypoxemia, Night oxygen was added.  PFT:  Obstructive defect with reduced DLCO  Echo reviewed: Grade I (mild) left ventricular diastolic dysfunction consistent with impaired relaxation: BNP elevated  Will get cardiac MRI  ACT improved from 9 now 10  BREO, Nebulizer DUONEB, PULMICORT NEB,   Last FEV1:  1.11 L ( 46 %),   Known Sarcoidosis Lung after Biopsy at 29 years,   Chest CT: Bilateral scarring      Asthma  There is no cough, shortness of breath or  wheezing. Pertinent negatives include no headaches. Her past medical history is significant for asthma.     Social History     Tobacco Use   Smoking Status Former Smoker    Packs/day: 1.00    Years: 35.00    Pack years: 35.00    Types: Cigarettes    Start date: 1979    Quit date: 2020    Years since quittin.1   Smokeless Tobacco Never Used   Tobacco Comment    started age 17      Asthma Control Test  In the past 4  weeks, how much of the time did your asthma keep you from getting as much done at work, school or at home?: Most of the time  During the past 4 weeks, how often have you had shortness of breath?: More than once a day  During the past 4 weeks, how often did your asthma symptoms (wheezing, couging, shortness of breath, chest tightness or pain) wake you up at night or earlier than usual in the morning?: 2 or 3 nights a week  During the past 4 weeks, how often have you used your rescue inhaler or nebulizer medication (such as albuterol)?: 1 or 2 times per day  How would you rate your asthma control during the past 4 weeks?: Somewhat controlled  If your score is 19 or less, your asthma may not be under control: 10    Immunization History   Administered Date(s) Administered    Influenza - Quadrivalent - PF *Preferred* (6 months and older) 10/24/2018, 2020    Influenza - Trivalent (ADULT) 2018    Pneumococcal Polysaccharide - 23 Valent 2019    Td (ADULT) 2019            Review of Systems   Constitutional: Positive for fatigue.   Respiratory: Negative for cough, shortness of breath, wheezing, dyspnea on extertion and use of rescue inhaler.    Cardiovascular: Negative for leg swelling.   Neurological: Negative for weakness and headaches.   Psychiatric/Behavioral: Negative for sleep disturbance.   All other systems reviewed and are negative.      Outpatient Encounter Medications as of 2020   Medication Sig Dispense Refill    albuterol (PROVENTIL/VENTOLIN HFA) 90  mcg/actuation inhaler Inhale 2 puffs into the lungs every 4 (four) hours as needed for Wheezing or Shortness of Breath. 54 g 3    albuterol-ipratropium (DUO-NEB) 2.5 mg-0.5 mg/3 mL nebulizer solution Take 3 mLs by nebulization every 6 (six) hours as needed for Wheezing or Shortness of Breath. Rescue. Corrected prescription with refills 1 Box 11    budesonide (PULMICORT) 0.5 mg/2 mL nebulizer solution Take 2 mLs (0.5 mg total) by nebulization 2 (two) times daily. Controller 120 mL 0    butalbital-acetaminophen-caffeine -40 mg (FIORICET, ESGIC) -40 mg per tablet Take 1 tablet by mouth every 6 (six) hours as needed for Pain. 10 tablet 0    fluticasone furoate-vilanteroL (BREO ELLIPTA) 200-25 mcg/dose DsDv diskus inhaler Inhale 1 puff into the lungs once daily. Controller 60 each 11    fluticasone propionate (FLONASE) 50 mcg/actuation nasal spray 2 sprays (100 mcg total) by Each Nostril route once daily. Corrected prescription with refills 48 mL 3    furosemide (LASIX) 40 MG tablet Take 1 tablet (40 mg total) by mouth daily as needed (for leg swelling). 30 tablet 3    ipratropium (ATROVENT HFA) 17 mcg/actuation inhaler Inhale 2 puffs into the lungs every 4 to 6 hours as needed for Wheezing. Rescue 1 Inhaler 11    potassium chloride SA (K-DUR,KLOR-CON) 20 MEQ tablet Take 1 tablet (20 mEq total) by mouth daily as needed (take with lasix). 60 tablet 3    predniSONE (DELTASONE) 20 MG tablet Take 2 tablets (40 mg total) by mouth once daily. 10 tablet 0    topiramate (TOPAMAX) 50 MG tablet Take 50 mg by mouth every evening.      traMADoL (ULTRAM) 50 mg tablet Take 1 tablet (50 mg total) by mouth every 6 (six) hours as needed for Pain. 12 tablet 0    varenicline (CHANTIX) 1 mg Tab Take 1 tablet (1 mg total) by mouth 2 (two) times daily. 60 tablet 2    zafirlukast (ACCOLATE) 20 MG tablet Take 1 tablet (20 mg total) by mouth 2 (two) times daily. 180 tablet 3    zolpidem (AMBIEN) 5 MG Tab Take 1 tablet  "(5 mg total) by mouth nightly as needed (insomnia). 20 tablet 1    nicotine polacrilex 2 MG Lozg Take 1 lozenge (2 mg total) by mouth as needed. (Patient not taking: Reported on 7/10/2020) 200 lozenge 2    oxybutynin (DITROPAN) 5 MG Tab Take 1 tablet (5 mg total) by mouth every evening. 90 tablet 0    predniSONE (DELTASONE) 20 MG tablet Take 1 tablet (20 mg total) by mouth As instructed. 3tab po daily x3days, 2tabs po daily x3days, 1tab po daily x3days, 1/2tab po daily x3days (Patient not taking: Reported on 9/17/2020) 25 tablet 0    QUEtiapine (SEROQUEL) 50 MG tablet Take 50 mg by mouth every evening.       Facility-Administered Encounter Medications as of 9/17/2020   Medication Dose Route Frequency Provider Last Rate Last Dose    cyanocobalamin injection 1,000 mcg  1,000 mcg Intramuscular Once Viviana Cuba MD        [COMPLETED] iohexoL (OMNIPAQUE 350) injection 75 mL  75 mL Intravenous ONCE PRN Adrian Keating MD   75 mL at 09/17/20 1522       Objective:     Vital Signs (Most Recent)  Vital Signs  Pulse: 80  Resp: 16  SpO2: 96 %(2 LPM)  BP: 120/82  Height and Weight  Height: 5' 7" (170.2 cm)  Weight: 75.2 kg (165 lb 12.6 oz)  BSA (Calculated - sq m): 1.89 sq meters  BMI (Calculated): 26  Weight in (lb) to have BMI = 25: 159.3]  Wt Readings from Last 2 Encounters:   09/17/20 75.2 kg (165 lb 12.6 oz)   08/13/20 74.8 kg (164 lb 14.5 oz)       Physical Exam   Constitutional: She is oriented to person, place, and time. Vital signs are normal. She appears well-developed and well-nourished.       HENT:   Head: Normocephalic.   Nose: Nose normal.   Mouth/Throat: Oropharynx is clear and moist. No oropharyngeal exudate. Mallampati Score: II.   Neck: Normal range of motion. Neck supple.   Cardiovascular: Normal rate and normal heart sounds.   No murmur heard.  Pulmonary/Chest: Normal expansion, symmetric chest wall expansion and effort normal. She has decreased breath sounds. She has no wheezes.   Abdominal: " Soft. Bowel sounds are normal.   Musculoskeletal:         General: Edema present.   Neurological: She is alert and oriented to person, place, and time.   Skin: Skin is warm.   Nursing note and vitals reviewed.      Laboratory  Lab Results   Component Value Date    WBC 8.56 08/05/2020    RBC 4.84 08/05/2020    HGB 12.9 08/05/2020    HCT 43.5 08/05/2020    MCV 90 08/05/2020    MCH 26.7 (L) 08/05/2020    MCHC 29.7 (L) 08/05/2020    RDW 19.0 (H) 08/05/2020     08/05/2020    MPV 9.4 08/05/2020    GRAN 7.1 08/05/2020    GRAN 82.4 (H) 08/05/2020    LYMPH 0.8 (L) 08/05/2020    LYMPH 9.2 (L) 08/05/2020    MONO 0.4 08/05/2020    MONO 5.1 08/05/2020    EOS 0.2 08/05/2020    BASO 0.05 08/05/2020    EOSINOPHIL 2.2 08/05/2020    BASOPHIL 0.6 08/05/2020       BMP  Lab Results   Component Value Date     08/05/2020    K 4.4 08/05/2020     08/05/2020    CO2 27 08/05/2020    BUN 22 (H) 08/05/2020    CREATININE 1.0 08/05/2020    CALCIUM 8.4 (L) 08/05/2020    ANIONGAP 8 08/05/2020    ESTGFRAFRICA >60 08/05/2020    EGFRNONAA >60 08/05/2020    AST 21 08/05/2020    ALT 10 08/05/2020    PROT 6.8 08/05/2020       Lab Results   Component Value Date     (H) 06/05/2020       Lab Results   Component Value Date    TSH 0.610 08/05/2020       Lab Results   Component Value Date    SEDRATE 15 08/05/2020       No results found for: CRP  Lab Results   Component Value Date    IGE <35 07/10/2020        No results found for: ASPERGILLUS  No results found for: AFUMIGATUSCL     Lab Results   Component Value Date    ACE 26 06/10/2020        Diagnostic Results:  I have personally reviewed today the following studies:     PSG  No Significant Obstructive Sleep apnea(NENA): Overall AHI was 3.9   events per hour. REM AHI was 31.3   events per hour. Very little supine sleep   EKG showed no cardiac abnormalities.   Severe Oxygen Desaturation: Supplemental oxygen required.   EEG did not show alpha intrusion.   No snoring was audible during  this study.   Significant periodic leg movements(PLMs) during sleep : Index   67.0 events per hour.. However, no significant   associated arousals. Index 0.8 events per hour    ECHO  · Moderate concentric left ventricular hypertrophy.  · Small left ventricular cavity size.  · Normal left ventricular systolic function. The estimated ejection fraction is 60%.  · Grade I (mild) left ventricular diastolic dysfunction consistent with impaired relaxation.  · Normal right ventricular systolic function.  · Mild tricuspid regurgitation.  · Mild pulmonic regurgitation.  · Normal central venous pressure (3 mmHg).  · The estimated PA systolic pressure is 28 mmHg.  · Mild mitral prolapse of the anterior mitral leaflet.    CHEST CT  Low dose axial images, sagittal and coronal reformations were obtained from the thoracic inlet to the lung bases following the IV administration of 75 mL of Omnipaque 350.     COMPARISON:  None     FINDINGS:  There are extensive scarring and fibrotic changes noted within the bilateral upper lung zones/perihilar regions.  Subpleural reticulation seen within either midlung zone anteriorly.  Findings can be seen with sarcoidosis.  No discrete pulmonary nodules or masses are identified.     Mild vascular calcification seen involving the aorta.  There is no pericardial effusion.  No enlarged mediastinal, hilar or axillary lymph nodes are identified.     There is evidence for prior cholecystectomy.  There is are also extensive postoperative changes associated with the stomach/GE junction.     No suspicious appearing osseus abnormalities are identified.     Impression:     1. Extensive scarring and fibrotic changes noted predominantly within the bilateral mid to upper lung zones as described in detail above.  No discrete pulmonary nodule, mass or adenopathy seen in the right hilar region.  Finding seen on plain film appear to correspond to prominent vasculature      Assessment/Plan:     Problem List Items  Addressed This Visit     Psychophysiological insomnia    Hypertension    Sarcoidosis of lung - Primary    Current Assessment & Plan     ASSESSMENT:     Sarcoidosis stage:   []        Stage I Sarcoidosis  []        Stage II Sarcoidosis  [x]        Stage III Sarcoidosis  []        Stage IV Srcoidosis     Clinical assessment:   [x]        Symptomatic  []        Assymptomatic         Indications for treatment of pulmonary sarcoidosis:   [x]        Evidence of progressive disease   []        Severe disease at presentation     Management options:   []        Observation without therapy  [x]        Systemic glucocorticoids  []        Methotrexate.  []        Azathioprine   []        Leuflonamide  []        Mycophenolate  []        Anti TNF alpha antagonists            Relevant Orders    Stress test, pulmonary    Ambulatory referral/consult to Rheumatology    Complete PFT with bronchodilator    MRI Cardiac Morphology Function W WO    Cardiac MRI Morphology    Moderate persistent asthma    Current Assessment & Plan     IGE < 35  On Prednisone taper  FEV1: 1.11L( 46%)  Repeat spirometry next visit : likely change to TRELEGY  Chest CT : scarring: no airway disease            Heavy tobacco smoker    Current Assessment & Plan     following with smoking cessation         Nocturnal hypoxemia due to obstructive chronic bronchitis    Current Assessment & Plan     ONSAT: 06/30/2020  TC 88 was 2 hr 36 mins  On Oxygen         Nocturnal hypoxemia    Current Assessment & Plan     Based on ONSAt and PSG  Oxygen was ordered         Chronic heart failure with preserved ejection fraction    Current Assessment & Plan     Need to R/o  Cardiac sarcoid  Check MRI           Other Visit Diagnoses     Cardiomyopathy, nonischemic        Relevant Orders    MRI Cardiac Morphology Function W WO    Heart failure, unspecified HF chronicity, unspecified heart failure type        Relevant Orders    MRI Cardiac Morphology Function W WO          Requested  Prescriptions      No prescriptions requested or ordered in this encounter         Follow up in about 8 weeks (around 11/12/2020), or Flu shot, 6MWD next week,PFT,.    This note was prepared using voice recognition system and is likely to have sound alike errors that may have been overlooked even after proof reading.  Please call me with any questions    Discussed diagnosis, its evaluation, treatment and usual course. All questions answered.      Adrian Keating MD

## 2020-09-18 ENCOUNTER — TELEPHONE (OUTPATIENT)
Dept: PULMONOLOGY | Facility: CLINIC | Age: 60
End: 2020-09-18

## 2020-09-18 ENCOUNTER — CLINICAL SUPPORT (OUTPATIENT)
Dept: PULMONOLOGY | Facility: CLINIC | Age: 60
End: 2020-09-18
Payer: COMMERCIAL

## 2020-09-18 ENCOUNTER — OFFICE VISIT (OUTPATIENT)
Dept: INTERNAL MEDICINE | Facility: CLINIC | Age: 60
End: 2020-09-18
Payer: COMMERCIAL

## 2020-09-18 VITALS
TEMPERATURE: 99 F | WEIGHT: 162.94 LBS | BODY MASS INDEX: 25.57 KG/M2 | RESPIRATION RATE: 19 BRPM | DIASTOLIC BLOOD PRESSURE: 74 MMHG | HEIGHT: 67 IN | OXYGEN SATURATION: 100 % | HEART RATE: 68 BPM | SYSTOLIC BLOOD PRESSURE: 118 MMHG

## 2020-09-18 VITALS — BODY MASS INDEX: 25.57 KG/M2 | HEIGHT: 67 IN | WEIGHT: 162.94 LBS

## 2020-09-18 DIAGNOSIS — I10 ESSENTIAL HYPERTENSION: ICD-10-CM

## 2020-09-18 DIAGNOSIS — R51.9 WORSENING HEADACHES: Primary | ICD-10-CM

## 2020-09-18 DIAGNOSIS — D86.0 SARCOIDOSIS OF LUNG: ICD-10-CM

## 2020-09-18 PROCEDURE — 94618 PULMONARY STRESS TESTING: ICD-10-PCS | Mod: S$GLB,,, | Performed by: INTERNAL MEDICINE

## 2020-09-18 PROCEDURE — 3074F PR MOST RECENT SYSTOLIC BLOOD PRESSURE < 130 MM HG: ICD-10-PCS | Mod: CPTII,S$GLB,, | Performed by: FAMILY MEDICINE

## 2020-09-18 PROCEDURE — 99999 PR PBB SHADOW E&M-EST. PATIENT-LVL III: CPT | Mod: PBBFAC,,, | Performed by: FAMILY MEDICINE

## 2020-09-18 PROCEDURE — 94618 PULMONARY STRESS TESTING: CPT | Mod: S$GLB,,, | Performed by: INTERNAL MEDICINE

## 2020-09-18 PROCEDURE — 3074F SYST BP LT 130 MM HG: CPT | Mod: CPTII,S$GLB,, | Performed by: FAMILY MEDICINE

## 2020-09-18 PROCEDURE — 3008F BODY MASS INDEX DOCD: CPT | Mod: CPTII,S$GLB,, | Performed by: FAMILY MEDICINE

## 2020-09-18 PROCEDURE — 99999 PR PBB SHADOW E&M-EST. PATIENT-LVL I: CPT | Mod: PBBFAC,,,

## 2020-09-18 PROCEDURE — 3008F PR BODY MASS INDEX (BMI) DOCUMENTED: ICD-10-PCS | Mod: CPTII,S$GLB,, | Performed by: FAMILY MEDICINE

## 2020-09-18 PROCEDURE — 3078F PR MOST RECENT DIASTOLIC BLOOD PRESSURE < 80 MM HG: ICD-10-PCS | Mod: CPTII,S$GLB,, | Performed by: FAMILY MEDICINE

## 2020-09-18 PROCEDURE — 99999 PR PBB SHADOW E&M-EST. PATIENT-LVL I: ICD-10-PCS | Mod: PBBFAC,,,

## 2020-09-18 PROCEDURE — 99213 PR OFFICE/OUTPT VISIT, EST, LEVL III, 20-29 MIN: ICD-10-PCS | Mod: S$GLB,,, | Performed by: FAMILY MEDICINE

## 2020-09-18 PROCEDURE — 99213 OFFICE O/P EST LOW 20 MIN: CPT | Mod: S$GLB,,, | Performed by: FAMILY MEDICINE

## 2020-09-18 PROCEDURE — 3078F DIAST BP <80 MM HG: CPT | Mod: CPTII,S$GLB,, | Performed by: FAMILY MEDICINE

## 2020-09-18 PROCEDURE — 99999 PR PBB SHADOW E&M-EST. PATIENT-LVL III: ICD-10-PCS | Mod: PBBFAC,,, | Performed by: FAMILY MEDICINE

## 2020-09-18 NOTE — TELEPHONE ENCOUNTER
----- Message from Bessie Bergman sent at 9/18/2020 10:05 AM CDT -----  Regarding: RET CALL  Contact: PATIENT  Type:  Patient Returning Call    Who Called:PATIENT  Who Left Message for Patient:NURSE  Does the patient know what this is regarding?:APPT  Would the patient rather a call back or a response via [facility]? CALL  Best Call Back Number:#[phone]  Additional Information: PLEASE CALL ASAP

## 2020-09-18 NOTE — PROGRESS NOTES
Subjective:       Patient ID: Salvatore Phillips is a 59 y.o. female.    Chief Complaint: Shortness of Breath    HPI Ms. Phillips presents today for HAs and shortness of breath.  Migraine headaches started in July    She is working from home  Felt self sleeping, speech slurred and no feeling on left side.   This has happened a few times.     Light bothered her     Went to another hospital and she was admitted. She slept from Wed to Fri.   She is seeing a neurologist    Stroke work up negative     Neurology has her taking Topamax 50 mg  She has not had another migraine since she started  She is having a headache everyday    Review of Systems   Constitutional: Negative for fever.   HENT: Positive for rhinorrhea. Negative for ear pain and sore throat.    Respiratory: Positive for shortness of breath and wheezing.    Cardiovascular: Positive for leg swelling. Negative for chest pain.   Gastrointestinal: Negative for abdominal pain and vomiting.   Musculoskeletal: Negative for neck pain.   Skin: Negative for rash.   Neurological: Positive for headaches.           Past Medical History:   Diagnosis Date    Asthma     Hypertension     Sarcoidosis of lung     Sleep apnea     maintained on CPAP    Upper respiratory disease     URI (upper respiratory infection)      Past Surgical History:   Procedure Laterality Date    ABDOMINAL SURGERY      BREAST LUMPECTOMY Left 1996    BENIGN    BREAST SURGERY       SECTION      x3    COLON SURGERY      exploratory procedure      GASTRIC BYPASS      HYSTERECTOMY  2009    JOINT REPLACEMENT  2013    KNEE SURGERY      OOPHORECTOMY  2009    SMALL INTESTINE SURGERY      tummy tuck           Objective:        Physical Exam  Vitals signs reviewed.   Constitutional:       Appearance: Normal appearance.      Comments: Nasal canula in place on 2 L Oxygen   Neck:      Musculoskeletal: Normal range of motion.   Skin:     General: Skin is warm.   Neurological:      Mental Status:  She is alert and oriented to person, place, and time.   Psychiatric:         Mood and Affect: Mood normal.         Behavior: Behavior normal.           Results for orders placed or performed during the hospital encounter of 08/05/20   HIV 1/2 Ag/Ab (4th Gen)   Result Value Ref Range    HIV 1/2 Ag/Ab Negative Negative   Hepatitis C antibody   Result Value Ref Range    Hepatitis C Ab Negative Negative   CBC W/ AUTO DIFFERENTIAL   Result Value Ref Range    WBC 8.56 3.90 - 12.70 K/uL    RBC 4.84 4.00 - 5.40 M/uL    Hemoglobin 12.9 12.0 - 16.0 g/dL    Hematocrit 43.5 37.0 - 48.5 %    Mean Corpuscular Volume 90 82 - 98 fL    Mean Corpuscular Hemoglobin 26.7 (L) 27.0 - 31.0 pg    Mean Corpuscular Hemoglobin Conc 29.7 (L) 32.0 - 36.0 g/dL    RDW 19.0 (H) 11.5 - 14.5 %    Platelets 238 150 - 350 K/uL    MPV 9.4 9.2 - 12.9 fL    Immature Granulocytes 0.5 0.0 - 0.5 %    Gran # (ANC) 7.1 1.8 - 7.7 K/uL    Immature Grans (Abs) 0.04 0.00 - 0.04 K/uL    Lymph # 0.8 (L) 1.0 - 4.8 K/uL    Mono # 0.4 0.3 - 1.0 K/uL    Eos # 0.2 0.0 - 0.5 K/uL    Baso # 0.05 0.00 - 0.20 K/uL    nRBC 0 0 /100 WBC    Gran% 82.4 (H) 38.0 - 73.0 %    Lymph% 9.2 (L) 18.0 - 48.0 %    Mono% 5.1 4.0 - 15.0 %    Eosinophil% 2.2 0.0 - 8.0 %    Basophil% 0.6 0.0 - 1.9 %    Differential Method Automated    Comprehensive metabolic panel   Result Value Ref Range    Sodium 142 136 - 145 mmol/L    Potassium 4.4 3.5 - 5.1 mmol/L    Chloride 107 95 - 110 mmol/L    CO2 27 23 - 29 mmol/L    Glucose 74 70 - 110 mg/dL    BUN, Bld 22 (H) 6 - 20 mg/dL    Creatinine 1.0 0.5 - 1.4 mg/dL    Calcium 8.4 (L) 8.7 - 10.5 mg/dL    Total Protein 6.8 6.0 - 8.4 g/dL    Albumin 2.8 (L) 3.5 - 5.2 g/dL    Total Bilirubin 0.2 0.1 - 1.0 mg/dL    Alkaline Phosphatase 91 55 - 135 U/L    AST 21 10 - 40 U/L    ALT 10 10 - 44 U/L    Anion Gap 8 8 - 16 mmol/L    eGFR if African American >60 >60 mL/min/1.73 m^2    eGFR if non African American >60 >60 mL/min/1.73 m^2   Protime-INR   Result Value  Ref Range    Prothrombin Time 10.7 9.0 - 12.5 sec    INR 1.0 0.8 - 1.2   TSH   Result Value Ref Range    TSH 0.610 0.400 - 4.000 uIU/mL   LDL - Lipid Panel   Result Value Ref Range    Cholesterol 146 120 - 199 mg/dL    Triglycerides 61 30 - 150 mg/dL    HDL 56 40 - 75 mg/dL    LDL Cholesterol 77.8 63.0 - 159.0 mg/dL    Hdl/Cholesterol Ratio 38.4 20.0 - 50.0 %    Total Cholesterol/HDL Ratio 2.6 2.0 - 5.0    Non-HDL Cholesterol 90 mg/dL   APTT   Result Value Ref Range    aPTT 36.8 (H) 21.0 - 32.0 sec   Troponin I   Result Value Ref Range    Troponin I 0.006 0.000 - 0.026 ng/mL   COVID-19 Rapid Screening   Result Value Ref Range    SARS-CoV-2 RNA, Amplification, Qual Negative Negative   Sedimentation rate   Result Value Ref Range    Sed Rate 15 0 - 20 mm/Hr   Type & Screen   Result Value Ref Range    Group & Rh O POS     Indirect Alex NEG    POCT glucose   Result Value Ref Range    POCT Glucose 69 (L) 70 - 110 mg/dL   POCT glucose   Result Value Ref Range    POCT Glucose 104 70 - 110 mg/dL       Assessment/Plan:       Worsening headaches  Seeing neurology   Currently improved with Topamax    Essential hypertension-controlled   She has quit smoking and doing well although she does get the urge at times     Discussed possible return to work she would prefer to work from home with underlying health conditions and COVID     Follow up as needed    Viviana Cuba MD  ON   Family Medicine

## 2020-09-18 NOTE — PROCEDURES
"[facility] - Pulm Function [facility]  Six Minute Walk     SUMMARY     Ordering Provider: Dr Keating   Interpreting Provider: Dr Keating  Performing nurse/tech/RT: SIRENA Alcantara RRT  Diagnosis: Sarcoidosis  Height: 5' 7" (170.2 cm)  Weight: 73.9 kg (162 lb 14.7 oz)  BMI (Calculated): 25.5   Patient Race:             Phase Oxygen Assessment Supplemental O2 Heart   Rate Blood Pressure Meeta Dyspnea Scale Rating   Resting 95 % Room Air 75 bpm 143/76 2   Exercise        Minute        1 95 % Room Air 91 bpm     2 94 % Room Air 96 bpm     3 93 % Room Air 98 bpm     4 93 % Room Air 105 bpm     5 93 % Room Air 104 bpm     6  91 % Room Air 103 bpm 162/83 7-8   Recovery        Minute        1 94 % Room Air 81 bpm     2 97 % Room Air 74 bpm     3 98 % Room Air 65 bpm     4 98 % Room Air 72 bpm (!) 142/92 3     Six Minute Walk Summary  6MWT Status: completed without stopping  Patient Reported: Dyspnea, Lightheadedness     Interpretation:  Did the patient stop or pause?: No         Total Time Walked (Calculated): 360 seconds  Final Partial Lap Distance (feet): 125 feet  Total Distance Meters (Calculated): 403.86 meters  Predicted Distance Meters (Calculated): 521.65 meters  Percentage of Predicted (Calculated): 77.42  Peak VO2 (Calculated): 16.1  Mets: 4.6  Has The Patient Had a Previous Six Minute Walk Test?: Yes       Previous 6MWT Results  Has The Patient Had a Previous Six Minute Walk Test?: Yes  Date of Previous Test: 06/29/20  Total Time Walked: 360 seconds  Total Distance (meters): 403.86  Predicted Distance (meters): 529.44 meters  Percentage of Predicted: 76.28  Percent Change (Calculated): 0          CLINICAL INTERPRETATION:  Six minute walk distance is 403.86m (77.42 % predicted) with heavy dyspnea.  During exercise, there was mild desaturation while breathing room air.   Spo2 jj was 91%  Heart rate remained stable with walking.  The patient reported non-pulmonary symptoms during exercise.  The patient did complete " the study, walking 360 seconds of the 360 second test.  Since the previous study in 06/29/2020, exercise capacity is unchanged.  Based upon age and body mass index, exercise capacity is normal.

## 2020-09-18 NOTE — TELEPHONE ENCOUNTER
----- Message from Christine Alcala sent at 9/18/2020  9:01 AM CDT -----  Patient called in regards to r/s her walk to an later time , please call back at [phone].        Thanks,  Christine Alcala

## 2020-09-21 ENCOUNTER — TELEPHONE (OUTPATIENT)
Dept: CARDIOLOGY | Facility: CLINIC | Age: 60
End: 2020-09-21

## 2020-09-22 ENCOUNTER — TELEPHONE (OUTPATIENT)
Dept: PULMONOLOGY | Facility: CLINIC | Age: 60
End: 2020-09-22

## 2020-09-22 NOTE — TELEPHONE ENCOUNTER
----- Message from Adrian Keating MD sent at 9/18/2020  7:04 PM CDT -----   Please let patient know the Following    Walk was Normal    No Oxygen needed at rest or with exercise    Keep oxygen only at night         Adrian Keating MD

## 2020-09-23 ENCOUNTER — PATIENT MESSAGE (OUTPATIENT)
Dept: INTERNAL MEDICINE | Facility: CLINIC | Age: 60
End: 2020-09-23

## 2020-10-05 ENCOUNTER — PATIENT MESSAGE (OUTPATIENT)
Dept: ADMINISTRATIVE | Facility: HOSPITAL | Age: 60
End: 2020-10-05

## 2020-10-13 ENCOUNTER — APPOINTMENT (OUTPATIENT)
Dept: LAB | Facility: HOSPITAL | Age: 60
End: 2020-10-13
Attending: PHYSICAL MEDICINE & REHABILITATION
Payer: COMMERCIAL

## 2020-10-13 ENCOUNTER — LAB VISIT (OUTPATIENT)
Dept: LAB | Facility: HOSPITAL | Age: 60
End: 2020-10-13
Attending: INTERNAL MEDICINE
Payer: COMMERCIAL

## 2020-10-13 ENCOUNTER — OFFICE VISIT (OUTPATIENT)
Dept: RHEUMATOLOGY | Facility: CLINIC | Age: 60
End: 2020-10-13
Payer: COMMERCIAL

## 2020-10-13 VITALS
DIASTOLIC BLOOD PRESSURE: 70 MMHG | BODY MASS INDEX: 26.26 KG/M2 | SYSTOLIC BLOOD PRESSURE: 138 MMHG | HEIGHT: 67 IN | HEART RATE: 83 BPM | WEIGHT: 167.31 LBS

## 2020-10-13 DIAGNOSIS — Z79.52 CURRENT CHRONIC USE OF SYSTEMIC STEROIDS: ICD-10-CM

## 2020-10-13 DIAGNOSIS — Z71.89 COUNSELING ON HEALTH PROMOTION AND DISEASE PREVENTION: ICD-10-CM

## 2020-10-13 DIAGNOSIS — M81.0 OSTEOPOROSIS, UNSPECIFIED OSTEOPOROSIS TYPE, UNSPECIFIED PATHOLOGICAL FRACTURE PRESENCE: ICD-10-CM

## 2020-10-13 DIAGNOSIS — D86.0 SARCOIDOSIS OF LUNG: Primary | ICD-10-CM

## 2020-10-13 DIAGNOSIS — D86.0 SARCOIDOSIS OF LUNG: ICD-10-CM

## 2020-10-13 LAB
ALBUMIN SERPL BCP-MCNC: 3 G/DL (ref 3.5–5.2)
ALP SERPL-CCNC: 118 U/L (ref 55–135)
ALT SERPL W/O P-5'-P-CCNC: 11 U/L (ref 10–44)
ANION GAP SERPL CALC-SCNC: 9 MMOL/L (ref 8–16)
AST SERPL-CCNC: 21 U/L (ref 10–40)
BASOPHILS # BLD AUTO: 0.05 K/UL (ref 0–0.2)
BASOPHILS NFR BLD: 0.6 % (ref 0–1.9)
BILIRUB SERPL-MCNC: <0.1 MG/DL (ref 0.1–1)
BUN SERPL-MCNC: 17 MG/DL (ref 6–20)
CALCIUM SERPL-MCNC: 8.4 MG/DL (ref 8.7–10.5)
CHLORIDE SERPL-SCNC: 107 MMOL/L (ref 95–110)
CO2 SERPL-SCNC: 24 MMOL/L (ref 23–29)
CREAT SERPL-MCNC: 1.1 MG/DL (ref 0.5–1.4)
DIFFERENTIAL METHOD: ABNORMAL
EOSINOPHIL # BLD AUTO: 0.4 K/UL (ref 0–0.5)
EOSINOPHIL NFR BLD: 4.8 % (ref 0–8)
ERYTHROCYTE [DISTWIDTH] IN BLOOD BY AUTOMATED COUNT: 17 % (ref 11.5–14.5)
ERYTHROCYTE [SEDIMENTATION RATE] IN BLOOD BY WESTERGREN METHOD: 56 MM/HR (ref 0–36)
EST. GFR  (AFRICAN AMERICAN): >60 ML/MIN/1.73 M^2
EST. GFR  (NON AFRICAN AMERICAN): 55.5 ML/MIN/1.73 M^2
GLUCOSE SERPL-MCNC: 138 MG/DL (ref 70–110)
HCT VFR BLD AUTO: 40.3 % (ref 37–48.5)
HGB BLD-MCNC: 11.9 G/DL (ref 12–16)
IMM GRANULOCYTES # BLD AUTO: 0.02 K/UL (ref 0–0.04)
IMM GRANULOCYTES NFR BLD AUTO: 0.2 % (ref 0–0.5)
LYMPHOCYTES # BLD AUTO: 1.2 K/UL (ref 1–4.8)
LYMPHOCYTES NFR BLD: 14.4 % (ref 18–48)
MCH RBC QN AUTO: 26.9 PG (ref 27–31)
MCHC RBC AUTO-ENTMCNC: 29.5 G/DL (ref 32–36)
MCV RBC AUTO: 91 FL (ref 82–98)
MONOCYTES # BLD AUTO: 0.4 K/UL (ref 0.3–1)
MONOCYTES NFR BLD: 4.4 % (ref 4–15)
NEUTROPHILS # BLD AUTO: 6.3 K/UL (ref 1.8–7.7)
NEUTROPHILS NFR BLD: 75.6 % (ref 38–73)
NRBC BLD-RTO: 0 /100 WBC
PLATELET # BLD AUTO: 300 K/UL (ref 150–350)
PMV BLD AUTO: 9.5 FL (ref 9.2–12.9)
POTASSIUM SERPL-SCNC: 4.5 MMOL/L (ref 3.5–5.1)
PROT SERPL-MCNC: 7 G/DL (ref 6–8.4)
RBC # BLD AUTO: 4.43 M/UL (ref 4–5.4)
SODIUM SERPL-SCNC: 140 MMOL/L (ref 136–145)
WBC # BLD AUTO: 8.36 K/UL (ref 3.9–12.7)

## 2020-10-13 PROCEDURE — 99244 OFF/OP CNSLTJ NEW/EST MOD 40: CPT | Mod: S$GLB,,, | Performed by: INTERNAL MEDICINE

## 2020-10-13 PROCEDURE — 82787 IGG 1 2 3 OR 4 EACH: CPT | Mod: 59

## 2020-10-13 PROCEDURE — 80074 ACUTE HEPATITIS PANEL: CPT

## 2020-10-13 PROCEDURE — 36415 COLL VENOUS BLD VENIPUNCTURE: CPT | Mod: PO

## 2020-10-13 PROCEDURE — 99999 PR PBB SHADOW E&M-EST. PATIENT-LVL V: ICD-10-PCS | Mod: PBBFAC,,, | Performed by: INTERNAL MEDICINE

## 2020-10-13 PROCEDURE — 99244 PR OFFICE CONSULTATION,LEVEL IV: ICD-10-PCS | Mod: S$GLB,,, | Performed by: INTERNAL MEDICINE

## 2020-10-13 PROCEDURE — 82164 ANGIOTENSIN I ENZYME TEST: CPT

## 2020-10-13 PROCEDURE — 80053 COMPREHEN METABOLIC PANEL: CPT

## 2020-10-13 PROCEDURE — 86480 TB TEST CELL IMMUN MEASURE: CPT

## 2020-10-13 PROCEDURE — 86140 C-REACTIVE PROTEIN: CPT

## 2020-10-13 PROCEDURE — 85652 RBC SED RATE AUTOMATED: CPT

## 2020-10-13 PROCEDURE — 83520 IMMUNOASSAY QUANT NOS NONAB: CPT

## 2020-10-13 PROCEDURE — 85025 COMPLETE CBC W/AUTO DIFF WBC: CPT

## 2020-10-13 PROCEDURE — 99999 PR PBB SHADOW E&M-EST. PATIENT-LVL V: CPT | Mod: PBBFAC,,, | Performed by: INTERNAL MEDICINE

## 2020-10-13 NOTE — LETTER
October 13, 2020      Adrian Keating MD  [address]  [place] [address]           [facility]  [address] [facility] [address]  Phone: [phone]  Fax: [phone]          Patient: Salvatore Phillips   MR Number: [id number]   YOB: 1960   Date of Visit: 10/13/2020       Dear Dr. Adrian Keating:    Thank you for referring Salvatore Phillips to me for evaluation. Attached you will find relevant portions of my assessment and plan of care.    If you have questions, please do not hesitate to call me. I look forward to following Salvatore Phillips along with you.    Sincerely,    Willian Forrester MD    Enclosure  CC:  No Recipients    If you would like to receive this communication electronically, please contact [email or web address] or [phone] to request more information on [facility] access.    For providers and/or their staff who would like to refer a patient to [facility], please contact us through our one-stop-shop provider referral line, [facility], at [phone].    If you feel you have received this communication in error or would no longer like to receive these types of communications, please e-mail [email or web address]

## 2020-10-13 NOTE — Clinical Note
Greatly appreciate the referral!  If you agree, will recommend trial of Cellcept 3gm for 3-4 months prior TNFi.  Thank you again!

## 2020-10-13 NOTE — PROGRESS NOTES
RHEUMATOLOGY OUTPATIENT CLINIC NOTE    10/13/2020    Attending Rheumatologist: Willian Forrester  Primary Care Provider: Viviana Cuba MD   Physician Requesting Consultation: Adrian Keating MD  [address] [place] [address]  Chief Complaint/Reason For Consultation:  Sarcoidosis (referred by Dr Keating)    Subjective:       HPI  Salvatore Phillips is a 59 y.o. Black or  female with historical diagnosis of sarcoidosis recommended for rheumatic evaluation.  Per chart review, diagnosis made at 29 y/o after mediastinal lymph node biopsy.  Following with Pulmonary for probable sarcoid lung involvement, along with moderate persistent asthma, and NENA.  Sarcoidosis being managed with chronic systemic corticosteroids per patient currently on 40 mg daily for the past 5 months.  Main complaint is persistent shortness of breath.  Worsening over the past few months without any particular precipitating event.  Constant, aggravated by activity.  Short-term relief with the use of bronchodilators.  Denies any active rash or photosensitivity.  Does not have fever or tri-color discoloration of fingertips upon cold exposure.  Denies ulcers.  Does not have joint swelling, persisent paresthesias, fever, or hematuria.    Review of Systems   Constitutional: Negative for chills, fever and malaise/fatigue.   Eyes: Negative for pain and redness.   Respiratory: Positive for shortness of breath. Negative for cough and hemoptysis.    Cardiovascular: Negative for chest pain and leg swelling.   Gastrointestinal: Negative for abdominal pain, blood in stool and melena.   Genitourinary: Negative for dysuria and hematuria.   Musculoskeletal: Negative for falls and joint pain.   Skin: Negative for rash.        Denies rash or photosensitivity.  No ulcers.   Neurological: Negative for tingling, focal weakness and weakness.   Psychiatric/Behavioral: Negative for memory loss. The patient does not have insomnia.       Chronic comorbid conditions affecting medical decision making today:  Past Medical History:   Diagnosis Date    Asthma     Hypertension     Sarcoidosis of lung     Sleep apnea     maintained on CPAP    Upper respiratory disease     URI (upper respiratory infection)    · HFpEF    Past Surgical History:   Procedure Laterality Date    ABDOMINAL SURGERY      BREAST LUMPECTOMY Left 1996    BENIGN    BREAST SURGERY       SECTION      x3    COLON SURGERY      exploratory procedure      GASTRIC BYPASS      HYSTERECTOMY  2009    JOINT REPLACEMENT  2013    KNEE SURGERY      OOPHORECTOMY  2009    SMALL INTESTINE SURGERY      tummy tuck       Family History   Problem Relation Age of Onset    Alzheimer's disease Mother     Cancer Father         lung    Crohn's disease Brother     Kidney disease Brother     Arthritis Sister     Hypertension Son      Social History     Substance and Sexual Activity   Alcohol Use Not Currently    Frequency: Monthly or less    Drinks per session: 1 or 2    Binge frequency: Never    Comment: occ     Social History     Tobacco Use   Smoking Status Former Smoker    Packs/day: 1.00    Years: 35.00    Pack years: 35.00    Types: Cigarettes    Start date: 1979    Quit date: 2020    Years since quittin.1   Smokeless Tobacco Never Used   Tobacco Comment    started age 17     Social History     Substance and Sexual Activity   Drug Use Never       Current Outpatient Medications:     albuterol (PROVENTIL/VENTOLIN HFA) 90 mcg/actuation inhaler, Inhale 2 puffs into the lungs every 4 (four) hours as needed for Wheezing or Shortness of Breath., Disp: 54 g, Rfl: 3    albuterol-ipratropium (DUO-NEB) 2.5 mg-0.5 mg/3 mL nebulizer solution, Take 3 mLs by nebulization every 6 (six) hours as needed for Wheezing or Shortness of Breath. Rescue. Corrected prescription with refills, Disp: 1 Box, Rfl: 11    budesonide (PULMICORT) 0.5 mg/2 mL nebulizer solution,  Take 2 mLs (0.5 mg total) by nebulization 2 (two) times daily. Controller, Disp: 120 mL, Rfl: 0    butalbital-acetaminophen-caffeine -40 mg (FIORICET, ESGIC) -40 mg per tablet, Take 1 tablet by mouth every 6 (six) hours as needed for Pain., Disp: 10 tablet, Rfl: 0    fluticasone furoate-vilanteroL (BREO ELLIPTA) 200-25 mcg/dose DsDv diskus inhaler, Inhale 1 puff into the lungs once daily. Controller, Disp: 60 each, Rfl: 11    fluticasone propionate (FLONASE) 50 mcg/actuation nasal spray, 2 sprays (100 mcg total) by Each Nostril route once daily. Corrected prescription with refills, Disp: 48 mL, Rfl: 3    furosemide (LASIX) 40 MG tablet, Take 1 tablet (40 mg total) by mouth daily as needed (for leg swelling)., Disp: 30 tablet, Rfl: 3    ipratropium (ATROVENT HFA) 17 mcg/actuation inhaler, Inhale 2 puffs into the lungs every 4 to 6 hours as needed for Wheezing. Rescue, Disp: 1 Inhaler, Rfl: 11    potassium chloride SA (K-DUR,KLOR-CON) 20 MEQ tablet, Take 1 tablet (20 mEq total) by mouth daily as needed (take with lasix)., Disp: 60 tablet, Rfl: 3    predniSONE (DELTASONE) 20 MG tablet, Take 2 tablets (40 mg total) by mouth once daily., Disp: 10 tablet, Rfl: 0    topiramate (TOPAMAX) 50 MG tablet, Take 50 mg by mouth every evening., Disp: , Rfl:     traMADoL (ULTRAM) 50 mg tablet, Take 1 tablet (50 mg total) by mouth every 6 (six) hours as needed for Pain., Disp: 12 tablet, Rfl: 0    varenicline (CHANTIX) 1 mg Tab, Take 1 tablet (1 mg total) by mouth 2 (two) times daily., Disp: 60 tablet, Rfl: 2    zafirlukast (ACCOLATE) 20 MG tablet, Take 1 tablet (20 mg total) by mouth 2 (two) times daily., Disp: 180 tablet, Rfl: 3    zolpidem (AMBIEN) 5 MG Tab, Take 1 tablet (5 mg total) by mouth nightly as needed (insomnia)., Disp: 20 tablet, Rfl: 1    nicotine polacrilex 2 MG Lozg, Take 1 lozenge (2 mg total) by mouth as needed. (Patient not taking: Reported on 7/10/2020), Disp: 200 lozenge, Rfl: 2     "oxybutynin (DITROPAN) 5 MG Tab, Take 1 tablet (5 mg total) by mouth every evening., Disp: 90 tablet, Rfl: 0    predniSONE (DELTASONE) 20 MG tablet, Take 1 tablet (20 mg total) by mouth As instructed. 3tab po daily x3days, 2tabs po daily x3days, 1tab po daily x3days, 1/2tab po daily x3days, Disp: 25 tablet, Rfl: 0    QUEtiapine (SEROQUEL) 50 MG tablet, Take 50 mg by mouth every evening., Disp: , Rfl:     Current Facility-Administered Medications:     cyanocobalamin injection 1,000 mcg, 1,000 mcg, Intramuscular, Once, Viviana Cuba MD     Objective:         Vitals:    10/13/20 1421   BP: 138/70   Pulse: 83     Physical Exam   Constitutional: She is oriented to person, place, and time. No distress.   Estimated body mass index is 26.21 kg/m² as calculated from the following:    Height as of this encounter: 5' 7" (1.702 m).    Weight as of this encounter: 75.9 kg (167 lb 5.3 oz).    Wt Readings from Last 1 Encounters:  10/13/20 1421 : 75.9 kg (167 lb 5.3 oz)     HENT:   Head: Normocephalic and atraumatic.   Eyes: Conjunctivae are normal. Pupils are equal, round, and reactive to light.   Neck: Normal range of motion.   Cardiovascular: Normal rate and intact distal pulses.    Pulmonary/Chest: Effort normal.   Increased work or breathing.   Abdominal: Soft. She exhibits no distension.   Neurological: She is alert and oriented to person, place, and time.   Skin: No rash noted. No erythema.     Musculoskeletal: Normal range of motion.      Comments: : strong  No synovitis or significant squeeze tenderness    AROM: intact  PROM: intact    Devices used by patient: none       Reviewed old and all outside pertinent medical records available.    All lab results personally reviewed and interpreted by me.  Lab Results   Component Value Date    WBC 8.56 08/05/2020    HGB 12.9 08/05/2020    HCT 43.5 08/05/2020    MCV 90 08/05/2020    MCH 26.7 (L) 08/05/2020    MCHC 29.7 (L) 08/05/2020    RDW 19.0 (H) 08/05/2020     " 08/05/2020    MPV 9.4 08/05/2020    PLTEST Adequate 01/03/2010       Lab Results   Component Value Date     08/05/2020    K 4.4 08/05/2020     08/05/2020    CO2 27 08/05/2020    GLU 74 08/05/2020    BUN 22 (H) 08/05/2020    CALCIUM 8.4 (L) 08/05/2020    PROT 6.8 08/05/2020    ALBUMIN 2.8 (L) 08/05/2020    BILITOT 0.2 08/05/2020    AST 21 08/05/2020    ALKPHOS 91 08/05/2020    ALT 10 08/05/2020       Lab Results   Component Value Date    COLORU yellow 12/05/2019    APPEARANCEUA CLEAR 01/02/2010    SPECGRAV 1.020 12/05/2019    PHUR 5 12/05/2019    PROTEINUA 1+ (A) 01/02/2010    KETONESU neg 12/05/2019    LEUKOCYTESUR NEG 01/02/2010    NITRITE neg 12/05/2019    UROBILINOGEN neg 12/05/2019       No results found for: CRP    Lab Results   Component Value Date    SEDRATE 15 08/05/2020       Lab Results   Component Value Date    SEDRATE 15 08/05/2020       No components found for: 25OHVITDTOT, 93CPWRXU1, 63UQWMKY8, METHODNOTE    No results found for: URICACID    No components found for: TSPOTTB    Rheum Labs:   ACE within normal range June 2020     Infectious Labs:   HCV NR 8/2020   HIV nonreactive 8/2020   TB spot test negative 2/2016     Imaging:  All imaging reviewed and independently interpreted by me.    MRI lumbar spine September 2016  1.  Severe bilateral neural foraminal stenosis at L5-S1 secondary to a broad-based disc bulge and facet hypertrophy.   2.  No more than mild spinal canal stenosis identified at L4-5.   3.  No acute osseous findings.    MRI brain August 2020  No acute intracranial infarct or hemorrhage. Minor chronic microvascular ischemic changes. Low-lying cerebellar tonsils    CT chest September 2020  Extensive scarring and fibrotic changes noted predominantly within the bilateral mid to upper lung zones.  No discrete pulmonary nodule, mass or adenopathy seen in the right hilar region.     EKG 8/2020  Normal sinus rhythm a 75bpm. QTc Int : 408 ms. Possible Left atrial enlargement      TTE 7/2020  Moderate concentric left ventricular hypertrophy.  Small left ventricular cavity size.  Normal left ventricular systolic function. The estimated ejection fraction is 60%.  Grade I (mild) left ventricular diastolic dysfunction consistent with impaired relaxation.  Normal right ventricular systolic function.  Mild tricuspid regurgitation.  Mild pulmonic regurgitation.  Normal central venous pressure (3 mmHg).  The estimated PA systolic pressure is 28 mmHg.  Mild mitral prolapse of the anterior mitral leaflet.    Pulmonary function test: FVC / FEV1 / Ratio / TLC / DLCO  September 2019  72 / 56 / 76 / na / na    June 2020  66 / 46 / 54 / 79 / 55     ASSESSMENT / PLAN:     Salvatore Phillips is a 59 y.o. Black or  female with:    1. Sarcoidosis with lymph node and probable lung involvement   - historical diagnosis after mediastinal lymph node biopsy (n/a) at 31 yo age  - currently without apparent extrathoracic manifestations otherwise  - worsening shortness of breath.  Multiple etiologies possible.  History of asthma, NENA, HFpEF, Hx of smoking  - fibrotic changes on lung CT imaging, recent PFT with severe obstruction and mild restrictive pattern  - awaiting cardiac MRI per cardiology recommendations.  - currently on 4 mg of prednisone daily for several months.  - recommend trial of CellCept (after lab review) to goal of 3 g daily for 3-4 months as steroid sparing agent  - clinical significant side effects of therapy discussed in detail.    - Consider escalation to TNF inhibitor if suboptimal response  - follow with Ophthalmology to assess for uveitis.  - Bone protection: DEXA, Ca can be given if needed. Vitamin D therapy is usually avoided because of the tendency to cause hypercalcemia.  - Quantiferon Gold TB; Future  - Hepatitis Panel, Acute; Future  - Angiotensin Converting Enzyme; Standing  - Interleukin-2 Receptor; Future  - IgG 1, 2, 3, and 4; Standing  - CBC auto differential;  Standing  - Comprehensive Metabolic Panel; Standing  - C-Reactive Protein; Standing  - Sedimentation rate; Standing    2. Current chronic use of systemic steroids  - DXA Bone Density Spine And Hip; Future    3. Other specified counseling  - over 10 minutes spent regarding below topics:  - Immunization counseling done.  - Nutrition and exercise counseling.  - Limitation of alcohol consumption.  - Regular exercise:  Aerobic and resistance.  - Medication counseling provided.    Follow up in about 6 weeks (around 11/24/2020).    Method of contact with patient concerns: [facility]    Disclaimer:  This note is prepared using voice recognition software and as such is likely to have errors and has not been proof read. Please contact me for questions.     Time spent: 60 minutes in face to face discussion concerning diagnosis, prognosis, review of lab and test results, benefits of treatment as well as management of disease, counseling of patient and coordination of care between various health care providers.  Greater than half the time spent was used for coordination of care and counseling of patient.    Willian Forrester M.D.  Rheumatology Department   [facility]

## 2020-10-14 LAB
CRP SERPL-MCNC: 5 MG/L (ref 0–8.2)
HAV IGM SERPL QL IA: NEGATIVE
HBV CORE IGM SERPL QL IA: NEGATIVE
HBV SURFACE AG SERPL QL IA: NEGATIVE
HCV AB SERPL QL IA: NEGATIVE

## 2020-10-15 LAB
ACE SERPL-CCNC: 37 U/L (ref 16–85)
GAMMA INTERFERON BACKGROUND BLD IA-ACNC: 0.04 IU/ML
M TB IFN-G CD4+ BCKGRND COR BLD-ACNC: -0.01 IU/ML
MITOGEN IGNF BCKGRD COR BLD-ACNC: >10 IU/ML
TB GOLD PLUS: NEGATIVE
TB2 - NIL: -0.01 IU/ML

## 2020-10-16 LAB
IGG1 SER-MCNC: 817 MG/DL (ref 382–929)
IGG2 SER-MCNC: 352 MG/DL (ref 242–700)
IGG3 SER-MCNC: 140 MG/DL (ref 22–176)
IGG4 SER-MCNC: 54 MG/DL (ref 4–86)

## 2020-10-17 LAB — SOL IL2 RECEP SERPL-MCNC: 701.1 PG/ML (ref 175.3–858.2)

## 2020-11-13 ENCOUNTER — PATIENT MESSAGE (OUTPATIENT)
Dept: CARDIOLOGY | Facility: CLINIC | Age: 60
End: 2020-11-13

## 2020-11-16 DIAGNOSIS — D86.0 SARCOIDOSIS OF LUNG: ICD-10-CM

## 2020-11-16 DIAGNOSIS — J45.41 MODERATE PERSISTENT ASTHMA WITH ACUTE EXACERBATION: ICD-10-CM

## 2020-11-16 RX ORDER — PREDNISONE 2.5 MG/1
5 TABLET ORAL DAILY
Qty: 60 TABLET | Refills: 5 | Status: SHIPPED | OUTPATIENT
Start: 2020-11-16

## 2020-11-17 ENCOUNTER — LAB VISIT (OUTPATIENT)
Dept: OTOLARYNGOLOGY | Facility: CLINIC | Age: 60
End: 2020-11-17
Payer: COMMERCIAL

## 2020-11-17 DIAGNOSIS — U07.1 COVID-19: ICD-10-CM

## 2020-11-17 PROCEDURE — U0003 INFECTIOUS AGENT DETECTION BY NUCLEIC ACID (DNA OR RNA); SEVERE ACUTE RESPIRATORY SYNDROME CORONAVIRUS 2 (SARS-COV-2) (CORONAVIRUS DISEASE [COVID-19]), AMPLIFIED PROBE TECHNIQUE, MAKING USE OF HIGH THROUGHPUT TECHNOLOGIES AS DESCRIBED BY CMS-2020-01-R: HCPCS

## 2020-11-19 LAB — SARS-COV-2 RNA RESP QL NAA+PROBE: NOT DETECTED

## 2020-11-20 ENCOUNTER — CLINICAL SUPPORT (OUTPATIENT)
Dept: PULMONOLOGY | Facility: CLINIC | Age: 60
End: 2020-11-20
Payer: COMMERCIAL

## 2020-11-20 ENCOUNTER — PATIENT OUTREACH (OUTPATIENT)
Dept: ADMINISTRATIVE | Facility: OTHER | Age: 60
End: 2020-11-20

## 2020-11-20 ENCOUNTER — APPOINTMENT (OUTPATIENT)
Dept: RADIOLOGY | Facility: HOSPITAL | Age: 60
End: 2020-11-20
Attending: INTERNAL MEDICINE
Payer: COMMERCIAL

## 2020-11-20 ENCOUNTER — OFFICE VISIT (OUTPATIENT)
Dept: PULMONOLOGY | Facility: CLINIC | Age: 60
End: 2020-11-20
Payer: COMMERCIAL

## 2020-11-20 ENCOUNTER — OFFICE VISIT (OUTPATIENT)
Dept: RHEUMATOLOGY | Facility: CLINIC | Age: 60
End: 2020-11-20
Payer: COMMERCIAL

## 2020-11-20 VITALS
HEART RATE: 72 BPM | BODY MASS INDEX: 27.06 KG/M2 | SYSTOLIC BLOOD PRESSURE: 159 MMHG | WEIGHT: 172.38 LBS | HEIGHT: 67 IN | DIASTOLIC BLOOD PRESSURE: 101 MMHG

## 2020-11-20 VITALS
OXYGEN SATURATION: 98 % | WEIGHT: 171.94 LBS | RESPIRATION RATE: 16 BRPM | SYSTOLIC BLOOD PRESSURE: 118 MMHG | BODY MASS INDEX: 26.99 KG/M2 | DIASTOLIC BLOOD PRESSURE: 76 MMHG | HEIGHT: 67 IN | HEART RATE: 81 BPM

## 2020-11-20 DIAGNOSIS — G47.36 NOCTURNAL HYPOXEMIA DUE TO OBSTRUCTIVE CHRONIC BRONCHITIS: ICD-10-CM

## 2020-11-20 DIAGNOSIS — M81.0 OSTEOPOROSIS, UNSPECIFIED OSTEOPOROSIS TYPE, UNSPECIFIED PATHOLOGICAL FRACTURE PRESENCE: ICD-10-CM

## 2020-11-20 DIAGNOSIS — G47.00 INSOMNIA, UNSPECIFIED TYPE: ICD-10-CM

## 2020-11-20 DIAGNOSIS — D86.0 SARCOIDOSIS OF LUNG: ICD-10-CM

## 2020-11-20 DIAGNOSIS — D86.0 SARCOIDOSIS OF LUNG: Primary | ICD-10-CM

## 2020-11-20 DIAGNOSIS — F51.04 PSYCHOPHYSIOLOGICAL INSOMNIA: ICD-10-CM

## 2020-11-20 DIAGNOSIS — Z79.52 CURRENT CHRONIC USE OF SYSTEMIC STEROIDS: ICD-10-CM

## 2020-11-20 DIAGNOSIS — J44.89 NOCTURNAL HYPOXEMIA DUE TO OBSTRUCTIVE CHRONIC BRONCHITIS: ICD-10-CM

## 2020-11-20 DIAGNOSIS — G47.34 NOCTURNAL HYPOXEMIA: ICD-10-CM

## 2020-11-20 DIAGNOSIS — J45.40 MODERATE PERSISTENT ASTHMA WITHOUT COMPLICATION: Primary | ICD-10-CM

## 2020-11-20 LAB
BRPFT: NORMAL
DLCO ADJ PRE: 13.79 ML/(MIN*MMHG)
DLCO SINGLE BREATH LLN: 18.98
DLCO SINGLE BREATH PRE REF: 55.8 %
DLCO SINGLE BREATH REF: 24.71
DLCOC SBVA LLN: 3.21
DLCOC SBVA PRE REF: 101.2 %
DLCOC SBVA REF: 4.55
DLCOC SINGLE BREATH LLN: 18.98
DLCOC SINGLE BREATH PRE REF: 55.8 %
DLCOC SINGLE BREATH REF: 24.71
DLCOVA LLN: 3.21
DLCOVA PRE REF: 101.2 %
DLCOVA PRE: 4.6 ML/(MIN*MMHG*L)
DLCOVA REF: 4.55
DLVAADJ PRE: 4.6 ML/(MIN*MMHG*L)
ERV LLN: -16449.15
ERV PRE REF: 48.5 %
ERV REF: 0.85
FEF 25 75 LLN: 0.95
FEF 25 75 PRE REF: 23.8 %
FEF 25 75 REF: 2.18
FEV1 FVC LLN: 68
FEV1 FVC PRE REF: 73.8 %
FEV1 FVC REF: 79
FEV1 LLN: 1.75
FEV1 PRE REF: 45.5 %
FEV1 REF: 2.39
FRCPLETH LLN: 2.04
FRCPLETH PREREF: 53.6 %
FRCPLETH REF: 2.87
FVC LLN: 2.24
FVC PRE REF: 61.3 %
FVC REF: 3.03
IVC PRE: 1.79 L
IVC SINGLE BREATH LLN: 2.24
IVC SINGLE BREATH PRE REF: 59 %
IVC SINGLE BREATH REF: 3.03
MVV LLN: 90
MVV PRE REF: 35 %
MVV REF: 106
PEF LLN: 3.97
PEF PRE REF: 46.1 %
PEF REF: 6.16
PRE DLCO: 13.79 ML/(MIN*MMHG)
PRE ERV: 0.41 L
PRE FEF 25 75: 0.52 L/S
PRE FET 100: 7.5 SEC
PRE FEV1 FVC: 58.71 %
PRE FEV1: 1.09 L
PRE FRC PL: 1.54 L
PRE FVC: 1.85 L
PRE MVV: 37 L/MIN
PRE PEF: 2.84 L/S
PRE RV: 1.2 L
PRE TLC: 3.06 L
RAW LLN: 3.06
RAW PRE REF: 407 %
RAW PRE: 12.45 CMH2O*S/L
RAW REF: 3.06
RV LLN: 1.45
RV PRE REF: 59.2 %
RV REF: 2.02
RVTLC LLN: 29
RVTLC PRE REF: 100.3 %
RVTLC PRE: 39.16 %
RVTLC REF: 39
TLC LLN: 4.44
TLC PRE REF: 56.3 %
TLC REF: 5.43
VA PRE: 3 L
VA SINGLE BREATH LLN: 5.28
VA SINGLE BREATH PRE REF: 56.8 %
VA SINGLE BREATH REF: 5.28
VC LLN: 2.24
VC PRE REF: 61.4 %
VC PRE: 1.86 L
VC REF: 3.03
VTGRAWPRE: 1.92 L

## 2020-11-20 PROCEDURE — 99214 OFFICE O/P EST MOD 30 MIN: CPT | Mod: S$GLB,,, | Performed by: INTERNAL MEDICINE

## 2020-11-20 PROCEDURE — 77080 DXA BONE DENSITY AXIAL: CPT | Mod: TC

## 2020-11-20 PROCEDURE — 3078F DIAST BP <80 MM HG: CPT | Mod: CPTII,S$GLB,, | Performed by: INTERNAL MEDICINE

## 2020-11-20 PROCEDURE — 3074F SYST BP LT 130 MM HG: CPT | Mod: CPTII,S$GLB,, | Performed by: INTERNAL MEDICINE

## 2020-11-20 PROCEDURE — 77080 DXA BONE DENSITY AXIAL: CPT | Mod: 26,,, | Performed by: RADIOLOGY

## 2020-11-20 PROCEDURE — 3074F PR MOST RECENT SYSTOLIC BLOOD PRESSURE < 130 MM HG: ICD-10-PCS | Mod: CPTII,S$GLB,, | Performed by: INTERNAL MEDICINE

## 2020-11-20 PROCEDURE — 99999 PR PBB SHADOW E&M-EST. PATIENT-LVL IV: ICD-10-PCS | Mod: PBBFAC,,, | Performed by: INTERNAL MEDICINE

## 2020-11-20 PROCEDURE — 3008F BODY MASS INDEX DOCD: CPT | Mod: CPTII,S$GLB,, | Performed by: INTERNAL MEDICINE

## 2020-11-20 PROCEDURE — 1126F PR PAIN SEVERITY QUANTIFIED, NO PAIN PRESENT: ICD-10-PCS | Mod: S$GLB,,, | Performed by: INTERNAL MEDICINE

## 2020-11-20 PROCEDURE — 99214 PR OFFICE/OUTPT VISIT, EST, LEVL IV, 30-39 MIN: ICD-10-PCS | Mod: S$GLB,,, | Performed by: INTERNAL MEDICINE

## 2020-11-20 PROCEDURE — 1126F AMNT PAIN NOTED NONE PRSNT: CPT | Mod: S$GLB,,, | Performed by: INTERNAL MEDICINE

## 2020-11-20 PROCEDURE — 99214 OFFICE O/P EST MOD 30 MIN: CPT | Mod: 25,S$GLB,, | Performed by: INTERNAL MEDICINE

## 2020-11-20 PROCEDURE — 94729 DIFFUSING CAPACITY: CPT | Mod: S$GLB,,, | Performed by: INTERNAL MEDICINE

## 2020-11-20 PROCEDURE — 3078F PR MOST RECENT DIASTOLIC BLOOD PRESSURE < 80 MM HG: ICD-10-PCS | Mod: CPTII,S$GLB,, | Performed by: INTERNAL MEDICINE

## 2020-11-20 PROCEDURE — 3008F PR BODY MASS INDEX (BMI) DOCUMENTED: ICD-10-PCS | Mod: CPTII,S$GLB,, | Performed by: INTERNAL MEDICINE

## 2020-11-20 PROCEDURE — 99214 PR OFFICE/OUTPT VISIT, EST, LEVL IV, 30-39 MIN: ICD-10-PCS | Mod: 25,S$GLB,, | Performed by: INTERNAL MEDICINE

## 2020-11-20 PROCEDURE — 94726 PULM FUNCT TST PLETHYSMOGRAP: ICD-10-PCS | Mod: S$GLB,,, | Performed by: INTERNAL MEDICINE

## 2020-11-20 PROCEDURE — 94729 PR C02/MEMBANE DIFFUSE CAPACITY: ICD-10-PCS | Mod: S$GLB,,, | Performed by: INTERNAL MEDICINE

## 2020-11-20 PROCEDURE — 94010 BREATHING CAPACITY TEST: CPT | Mod: S$GLB,,, | Performed by: INTERNAL MEDICINE

## 2020-11-20 PROCEDURE — 94010 BREATHING CAPACITY TEST: ICD-10-PCS | Mod: S$GLB,,, | Performed by: INTERNAL MEDICINE

## 2020-11-20 PROCEDURE — 99999 PR PBB SHADOW E&M-EST. PATIENT-LVL IV: CPT | Mod: PBBFAC,,, | Performed by: INTERNAL MEDICINE

## 2020-11-20 PROCEDURE — 77080 DEXA BONE DENSITY SPINE HIP: ICD-10-PCS | Mod: 26,,, | Performed by: RADIOLOGY

## 2020-11-20 PROCEDURE — 94726 PLETHYSMOGRAPHY LUNG VOLUMES: CPT | Mod: S$GLB,,, | Performed by: INTERNAL MEDICINE

## 2020-11-20 RX ORDER — MYCOPHENOLATE MOFETIL 500 MG/1
TABLET ORAL
Qty: 60 TABLET | Refills: 3 | Status: SHIPPED | OUTPATIENT
Start: 2020-11-20 | End: 2021-01-03

## 2020-11-20 NOTE — PROGRESS NOTES
Pulmonary Outpatient Follow Up Visit     Subjective:       Patient ID: Salvatore Phillips is a 60 y.o. female.    Patient Active Problem List   Diagnosis    Foot drop, right    Asthma    Hypertension    Sarcoidosis of lung    Postnasal drip    Wheezing    Psychophysiological insomnia    Sleepwalking    Primary snoring    Inadequate sleep hygiene    Moderate persistent asthma    Edema of leg    Elevated brain natriuretic peptide (BNP) level    Heavy tobacco smoker    Nocturnal hypoxemia due to obstructive chronic bronchitis    Nocturnal hypoxemia    Insomnia    Worsening headaches    Chronic heart failure with preserved ejection fraction      Immunization History   Administered Date(s) Administered    Influenza - Quadrivalent - PF *Preferred* (6 months and older) 10/24/2018, 2020    Influenza - Trivalent (ADULT) 2018    Pneumococcal Polysaccharide - 23 Valent 2019    Td (ADULT) 2019      Social History     Tobacco Use   Smoking Status Former Smoker    Packs/day: 1.00    Years: 35.00    Pack years: 35.00    Types: Cigarettes    Start date: 1979    Quit date: 2020    Years since quittin.2   Smokeless Tobacco Never Used   Tobacco Comment    started age 17       Asthma Control Test  In the past 4  weeks, how much of the time did your asthma keep you from getting as much done at work, school or at home?: Most of the time  During the past 4 weeks, how often have you had shortness of breath?: More than once a day  During the past 4 weeks, how often did your asthma symptoms (wheezing, couging, shortness of breath, chest tightness or pain) wake you up at night or earlier than usual in the morning?: 4 or more nights a week  During the past 4 weeks, how often have you used your rescue inhaler or nebulizer medication (such as albuterol)?: 3 or more times per day  How would you rate your asthma control during the past 4  weeks?: Poorly controlled  If your score is 19 or less, your asthma may not be under control: 7    Asthma Control Test  In the past 4  weeks, how much of the time did your asthma keep you from getting as much done at work, school or at home?: Most of the time  During the past 4 weeks, how often have you had shortness of breath?: More than once a day  During the past 4 weeks, how often did your asthma symptoms (wheezing, couging, shortness of breath, chest tightness or pain) wake you up at night or earlier than usual in the morning?: 4 or more nights a week  During the past 4 weeks, how often have you used your rescue inhaler or nebulizer medication (such as albuterol)?: 3 or more times per day  How would you rate your asthma control during the past 4 weeks?: Poorly controlled  If your score is 19 or less, your asthma may not be under control: 7       Chief Complaint: Asthma and Sarcoidosis      Salvatore Phillips is 60 y.o. .   Last visit 09/17/2020  Due to see Rheumatology to start CELLCEPT  Delay due to death of mother, missed appt  Missed appt with cardiology and cardiac MRI pending  reviewed PFT  MIXED RESTRICTION AND OBSTRUCTION  She is on 2.0 LPM oxygen from [facility], pulse flow  RA spO2 was 98% today  Still Fatigued  No cough, No fever, No wheezing.  PFT:  Obstructive defect with reduced DLCO  Echo reviewed: Grade I (mild) left ventricular diastolic dysfunction consistent with impaired relaxation: BNP elevated  Will get cardiac MRI  ACT improved from 9 n   BREO, Nebulizer DUONEB, PULMICORT NEB,   Last FEV1:  1.09 L ( 46 %),   Known Sarcoidosis Lung after Biopsy at 29 years,   Chest CT: Bilateral scarring      Asthma  There is no cough, shortness of breath or wheezing. Pertinent negatives include no headaches. Her past medical history is significant for asthma.     Social History     Tobacco Use   Smoking Status Former Smoker    Packs/day: 1.00    Years: 35.00    Pack years: 35.00    Types: Cigarettes     Start date: 1979    Quit date: 2020    Years since quittin.2   Smokeless Tobacco Never Used   Tobacco Comment    started age 17           Immunization History   Administered Date(s) Administered    Influenza - Quadrivalent - PF *Preferred* (6 months and older) 10/24/2018, 2020    Influenza - Trivalent (ADULT) 2018    Pneumococcal Polysaccharide - 23 Valent 2019    Td (ADULT) 2019            Review of Systems   Constitutional: Positive for fatigue.   Respiratory: Negative for cough, shortness of breath, wheezing, dyspnea on extertion and use of rescue inhaler.    Cardiovascular: Negative for leg swelling.   Neurological: Negative for weakness and headaches.   Psychiatric/Behavioral: Negative for sleep disturbance.   All other systems reviewed and are negative.      Outpatient Encounter Medications as of 2020   Medication Sig Dispense Refill    albuterol (PROVENTIL/VENTOLIN HFA) 90 mcg/actuation inhaler Inhale 2 puffs into the lungs every 4 (four) hours as needed for Wheezing or Shortness of Breath. 54 g 3    albuterol-ipratropium (DUO-NEB) 2.5 mg-0.5 mg/3 mL nebulizer solution Take 3 mLs by nebulization every 6 (six) hours as needed for Wheezing or Shortness of Breath. Rescue. Corrected prescription with refills 1 Box 11    budesonide (PULMICORT) 0.5 mg/2 mL nebulizer solution Take 2 mLs (0.5 mg total) by nebulization 2 (two) times daily. Controller 120 mL 0    butalbital-acetaminophen-caffeine -40 mg (FIORICET, ESGIC) -40 mg per tablet Take 1 tablet by mouth every 6 (six) hours as needed for Pain. 10 tablet 0    fluticasone furoate-vilanteroL (BREO ELLIPTA) 200-25 mcg/dose DsDv diskus inhaler Inhale 1 puff into the lungs once daily. Controller 60 each 11    fluticasone propionate (FLONASE) 50 mcg/actuation nasal spray 2 sprays (100 mcg total) by Each Nostril route once daily. Corrected prescription with refills 48 mL 3    furosemide (LASIX) 40 MG  "tablet Take 1 tablet (40 mg total) by mouth daily as needed (for leg swelling). 30 tablet 3    ipratropium (ATROVENT HFA) 17 mcg/actuation inhaler Inhale 2 puffs into the lungs every 4 to 6 hours as needed for Wheezing. Rescue 1 Inhaler 11    nicotine polacrilex 2 MG Lozg Take 1 lozenge (2 mg total) by mouth as needed. 200 lozenge 2    potassium chloride SA (K-DUR,KLOR-CON) 20 MEQ tablet Take 1 tablet (20 mEq total) by mouth daily as needed (take with lasix). 60 tablet 3    predniSONE (DELTASONE) 2.5 MG tablet Take 2 tablets (5 mg total) by mouth once daily. 60 tablet 5    QUEtiapine (SEROQUEL) 50 MG tablet Take 50 mg by mouth every evening.      topiramate (TOPAMAX) 50 MG tablet Take 50 mg by mouth every evening.      traMADoL (ULTRAM) 50 mg tablet Take 1 tablet (50 mg total) by mouth every 6 (six) hours as needed for Pain. 12 tablet 0    varenicline (CHANTIX) 1 mg Tab Take 1 tablet (1 mg total) by mouth 2 (two) times daily. 60 tablet 2    zafirlukast (ACCOLATE) 20 MG tablet Take 1 tablet (20 mg total) by mouth 2 (two) times daily. 180 tablet 3    zolpidem (AMBIEN) 5 MG Tab Take 1 tablet (5 mg total) by mouth nightly as needed (insomnia). 20 tablet 1    oxybutynin (DITROPAN) 5 MG Tab Take 1 tablet (5 mg total) by mouth every evening. 90 tablet 0    predniSONE (DELTASONE) 20 MG tablet Take 2 tablets (40 mg total) by mouth once daily. 10 tablet 0    predniSONE (DELTASONE) 20 MG tablet Take 1 tablet (20 mg total) by mouth As instructed. 3tab po daily x3days, 2tabs po daily x3days, 1tab po daily x3days, 1/2tab po daily x3days 25 tablet 0     Facility-Administered Encounter Medications as of 11/20/2020   Medication Dose Route Frequency Provider Last Rate Last Dose    cyanocobalamin injection 1,000 mcg  1,000 mcg Intramuscular Once Viviana Cuba MD           Objective:     Vital Signs (Most Recent)  Vital Signs  Pulse: 81  Resp: 16(2 LPM)  SpO2: 98 %  BP: 118/76  Height and Weight  Height: 5' 7" (170.2 " cm)  Weight: 78 kg (171 lb 15.3 oz)  BSA (Calculated - sq m): 1.92 sq meters  BMI (Calculated): 26.9  Weight in (lb) to have BMI = 25: 159.3]  Wt Readings from Last 2 Encounters:   11/20/20 78.2 kg (172 lb 6.4 oz)   11/20/20 78 kg (171 lb 15.3 oz)       Physical Exam   Constitutional: She is oriented to person, place, and time. Vital signs are normal. She appears well-developed and well-nourished.       HENT:   Head: Normocephalic.   Nose: Nose normal.   Mouth/Throat: Oropharynx is clear and moist. No oropharyngeal exudate. Mallampati Score: II.   Neck: Normal range of motion. Neck supple.   Cardiovascular: Normal rate and normal heart sounds.   No murmur heard.  Pulmonary/Chest: Normal expansion, symmetric chest wall expansion and effort normal. She has decreased breath sounds. She has no wheezes.   Abdominal: Soft. Bowel sounds are normal.   Musculoskeletal:         General: Edema present.   Neurological: She is alert and oriented to person, place, and time.   Skin: Skin is warm.   Nursing note and vitals reviewed.      Laboratory  Lab Results   Component Value Date    WBC 5.48 11/20/2020    RBC 4.52 11/20/2020    HGB 12.2 11/20/2020    HCT 41.4 11/20/2020    MCV 92 11/20/2020    MCH 27.0 11/20/2020    MCHC 29.5 (L) 11/20/2020    RDW 16.1 (H) 11/20/2020     11/20/2020    MPV 8.6 (L) 11/20/2020    GRAN 3.4 11/20/2020    GRAN 62.5 11/20/2020    LYMPH 1.1 11/20/2020    LYMPH 20.4 11/20/2020    MONO 0.6 11/20/2020    MONO 10.4 11/20/2020    EOS 0.3 11/20/2020    BASO 0.04 11/20/2020    EOSINOPHIL 5.8 11/20/2020    BASOPHIL 0.7 11/20/2020       BMP  Lab Results   Component Value Date     11/20/2020    K 4.3 11/20/2020     11/20/2020    CO2 29 11/20/2020    BUN 19 11/20/2020    CREATININE 1.1 11/20/2020    CALCIUM 8.4 (L) 11/20/2020    ANIONGAP 9 11/20/2020    ESTGFRAFRICA >60 11/20/2020    EGFRNONAA 55 (A) 11/20/2020    AST 23 11/20/2020    ALT 14 11/20/2020    PROT 7.1 11/20/2020       Lab Results    Component Value Date     (H) 06/05/2020       Lab Results   Component Value Date    TSH 0.610 08/05/2020       Lab Results   Component Value Date    SEDRATE 56 (H) 10/13/2020       Lab Results   Component Value Date    CRP 5.0 10/13/2020     Lab Results   Component Value Date    IGE <35 07/10/2020        No results found for: ASPERGILLUS  No results found for: AFUMIGATUSCL     Lab Results   Component Value Date    ACE 37 10/13/2020        Diagnostic Results:  I have personally reviewed today the following studies:  PFT     FEV1: 1.09L( 45.5%), FVC 1.85L( 61.3%)  FEV1/FVC 59  TLC: 3.06L( 56.3%)  DLCO 13.79( 55.8%)    Assessment/Plan:     Problem List Items Addressed This Visit     Psychophysiological insomnia    Nocturnal hypoxemia    Sarcoidosis of lung    Current Assessment & Plan     ASSESSMENT:     Sarcoidosis stage:   []        Stage I Sarcoidosis  []        Stage II Sarcoidosis  [x]        Stage III Sarcoidosis  []        Stage IV Srcoidosis     Clinical assessment:   [x]        Symptomatic  []        Assymptomatic         Indications for treatment of pulmonary sarcoidosis:   [x]        Evidence of progressive disease :  Awaiting MRI cardiac  []        Severe disease at presentation     Management options:   []        Observation without therapy  [x]        Systemic glucocorticoids  []        Methotrexate.  []        Azathioprine   []        Leuflonamide  [x]        Mycophenolate to be started by Rheumatology  []        Anti TNF alpha antagonists            Moderate persistent asthma - Primary    Current Assessment & Plan     IGE < 35  Asthma control score 7  On DuoNeb, prednisone 5 mg, ACCOLATE  FEV1 1.09 L (45.5% predicted)  Repeat spirometry next visit : likely change to TRELEGY  Chest CT : scarring: no airway disease            Nocturnal hypoxemia due to obstructive chronic bronchitis    Current Assessment & Plan     Portable oxygen prescription.         Insomnia    Current Assessment & Plan     On  Ambien               Requested Prescriptions      No prescriptions requested or ordered in this encounter         Follow up in about 3 months (around 2/20/2021), or scehdule f/u appt with cardiology and cardiac MRI.    This note was prepared using voice recognition system and is likely to have sound alike errors that may have been overlooked even after proof reading.  Please call me with any questions    Discussed diagnosis, its evaluation, treatment and usual course. All questions answered.      Adrian Keating MD

## 2020-11-20 NOTE — PROGRESS NOTES
RHEUMATOLOGY OUTPATIENT CLINIC NOTE    11/20/2020    Attending Rheumatologist: Willian Forrester  Primary Care Provider: Viviana Cuba MD   Physician Requesting Consultation: No referring provider defined for this encounter.  Chief Complaint/Reason For Consultation:  Sarcoidosis and Osteoporosis    Subjective:       HPI  Salvatore Phillips is a 60 y.o. Black or  female with historical diagnosis of sarcoidosis comes for follow-up.    Today  Last seen mid October.  Recommended rheumatic were and consider for CellCept as steroid sparing agent for sarcoidosis with lung involvement.  No acute complaints.  See refers dyspnea on exertion and episodic cough productive of greenish sputum.  Denies fever or rash.  Does not have joint swelling or significant pain.  Does not have tri-color discoloration of fingertips upon cold exposure.  Denies ulcers.  Does not have persisent paresthesias, or hematuria.    Review of Systems   Constitutional: Negative for chills, fever and malaise/fatigue.   Eyes: Negative for pain and redness.   Respiratory: Positive for shortness of breath. Negative for cough and hemoptysis.    Cardiovascular: Negative for chest pain and leg swelling.   Gastrointestinal: Negative for abdominal pain, blood in stool and melena.   Genitourinary: Negative for dysuria and hematuria.   Musculoskeletal: Negative for falls and joint pain.   Skin: Negative for rash.        Denies rash or photosensitivity.  No ulcers.   Neurological: Negative for tingling, focal weakness and weakness.   Psychiatric/Behavioral: Negative for memory loss. The patient does not have insomnia.      Chronic comorbid conditions affecting medical decision making today:  Past Medical History:   Diagnosis Date    Asthma     Hypertension     Sarcoidosis of lung     Sleep apnea     maintained on CPAP    Upper respiratory disease     URI (upper respiratory infection)    · HFpEF    Past Surgical History:   Procedure  Laterality Date    ABDOMINAL SURGERY      BREAST LUMPECTOMY Left 1996    BENIGN    BREAST SURGERY       SECTION      x3    COLON SURGERY      exploratory procedure      GASTRIC BYPASS      HYSTERECTOMY  2009    JOINT REPLACEMENT  2013    KNEE SURGERY      OOPHORECTOMY  2009    SMALL INTESTINE SURGERY      tummy tuck       Family History   Problem Relation Age of Onset    Alzheimer's disease Mother     Cancer Father         lung    Crohn's disease Brother     Kidney disease Brother     Arthritis Sister     Hypertension Son      Social History     Substance and Sexual Activity   Alcohol Use Not Currently    Frequency: Monthly or less    Drinks per session: 1 or 2    Binge frequency: Never    Comment: occ     Social History     Tobacco Use   Smoking Status Former Smoker    Packs/day: 1.00    Years: 35.00    Pack years: 35.00    Types: Cigarettes    Start date: 1979    Quit date: 2020    Years since quittin.2   Smokeless Tobacco Never Used   Tobacco Comment    started age 17     Social History     Substance and Sexual Activity   Drug Use Never       Current Outpatient Medications:     albuterol (PROVENTIL/VENTOLIN HFA) 90 mcg/actuation inhaler, Inhale 2 puffs into the lungs every 4 (four) hours as needed for Wheezing or Shortness of Breath., Disp: 54 g, Rfl: 3    albuterol-ipratropium (DUO-NEB) 2.5 mg-0.5 mg/3 mL nebulizer solution, Take 3 mLs by nebulization every 6 (six) hours as needed for Wheezing or Shortness of Breath. Rescue. Corrected prescription with refills, Disp: 1 Box, Rfl: 11    budesonide (PULMICORT) 0.5 mg/2 mL nebulizer solution, Take 2 mLs (0.5 mg total) by nebulization 2 (two) times daily. Controller, Disp: 120 mL, Rfl: 0    butalbital-acetaminophen-caffeine -40 mg (FIORICET, ESGIC) -40 mg per tablet, Take 1 tablet by mouth every 6 (six) hours as needed for Pain., Disp: 10 tablet, Rfl: 0    fluticasone furoate-vilanteroL (BREO ELLIPTA)  200-25 mcg/dose DsDv diskus inhaler, Inhale 1 puff into the lungs once daily. Controller, Disp: 60 each, Rfl: 11    fluticasone propionate (FLONASE) 50 mcg/actuation nasal spray, 2 sprays (100 mcg total) by Each Nostril route once daily. Corrected prescription with refills, Disp: 48 mL, Rfl: 3    furosemide (LASIX) 40 MG tablet, Take 1 tablet (40 mg total) by mouth daily as needed (for leg swelling)., Disp: 30 tablet, Rfl: 3    ipratropium (ATROVENT HFA) 17 mcg/actuation inhaler, Inhale 2 puffs into the lungs every 4 to 6 hours as needed for Wheezing. Rescue, Disp: 1 Inhaler, Rfl: 11    nicotine polacrilex 2 MG Lozg, Take 1 lozenge (2 mg total) by mouth as needed., Disp: 200 lozenge, Rfl: 2    potassium chloride SA (K-DUR,KLOR-CON) 20 MEQ tablet, Take 1 tablet (20 mEq total) by mouth daily as needed (take with lasix)., Disp: 60 tablet, Rfl: 3    predniSONE (DELTASONE) 2.5 MG tablet, Take 2 tablets (5 mg total) by mouth once daily., Disp: 60 tablet, Rfl: 5    predniSONE (DELTASONE) 20 MG tablet, Take 2 tablets (40 mg total) by mouth once daily., Disp: 10 tablet, Rfl: 0    predniSONE (DELTASONE) 20 MG tablet, Take 1 tablet (20 mg total) by mouth As instructed. 3tab po daily x3days, 2tabs po daily x3days, 1tab po daily x3days, 1/2tab po daily x3days, Disp: 25 tablet, Rfl: 0    QUEtiapine (SEROQUEL) 50 MG tablet, Take 50 mg by mouth every evening., Disp: , Rfl:     topiramate (TOPAMAX) 50 MG tablet, Take 50 mg by mouth every evening., Disp: , Rfl:     traMADoL (ULTRAM) 50 mg tablet, Take 1 tablet (50 mg total) by mouth every 6 (six) hours as needed for Pain., Disp: 12 tablet, Rfl: 0    varenicline (CHANTIX) 1 mg Tab, Take 1 tablet (1 mg total) by mouth 2 (two) times daily., Disp: 60 tablet, Rfl: 2    zafirlukast (ACCOLATE) 20 MG tablet, Take 1 tablet (20 mg total) by mouth 2 (two) times daily., Disp: 180 tablet, Rfl: 3    zolpidem (AMBIEN) 5 MG Tab, Take 1 tablet (5 mg total) by mouth nightly as needed  "(insomnia)., Disp: 20 tablet, Rfl: 1    mycophenolate (CELLCEPT) 500 mg Tab, Take 1 tablet (500 mg total) by mouth once daily for 14 days, THEN 1 tablet (500 mg total) 2 (two) times daily., Disp: 60 tablet, Rfl: 3    oxybutynin (DITROPAN) 5 MG Tab, Take 1 tablet (5 mg total) by mouth every evening., Disp: 90 tablet, Rfl: 0    Current Facility-Administered Medications:     cyanocobalamin injection 1,000 mcg, 1,000 mcg, Intramuscular, Once, Viviana Cuba MD     Objective:         Vitals:    11/20/20 1409   BP: (!) 159/101   Pulse: 72     Physical Exam   Constitutional: She is oriented to person, place, and time. No distress.   Estimated body mass index is 27 kg/m² as calculated from the following:    Height as of this encounter: 5' 7" (1.702 m).    Weight as of this encounter: 78.2 kg (172 lb 6.4 oz).    Wt Readings from Last 1 Encounters:  11/20/20 1409 : 78.2 kg (172 lb 6.4 oz)     HENT:   Head: Normocephalic and atraumatic.   Eyes: Conjunctivae are normal. Pupils are equal, round, and reactive to light.   Neck: Normal range of motion.   Cardiovascular: Normal rate and intact distal pulses.    Pulmonary/Chest: Effort normal. No respiratory distress.   Abdominal: Soft. She exhibits no distension.   Neurological: She is alert and oriented to person, place, and time.   Skin: No rash noted. No erythema.     Musculoskeletal: Normal range of motion.      Comments: : strong  No synovitis or significant squeeze tenderness    AROM: intact  PROM: intact    Devices used by patient: none       Reviewed old and all outside pertinent medical records available.    All lab results personally reviewed and interpreted by me.  Lab Results   Component Value Date    WBC 5.48 11/20/2020    HGB 12.2 11/20/2020    HCT 41.4 11/20/2020    MCV 92 11/20/2020    MCH 27.0 11/20/2020    MCHC 29.5 (L) 11/20/2020    RDW 16.1 (H) 11/20/2020     11/20/2020    MPV 8.6 (L) 11/20/2020    PLTEST Adequate 01/03/2010       Lab Results "   Component Value Date     11/20/2020    K 4.3 11/20/2020     11/20/2020    CO2 29 11/20/2020    GLU 84 11/20/2020    BUN 19 11/20/2020    CALCIUM 8.4 (L) 11/20/2020    PROT 7.1 11/20/2020    ALBUMIN 2.9 (L) 11/20/2020    BILITOT 0.1 11/20/2020    AST 23 11/20/2020    ALKPHOS 113 11/20/2020    ALT 14 11/20/2020       Lab Results   Component Value Date    COLORU yellow 12/05/2019    APPEARANCEUA CLEAR 01/02/2010    SPECGRAV 1.020 12/05/2019    PHUR 5 12/05/2019    PROTEINUA 1+ (A) 01/02/2010    KETONESU neg 12/05/2019    LEUKOCYTESUR NEG 01/02/2010    NITRITE neg 12/05/2019    UROBILINOGEN neg 12/05/2019       Lab Results   Component Value Date    CRP 5.0 10/13/2020       Lab Results   Component Value Date    SEDRATE 56 (H) 10/13/2020       Lab Results   Component Value Date    SEDRATE 56 (H) 10/13/2020       No components found for: 25OHVITDTOT, 17SWFQEN2, 16TYWRXU8, METHODNOTE    No results found for: URICACID    No components found for: TSPOTTB    Rheum Labs:   ACE within normal range.  I will to receptor within normal range.     IgG 4 within normal range       Infectious Labs:   Hepatitis profile nonreactive October 2020   QuantiFERON TB nonreactive October 2020   HIV nonreactive 8/2020    Imaging:  All imaging reviewed and independently interpreted by me.    MRI lumbar spine September 2016  1.  Severe bilateral neural foraminal stenosis at L5-S1 secondary to a broad-based disc bulge and facet hypertrophy.   2.  No more than mild spinal canal stenosis identified at L4-5.   3.  No acute osseous findings.    MRI brain August 2020  No acute intracranial infarct or hemorrhage. Minor chronic microvascular ischemic changes. Low-lying cerebellar tonsils    CT chest September 2020  Extensive scarring and fibrotic changes noted predominantly within the bilateral mid to upper lung zones.  No discrete pulmonary nodule, mass or adenopathy seen in the right hilar region.     EKG 8/2020  Normal sinus rhythm a  75bpm. QTc Int : 408 ms. Possible Left atrial enlargement     TTE 7/2020  Moderate concentric left ventricular hypertrophy.  Small left ventricular cavity size.  Normal left ventricular systolic function. The estimated ejection fraction is 60%.  Grade I (mild) left ventricular diastolic dysfunction consistent with impaired relaxation.  Normal right ventricular systolic function.  Mild tricuspid regurgitation.  Mild pulmonic regurgitation.  Normal central venous pressure (3 mmHg).  The estimated PA systolic pressure is 28 mmHg.  Mild mitral prolapse of the anterior mitral leaflet.    Pulmonary function test: FVC / FEV1 / Ratio / TLC / DLCO  September 2019  72 / 56 / 76 / na / na    June 2020  66 / 46 / 54 / 79 / 55    DEXA scan  November 2020  FN: -1.8  LS: -1.7  FRAX: 0.7% hip / 6.1% major     ASSESSMENT / PLAN:     Salvatore Phillips is a 60 y.o. Black or  female with:    1. 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  Multiple etiologies possible.  History of asthma, ENNA, HFpEF, Hx of smoking  - fibrotic changes on lung CT imaging, PFT with severe obstruction and mild restrictive pattern  - awaiting cardiac MRI per cardiology recommendations.  - currently on 2.5 mg of prednisone daily prep pulmonary  - recommend trial of CellCept (after lab review) to goal of 3 g daily for 3-4 months as steroid sparing agent  - clinical significant side effects of therapy discussed in detail.    - Consider escalation to TNF inhibitor if suboptimal response  - follow with Ophthalmology to assess for uveitis.  - Bone protection: DEXA with osteopenia and low FRAX score, Ca can be given if needed. Vitamin D therapy is usually avoided because of the tendency to cause hypercalcemia.  Consider addition of prophylactic dose of  bisphosphonate if need to increase prednisone dose  - CBC and CMP prior next visit to monitor for MMF toxicity    3. Other specified counseling  - Immunization counseling done.  - Nutrition and exercise counseling.  - Limitation of alcohol consumption.  - Regular exercise:  Aerobic and resistance.  - Medication counseling provided.    Follow up in about 2 months (around 1/20/2021).    Method of contact with patient concerns: [facility]    Disclaimer:  This note is prepared using voice recognition software and as such is likely to have errors and has not been proof read. Please contact me for questions.     Willian Forrester M.D.  Rheumatology Department   [facility] [place]

## 2020-11-21 NOTE — ASSESSMENT & PLAN NOTE
IGE < 35  Asthma control score 7  On DuoNeb, prednisone 5 mg, ACCOLATE  FEV1 1.09 L (45.5% predicted)  Repeat spirometry next visit : likely change to TRELEGY  Chest CT : scarring: no airway disease

## 2020-11-21 NOTE — ASSESSMENT & PLAN NOTE
ASSESSMENT:     Sarcoidosis stage:   []        Stage I Sarcoidosis  []        Stage II Sarcoidosis  [x]        Stage III Sarcoidosis  []        Stage IV Srcoidosis     Clinical assessment:   [x]        Symptomatic  []        Assymptomatic         Indications for treatment of pulmonary sarcoidosis:   [x]        Evidence of progressive disease :  Awaiting MRI cardiac  []        Severe disease at presentation     Management options:   []        Observation without therapy  [x]        Systemic glucocorticoids  []        Methotrexate.  []        Azathioprine   []        Leuflonamide  [x]        Mycophenolate to be started by Rheumatology  []        Anti TNF alpha antagonists

## 2020-12-01 ENCOUNTER — CLINICAL SUPPORT (OUTPATIENT)
Dept: SMOKING CESSATION | Facility: CLINIC | Age: 60
End: 2020-12-01
Payer: COMMERCIAL

## 2020-12-01 DIAGNOSIS — F17.200 NICOTINE DEPENDENCE: Primary | ICD-10-CM

## 2020-12-01 PROCEDURE — 99407 BEHAV CHNG SMOKING > 10 MIN: CPT | Mod: S$GLB,,,

## 2020-12-01 PROCEDURE — 99407 PR TOBACCO USE CESSATION INTENSIVE >10 MINUTES: ICD-10-PCS | Mod: S$GLB,,,

## 2020-12-01 NOTE — PROGRESS NOTES
Spoke with patient today in regard to smoking cessation progress for 12-month telephone follow up on Quit 1 and 3,6 month on Quit 2. Patient stated that she is tobacco free. Congratulated patient on her accomplishment. Informed patient of benefit period, future follow up calls  and contact information if any further help or support is needed. Will complete / resolve smart form for 12 month follow up on Quit attempt #1 and complete smart form for 3,6 on Quit 2.

## 2020-12-11 DIAGNOSIS — R09.82 POSTNASAL DRIP: ICD-10-CM

## 2020-12-11 DIAGNOSIS — J45.41 MODERATE PERSISTENT ASTHMA WITH ACUTE EXACERBATION: ICD-10-CM

## 2020-12-11 DIAGNOSIS — F51.01 PRIMARY INSOMNIA: ICD-10-CM

## 2020-12-12 RX ORDER — ZAFIRLUKAST 20 MG/1
20 TABLET, FILM COATED ORAL 2 TIMES DAILY
Qty: 180 TABLET | Refills: 3 | Status: SHIPPED | OUTPATIENT
Start: 2020-12-12

## 2020-12-12 RX ORDER — ZOLPIDEM TARTRATE 5 MG/1
5 TABLET ORAL NIGHTLY PRN
Qty: 20 TABLET | Refills: 1 | Status: ON HOLD | OUTPATIENT
Start: 2020-12-12 | End: 2020-12-18 | Stop reason: HOSPADM

## 2020-12-15 ENCOUNTER — HOSPITAL ENCOUNTER (INPATIENT)
Facility: HOSPITAL | Age: 60
LOS: 3 days | Discharge: HOME | DRG: 193 | End: 2020-12-18
Attending: EMERGENCY MEDICINE | Admitting: STUDENT IN AN ORGANIZED HEALTH CARE EDUCATION/TRAINING PROGRAM
Payer: COMMERCIAL

## 2020-12-15 DIAGNOSIS — R06.02 SHORTNESS OF BREATH: ICD-10-CM

## 2020-12-15 DIAGNOSIS — R04.2 HEMOPTYSIS: ICD-10-CM

## 2020-12-15 DIAGNOSIS — D86.9 SARCOIDOSIS: ICD-10-CM

## 2020-12-15 DIAGNOSIS — I50.32 CHRONIC DIASTOLIC HEART FAILURE: ICD-10-CM

## 2020-12-15 DIAGNOSIS — D86.0 SARCOIDOSIS OF LUNG: ICD-10-CM

## 2020-12-15 DIAGNOSIS — J45.41 MODERATE PERSISTENT ASTHMA WITH EXACERBATION: ICD-10-CM

## 2020-12-15 DIAGNOSIS — J98.4 PNEUMONITIS: Primary | ICD-10-CM

## 2020-12-15 LAB
ALBUMIN SERPL BCP-MCNC: 2.6 G/DL (ref 3.5–5.2)
ALP SERPL-CCNC: 126 U/L (ref 55–135)
ALT SERPL W/O P-5'-P-CCNC: 12 U/L (ref 10–44)
ANION GAP SERPL CALC-SCNC: 6 MMOL/L (ref 8–16)
AST SERPL-CCNC: 21 U/L (ref 10–40)
BASOPHILS # BLD AUTO: 0.01 K/UL (ref 0–0.2)
BASOPHILS # BLD AUTO: 0.04 K/UL (ref 0–0.2)
BASOPHILS NFR BLD: 0.2 % (ref 0–1.9)
BASOPHILS NFR BLD: 0.6 % (ref 0–1.9)
BILIRUB SERPL-MCNC: 0.1 MG/DL (ref 0.1–1)
BNP SERPL-MCNC: 173 PG/ML (ref 0–99)
BUN SERPL-MCNC: 15 MG/DL (ref 6–20)
BUN SERPL-MCNC: 16 MG/DL (ref 6–30)
CALCIUM SERPL-MCNC: 8.3 MG/DL (ref 8.7–10.5)
CHLORIDE SERPL-SCNC: 103 MMOL/L (ref 95–110)
CHLORIDE SERPL-SCNC: 104 MMOL/L (ref 95–110)
CO2 SERPL-SCNC: 29 MMOL/L (ref 23–29)
CREAT SERPL-MCNC: 0.9 MG/DL (ref 0.5–1.4)
CREAT SERPL-MCNC: 0.9 MG/DL (ref 0.5–1.4)
CTP QC/QA: YES
D DIMER PPP IA.FEU-MCNC: 0.62 MG/L FEU
DIFFERENTIAL METHOD: ABNORMAL
DIFFERENTIAL METHOD: ABNORMAL
EOSINOPHIL # BLD AUTO: 0 K/UL (ref 0–0.5)
EOSINOPHIL # BLD AUTO: 0.3 K/UL (ref 0–0.5)
EOSINOPHIL NFR BLD: 0 % (ref 0–8)
EOSINOPHIL NFR BLD: 3.9 % (ref 0–8)
ERYTHROCYTE [DISTWIDTH] IN BLOOD BY AUTOMATED COUNT: 15.3 % (ref 11.5–14.5)
ERYTHROCYTE [DISTWIDTH] IN BLOOD BY AUTOMATED COUNT: 15.3 % (ref 11.5–14.5)
EST. GFR  (AFRICAN AMERICAN): >60 ML/MIN/1.73 M^2
EST. GFR  (NON AFRICAN AMERICAN): >60 ML/MIN/1.73 M^2
GLUCOSE SERPL-MCNC: 82 MG/DL (ref 70–110)
GLUCOSE SERPL-MCNC: 84 MG/DL (ref 70–110)
HCT VFR BLD AUTO: 41.1 % (ref 37–48.5)
HCT VFR BLD AUTO: 41.2 % (ref 37–48.5)
HCT VFR BLD CALC: 40 %PCV (ref 36–54)
HGB BLD-MCNC: 11.6 G/DL (ref 12–16)
HGB BLD-MCNC: 11.8 G/DL (ref 12–16)
IMM GRANULOCYTES # BLD AUTO: 0.01 K/UL (ref 0–0.04)
IMM GRANULOCYTES # BLD AUTO: 0.02 K/UL (ref 0–0.04)
IMM GRANULOCYTES NFR BLD AUTO: 0.1 % (ref 0–0.5)
IMM GRANULOCYTES NFR BLD AUTO: 0.4 % (ref 0–0.5)
INR PPP: 0.9 (ref 0.8–1.2)
LYMPHOCYTES # BLD AUTO: 0.3 K/UL (ref 1–4.8)
LYMPHOCYTES # BLD AUTO: 0.8 K/UL (ref 1–4.8)
LYMPHOCYTES NFR BLD: 11.8 % (ref 18–48)
LYMPHOCYTES NFR BLD: 6 % (ref 18–48)
MCH RBC QN AUTO: 25.1 PG (ref 27–31)
MCH RBC QN AUTO: 25.5 PG (ref 27–31)
MCHC RBC AUTO-ENTMCNC: 28.2 G/DL (ref 32–36)
MCHC RBC AUTO-ENTMCNC: 28.6 G/DL (ref 32–36)
MCV RBC AUTO: 89 FL (ref 82–98)
MCV RBC AUTO: 89 FL (ref 82–98)
MONOCYTES # BLD AUTO: 0.1 K/UL (ref 0.3–1)
MONOCYTES # BLD AUTO: 0.6 K/UL (ref 0.3–1)
MONOCYTES NFR BLD: 1.7 % (ref 4–15)
MONOCYTES NFR BLD: 8.3 % (ref 4–15)
NEUTROPHILS # BLD AUTO: 4.4 K/UL (ref 1.8–7.7)
NEUTROPHILS # BLD AUTO: 5.2 K/UL (ref 1.8–7.7)
NEUTROPHILS NFR BLD: 75.3 % (ref 38–73)
NEUTROPHILS NFR BLD: 91.7 % (ref 38–73)
NRBC BLD-RTO: 0 /100 WBC
NRBC BLD-RTO: 0 /100 WBC
PLATELET # BLD AUTO: 256 K/UL (ref 150–350)
PLATELET # BLD AUTO: 269 K/UL (ref 150–350)
PMV BLD AUTO: 9.3 FL (ref 9.2–12.9)
PMV BLD AUTO: 9.7 FL (ref 9.2–12.9)
POC IONIZED CALCIUM: 1.05 MMOL/L (ref 1.06–1.42)
POC TCO2 (MEASURED): 29 MMOL/L (ref 23–29)
POTASSIUM BLD-SCNC: 4 MMOL/L (ref 3.5–5.1)
POTASSIUM SERPL-SCNC: 4.1 MMOL/L (ref 3.5–5.1)
PROCALCITONIN SERPL IA-MCNC: 0.02 NG/ML
PROT SERPL-MCNC: 7.1 G/DL (ref 6–8.4)
PROTHROMBIN TIME: 10.3 SEC (ref 9–12.5)
RBC # BLD AUTO: 4.62 M/UL (ref 4–5.4)
RBC # BLD AUTO: 4.62 M/UL (ref 4–5.4)
SAMPLE: ABNORMAL
SARS-COV-2 RDRP RESP QL NAA+PROBE: NEGATIVE
SODIUM BLD-SCNC: 141 MMOL/L (ref 136–145)
SODIUM SERPL-SCNC: 139 MMOL/L (ref 136–145)
TROPONIN I SERPL DL<=0.01 NG/ML-MCNC: 0.03 NG/ML (ref 0–0.03)
TROPONIN I SERPL DL<=0.01 NG/ML-MCNC: <0.006 NG/ML (ref 0–0.03)
WBC # BLD AUTO: 4.81 K/UL (ref 3.9–12.7)
WBC # BLD AUTO: 6.85 K/UL (ref 3.9–12.7)

## 2020-12-15 PROCEDURE — 84145 PROCALCITONIN (PCT): CPT

## 2020-12-15 PROCEDURE — 99900035 HC TECH TIME PER 15 MIN (STAT)

## 2020-12-15 PROCEDURE — 27000221 HC OXYGEN, UP TO 24 HOURS

## 2020-12-15 PROCEDURE — 94640 AIRWAY INHALATION TREATMENT: CPT

## 2020-12-15 PROCEDURE — 94761 N-INVAS EAR/PLS OXIMETRY MLT: CPT

## 2020-12-15 PROCEDURE — 80047 BASIC METABLC PNL IONIZED CA: CPT

## 2020-12-15 PROCEDURE — 99223 1ST HOSP IP/OBS HIGH 75: CPT | Mod: ,,, | Performed by: STUDENT IN AN ORGANIZED HEALTH CARE EDUCATION/TRAINING PROGRAM

## 2020-12-15 PROCEDURE — 93010 EKG 12-LEAD: ICD-10-PCS | Mod: ,,, | Performed by: INTERNAL MEDICINE

## 2020-12-15 PROCEDURE — 25000242 PHARM REV CODE 250 ALT 637 W/ HCPCS: Performed by: STUDENT IN AN ORGANIZED HEALTH CARE EDUCATION/TRAINING PROGRAM

## 2020-12-15 PROCEDURE — 96374 THER/PROPH/DIAG INJ IV PUSH: CPT

## 2020-12-15 PROCEDURE — 25000003 PHARM REV CODE 250: Performed by: STUDENT IN AN ORGANIZED HEALTH CARE EDUCATION/TRAINING PROGRAM

## 2020-12-15 PROCEDURE — 99285 EMERGENCY DEPT VISIT HI MDM: CPT | Mod: 25

## 2020-12-15 PROCEDURE — 85379 FIBRIN DEGRADATION QUANT: CPT

## 2020-12-15 PROCEDURE — 87449 NOS EACH ORGANISM AG IA: CPT

## 2020-12-15 PROCEDURE — 63600175 PHARM REV CODE 636 W HCPCS: Performed by: EMERGENCY MEDICINE

## 2020-12-15 PROCEDURE — 11000001 HC ACUTE MED/SURG PRIVATE ROOM

## 2020-12-15 PROCEDURE — 63700000 PHARM REV CODE 250 ALT 637 W/O HCPCS: Performed by: EMERGENCY MEDICINE

## 2020-12-15 PROCEDURE — 25000242 PHARM REV CODE 250 ALT 637 W/ HCPCS: Performed by: EMERGENCY MEDICINE

## 2020-12-15 PROCEDURE — 99284 EMERGENCY DEPT VISIT MOD MDM: CPT | Mod: ,,, | Performed by: EMERGENCY MEDICINE

## 2020-12-15 PROCEDURE — 99284 PR EMERGENCY DEPT VISIT,LEVEL IV: ICD-10-PCS | Mod: ,,, | Performed by: EMERGENCY MEDICINE

## 2020-12-15 PROCEDURE — 83880 ASSAY OF NATRIURETIC PEPTIDE: CPT

## 2020-12-15 PROCEDURE — 25500020 PHARM REV CODE 255: Performed by: EMERGENCY MEDICINE

## 2020-12-15 PROCEDURE — U0002 COVID-19 LAB TEST NON-CDC: HCPCS | Performed by: EMERGENCY MEDICINE

## 2020-12-15 PROCEDURE — 99223 PR INITIAL HOSPITAL CARE,LEVL III: ICD-10-PCS | Mod: ,,, | Performed by: STUDENT IN AN ORGANIZED HEALTH CARE EDUCATION/TRAINING PROGRAM

## 2020-12-15 PROCEDURE — 85610 PROTHROMBIN TIME: CPT

## 2020-12-15 PROCEDURE — 85025 COMPLETE CBC W/AUTO DIFF WBC: CPT

## 2020-12-15 PROCEDURE — 84484 ASSAY OF TROPONIN QUANT: CPT

## 2020-12-15 PROCEDURE — 93005 ELECTROCARDIOGRAM TRACING: CPT

## 2020-12-15 PROCEDURE — 84484 ASSAY OF TROPONIN QUANT: CPT | Mod: 91

## 2020-12-15 PROCEDURE — 80053 COMPREHEN METABOLIC PANEL: CPT

## 2020-12-15 PROCEDURE — 93010 ELECTROCARDIOGRAM REPORT: CPT | Mod: ,,, | Performed by: INTERNAL MEDICINE

## 2020-12-15 RX ORDER — AZITHROMYCIN 250 MG/1
500 TABLET, FILM COATED ORAL DAILY
Status: COMPLETED | OUTPATIENT
Start: 2020-12-16 | End: 2020-12-17

## 2020-12-15 RX ORDER — PREDNISONE 2.5 MG/1
5 TABLET ORAL DAILY
Status: DISCONTINUED | OUTPATIENT
Start: 2020-12-15 | End: 2020-12-15

## 2020-12-15 RX ORDER — SODIUM CHLORIDE 0.9 % (FLUSH) 0.9 %
10 SYRINGE (ML) INJECTION
Status: DISCONTINUED | OUTPATIENT
Start: 2020-12-15 | End: 2020-12-18 | Stop reason: HOSPADM

## 2020-12-15 RX ORDER — AZITHROMYCIN 250 MG/1
500 TABLET, FILM COATED ORAL DAILY
Status: DISCONTINUED | OUTPATIENT
Start: 2020-12-15 | End: 2020-12-15

## 2020-12-15 RX ORDER — METHYLPREDNISOLONE SOD SUCC 125 MG
125 VIAL (EA) INJECTION
Status: COMPLETED | OUTPATIENT
Start: 2020-12-15 | End: 2020-12-15

## 2020-12-15 RX ORDER — PREDNISONE 10 MG/1
40 TABLET ORAL DAILY
Status: DISCONTINUED | OUTPATIENT
Start: 2020-12-16 | End: 2020-12-16

## 2020-12-15 RX ORDER — SODIUM CHLORIDE 0.9 % (FLUSH) 0.9 %
10 SYRINGE (ML) INJECTION
Status: CANCELLED | OUTPATIENT
Start: 2020-12-15

## 2020-12-15 RX ORDER — CEFTRIAXONE 1 G/1
1 INJECTION, POWDER, FOR SOLUTION INTRAMUSCULAR; INTRAVENOUS
Status: DISCONTINUED | OUTPATIENT
Start: 2020-12-16 | End: 2020-12-18 | Stop reason: HOSPADM

## 2020-12-15 RX ORDER — IPRATROPIUM BROMIDE AND ALBUTEROL SULFATE 2.5; .5 MG/3ML; MG/3ML
3 SOLUTION RESPIRATORY (INHALATION) EVERY 4 HOURS
Status: DISCONTINUED | OUTPATIENT
Start: 2020-12-15 | End: 2020-12-17

## 2020-12-15 RX ORDER — PREDNISONE 2.5 MG/1
2.5 TABLET ORAL DAILY
Status: DISCONTINUED | OUTPATIENT
Start: 2020-12-15 | End: 2020-12-15

## 2020-12-15 RX ORDER — IPRATROPIUM BROMIDE AND ALBUTEROL SULFATE 2.5; .5 MG/3ML; MG/3ML
3 SOLUTION RESPIRATORY (INHALATION)
Status: COMPLETED | OUTPATIENT
Start: 2020-12-15 | End: 2020-12-15

## 2020-12-15 RX ORDER — PREDNISONE 20 MG/1
20 TABLET ORAL EVERY 12 HOURS
Status: DISCONTINUED | OUTPATIENT
Start: 2020-12-15 | End: 2020-12-15

## 2020-12-15 RX ORDER — VARENICLINE TARTRATE 1 MG/1
1 TABLET, FILM COATED ORAL 2 TIMES DAILY WITH MEALS
Status: DISCONTINUED | OUTPATIENT
Start: 2020-12-15 | End: 2020-12-18 | Stop reason: HOSPADM

## 2020-12-15 RX ORDER — TALC
6 POWDER (GRAM) TOPICAL NIGHTLY PRN
Status: CANCELLED | OUTPATIENT
Start: 2020-12-15

## 2020-12-15 RX ORDER — TOPIRAMATE 25 MG/1
50 TABLET ORAL NIGHTLY
Status: DISCONTINUED | OUTPATIENT
Start: 2020-12-15 | End: 2020-12-18 | Stop reason: HOSPADM

## 2020-12-15 RX ORDER — AZITHROMYCIN 250 MG/1
500 TABLET, FILM COATED ORAL
Status: COMPLETED | OUTPATIENT
Start: 2020-12-15 | End: 2020-12-15

## 2020-12-15 RX ORDER — CEFTRIAXONE 1 G/1
1 INJECTION, POWDER, FOR SOLUTION INTRAMUSCULAR; INTRAVENOUS
Status: COMPLETED | OUTPATIENT
Start: 2020-12-15 | End: 2020-12-15

## 2020-12-15 RX ORDER — TALC
6 POWDER (GRAM) TOPICAL NIGHTLY PRN
Status: DISCONTINUED | OUTPATIENT
Start: 2020-12-15 | End: 2020-12-18 | Stop reason: HOSPADM

## 2020-12-15 RX ADMIN — IPRATROPIUM BROMIDE AND ALBUTEROL SULFATE 3 ML: .5; 2.5 SOLUTION RESPIRATORY (INHALATION) at 09:12

## 2020-12-15 RX ADMIN — AZITHROMYCIN MONOHYDRATE 500 MG: 250 TABLET ORAL at 01:12

## 2020-12-15 RX ADMIN — METHYLPREDNISOLONE SODIUM SUCCINATE 125 MG: 125 INJECTION, POWDER, FOR SOLUTION INTRAMUSCULAR; INTRAVENOUS at 09:12

## 2020-12-15 RX ADMIN — TOPIRAMATE 50 MG: 25 TABLET, FILM COATED ORAL at 09:12

## 2020-12-15 RX ADMIN — IPRATROPIUM BROMIDE AND ALBUTEROL SULFATE 3 ML: .5; 2.5 SOLUTION RESPIRATORY (INHALATION) at 08:12

## 2020-12-15 RX ADMIN — CEFTRIAXONE SODIUM 1 G: 1 INJECTION, POWDER, FOR SOLUTION INTRAMUSCULAR; INTRAVENOUS at 01:12

## 2020-12-15 RX ADMIN — IPRATROPIUM BROMIDE AND ALBUTEROL SULFATE 3 ML: .5; 2.5 SOLUTION RESPIRATORY (INHALATION) at 04:12

## 2020-12-15 RX ADMIN — IOHEXOL 75 ML: 350 INJECTION, SOLUTION INTRAVENOUS at 11:12

## 2020-12-15 NOTE — ED NOTES
Pt identifiers checked and accurate with Salvatore Phillips    Pt reports to ED with complaints of SOB since Saturday and coughing up blood this AM. Pt has home oxygen 3 L nasal cannula, has increased oxygen need at home over the weekend. Pt denies fever, chills, abdominal pain, N/V/D, CP.     LOC: The patient is awake, alert and aware of environment with an appropriate affect, the patient is oriented x 3 and speaking appropriately.  APPEARANCE: Patient resting comfortably and in no acute distress, patient is clean and well groomed, pt placed in hospital gown and on monitoring.   SKIN: The skin is warm and dry, color consistent with ethnicity, patient has normal skin turgor and moist mucus membranes, skin intact.  MUSCULOSKELETAL: Patient moving all extremities well, no obvious swelling or deformities noted.   RESPIRATORY: Airway is open and patent; respirations are spontaneous, patient has increased effort and rate. Pt reports home oxygen 3 L NC at baseline, pt oxygen saturation 98% on 3 L NC.   CARDIAC: Patient has no peripheral edema noted. No complaints of chest pain   ABDOMEN: Soft and non tender to palpation, no distention noted. Bowel sounds present x 4  NEUROLOGIC: eyes open spontaneously, behavior appropriate to situation, follows commands, facial expression symmetrical, bilateral hand grasp equal and even, purposeful motor response noted, normal sensation in all extremities when touched with a finger.

## 2020-12-15 NOTE — ED NOTES
Pt ambulated to restroom with RN escort and oxygen 3 L NC. Pt instructed to call for assistance as needed and for assistance ambulating back to room.

## 2020-12-15 NOTE — ED PROVIDER NOTES
Source of History:  Patient and family    Chief complaint:  Hemoptysis (hx sarcoid,on home oxygen)      HPI:  Salvatore Phillips is a 60 y.o. female presenting with shortness of breath and hemoptysis.  Shortness of breath started last Friday and has been steadily worsening.  She started noticing hemoptysis today.  There have been a few small jelly like clots along with some blood-tinged sputum.  No fever or chills.  No known exposure to coronavirus and she has been very careful to avoid public spaces.    ROS: As per HPI and below:  General: No fever.  No chills.  Eyes: No visual changes.  Head: No headache.    Integument: No rashes or lesions.  Chest: Notes shortness of breath.  Cardiovascular: No chest pain.  Abdomen: No abdominal pain.  No nausea or vomiting.  Urinary: No abnormal urination.  Neurologic: No focal weakness.  No numbness.  Hematologic: No easy bruising.  Endocrine: No excessive thirst or urination.      Review of patient's allergies indicates:  No Known Allergies    Current Facility-Administered Medications on File Prior to Encounter   Medication Dose Route Frequency Provider Last Rate Last Dose    cyanocobalamin injection 1,000 mcg  1,000 mcg Intramuscular Once Viviana Cuba MD         Current Outpatient Medications on File Prior to Encounter   Medication Sig Dispense Refill    mycophenolate (CELLCEPT) 500 mg Tab Take 1 tablet (500 mg total) by mouth once daily for 14 days, THEN 1 tablet (500 mg total) 2 (two) times daily. 60 tablet 3    predniSONE (DELTASONE) 2.5 MG tablet Take 2 tablets (5 mg total) by mouth once daily. 60 tablet 5    zolpidem (AMBIEN) 5 MG Tab Take 1 tablet (5 mg total) by mouth nightly as needed (insomnia). 20 tablet 1    albuterol (PROVENTIL/VENTOLIN HFA) 90 mcg/actuation inhaler Inhale 2 puffs into the lungs every 4 (four) hours as needed for Wheezing or Shortness of Breath. 54 g 3    albuterol-ipratropium (DUO-NEB) 2.5 mg-0.5 mg/3 mL nebulizer solution Take 3 mLs by  nebulization every 6 (six) hours as needed for Wheezing or Shortness of Breath. Rescue. Corrected prescription with refills 1 Box 11    budesonide (PULMICORT) 0.5 mg/2 mL nebulizer solution Take 2 mLs (0.5 mg total) by nebulization 2 (two) times daily. Controller 120 mL 0    butalbital-acetaminophen-caffeine -40 mg (FIORICET, ESGIC) -40 mg per tablet Take 1 tablet by mouth every 6 (six) hours as needed for Pain. 10 tablet 0    fluticasone furoate-vilanteroL (BREO ELLIPTA) 200-25 mcg/dose DsDv diskus inhaler Inhale 1 puff into the lungs once daily. Controller 60 each 11    fluticasone propionate (FLONASE) 50 mcg/actuation nasal spray 2 sprays (100 mcg total) by Each Nostril route once daily. Corrected prescription with refills 48 mL 3    furosemide (LASIX) 40 MG tablet Take 1 tablet (40 mg total) by mouth daily as needed (for leg swelling). 30 tablet 3    ipratropium (ATROVENT HFA) 17 mcg/actuation inhaler Inhale 2 puffs into the lungs every 4 to 6 hours as needed for Wheezing. Rescue 1 Inhaler 11    nicotine polacrilex 2 MG Lozg Take 1 lozenge (2 mg total) by mouth as needed. 200 lozenge 2    oxybutynin (DITROPAN) 5 MG Tab Take 1 tablet (5 mg total) by mouth every evening. 90 tablet 0    potassium chloride SA (K-DUR,KLOR-CON) 20 MEQ tablet Take 1 tablet (20 mEq total) by mouth daily as needed (take with lasix). 60 tablet 3    predniSONE (DELTASONE) 20 MG tablet Take 2 tablets (40 mg total) by mouth once daily. 10 tablet 0    predniSONE (DELTASONE) 20 MG tablet Take 1 tablet (20 mg total) by mouth As instructed. 3tab po daily x3days, 2tabs po daily x3days, 1tab po daily x3days, 1/2tab po daily x3days 25 tablet 0    QUEtiapine (SEROQUEL) 50 MG tablet Take 50 mg by mouth every evening.      topiramate (TOPAMAX) 50 MG tablet Take 50 mg by mouth every evening.      traMADoL (ULTRAM) 50 mg tablet Take 1 tablet (50 mg total) by mouth every 6 (six) hours as needed for Pain. 12 tablet 0     varenicline (CHANTIX) 1 mg Tab Take 1 tablet (1 mg total) by mouth 2 (two) times daily. 60 tablet 2    zafirlukast (ACCOLATE) 20 MG tablet Take 1 tablet (20 mg total) by mouth 2 (two) times daily. 180 tablet 3       PMH:  As per HPI and below:  Past Medical History:   Diagnosis Date    Asthma     Hypertension     Sarcoidosis of lung     Sleep apnea     maintained on CPAP    Upper respiratory disease     URI (upper respiratory infection)      Past Surgical History:   Procedure Laterality Date    ABDOMINAL SURGERY      BREAST LUMPECTOMY Left 1996    BENIGN    BREAST SURGERY       SECTION      x3    COLON SURGERY      exploratory procedure      GASTRIC BYPASS      HYSTERECTOMY  2009    JOINT REPLACEMENT  2013    KNEE SURGERY      OOPHORECTOMY  2009    SMALL INTESTINE SURGERY      tummy tuck         Social History     Socioeconomic History    Marital status: Single     Spouse name: Not on file    Number of children: Not on file    Years of education: Not on file    Highest education level: Not on file   Occupational History    Not on file   Social Needs    Financial resource strain: Somewhat hard    Food insecurity     Worry: Sometimes true     Inability: Sometimes true    Transportation needs     Medical: No     Non-medical: No   Tobacco Use    Smoking status: Former Smoker     Packs/day: 1.00     Years: 35.00     Pack years: 35.00     Types: Cigarettes     Start date: 1979     Quit date: 2020     Years since quittin.3    Smokeless tobacco: Never Used    Tobacco comment: started age 17   Substance and Sexual Activity    Alcohol use: Not Currently     Frequency: Monthly or less     Drinks per session: 1 or 2     Binge frequency: Never     Comment: occ    Drug use: Never    Sexual activity: Never   Lifestyle    Physical activity     Days per week: 0 days     Minutes per session: 0 min    Stress: Very much   Relationships    Social connections     Talks on phone:  Not on file     Gets together: Once a week     Attends [employer] service: Not on file     Active member of club or organization: No     Attends meetings of clubs or organizations: Never     Relationship status:    Other Topics Concern    Not on file   Social History Narrative    Not on file       Family History   Problem Relation Age of Onset    Alzheimer's disease Mother     Cancer Father         lung    Crohn's disease Brother     Kidney disease Brother     Arthritis Sister     Hypertension Son        Physical Exam:    Vitals:    12/15/20 1101 12/15/20 1135 12/15/20 1146 12/15/20 1148   BP: (!) 147/70 (!) 157/77 (!) 155/76    Pulse: 93 100 99 98   Resp:    14   Temp:       TempSrc:       SpO2: 98% 96% 95% 96%   Weight:       Height:         Appearance:  Mild respiratory distress.  Skin: No rashes seen.  Good turgor.  No abrasions.  No ecchymoses.  Eyes: No conjunctival injection.  ENT: Oropharynx clear.    Chest:  Diffuse expiratory wheezes.  No rales.  Cardiovascular: Regular rate and rhythm.  No murmurs. No gallops. No rubs.  Abdomen: Soft.  Not distended.  Nontender.  No guarding.  No rebound.  Musculoskeletal: Good range of motion all joints.  No deformities.  Neck supple.  No meningismus.  Neurologic: Motor intact.  Sensation intact.  Cerebellar intact.  Cranial nerves intact.  Mental Status:  Alert and oriented x 3.  Appropriate, conversant.      Initial Impression:  Shortness of breath, history of asthma and CHF as well as sarcoidosis.  Hemoptysis present as well.  Will give breathing treatments and do pulmonary workup.  DDx also includes PE, will check D-Dimer. Anticipate admission.    Laboratory Studies:  Labs Reviewed   CBC W/ AUTO DIFFERENTIAL - Abnormal; Notable for the following components:       Result Value    Hemoglobin 11.6 (*)     MCH 25.1 (*)     MCHC 28.2 (*)     RDW 15.3 (*)     Lymph # 0.8 (*)     Gran % 75.3 (*)     Lymph % 11.8 (*)     All other components within normal  limits   COMPREHENSIVE METABOLIC PANEL - Abnormal; Notable for the following components:    Calcium 8.3 (*)     Albumin 2.6 (*)     Anion Gap 6 (*)     All other components within normal limits   TROPONIN I - Abnormal; Notable for the following components:    Troponin I 0.032 (*)     All other components within normal limits   B-TYPE NATRIURETIC PEPTIDE - Abnormal; Notable for the following components:     (*)     All other components within normal limits   D DIMER, QUANTITATIVE - Abnormal; Notable for the following components:    D-Dimer 0.62 (*)     All other components within normal limits   ISTAT PROCEDURE - Abnormal; Notable for the following components:    POC Ionized Calcium 1.05 (*)     All other components within normal limits   PROTIME-INR   SARS-COV-2 RDRP GENE    Narrative:     This test utilizes isothermal nucleic acid amplification   technology to detect the SARS-CoV-2 RdRp nucleic acid segment.   The analytical sensitivity (limit of detection) is 125 genome   equivalents/mL.   A POSITIVE result implies infection with the SARS-CoV-2 virus;   the patient is presumed to be contagious.     A NEGATIVE result means that SARS-CoV-2 nucleic acids are not   present above the limit of detection. A NEGATIVE result should be   treated as presumptive. It does not rule out the possibility of   COVID-19 and should not be the sole basis for treatment decisions.   If COVID-19 is strongly suspected based on clinical and exposure   history, re-testing using an alternate molecular assay should be   considered.   This test is only for use under the Food and Drug   Administration s Emergency Use Authorization (EUA).   Commercial kits are provided by [employer].   Performance characteristics of the EUA have been independently   verified by [facility] Department of   Pathology and Laboratory Medicine.   _________________________________________________________________   The authorized Fact Sheet  for Healthcare Providers and the authorized Fact   Sheet for Patients of the ID NOW COVID-19 are available on the FDA   website:     https://www.fda.gov/media/278524/download  https://www.fda.gov/media/516547/download         ISTAT CHEM8       I decided to obtain the patient's medical records.    Imaging Results          CTA Chest Non-Coronary (PE Study) (Final result)  Result time 12/15/20 11:52:51    Final result by Daxa Figueroa MD (12/15/20 11:52:51)                 Impression:      Patient motion during image acquisition degrades fine spatial resolution and contrast resolution.  Within the limits of the study I detect no pulmonary thromboembolism, pulmonary infarct or right heart strain.    In both lungs there is extensive mixed reticulation and ground-glass disease with some areas of janie parenchymal consolidation. This is upper lobe predominant and produces some volume loss in the right upper lobe resulting in shift of fissures.  Chest radiograph abnormality dates back to at least 11/25/2018 in this patient with a history of sarcoidosis.  The extent of the pulmonary disease is worse today than on chest radiograph of 06/05/2020 or chest CT of 09/17/2020. No cavitation identified. Findings are consistent with sarcoidosis although other processes including subacute hypersensitivity pneumonia could present in a similar fashion.    No pulmonary edema identified in this patient with aortic and coronary calcific atherosclerosis.    Additional findings are above.      Electronically signed by: Daxa Figueroa MD  Date:    12/15/2020  Time:    11:52             Narrative:    EXAMINATION:  CTA CHEST NON CORONARY    CLINICAL HISTORY:  PE suspected, intermediate prob, positive D-dimer;    TECHNIQUE:  During intravenous bolus injection of 100 ml of Omnipaque 350 contrast medium via the right upper extremity and using low dose technique, the chest was surveyed from above the pulmonary apices through the posterior  costophrenic angles.  Images were acquired without gatingscratch.    Data was reconstructed for multiplanar images in axial, sagittal and coronal planes and for maximal intensity projection images in the axial plane.    Patient motion during image acquisition causes x-ray beam attenuation and scatter, degrading spatial and contrast resolution.    All CT scans at this facility use dose modulation, iterative reconstruction and/or weight based dosing when appropriate to reduce radiation dose to as low as reasonably achievable.    Xray dose: DLP = 165.05 mGy-cm.    COMPARISON:  Chest radiographs: 12/15/2020.  06/05/2020.  11/25/2018.    Chest CT: 09/17/2020.    FINDINGS:  Additional history: Sarcoidosis established by lung biopsy at 29 years of age.    Base of Neck: No significant abnormality.    Aorta: Left-sided aortic arch with 3 arterial branches.  The aorta maintains normal caliber, contour and course. There is calcification of the thoracic aorta and coronary arteries in this 60-year-old woman.    Heart/pericardium: Normal size. No right heart strain identified. No pericardial fluid or calcification.    Pulmonary arteries: Pulmonary arteries distribute normally.  Pulmonary arteries are sufficiently opacified for diagnostic assessment.  Patient motion degrades spatial and contrast resolution.  There is no pulmonary thromboembolism through the lobar arteries.  Pulmonary arterial hypertension is neither confirmed nor excluded.  There is no pulmonary thromboembolism.    Pulmonary veins, left atrium:    There are four pulmonary veins that return to the left atrium.    Sindi/Mediastinum: There is a mildly enlarged lymph node in the anterior mediastinum (axial series 2, image 161) with short axis of 1.1 cm; this is stable compared with 09/17/2020, axial series 2, image 43 no other definite mediastinal lymph node enlargement identified.  Hilar lymph nodes are not enlarged in this patient with a clinical diagnosis of  sarcoidosis..    Pleura: No pleural fluid.No pleural calcification.    Esophagus and upper abdomen: Postoperative changes are again identified at the gastroesophageal junction.  There is prior cholecystectomy.  No abnormality of the partially imaged upper abdomen.    Thoracic soft tissues: Normal. Both breasts are present.    Bones: No acute fracture. No suspicious lytic or sclerotic lesion.    Airways: Patent.    Lungs:    -In both lungs there is extensive mixed reticulation and ground-glass disease with some areas of janie parenchymal consolidation.  This lung disease is upper lobe predominant and produces some volume loss in the right upper lobe resulting in shift of right pleural fissures.  The extent of the disease is worse than on chest radiograph of 06/05/2020 or chest CT of 09/17/2020.  No cavitation identified.  Findings are consistent with sarcoidosis although other processes including subacute hypersensitivity pneumonia could present in a similar fashion.    -No pulmonary infarct.    -No pulmonary edema identified in this patient with aortic and coronary atherosclerosis.                               X-Ray Chest AP Portable (Final result)  Result time 12/15/20 08:54:43    Final result by Yuniel Casanova DO (12/15/20 08:54:43)                 Impression:      1. Chronic pleural-parenchymal changes in the bilateral upper lungs, likely related to patient's history of sarcoidosis.  2. Mild reticulonodular opacities in the right lower lung may be related to aspiration or atypical infection.      Electronically signed by: Yuniel Casanova  Date:    12/15/2020  Time:    08:54             Narrative:    EXAMINATION:  XR CHEST AP PORTABLE    CLINICAL HISTORY:  CHF.    TECHNIQUE:  Single frontal view of the chest was performed.    COMPARISON:  Multiple prior radiographs of the chest, most recent from 08/05/2020.  CT of the chest from 09/17/2020.    FINDINGS:  There is upper lung predominant fibrotic changes  compatible with patient's history of sarcoidosis.  Mild reticulonodular opacities within the right lung base may represent aspiration or atypical infection.  The pleural spaces are clear.  Cardiac silhouette is unremarkable.  There are calcifications of the aortic arch.  There a visualized osseous structures are unremarkable.  There are surgical clips in the superior mediastinum.  Epigastric surgical clips are also seen.                                Medications Given:  Medications   cefTRIAXone injection 1 g (has no administration in time range)   azithromycin tablet 500 mg (has no administration in time range)   albuterol-ipratropium 2.5 mg-0.5 mg/3 mL nebulizer solution 3 mL (3 mLs Nebulization Given 12/15/20 0913)   methylPREDNISolone sodium succinate injection 125 mg (125 mg Intravenous Given 12/15/20 0908)   iohexoL (OMNIPAQUE 350) injection 75 mL (75 mLs Intravenous Given 12/15/20 1135)       ED Course:  ED Course as of Dec 15 1217   Tue Dec 15, 2020   0909 SARS-CoV-2 RNA, Amplification, Qual: Negative [DC]   0910 Chronic sarcoidosis changes, increased spot-like markings in RLL per my independent interpretation.       X-Ray Chest AP Portable [DC]   0925 WBC: 6.85 [DC]   0925 Hemoglobin(!): 11.6 [DC]   0925 Platelets: 256 [DC]   0925 POC Creatinine: 0.9 [DC]   1019 D-Dimer(!): 0.62 [DC]   1019 BNP(!): 173 [DC]   1046 Some improvement after three treatments, still somewhat SOB.  CT pending.    [DC]   1121 Troponin I(!): 0.032 [DC]      ED Course User Index  [DC] Reilly Ulloa MD              MDM:    60 y.o. female with sarcoidosis, worsening shortness of breath.  Workup consistent with asthma exacerbation plus some pneumonitis.  Abx given.  No PE seen.  Discussed with hospital medicine for admission.    Diagnostic Impression:    1. Pneumonitis    2. Shortness of breath    3. Moderate persistent asthma with exacerbation    4. Sarcoidosis    5. Hemoptysis         ED Disposition Condition    Admit                Reilly Ulloa MD  12/15/20 2149

## 2020-12-15 NOTE — ED NOTES
Pt resting on stretcher in NAD, respirations even and unlabored. Stretcher locked and in lowest position, side rails x 2, call bell within reach. Cardiac monitor, pulse ox and BP cuff applied cycling Q 30 minute vitals, oxygen nasal cannula 3 L in place. Pt offered restroom assistance, pt states no needs at this time. Pt updated on wait for CTA and results. Will continue to monitor and update pt on plan of care.

## 2020-12-15 NOTE — HPI
Ms. Salvatore Phillips is a 60 y.o. female with sarcoidosis (on prednisone, recently started on mycophenolate, on home 2L O2), asthma, HFpEF (60%EF, G1LVDD), HTN, NENA who presents today with hemoptysis.  Patient reports coughing up white sputum with scant blood several times this morning.  Patient states that she has had accompanied exertional dyspnea worse than baseline for the past 4 days, using home asthma medications with minimal relief.  She states that this dyspnea has waxed and waned over the past several months and is being followed by rheum/pulm who are adjusting her sarcoid medications.  States that she had a cough productive of green sputum briefly a couple weeks ago that she treated with OTC medications.  Denies any previous history of hemoptysis.  Denies any sick contacts or recent travel.  Recently tested negative for TB, denies any history of incarceration or homelessness.  Denies any fevers, chills, night sweats, recent weight loss, chest pain, abdominal pain, nausea, vomiting, changes in bowel or bladder habits.     Sarcoidosis without apparent extrathoracic manifestations first diagnosed at age 30, followed by Dr Forrester (rheum) and Dr Keating (pulm) for her sarcoidosis.  Currently on prednisone 2.5 mg, was started on mycophenolate ~1.5 weeks ago for worsening chronic dyspnea suspected to be 2/2 to worsening sarcoidosis.  Patient has smoked 0.5 ppd for past 30 years, quit several months ago.  Drinks a glass of wine twice a month, denies any previous history of recreational drug use/IVDU.  Lives with mother (83) and sister in [place].  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     ED course: mild respiratory distress and wheezing on arrival, improved with breathing treatments.  Afebrile, HDS, O2 sats mid 90% on 3L.  WBC 6.8, Hgb 11.6 (baseline 12).  CXR showing possible RLL aspiration pneumonia, CTA negative for PE but showing worsening interstitial disease in bilateral upper lobes. Started on CAP coverage and  steroids.

## 2020-12-15 NOTE — ED NOTES
Notified patient that she's gotten a bed assignment and that it is being cleaned.  Patient has no new needs at this time.  All necessary monitoring equipment in place.  Will continue to monitor.

## 2020-12-15 NOTE — ED NOTES
Pt returned to ED 1, back in stretcher, stretcher locked and in lowest position, side rails x 2. Cardiac monitor, pulse ox and BP cuff applied cycling Q 30 minute vitals, oxygen nasal cannula 3 L in place. Pt updated on wait for x-ray and blood results. Will continue to monitor and update pt on plan of care.

## 2020-12-16 PROBLEM — I27.20 PULMONARY HYPERTENSION: Status: ACTIVE | Noted: 2020-12-16

## 2020-12-16 LAB
ADENOVIRUS: NOT DETECTED
ALBUMIN SERPL BCP-MCNC: 2.4 G/DL (ref 3.5–5.2)
ALP SERPL-CCNC: 117 U/L (ref 55–135)
ALT SERPL W/O P-5'-P-CCNC: 11 U/L (ref 10–44)
ANION GAP SERPL CALC-SCNC: 5 MMOL/L (ref 8–16)
ASCENDING AORTA: 3.7 CM
AST SERPL-CCNC: 19 U/L (ref 10–40)
AV INDEX (PROSTH): 0.88
AV MEAN GRADIENT: 5 MMHG
AV PEAK GRADIENT: 9 MMHG
AV VALVE AREA: 3.51 CM2
AV VELOCITY RATIO: 0.83
BASOPHILS # BLD AUTO: 0.03 K/UL (ref 0–0.2)
BASOPHILS NFR BLD: 0.4 % (ref 0–1.9)
BILIRUB SERPL-MCNC: 0.1 MG/DL (ref 0.1–1)
BORDETELLA PARAPERTUSSIS (IS1001): NOT DETECTED
BORDETELLA PERTUSSIS (PTXP): NOT DETECTED
BSA FOR ECHO PROCEDURE: 1.92 M2
BUN SERPL-MCNC: 25 MG/DL (ref 6–20)
CALCIUM SERPL-MCNC: 8.2 MG/DL (ref 8.7–10.5)
CHLAMYDIA PNEUMONIAE: NOT DETECTED
CHLORIDE SERPL-SCNC: 105 MMOL/L (ref 95–110)
CO2 SERPL-SCNC: 31 MMOL/L (ref 23–29)
CORONAVIRUS 229E, COMMON COLD VIRUS: NOT DETECTED
CORONAVIRUS HKU1, COMMON COLD VIRUS: NOT DETECTED
CORONAVIRUS NL63, COMMON COLD VIRUS: NOT DETECTED
CORONAVIRUS OC43, COMMON COLD VIRUS: NOT DETECTED
CREAT SERPL-MCNC: 1.1 MG/DL (ref 0.5–1.4)
CV ECHO LV RWT: 0.5 CM
DIFFERENTIAL METHOD: ABNORMAL
DOP CALC AO PEAK VEL: 1.46 M/S
DOP CALC AO VTI: 29.28 CM
DOP CALC LVOT AREA: 4 CM2
DOP CALC LVOT DIAMETER: 2.26 CM
DOP CALC LVOT PEAK VEL: 1.21 M/S
DOP CALC LVOT STROKE VOLUME: 102.88 CM3
DOP CALCLVOT PEAK VEL VTI: 25.66 CM
E WAVE DECELERATION TIME: 129.22 MSEC
E/A RATIO: 0.72
E/E' RATIO: 8.88 M/S
ECHO LV POSTERIOR WALL: 1.1 CM (ref 0.6–1.1)
EOSINOPHIL # BLD AUTO: 0.1 K/UL (ref 0–0.5)
EOSINOPHIL NFR BLD: 0.7 % (ref 0–8)
ERYTHROCYTE [DISTWIDTH] IN BLOOD BY AUTOMATED COUNT: 15.3 % (ref 11.5–14.5)
EST. GFR  (AFRICAN AMERICAN): >60 ML/MIN/1.73 M^2
EST. GFR  (NON AFRICAN AMERICAN): 55.1 ML/MIN/1.73 M^2
FLUBV RNA NPH QL NAA+NON-PROBE: NOT DETECTED
FRACTIONAL SHORTENING: 35 % (ref 28–44)
GLUCOSE SERPL-MCNC: 87 MG/DL (ref 70–110)
HCT VFR BLD AUTO: 38.7 % (ref 37–48.5)
HGB BLD-MCNC: 10.9 G/DL (ref 12–16)
HPIV1 RNA NPH QL NAA+NON-PROBE: NOT DETECTED
HPIV2 RNA NPH QL NAA+NON-PROBE: NOT DETECTED
HPIV3 RNA NPH QL NAA+NON-PROBE: NOT DETECTED
HPIV4 RNA NPH QL NAA+NON-PROBE: NOT DETECTED
HUMAN METAPNEUMOVIRUS: NOT DETECTED
IMM GRANULOCYTES # BLD AUTO: 0.03 K/UL (ref 0–0.04)
IMM GRANULOCYTES NFR BLD AUTO: 0.4 % (ref 0–0.5)
INFLUENZA A (SUBTYPES H1,H1-2009,H3): NOT DETECTED
INTERVENTRICULAR SEPTUM: 1.19 CM (ref 0.6–1.1)
IVRT: 59.94 MSEC
LA MAJOR: 5.23 CM
LA MINOR: 5.31 CM
LA WIDTH: 5.69 CM
LEFT ATRIUM SIZE: 4.12 CM
LEFT ATRIUM VOLUME INDEX MOD: 53 ML/M2
LEFT ATRIUM VOLUME INDEX: 53.7 ML/M2
LEFT ATRIUM VOLUME MOD: 103.6 CM3
LEFT ATRIUM VOLUME: 105.01 CM3
LEFT INTERNAL DIMENSION IN SYSTOLE: 2.89 CM (ref 2.1–4)
LEFT VENTRICLE DIASTOLIC VOLUME INDEX: 45.66 ML/M2
LEFT VENTRICLE DIASTOLIC VOLUME: 89.27 ML
LEFT VENTRICLE MASS INDEX: 92 G/M2
LEFT VENTRICLE SYSTOLIC VOLUME INDEX: 16.3 ML/M2
LEFT VENTRICLE SYSTOLIC VOLUME: 31.86 ML
LEFT VENTRICULAR INTERNAL DIMENSION IN DIASTOLE: 4.43 CM (ref 3.5–6)
LEFT VENTRICULAR MASS: 180.75 G
LV LATERAL E/E' RATIO: 7.89 M/S
LV SEPTAL E/E' RATIO: 10.14 M/S
LYMPHOCYTES # BLD AUTO: 1.1 K/UL (ref 1–4.8)
LYMPHOCYTES NFR BLD: 16.6 % (ref 18–48)
MAGNESIUM SERPL-MCNC: 2.2 MG/DL (ref 1.6–2.6)
MCH RBC QN AUTO: 25.2 PG (ref 27–31)
MCHC RBC AUTO-ENTMCNC: 28.2 G/DL (ref 32–36)
MCV RBC AUTO: 89 FL (ref 82–98)
MONOCYTES # BLD AUTO: 0.6 K/UL (ref 0.3–1)
MONOCYTES NFR BLD: 8.4 % (ref 4–15)
MV PEAK A VEL: 0.98 M/S
MV PEAK E VEL: 0.71 M/S
MYCOPLASMA PNEUMONIAE: NOT DETECTED
NEUTROPHILS # BLD AUTO: 5 K/UL (ref 1.8–7.7)
NEUTROPHILS NFR BLD: 73.5 % (ref 38–73)
NRBC BLD-RTO: 0 /100 WBC
PHOSPHATE SERPL-MCNC: 3.8 MG/DL (ref 2.7–4.5)
PISA TR MAX VEL: 2.99 M/S
PLATELET # BLD AUTO: 250 K/UL (ref 150–350)
PMV BLD AUTO: 9.6 FL (ref 9.2–12.9)
POTASSIUM SERPL-SCNC: 3.8 MMOL/L (ref 3.5–5.1)
PROT SERPL-MCNC: 6.5 G/DL (ref 6–8.4)
RA MAJOR: 4.27 CM
RA PRESSURE: 8 MMHG
RA WIDTH: 3.65 CM
RBC # BLD AUTO: 4.33 M/UL (ref 4–5.4)
RESPIRATORY INFECTION PANEL SOURCE: NORMAL
RIGHT VENTRICULAR END-DIASTOLIC DIMENSION: 3.85 CM
RSV RNA NPH QL NAA+NON-PROBE: NOT DETECTED
RV+EV RNA NPH QL NAA+NON-PROBE: NOT DETECTED
SINUS: 3.34 CM
SODIUM SERPL-SCNC: 141 MMOL/L (ref 136–145)
STJ: 2.82 CM
TDI LATERAL: 0.09 M/S
TDI SEPTAL: 0.07 M/S
TDI: 0.08 M/S
TR MAX PG: 36 MMHG
TRICUSPID ANNULAR PLANE SYSTOLIC EXCURSION: 2.7 CM
TV REST PULMONARY ARTERY PRESSURE: 44 MMHG
WBC # BLD AUTO: 6.8 K/UL (ref 3.9–12.7)

## 2020-12-16 PROCEDURE — 99233 SBSQ HOSP IP/OBS HIGH 50: CPT | Mod: ,,, | Performed by: STUDENT IN AN ORGANIZED HEALTH CARE EDUCATION/TRAINING PROGRAM

## 2020-12-16 PROCEDURE — 36415 COLL VENOUS BLD VENIPUNCTURE: CPT

## 2020-12-16 PROCEDURE — 94761 N-INVAS EAR/PLS OXIMETRY MLT: CPT

## 2020-12-16 PROCEDURE — 25000242 PHARM REV CODE 250 ALT 637 W/ HCPCS: Performed by: STUDENT IN AN ORGANIZED HEALTH CARE EDUCATION/TRAINING PROGRAM

## 2020-12-16 PROCEDURE — 87798 DETECT AGENT NOS DNA AMP: CPT

## 2020-12-16 PROCEDURE — 25000003 PHARM REV CODE 250: Performed by: STUDENT IN AN ORGANIZED HEALTH CARE EDUCATION/TRAINING PROGRAM

## 2020-12-16 PROCEDURE — 99254 PR INITIAL INPATIENT CONSULT,LEVL IV: ICD-10-PCS | Mod: ,,, | Performed by: INTERNAL MEDICINE

## 2020-12-16 PROCEDURE — 97161 PT EVAL LOW COMPLEX 20 MIN: CPT

## 2020-12-16 PROCEDURE — 87070 CULTURE OTHR SPECIMN AEROBIC: CPT

## 2020-12-16 PROCEDURE — 94640 AIRWAY INHALATION TREATMENT: CPT

## 2020-12-16 PROCEDURE — 63600175 PHARM REV CODE 636 W HCPCS: Performed by: STUDENT IN AN ORGANIZED HEALTH CARE EDUCATION/TRAINING PROGRAM

## 2020-12-16 PROCEDURE — 97165 OT EVAL LOW COMPLEX 30 MIN: CPT

## 2020-12-16 PROCEDURE — 85025 COMPLETE CBC W/AUTO DIFF WBC: CPT

## 2020-12-16 PROCEDURE — 87205 SMEAR GRAM STAIN: CPT

## 2020-12-16 PROCEDURE — 11000001 HC ACUTE MED/SURG PRIVATE ROOM

## 2020-12-16 PROCEDURE — 99233 PR SUBSEQUENT HOSPITAL CARE,LEVL III: ICD-10-PCS | Mod: ,,, | Performed by: STUDENT IN AN ORGANIZED HEALTH CARE EDUCATION/TRAINING PROGRAM

## 2020-12-16 PROCEDURE — 63700000 PHARM REV CODE 250 ALT 637 W/O HCPCS: Performed by: STUDENT IN AN ORGANIZED HEALTH CARE EDUCATION/TRAINING PROGRAM

## 2020-12-16 PROCEDURE — 27000221 HC OXYGEN, UP TO 24 HOURS

## 2020-12-16 PROCEDURE — 83735 ASSAY OF MAGNESIUM: CPT

## 2020-12-16 PROCEDURE — 80053 COMPREHEN METABOLIC PANEL: CPT

## 2020-12-16 PROCEDURE — 99254 IP/OBS CNSLTJ NEW/EST MOD 60: CPT | Mod: ,,, | Performed by: INTERNAL MEDICINE

## 2020-12-16 PROCEDURE — 84100 ASSAY OF PHOSPHORUS: CPT

## 2020-12-16 PROCEDURE — 99900035 HC TECH TIME PER 15 MIN (STAT)

## 2020-12-16 RX ORDER — POTASSIUM CHLORIDE 20 MEQ/1
40 TABLET, EXTENDED RELEASE ORAL ONCE
Status: COMPLETED | OUTPATIENT
Start: 2020-12-16 | End: 2020-12-16

## 2020-12-16 RX ORDER — PREDNISONE 10 MG/1
40 TABLET ORAL DAILY
Status: DISCONTINUED | OUTPATIENT
Start: 2020-12-17 | End: 2020-12-17

## 2020-12-16 RX ORDER — SULFAMETHOXAZOLE AND TRIMETHOPRIM 400; 80 MG/1; MG/1
1 TABLET ORAL
Status: DISCONTINUED | OUTPATIENT
Start: 2020-12-18 | End: 2020-12-17

## 2020-12-16 RX ORDER — ACETAMINOPHEN 325 MG/1
650 TABLET ORAL EVERY 6 HOURS PRN
Status: DISCONTINUED | OUTPATIENT
Start: 2020-12-16 | End: 2020-12-18 | Stop reason: HOSPADM

## 2020-12-16 RX ADMIN — CEFTRIAXONE SODIUM 1 G: 1 INJECTION, POWDER, FOR SOLUTION INTRAMUSCULAR; INTRAVENOUS at 12:12

## 2020-12-16 RX ADMIN — METHYLPREDNISOLONE SODIUM SUCCINATE 60 MG: 40 INJECTION, POWDER, FOR SOLUTION INTRAMUSCULAR; INTRAVENOUS at 05:12

## 2020-12-16 RX ADMIN — POTASSIUM CHLORIDE 40 MEQ: 1500 TABLET, EXTENDED RELEASE ORAL at 11:12

## 2020-12-16 RX ADMIN — IPRATROPIUM BROMIDE AND ALBUTEROL SULFATE 3 ML: .5; 2.5 SOLUTION RESPIRATORY (INHALATION) at 12:12

## 2020-12-16 RX ADMIN — AZITHROMYCIN MONOHYDRATE 500 MG: 250 TABLET ORAL at 08:12

## 2020-12-16 RX ADMIN — TOPIRAMATE 50 MG: 25 TABLET, FILM COATED ORAL at 09:12

## 2020-12-16 RX ADMIN — IPRATROPIUM BROMIDE AND ALBUTEROL SULFATE 3 ML: .5; 2.5 SOLUTION RESPIRATORY (INHALATION) at 04:12

## 2020-12-16 RX ADMIN — ACETAMINOPHEN 650 MG: 325 TABLET ORAL at 12:12

## 2020-12-16 RX ADMIN — PREDNISONE 40 MG: 10 TABLET ORAL at 08:12

## 2020-12-16 RX ADMIN — METHYLPREDNISOLONE SODIUM SUCCINATE 60 MG: 40 INJECTION, POWDER, FOR SOLUTION INTRAMUSCULAR; INTRAVENOUS at 02:12

## 2020-12-16 RX ADMIN — IPRATROPIUM BROMIDE AND ALBUTEROL SULFATE 3 ML: .5; 2.5 SOLUTION RESPIRATORY (INHALATION) at 07:12

## 2020-12-16 RX ADMIN — IPRATROPIUM BROMIDE AND ALBUTEROL SULFATE 3 ML: .5; 2.5 SOLUTION RESPIRATORY (INHALATION) at 08:12

## 2020-12-16 NOTE — ASSESSMENT & PLAN NOTE
Last echo (7/10/2020): EF60%, grade I left ventricular diastolic dysfunction.  On home lasix prn swelling.  BNP in , trop 0.032.    Plan:  - repeat echo  - repeat trop  - cardiac diet, salt restriction <2g  - monitor I/Os

## 2020-12-16 NOTE — ASSESSMENT & PLAN NOTE
60 year old female with history of sarcoidosis (on prednisone, mycophenolate) presents with four days of worsening dyspnea and one day of scant hemoptysis.  On prednisone 2.5 mg qd, recently started on mycophenolate 500 mg within the last 2 weeks per rheum, extensive smoking history, TB negative (10/13/2020).  Afebrile, mild respiratory distress upon arrival, improved with duonebs.  WBC 6.8, Hgb 11.6 (baseline 12).  D-dimer 0.62. CXR showing possible aspiration pneumonia/infection in R lung base.  CTA negative for PE but demonstrating extensive mixed reticulation/ground glass with some consolidation in bilateral upper lobes.  Hemoptysis with myriad etiology for immunocompromised patient including worsening sarcoid, pneumonia, oropharyngeal bleeding 2/2 to consistent cough, malignancy, fungal.      Plan:  - pulm consulted, appreciate recs  - CBC BID, trend Hgb  - ordered procal, resp sputum culture, respiratory infection panel, fungitel   - hold mycophenolate mofetil  - continue ceftriaxone/azithromycin for CAP coverage, low threshold to broaden if clinically worsening  - start prednisone 40 mg for asthma exacerbation, discussed possible change in dosage if suspicious for worsening of sarcoidosis  - hold anticoagulation with current bleeding

## 2020-12-16 NOTE — SUBJECTIVE & OBJECTIVE
Past Medical History:   Diagnosis Date    Asthma     Hypertension     Sarcoidosis of lung     Sleep apnea     maintained on CPAP    Upper respiratory disease     URI (upper respiratory infection)        Past Surgical History:   Procedure Laterality Date    ABDOMINAL SURGERY      BREAST LUMPECTOMY Left 1996    BENIGN    BREAST SURGERY       SECTION      x3    COLON SURGERY      exploratory procedure      GASTRIC BYPASS      HYSTERECTOMY  2009    JOINT REPLACEMENT  2013    KNEE SURGERY      OOPHORECTOMY  2009    SMALL INTESTINE SURGERY      tummy tuck         Review of patient's allergies indicates:  No Known Allergies    Family History     Problem Relation (Age of Onset)    Alzheimer's disease Mother    Arthritis Sister    Cancer Father    Crohn's disease Brother    Hypertension Son    Kidney disease Brother        Tobacco Use    Smoking status: Former Smoker     Packs/day: 1.00     Years: 35.00     Pack years: 35.00     Types: Cigarettes     Start date: 1979     Quit date: 2020     Years since quittin.3    Smokeless tobacco: Never Used    Tobacco comment: started age 17   Substance and Sexual Activity    Alcohol use: Not Currently     Frequency: Monthly or less     Drinks per session: 1 or 2     Binge frequency: Never     Comment: occ    Drug use: Never    Sexual activity: Never         Review of Systems   Constitutional: Negative for activity change, appetite change and fever.   HENT: Negative for congestion, nosebleeds and rhinorrhea.    Eyes: Negative for pain and discharge.   Respiratory: Positive for cough (hemoptysis ). Negative for chest tightness, shortness of breath and wheezing.    Gastrointestinal: Negative for abdominal distention, constipation, diarrhea and nausea.   Endocrine: Negative.    Genitourinary: Negative for difficulty urinating, enuresis, hematuria and menstrual problem.   Musculoskeletal: Negative for arthralgias, joint swelling and myalgias.    Skin: Negative for color change and rash.   Allergic/Immunologic: Negative.    Neurological: Negative for dizziness, syncope and numbness.   Hematological: Negative for adenopathy.   Psychiatric/Behavioral: Negative for agitation and hallucinations.     Objective:     Vital Signs (Most Recent):  Temp: 97.9 °F (36.6 °C) (12/16/20 0753)  Pulse: 86 (12/16/20 1123)  Resp: 17 (12/16/20 0820)  BP: 125/69 (12/16/20 0753)  SpO2: (!) 94 % (12/16/20 0820) Vital Signs (24h Range):  Temp:  [96.6 °F (35.9 °C)-98.3 °F (36.8 °C)] 97.9 °F (36.6 °C)  Pulse:  [] 86  Resp:  [14-23] 17  SpO2:  [90 %-98 %] 94 %  BP: (116-157)/(64-94) 125/69     Weight: 83.8 kg (184 lb 11.9 oz)  Body mass index is 28.94 kg/m².    No intake or output data in the 24 hours ending 12/16/20 1132    Physical Exam  Constitutional:       Appearance: Normal appearance. She is normal weight.   HENT:      Head: Normocephalic and atraumatic.      Nose: Nose normal.      Mouth/Throat:      Mouth: Mucous membranes are moist.      Pharynx: Oropharynx is clear.   Eyes:      Conjunctiva/sclera: Conjunctivae normal.      Pupils: Pupils are equal, round, and reactive to light.   Neck:      Musculoskeletal: Normal range of motion and neck supple.   Cardiovascular:      Rate and Rhythm: Normal rate and regular rhythm.      Heart sounds: No murmur.   Pulmonary:      Effort: Pulmonary effort is normal. No respiratory distress.      Breath sounds: Rales (upper lob predominant) present.   Abdominal:      General: Abdomen is flat. Bowel sounds are normal.      Palpations: Abdomen is soft.   Musculoskeletal: Normal range of motion.         General: No swelling.   Skin:     General: Skin is warm and dry.   Neurological:      General: No focal deficit present.      Mental Status: She is alert and oriented to person, place, and time. Mental status is at baseline.   Psychiatric:         Mood and Affect: Mood normal.         Vents:       Lines/Drains/Airways     Peripheral  Intravenous Line                 Peripheral IV - Single Lumen 12/15/20 0900 18 G Right Antecubital 1 day                Significant Labs:    CBC/Anemia Profile:  Recent Labs   Lab 12/15/20  0901 12/15/20  0906 12/15/20  1927 12/16/20  0755   WBC 6.85  --  4.81 6.80   HGB 11.6*  --  11.8* 10.9*   HCT 41.1 40 41.2 38.7     --  269 250   MCV 89  --  89 89   RDW 15.3*  --  15.3* 15.3*        Chemistries:  Recent Labs   Lab 12/15/20  0901 12/16/20  0551    141   K 4.1 3.8    105   CO2 29 31*   BUN 15 25*   CREATININE 0.9 1.1   CALCIUM 8.3* 8.2*   ALBUMIN 2.6* 2.4*   PROT 7.1 6.5   BILITOT 0.1 0.1   ALKPHOS 126 117   ALT 12 11   AST 21 19   MG  --  2.2   PHOS  --  3.8       All pertinent labs within the past 24 hours have been reviewed.    Significant Imaging:   I have reviewed and interpreted all pertinent imaging results/findings within the past 24 hours.

## 2020-12-16 NOTE — CONSULTS
[facility]  Pulmonology  Consult Note    Patient Name: Salvatore Phillips  MRN: [id number]  Admission Date: 12/15/2020  Hospital Length of Stay: 1 days  Code Status: Prior  Attending Physician: Cal Francois MD  Primary Care Provider: Viviana Cuba MD   Principal Problem: Hemoptysis    Inpatient consult to Pulmonology  Consult performed by: Lizz Koenig MD  Consult ordered by: Saúl Keller MD  Reason for consult: sarcoidosis        Subjective:     HPI:    Ms. Phillips is 59 yo F with sarcoidosis that presents to the hospital with new onset hemoptysis of one day duration. She [place] and starting coughing. She noticed some bright red blood mixed with sputum. She has never had hemoptysis before. No fevers, but she has had sinus drainage and issues for 2 weeks and starting feeling more short of breath with exertion on Saturday.     Past Medical History:   Diagnosis Date    Asthma     Hypertension     Sarcoidosis of lung     Sleep apnea     maintained on CPAP    Upper respiratory disease     URI (upper respiratory infection)        Past Surgical History:   Procedure Laterality Date    ABDOMINAL SURGERY      BREAST LUMPECTOMY Left 1996    BENIGN    BREAST SURGERY       SECTION      x3    COLON SURGERY      exploratory procedure      GASTRIC BYPASS      HYSTERECTOMY  2009    JOINT REPLACEMENT  2013    KNEE SURGERY      OOPHORECTOMY  2009    SMALL INTESTINE SURGERY      tummy tuck         Review of patient's allergies indicates:  No Known Allergies    Family History     Problem Relation (Age of Onset)    Alzheimer's disease Mother    Arthritis Sister    Cancer Father    Crohn's disease Brother    Hypertension Son    Kidney disease Brother        Tobacco Use    Smoking status: Former Smoker     Packs/day: 1.00     Years: 35.00     Pack years: 35.00     Types: Cigarettes     Start date: 1979     Quit date: 2020     Years  since quittin.3    Smokeless tobacco: Never Used    Tobacco comment: started age 17   Substance and Sexual Activity    Alcohol use: Not Currently     Frequency: Monthly or less     Drinks per session: 1 or 2     Binge frequency: Never     Comment: occ    Drug use: Never    Sexual activity: Never         Review of Systems   Constitutional: Negative for activity change, appetite change and fever.   HENT: Negative for congestion, nosebleeds and rhinorrhea.    Eyes: Negative for pain and discharge.   Respiratory: Positive for cough (hemoptysis ). Negative for chest tightness, shortness of breath and wheezing.    Gastrointestinal: Negative for abdominal distention, constipation, diarrhea and nausea.   Endocrine: Negative.    Genitourinary: Negative for difficulty urinating, enuresis, hematuria and menstrual problem.   Musculoskeletal: Negative for arthralgias, joint swelling and myalgias.   Skin: Negative for color change and rash.   Allergic/Immunologic: Negative.    Neurological: Negative for dizziness, syncope and numbness.   Hematological: Negative for adenopathy.   Psychiatric/Behavioral: Negative for agitation and hallucinations.     Objective:     Vital Signs (Most Recent):  Temp: 97.9 °F (36.6 °C) (20 0753)  Pulse: 86 (20 1123)  Resp: 17 (20 0820)  BP: 125/69 (20 0753)  SpO2: (!) 94 % (20 0820) Vital Signs (24h Range):  Temp:  [96.6 °F (35.9 °C)-98.3 °F (36.8 °C)] 97.9 °F (36.6 °C)  Pulse:  [] 86  Resp:  [14-23] 17  SpO2:  [90 %-98 %] 94 %  BP: (116-157)/(64-94) 125/69     Weight: 83.8 kg (184 lb 11.9 oz)  Body mass index is 28.94 kg/m².    No intake or output data in the 24 hours ending 20 1132    Physical Exam  Constitutional:       Appearance: Normal appearance. She is normal weight.   HENT:      Head: Normocephalic and atraumatic.      Nose: Nose normal.      Mouth/Throat:      Mouth: Mucous membranes are moist.      Pharynx: Oropharynx is clear.   Eyes:       Conjunctiva/sclera: Conjunctivae normal.      Pupils: Pupils are equal, round, and reactive to light.   Neck:      Musculoskeletal: Normal range of motion and neck supple.   Cardiovascular:      Rate and Rhythm: Normal rate and regular rhythm.      Heart sounds: No murmur.   Pulmonary:      Effort: Pulmonary effort is normal. No respiratory distress.      Breath sounds: Rales (upper lob predominant) present.   Abdominal:      General: Abdomen is flat. Bowel sounds are normal.      Palpations: Abdomen is soft.   Musculoskeletal: Normal range of motion.         General: No swelling.   Skin:     General: Skin is warm and dry.   Neurological:      General: No focal deficit present.      Mental Status: She is alert and oriented to person, place, and time. Mental status is at baseline.   Psychiatric:         Mood and Affect: Mood normal.         Vents:       Lines/Drains/Airways     Peripheral Intravenous Line                 Peripheral IV - Single Lumen 12/15/20 0900 18 G Right Antecubital 1 day                Significant Labs:    CBC/Anemia Profile:  Recent Labs   Lab 12/15/20  0901 12/15/20  0906 12/15/20  1927 12/16/20  0755   WBC 6.85  --  4.81 6.80   HGB 11.6*  --  11.8* 10.9*   HCT 41.1 40 41.2 38.7     --  269 250   MCV 89  --  89 89   RDW 15.3*  --  15.3* 15.3*        Chemistries:  Recent Labs   Lab 12/15/20  0901 12/16/20  0551    141   K 4.1 3.8    105   CO2 29 31*   BUN 15 25*   CREATININE 0.9 1.1   CALCIUM 8.3* 8.2*   ALBUMIN 2.6* 2.4*   PROT 7.1 6.5   BILITOT 0.1 0.1   ALKPHOS 126 117   ALT 12 11   AST 21 19   MG  --  2.2   PHOS  --  3.8       All pertinent labs within the past 24 hours have been reviewed.    Significant Imaging:   I have reviewed and interpreted all pertinent imaging results/findings within the past 24 hours.    Assessment/Plan:     * Hemoptysis  As of this morning it has resolved. Likely secondary to an infectious process.   Agree with steroids, antibiotics, and culture.      Sarcoidosis of lung  She does have increased ground glass on her new CT scan- concern for infectious process vs exacerbation.   Continue cellcept  Obtain sputum culture  Agree with CAP coverage  Increase steroids to solumedrol 60 mg q6 for one day and then can decrease back to prednisone 40 mg tomorrow.             Thank you for your consult.      Lizz Koenig MD  Pulmonology  [facility]

## 2020-12-16 NOTE — NURSING
Patient arrived to the unit via stretcher, she ambulated independently to her bed from the door.  Patient has NC at 3L/m flowing and audible wheezes. Patient denied being in any pain or distress. All vitals are within expected parameters.

## 2020-12-16 NOTE — ASSESSMENT & PLAN NOTE
On home DuoNeb, prednisone 2.5 mg, zafirlukast 20 mg, with albuterol rescue.  Received breathing treatments and methylprednisone 125 mg in ED with mild improvement.  Lungs with diffuse end expiratory wheeze on exam.      Plan:  - continue duonebs qh4  - start prednisone 40 mg q5d   - continuous oxygen with SpO2 goal >90%

## 2020-12-16 NOTE — SUBJECTIVE & OBJECTIVE
Past Medical History:   Diagnosis Date    Asthma     Hypertension     Sarcoidosis of lung     Sleep apnea     maintained on CPAP    Upper respiratory disease     URI (upper respiratory infection)        Past Surgical History:   Procedure Laterality Date    ABDOMINAL SURGERY      BREAST LUMPECTOMY Left 1996    BENIGN    BREAST SURGERY       SECTION      x3    COLON SURGERY      exploratory procedure      GASTRIC BYPASS      HYSTERECTOMY  2009    JOINT REPLACEMENT  2013    KNEE SURGERY      OOPHORECTOMY  2009    SMALL INTESTINE SURGERY      tummy tuck         Review of patient's allergies indicates:  No Known Allergies    Current Facility-Administered Medications on File Prior to Encounter   Medication    cyanocobalamin injection 1,000 mcg     Current Outpatient Medications on File Prior to Encounter   Medication Sig    mycophenolate (CELLCEPT) 500 mg Tab Take 1 tablet (500 mg total) by mouth once daily for 14 days, THEN 1 tablet (500 mg total) 2 (two) times daily.    predniSONE (DELTASONE) 2.5 MG tablet Take 2 tablets (5 mg total) by mouth once daily.    zolpidem (AMBIEN) 5 MG Tab Take 1 tablet (5 mg total) by mouth nightly as needed (insomnia).    albuterol (PROVENTIL/VENTOLIN HFA) 90 mcg/actuation inhaler Inhale 2 puffs into the lungs every 4 (four) hours as needed for Wheezing or Shortness of Breath.    albuterol-ipratropium (DUO-NEB) 2.5 mg-0.5 mg/3 mL nebulizer solution Take 3 mLs by nebulization every 6 (six) hours as needed for Wheezing or Shortness of Breath. Rescue. Corrected prescription with refills    budesonide (PULMICORT) 0.5 mg/2 mL nebulizer solution Take 2 mLs (0.5 mg total) by nebulization 2 (two) times daily. Controller    butalbital-acetaminophen-caffeine -40 mg (FIORICET, ESGIC) -40 mg per tablet Take 1 tablet by mouth every 6 (six) hours as needed for Pain.    fluticasone furoate-vilanteroL (BREO ELLIPTA) 200-25 mcg/dose DsDv diskus inhaler Inhale  1 puff into the lungs once daily. Controller    fluticasone propionate (FLONASE) 50 mcg/actuation nasal spray 2 sprays (100 mcg total) by Each Nostril route once daily. Corrected prescription with refills    furosemide (LASIX) 40 MG tablet Take 1 tablet (40 mg total) by mouth daily as needed (for leg swelling).    ipratropium (ATROVENT HFA) 17 mcg/actuation inhaler Inhale 2 puffs into the lungs every 4 to 6 hours as needed for Wheezing. Rescue    nicotine polacrilex 2 MG Lozg Take 1 lozenge (2 mg total) by mouth as needed.    oxybutynin (DITROPAN) 5 MG Tab Take 1 tablet (5 mg total) by mouth every evening.    potassium chloride SA (K-DUR,KLOR-CON) 20 MEQ tablet Take 1 tablet (20 mEq total) by mouth daily as needed (take with lasix).    predniSONE (DELTASONE) 20 MG tablet Take 2 tablets (40 mg total) by mouth once daily.    predniSONE (DELTASONE) 20 MG tablet Take 1 tablet (20 mg total) by mouth As instructed. 3tab po daily x3days, 2tabs po daily x3days, 1tab po daily x3days, 1/2tab po daily x3days    QUEtiapine (SEROQUEL) 50 MG tablet Take 50 mg by mouth every evening.    topiramate (TOPAMAX) 50 MG tablet Take 50 mg by mouth every evening.    traMADoL (ULTRAM) 50 mg tablet Take 1 tablet (50 mg total) by mouth every 6 (six) hours as needed for Pain.    varenicline (CHANTIX) 1 mg Tab Take 1 tablet (1 mg total) by mouth 2 (two) times daily.    zafirlukast (ACCOLATE) 20 MG tablet Take 1 tablet (20 mg total) by mouth 2 (two) times daily.     Family History     Problem Relation (Age of Onset)    Alzheimer's disease Mother    Arthritis Sister    Cancer Father    Crohn's disease Brother    Hypertension Son    Kidney disease Brother        Tobacco Use    Smoking status: Former Smoker     Packs/day: 1.00     Years: 35.00     Pack years: 35.00     Types: Cigarettes     Start date: 1979     Quit date: 2020     Years since quittin.3    Smokeless tobacco: Never Used    Tobacco comment: started age 17    Substance and Sexual Activity    Alcohol use: Not Currently     Frequency: Monthly or less     Drinks per session: 1 or 2     Binge frequency: Never     Comment: occ    Drug use: Never    Sexual activity: Never     Review of Systems   Constitutional: Positive for fatigue. Negative for fever and unexpected weight change.   HENT: Negative for congestion and sore throat.    Respiratory: Positive for shortness of breath and wheezing. Negative for cough, chest tightness and stridor.    Cardiovascular: Negative for chest pain, palpitations and leg swelling.   Gastrointestinal: Negative for abdominal distention, blood in stool, constipation, diarrhea, nausea and vomiting.   Genitourinary: Negative for dysuria and frequency.   Musculoskeletal: Negative for arthralgias.   Neurological: Negative for weakness and light-headedness.   Hematological: Negative for adenopathy.   Psychiatric/Behavioral: Negative for agitation.     Objective:     Vital Signs (Most Recent):  Temp: 98.6 °F (37 °C) (12/15/20 0829)  Pulse: 97 (12/15/20 1603)  Resp: (!) 23 (12/15/20 1603)  BP: (!) 155/76 (12/15/20 1146)  SpO2: 98 % (12/15/20 1603) Vital Signs (24h Range):  Temp:  [98.6 °F (37 °C)] 98.6 °F (37 °C)  Pulse:  [] 97  Resp:  [14-24] 23  SpO2:  [91 %-100 %] 98 %  BP: (147-186)/(70-96) 155/76     Weight: 78 kg (172 lb)  Body mass index is 26.94 kg/m².    Physical Exam  Constitutional:       General: She is not in acute distress.     Appearance: Normal appearance. She is not ill-appearing.   HENT:      Head: Normocephalic and atraumatic.      Mouth/Throat:      Mouth: Mucous membranes are moist.   Cardiovascular:      Rate and Rhythm: Normal rate and regular rhythm.      Pulses: Normal pulses.      Heart sounds: Normal heart sounds. No murmur. No friction rub. No gallop.    Pulmonary:      Effort: Pulmonary effort is normal. No respiratory distress.      Breath sounds: No stridor. Wheezing (diffuse end expiratory) present. No rales.    Abdominal:      General: Bowel sounds are normal.      Palpations: Abdomen is soft. There is no mass.      Tenderness: There is no abdominal tenderness.   Skin:     General: Skin is warm and dry.      Capillary Refill: Capillary refill takes less than 2 seconds.   Neurological:      General: No focal deficit present.      Mental Status: She is alert and oriented to person, place, and time.   Psychiatric:         Mood and Affect: Mood normal.         Behavior: Behavior normal.             Significant Labs:   ABGs: No results for input(s): PH, PCO2, HCO3, POCSATURATED, BE, TOTALHB, COHB, METHB, O2HB, POCFIO2 in the last 48 hours.  Blood Culture: No results for input(s): LABBLOO in the last 48 hours.  CBC:   Recent Labs   Lab 12/15/20  0901 12/15/20  0906   WBC 6.85  --    HGB 11.6*  --    HCT 41.1 40     --      CMP:   Recent Labs   Lab 12/15/20  0901      K 4.1      CO2 29   GLU 82   BUN 15   CREATININE 0.9   CALCIUM 8.3*   PROT 7.1   ALBUMIN 2.6*   BILITOT 0.1   ALKPHOS 126   AST 21   ALT 12   ANIONGAP 6*   EGFRNONAA >60.0     Cardiac Markers:   Recent Labs   Lab 12/15/20  0901   *     Coagulation:   Recent Labs   Lab 12/15/20  0901   INR 0.9     Lactic Acid: No results for input(s): LACTATE in the last 48 hours.  Lipase: No results for input(s): LIPASE in the last 48 hours.  Lipid Panel: No results for input(s): CHOL, HDL, LDLCALC, TRIG, CHOLHDL in the last 48 hours.  Magnesium: No results for input(s): MG in the last 48 hours.  POCT Glucose: No results for input(s): POCTGLUCOSE in the last 48 hours.  Respiratory Culture: No results for input(s): GSRESP, RESPIRATORYC in the last 48 hours.  TSH:   Recent Labs   Lab 08/05/20  0906   TSH 0.610     Urine Culture: No results for input(s): LABURIN in the last 48 hours.  Urine Studies: No results for input(s): COLORU, APPEARANCEUA, PHUR, SPECGRAV, PROTEINUA, GLUCUA, KETONESU, BILIRUBINUA, OCCULTUA, NITRITE, UROBILINOGEN, LEUKOCYTESUR, RBCUA,  WBCUA, BACTERIA, SQUAMEPITHEL, HYALINECASTS in the last 48 hours.    Invalid input(s): MILES    Significant Imaging:   Imaging Results          CTA Chest Non-Coronary (PE Study) (Final result)  Result time 12/15/20 11:52:51    Final result by Daxa Figueroa MD (12/15/20 11:52:51)                 Impression:      Patient motion during image acquisition degrades fine spatial resolution and contrast resolution.  Within the limits of the study I detect no pulmonary thromboembolism, pulmonary infarct or right heart strain.    In both lungs there is extensive mixed reticulation and ground-glass disease with some areas of janie parenchymal consolidation. This is upper lobe predominant and produces some volume loss in the right upper lobe resulting in shift of fissures.  Chest radiograph abnormality dates back to at least 11/25/2018 in this patient with a history of sarcoidosis.  The extent of the pulmonary disease is worse today than on chest radiograph of 06/05/2020 or chest CT of 09/17/2020. No cavitation identified. Findings are consistent with sarcoidosis although other processes including subacute hypersensitivity pneumonia could present in a similar fashion.    No pulmonary edema identified in this patient with aortic and coronary calcific atherosclerosis.    Additional findings are above.      Electronically signed by: Daxa Figueroa MD  Date:    12/15/2020  Time:    11:52             Narrative:    EXAMINATION:  CTA CHEST NON CORONARY    CLINICAL HISTORY:  PE suspected, intermediate prob, positive D-dimer;    TECHNIQUE:  During intravenous bolus injection of 100 ml of Omnipaque 350 contrast medium via the right upper extremity and using low dose technique, the chest was surveyed from above the pulmonary apices through the posterior costophrenic angles.  Images were acquired without gatingscratch.    Data was reconstructed for multiplanar images in axial, sagittal and coronal planes and for maximal intensity  projection images in the axial plane.    Patient motion during image acquisition causes x-ray beam attenuation and scatter, degrading spatial and contrast resolution.    All CT scans at this facility use dose modulation, iterative reconstruction and/or weight based dosing when appropriate to reduce radiation dose to as low as reasonably achievable.    Xray dose: DLP = 165.05 mGy-cm.    COMPARISON:  Chest radiographs: 12/15/2020.  06/05/2020.  11/25/2018.    Chest CT: 09/17/2020.    FINDINGS:  Additional history: Sarcoidosis established by lung biopsy at 29 years of age.    Base of Neck: No significant abnormality.    Aorta: Left-sided aortic arch with 3 arterial branches.  The aorta maintains normal caliber, contour and course. There is calcification of the thoracic aorta and coronary arteries in this 60-year-old woman.    Heart/pericardium: Normal size. No right heart strain identified. No pericardial fluid or calcification.    Pulmonary arteries: Pulmonary arteries distribute normally.  Pulmonary arteries are sufficiently opacified for diagnostic assessment.  Patient motion degrades spatial and contrast resolution.  There is no pulmonary thromboembolism through the lobar arteries.  Pulmonary arterial hypertension is neither confirmed nor excluded.  There is no pulmonary thromboembolism.    Pulmonary veins, left atrium:    There are four pulmonary veins that return to the left atrium.    Sindi/Mediastinum: There is a mildly enlarged lymph node in the anterior mediastinum (axial series 2, image 161) with short axis of 1.1 cm; this is stable compared with 09/17/2020, axial series 2, image 43 no other definite mediastinal lymph node enlargement identified.  Hilar lymph nodes are not enlarged in this patient with a clinical diagnosis of sarcoidosis..    Pleura: No pleural fluid.No pleural calcification.    Esophagus and upper abdomen: Postoperative changes are again identified at the gastroesophageal junction.  There is  prior cholecystectomy.  No abnormality of the partially imaged upper abdomen.    Thoracic soft tissues: Normal. Both breasts are present.    Bones: No acute fracture. No suspicious lytic or sclerotic lesion.    Airways: Patent.    Lungs:    -In both lungs there is extensive mixed reticulation and ground-glass disease with some areas of janie parenchymal consolidation.  This lung disease is upper lobe predominant and produces some volume loss in the right upper lobe resulting in shift of right pleural fissures.  The extent of the disease is worse than on chest radiograph of 06/05/2020 or chest CT of 09/17/2020.  No cavitation identified.  Findings are consistent with sarcoidosis although other processes including subacute hypersensitivity pneumonia could present in a similar fashion.    -No pulmonary infarct.    -No pulmonary edema identified in this patient with aortic and coronary atherosclerosis.                               X-Ray Chest AP Portable (Final result)  Result time 12/15/20 08:54:43    Final result by Yuniel Casanova DO (12/15/20 08:54:43)                 Impression:      1. Chronic pleural-parenchymal changes in the bilateral upper lungs, likely related to patient's history of sarcoidosis.  2. Mild reticulonodular opacities in the right lower lung may be related to aspiration or atypical infection.      Electronically signed by: Yuniel Casanova  Date:    12/15/2020  Time:    08:54             Narrative:    EXAMINATION:  XR CHEST AP PORTABLE    CLINICAL HISTORY:  CHF.    TECHNIQUE:  Single frontal view of the chest was performed.    COMPARISON:  Multiple prior radiographs of the chest, most recent from 08/05/2020.  CT of the chest from 09/17/2020.    FINDINGS:  There is upper lung predominant fibrotic changes compatible with patient's history of sarcoidosis.  Mild reticulonodular opacities within the right lung base may represent aspiration or atypical infection.  The pleural spaces are clear.  Cardiac  silhouette is unremarkable.  There are calcifications of the aortic arch.  There a visualized osseous structures are unremarkable.  There are surgical clips in the superior mediastinum.  Epigastric surgical clips are also seen.

## 2020-12-16 NOTE — PT/OT/SLP EVAL
"Physical Therapy Co-Evaluation and Discharge Note with OT    Patient Name:  Salvatore Phillips   MRN:  [id number]     *Co-treatment with occupational therapy necessary due to patient's complex medical condition and assistance level to ensure safety of patient and hospital staff.    Recommendations:     Discharge Recommendations:  home   Discharge Equipment Recommendations: none   Barriers to discharge: None    Assessment:     Salvatore Phillips is a 60 y.o. female admitted with a medical diagnosis of Hemoptysis.  At this time, patient is functioning at their prior level of function and does not require further acute PT services. Pt states she has no concerns about going home and does not need PT.    Recent Surgery: * No surgery found *      Plan:     During this hospitalization, patient does not require further acute PT services.  Please re-consult if situation changes.      Subjective     Chief Complaint: Shortness of breath  Patient/Family Comments/goals: "I want to go home"  Pain/Comfort:  · Pain Rating 1: 0/10  · Pain Rating Post-Intervention 1: 0/10    Patients cultural, spiritual, Spiritism conflicts given the current situation:  no    Living Environment:  Pt lives with daughter and sister in a [facility] with 0 POONAM. She has a walk-in shower with a built-in bench.   Prior to admission, patients level of function was independent with all ADLs and mobility. 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     Equipment used at home: none.  DME owned (not currently used): none.  Upon discharge, patient will have assistance from family.    Objective:     Communicated with RN prior to session.  Patient found HOB elevated with peripheral IV, oxygen upon PT entry to room.    General Precautions: Standard, fall   Orthopedic Precautions:N/A   Braces: N/A     Exams:  · Cognitive Exam:  Patient is oriented to Person, Place, Time and Situation  · Postural Exam:  Patient presented with the following abnormalities:    · -   " "    Rounded shoulders  · -       Forward head  · Sensation:    · -       Intact  light/touch bilateral LEs   · RLE ROM: WNL  · RLE Strength: WFL  · LLE ROM: WNL  · LLE Strength: WFL    Functional Mobility:  · Bed Mobility:     · Supine to Sit: independence  · Sit to Supine: independence  · Transfers:     · Sit to Stand:  independence with no AD  · Toilet Transfer: independence with  no AD  using  Step Transfer  · Gait:   · Pt ambulated in room ~20 ft to bathroom and back without AD independently  · Pt removed O2 nasal cannula, states, "I don't need it to walk to the bathroom"    AM-PAC 6 CLICK MOBILITY  Total Score:24     AM-PAC 6 CLICK MOBILITY  Total Score:24     Patient left HOB elevated with all lines intact, call button in reach and RN notified.    GOALS:   Multidisciplinary Problems     Physical Therapy Goals     Not on file                History:     Past Medical History:   Diagnosis Date    Asthma     Hypertension     Sarcoidosis of lung     Sleep apnea     maintained on CPAP    Upper respiratory disease     URI (upper respiratory infection)        Past Surgical History:   Procedure Laterality Date    ABDOMINAL SURGERY      BREAST LUMPECTOMY Left 1996    BENIGN    BREAST SURGERY       SECTION      x3    COLON SURGERY      exploratory procedure      GASTRIC BYPASS      HYSTERECTOMY  2009    JOINT REPLACEMENT  2013    KNEE SURGERY      OOPHORECTOMY  2009    SMALL INTESTINE SURGERY      tummy tuck         Time Tracking:     PT Received On: 20  PT Start Time: 913     PT Stop Time: 920  PT Total Time (min): 7 min     Billable Minutes: Evaluation 7      Carmenza Caceres, PT  2020  "

## 2020-12-16 NOTE — HOSPITAL COURSE
Patient was admitted to hospital medicine for evaluation and management of hemoptysis. Pulmonology was consulted and recommended to increase steroids to methylpred 60mg q6h for 2 days and then decreasing to prednisone 40 mg daily. Bactrim was started due to high dosage of steroids. They agreed with CAP coverage with ceftriaxone and azithromycin, which she finished upon discharge. She had an increase in oxygen requirements from 2L to 4L and was given one time dose of lasix 40mg IV with good response and able to be titrated down to home 2L NC. Her renal function worsened slightly 12/18 (Cr peaked to 1.5 from 1.0 but slightly improved to 1.4) likely due to contrasted study done on 12/15/2020. Although fungitell assay was positive, Pulmonology did not recommend treating for PJP as patient was clinically improving. Patient will be discharged on home prednisone 2.5mg daily and encouraged to follow up with PCP and Pulmonologist.

## 2020-12-16 NOTE — HPI
Ms. Phillips is 61 yo F with sarcoidosis that presents to the hospital with new onset hemoptysis of one day duration. [place] and starting coughing. She noticed some bright red blood mixed with sputum. She has never had hemoptysis before. No fevers, chills, shortness of breath, or worsening oxygen requirements. She started cellcept on Monday with no obvious side effects.

## 2020-12-16 NOTE — H&P
[facility] Medicine  History & Physical    Patient Name: Salvatore Phillips  MRN: [id number]  Admission Date: 12/15/2020  Attending Physician: Cal Francois MD   Primary Care Provider: Viviana Cuba MD    [facility] Medicine Team: [facility] HOSP MED 5 Saúl Keller MD     Patient information was obtained from patient and ER records.     Subjective:     Principal Problem:Hemoptysis    Chief Complaint:   Chief Complaint   Patient presents with    Hemoptysis     hx sarcoid,on home oxygen        HPI: Ms. Salvatore Phillips is a 60 y.o. female with sarcoidosis (on prednisone, recently started on mycophenolate, on home 2L O2), asthma, HFpEF (60%EF, G1LVDD), HTN, NENA who presents today with hemoptysis.  Patient reports coughing up white sputum with scant blood several times this morning.  Patient states that she has had accompanied exertional dyspnea worse than baseline for the past 4 days, using home asthma medications with minimal relief.  She states that this dyspnea has waxed and waned over the past several months and is being followed by rheum/pulm who are adjusting her sarcoid medications.  States that she had a cough productive of green sputum briefly a couple weeks ago that she treated with OTC medications.  Denies any previous history of hemoptysis.  Denies any sick contacts or recent travel.  Recently tested negative for TB, denies any history of incarceration or homelessness.  Denies any fevers, chills, night sweats, recent weight loss, chest pain, abdominal pain, nausea, vomiting, changes in bowel or bladder habits.     Sarcoidosis without apparent extrathoracic manifestations first diagnosed at age 30, followed by Dr Forrester (rheum) and Dr Keating (pulm) for her sarcoidosis.  Currently on prednisone 2.5 mg, was started on mycophenolate ~1.5 weeks ago for worsening chronic dyspnea suspected to be 2/2 to worsening sarcoidosis.  Patient has smoked 0.5 ppd for past 30 years, quit several months  ago.  Drinks a glass of wine twice a month, denies any previous history of recreational drug use/IVDU.  Lives with mother (83) and sister in [place].  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     ED course: mild respiratory distress and wheezing on arrival, improved with breathing treatments.  Afebrile, HDS, O2 sats mid 90% on 3L.  WBC 6.8, Hgb 11.6 (baseline 12).  CXR showing possible RLL aspiration pneumonia, CTA negative for PE but showing worsening interstitial disease in bilateral upper lobes. Started on CAP coverage and steroids.      Past Medical History:   Diagnosis Date    Asthma     Hypertension     Sarcoidosis of lung     Sleep apnea     maintained on CPAP    Upper respiratory disease     URI (upper respiratory infection)        Past Surgical History:   Procedure Laterality Date    ABDOMINAL SURGERY      BREAST LUMPECTOMY Left 1996    BENIGN    BREAST SURGERY       SECTION      x3    COLON SURGERY      exploratory procedure      GASTRIC BYPASS      HYSTERECTOMY  2009    JOINT REPLACEMENT  2013    KNEE SURGERY      OOPHORECTOMY  2009    SMALL INTESTINE SURGERY      tummy tuck         Review of patient's allergies indicates:  No Known Allergies    Current Facility-Administered Medications on File Prior to Encounter   Medication    cyanocobalamin injection 1,000 mcg     Current Outpatient Medications on File Prior to Encounter   Medication Sig    mycophenolate (CELLCEPT) 500 mg Tab Take 1 tablet (500 mg total) by mouth once daily for 14 days, THEN 1 tablet (500 mg total) 2 (two) times daily.    predniSONE (DELTASONE) 2.5 MG tablet Take 2 tablets (5 mg total) by mouth once daily.    zolpidem (AMBIEN) 5 MG Tab Take 1 tablet (5 mg total) by mouth nightly as needed (insomnia).    albuterol (PROVENTIL/VENTOLIN HFA) 90 mcg/actuation inhaler Inhale 2 puffs into the lungs every 4 (four) hours as needed for Wheezing or Shortness of Breath.    albuterol-ipratropium (DUO-NEB) 2.5 mg-0.5 mg/3 mL  nebulizer solution Take 3 mLs by nebulization every 6 (six) hours as needed for Wheezing or Shortness of Breath. Rescue. Corrected prescription with refills    budesonide (PULMICORT) 0.5 mg/2 mL nebulizer solution Take 2 mLs (0.5 mg total) by nebulization 2 (two) times daily. Controller    butalbital-acetaminophen-caffeine -40 mg (FIORICET, ESGIC) -40 mg per tablet Take 1 tablet by mouth every 6 (six) hours as needed for Pain.    fluticasone furoate-vilanteroL (BREO ELLIPTA) 200-25 mcg/dose DsDv diskus inhaler Inhale 1 puff into the lungs once daily. Controller    fluticasone propionate (FLONASE) 50 mcg/actuation nasal spray 2 sprays (100 mcg total) by Each Nostril route once daily. Corrected prescription with refills    furosemide (LASIX) 40 MG tablet Take 1 tablet (40 mg total) by mouth daily as needed (for leg swelling).    ipratropium (ATROVENT HFA) 17 mcg/actuation inhaler Inhale 2 puffs into the lungs every 4 to 6 hours as needed for Wheezing. Rescue    nicotine polacrilex 2 MG Lozg Take 1 lozenge (2 mg total) by mouth as needed.    oxybutynin (DITROPAN) 5 MG Tab Take 1 tablet (5 mg total) by mouth every evening.    potassium chloride SA (K-DUR,KLOR-CON) 20 MEQ tablet Take 1 tablet (20 mEq total) by mouth daily as needed (take with lasix).    predniSONE (DELTASONE) 20 MG tablet Take 2 tablets (40 mg total) by mouth once daily.    predniSONE (DELTASONE) 20 MG tablet Take 1 tablet (20 mg total) by mouth As instructed. 3tab po daily x3days, 2tabs po daily x3days, 1tab po daily x3days, 1/2tab po daily x3days    QUEtiapine (SEROQUEL) 50 MG tablet Take 50 mg by mouth every evening.    topiramate (TOPAMAX) 50 MG tablet Take 50 mg by mouth every evening.    traMADoL (ULTRAM) 50 mg tablet Take 1 tablet (50 mg total) by mouth every 6 (six) hours as needed for Pain.    varenicline (CHANTIX) 1 mg Tab Take 1 tablet (1 mg total) by mouth 2 (two) times daily.    zafirlukast (ACCOLATE) 20 MG tablet  Take 1 tablet (20 mg total) by mouth 2 (two) times daily.     Family History     Problem Relation (Age of Onset)    Alzheimer's disease Mother    Arthritis Sister    Cancer Father    Crohn's disease Brother    Hypertension Son    Kidney disease Brother        Tobacco Use    Smoking status: Former Smoker     Packs/day: 1.00     Years: 35.00     Pack years: 35.00     Types: Cigarettes     Start date: 1979     Quit date: 2020     Years since quittin.3    Smokeless tobacco: Never Used    Tobacco comment: started age 17   Substance and Sexual Activity    Alcohol use: Not Currently     Frequency: Monthly or less     Drinks per session: 1 or 2     Binge frequency: Never     Comment: occ    Drug use: Never    Sexual activity: Never     Review of Systems   Constitutional: Positive for fatigue. Negative for fever and unexpected weight change.   HENT: Negative for congestion and sore throat.    Respiratory: Positive for shortness of breath and wheezing. Negative for cough, chest tightness and stridor.    Cardiovascular: Negative for chest pain, palpitations and leg swelling.   Gastrointestinal: Negative for abdominal distention, blood in stool, constipation, diarrhea, nausea and vomiting.   Genitourinary: Negative for dysuria and frequency.   Musculoskeletal: Negative for arthralgias.   Neurological: Negative for weakness and light-headedness.   Hematological: Negative for adenopathy.   Psychiatric/Behavioral: Negative for agitation.     Objective:     Vital Signs (Most Recent):  Temp: 98.6 °F (37 °C) (12/15/20 0829)  Pulse: 97 (12/15/20 1603)  Resp: (!) 23 (12/15/20 1603)  BP: (!) 155/76 (12/15/20 1146)  SpO2: 98 % (12/15/20 1603) Vital Signs (24h Range):  Temp:  [98.6 °F (37 °C)] 98.6 °F (37 °C)  Pulse:  [] 97  Resp:  [14-24] 23  SpO2:  [91 %-100 %] 98 %  BP: (147-186)/(70-96) 155/76     Weight: 78 kg (172 lb)  Body mass index is 26.94 kg/m².    Physical Exam  Constitutional:       General: She is not  in acute distress.     Appearance: Normal appearance. She is not ill-appearing.   HENT:      Head: Normocephalic and atraumatic.      Mouth/Throat:      Mouth: Mucous membranes are moist.   Cardiovascular:      Rate and Rhythm: Normal rate and regular rhythm.      Pulses: Normal pulses.      Heart sounds: Normal heart sounds. No murmur. No friction rub. No gallop.    Pulmonary:      Effort: Pulmonary effort is normal. No respiratory distress.      Breath sounds: No stridor. Wheezing (diffuse end expiratory) present. No rales.   Abdominal:      General: Bowel sounds are normal.      Palpations: Abdomen is soft. There is no mass.      Tenderness: There is no abdominal tenderness.   Skin:     General: Skin is warm and dry.      Capillary Refill: Capillary refill takes less than 2 seconds.   Neurological:      General: No focal deficit present.      Mental Status: She is alert and oriented to person, place, and time.   Psychiatric:         Mood and Affect: Mood normal.         Behavior: Behavior normal.             Significant Labs:   ABGs: No results for input(s): PH, PCO2, HCO3, POCSATURATED, BE, TOTALHB, COHB, METHB, O2HB, POCFIO2 in the last 48 hours.  Blood Culture: No results for input(s): LABBLOO in the last 48 hours.  CBC:   Recent Labs   Lab 12/15/20  0901 12/15/20  0906   WBC 6.85  --    HGB 11.6*  --    HCT 41.1 40     --      CMP:   Recent Labs   Lab 12/15/20  0901      K 4.1      CO2 29   GLU 82   BUN 15   CREATININE 0.9   CALCIUM 8.3*   PROT 7.1   ALBUMIN 2.6*   BILITOT 0.1   ALKPHOS 126   AST 21   ALT 12   ANIONGAP 6*   EGFRNONAA >60.0     Cardiac Markers:   Recent Labs   Lab 12/15/20  0901   *     Coagulation:   Recent Labs   Lab 12/15/20  0901   INR 0.9     Lactic Acid: No results for input(s): LACTATE in the last 48 hours.  Lipase: No results for input(s): LIPASE in the last 48 hours.  Lipid Panel: No results for input(s): CHOL, HDL, LDLCALC, TRIG, CHOLHDL in the last 48  hours.  Magnesium: No results for input(s): MG in the last 48 hours.  POCT Glucose: No results for input(s): POCTGLUCOSE in the last 48 hours.  Respiratory Culture: No results for input(s): GSRESP, RESPIRATORYC in the last 48 hours.  TSH:   Recent Labs   Lab 08/05/20  0906   TSH 0.610     Urine Culture: No results for input(s): LABURIN in the last 48 hours.  Urine Studies: No results for input(s): COLORU, APPEARANCEUA, PHUR, SPECGRAV, PROTEINUA, GLUCUA, KETONESU, BILIRUBINUA, OCCULTUA, NITRITE, UROBILINOGEN, LEUKOCYTESUR, RBCUA, WBCUA, BACTERIA, SQUAMEPITHEL, HYALINECASTS in the last 48 hours.    Invalid input(s): WRIGHTSUR    Significant Imaging:   Imaging Results          CTA Chest Non-Coronary (PE Study) (Final result)  Result time 12/15/20 11:52:51    Final result by Daxa Figueroa MD (12/15/20 11:52:51)                 Impression:      Patient motion during image acquisition degrades fine spatial resolution and contrast resolution.  Within the limits of the study I detect no pulmonary thromboembolism, pulmonary infarct or right heart strain.    In both lungs there is extensive mixed reticulation and ground-glass disease with some areas of janie parenchymal consolidation. This is upper lobe predominant and produces some volume loss in the right upper lobe resulting in shift of fissures.  Chest radiograph abnormality dates back to at least 11/25/2018 in this patient with a history of sarcoidosis.  The extent of the pulmonary disease is worse today than on chest radiograph of 06/05/2020 or chest CT of 09/17/2020. No cavitation identified. Findings are consistent with sarcoidosis although other processes including subacute hypersensitivity pneumonia could present in a similar fashion.    No pulmonary edema identified in this patient with aortic and coronary calcific atherosclerosis.    Additional findings are above.      Electronically signed by: Daxa Figueroa MD  Date:    12/15/2020  Time:    11:52              Narrative:    EXAMINATION:  CTA CHEST NON CORONARY    CLINICAL HISTORY:  PE suspected, intermediate prob, positive D-dimer;    TECHNIQUE:  During intravenous bolus injection of 100 ml of Omnipaque 350 contrast medium via the right upper extremity and using low dose technique, the chest was surveyed from above the pulmonary apices through the posterior costophrenic angles.  Images were acquired without gatingscratch.    Data was reconstructed for multiplanar images in axial, sagittal and coronal planes and for maximal intensity projection images in the axial plane.    Patient motion during image acquisition causes x-ray beam attenuation and scatter, degrading spatial and contrast resolution.    All CT scans at this facility use dose modulation, iterative reconstruction and/or weight based dosing when appropriate to reduce radiation dose to as low as reasonably achievable.    Xray dose: DLP = 165.05 mGy-cm.    COMPARISON:  Chest radiographs: 12/15/2020.  06/05/2020.  11/25/2018.    Chest CT: 09/17/2020.    FINDINGS:  Additional history: Sarcoidosis established by lung biopsy at 29 years of age.    Base of Neck: No significant abnormality.    Aorta: Left-sided aortic arch with 3 arterial branches.  The aorta maintains normal caliber, contour and course. There is calcification of the thoracic aorta and coronary arteries in this 60-year-old woman.    Heart/pericardium: Normal size. No right heart strain identified. No pericardial fluid or calcification.    Pulmonary arteries: Pulmonary arteries distribute normally.  Pulmonary arteries are sufficiently opacified for diagnostic assessment.  Patient motion degrades spatial and contrast resolution.  There is no pulmonary thromboembolism through the lobar arteries.  Pulmonary arterial hypertension is neither confirmed nor excluded.  There is no pulmonary thromboembolism.    Pulmonary veins, left atrium:    There are four pulmonary veins that return to the left  atrium.    Sindi/Mediastinum: There is a mildly enlarged lymph node in the anterior mediastinum (axial series 2, image 161) with short axis of 1.1 cm; this is stable compared with 09/17/2020, axial series 2, image 43 no other definite mediastinal lymph node enlargement identified.  Hilar lymph nodes are not enlarged in this patient with a clinical diagnosis of sarcoidosis..    Pleura: No pleural fluid.No pleural calcification.    Esophagus and upper abdomen: Postoperative changes are again identified at the gastroesophageal junction.  There is prior cholecystectomy.  No abnormality of the partially imaged upper abdomen.    Thoracic soft tissues: Normal. Both breasts are present.    Bones: No acute fracture. No suspicious lytic or sclerotic lesion.    Airways: Patent.    Lungs:    -In both lungs there is extensive mixed reticulation and ground-glass disease with some areas of janie parenchymal consolidation.  This lung disease is upper lobe predominant and produces some volume loss in the right upper lobe resulting in shift of right pleural fissures.  The extent of the disease is worse than on chest radiograph of 06/05/2020 or chest CT of 09/17/2020.  No cavitation identified.  Findings are consistent with sarcoidosis although other processes including subacute hypersensitivity pneumonia could present in a similar fashion.    -No pulmonary infarct.    -No pulmonary edema identified in this patient with aortic and coronary atherosclerosis.                               X-Ray Chest AP Portable (Final result)  Result time 12/15/20 08:54:43    Final result by Yuniel Casanova DO (12/15/20 08:54:43)                 Impression:      1. Chronic pleural-parenchymal changes in the bilateral upper lungs, likely related to patient's history of sarcoidosis.  2. Mild reticulonodular opacities in the right lower lung may be related to aspiration or atypical infection.      Electronically signed by: Yuniel  Jocelyne  Date:    12/15/2020  Time:    08:54             Narrative:    EXAMINATION:  XR CHEST AP PORTABLE    CLINICAL HISTORY:  CHF.    TECHNIQUE:  Single frontal view of the chest was performed.    COMPARISON:  Multiple prior radiographs of the chest, most recent from 08/05/2020.  CT of the chest from 09/17/2020.    FINDINGS:  There is upper lung predominant fibrotic changes compatible with patient's history of sarcoidosis.  Mild reticulonodular opacities within the right lung base may represent aspiration or atypical infection.  The pleural spaces are clear.  Cardiac silhouette is unremarkable.  There are calcifications of the aortic arch.  There a visualized osseous structures are unremarkable.  There are surgical clips in the superior mediastinum.  Epigastric surgical clips are also seen.                                  Assessment/Plan:     * Hemoptysis  60 year old female with history of sarcoidosis (on prednisone, mycophenolate) presents with four days of worsening dyspnea and one day of scant hemoptysis.  On prednisone 2.5 mg qd, recently started on mycophenolate 500 mg within the last 2 weeks per rheum, extensive smoking history, TB negative (10/13/2020).  Afebrile, mild respiratory distress upon arrival, improved with duonebs.  WBC 6.8, Hgb 11.6 (baseline 12).  D-dimer 0.62. CXR showing possible aspiration pneumonia/infection in R lung base.  CTA negative for PE but demonstrating extensive mixed reticulation/ground glass with some consolidation in bilateral upper lobes.  Hemoptysis with myriad etiology for immunocompromised patient including worsening sarcoid, pneumonia, oropharyngeal bleeding 2/2 to consistent cough, malignancy, fungal.      Plan:  - pulm consulted, appreciate recs  - CBC BID, trend Hgb  - ordered procal, resp sputum culture, respiratory infection panel, fungitel   - hold mycophenolate mofetil  - continue ceftriaxone/azithromycin for CAP coverage, low threshold to broaden if clinically  worsening  - start prednisone 40 mg for asthma exacerbation, discussed possible change in dosage if suspicious for worsening of sarcoidosis  - hold anticoagulation with current bleeding    Asthma  On home DuoNeb, prednisone 2.5 mg, zafirlukast 20 mg, with albuterol rescue.  Received breathing treatments and methylprednisone 125 mg in ED with mild improvement.  Lungs with diffuse end expiratory wheeze on exam.      Plan:  - continue duonebs qh4  - start prednisone 40 mg q5d   - continuous oxygen with SpO2 goal >90%    Chronic heart failure with preserved ejection fraction  Last echo (7/10/2020): EF60%, grade I left ventricular diastolic dysfunction.  On home lasix prn swelling.  BNP in , trop 0.032.    Plan:  - repeat echo  - repeat trop  - cardiac diet, salt restriction <2g  - monitor I/Os    Sarcoidosis of lung  Follows with Dr Forrester (rheum) and Dr Keating (pulm).  Last seen on 11/20/2020, on prednisone 2.5 mg with plan to trial mycophenolate.    Plan:  - hold mycophenolate  - start prednisone 40 mg qd for asthma exacerbation    Hypertension  Not currently on any home antihypertensive medications.    Plan:   - patient mildly hypertensive, will hold antihypertensive treatment for now    VTE Risk Mitigation (From admission, onward)         Ordered     IP VTE LOW RISK PATIENT  Once      12/15/20 1358     Place sequential compression device  Until discontinued      12/15/20 1358                   Saúl Keller MD  Department of Hospital Medicine   [facility]

## 2020-12-16 NOTE — ASSESSMENT & PLAN NOTE
Not currently on any home antihypertensive medications.    Plan:   - patient mildly hypertensive, will hold antihypertensive treatment for now

## 2020-12-16 NOTE — ASSESSMENT & PLAN NOTE
Last echo (7/10/2020): EF60%, grade I left ventricular diastolic dysfunction.  On home lasix prn swelling.  BNP in , trop 0.032.    Plan:  - repeat echo  - repeat trop wnl  - cardiac diet, salt restriction <2g  - monitor I/Os

## 2020-12-16 NOTE — ASSESSMENT & PLAN NOTE
As of this morning it has resolved. Likely secondary to an infectious process.   Agree with steroids, antibiotics, and culture.

## 2020-12-16 NOTE — PT/OT/SLP EVAL
Occupational Therapy   Evaluation and Discharge Note    Name: Salvatore Phillips  MRN: [id number]  Admitting Diagnosis:  Hemoptysis      Recommendations:     Discharge Recommendations: home  Discharge Equipment Recommendations:  none  Barriers to discharge:  None    Assessment:     Salvatore Phillips is a 60 y.o. female with a medical diagnosis of Hemoptysis. At this time, patient is functioning at their prior level of function and does not require further acute OT services.     Plan:     During this hospitalization, patient does not require further acute OT services.  Please re-consult if situation changes.    · Plan of Care Reviewed with: patient    Subjective     Chief Complaint: SOB  Patient/Family Comments/goals: to go home    Occupational Profile:  Living Environment: Pt lives with daughter and sister in a H with 0 POONAM. She has a walk-in shower with a built-in bench. Patient was independent with all ADLs and mobility PTA. 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 Patient owns no DME.     Pain/Comfort:  · Pain Rating 1: 0/10  · Pain Rating Post-Intervention 1: 0/10    Patients cultural, spiritual, Catholic conflicts given the current situation: no    Objective:     Communicated with: NSLEXI prior to session.  Patient found HOB elevated with peripheral IV, oxygen upon OT entry to room.    General Precautions: Standard, fall   Orthopedic Precautions:N/A   Braces: N/A     Occupational Performance:    Bed Mobility:    · Patient completed Supine to Sit with independence  · Patient completed Sit to Supine with independence    Functional Mobility/Transfers:  · Patient completed Sit <> Stand Transfer with independence  with  no assistive device   · Patient completed Toilet Transfer bed<>toilet with functional ambulation technique with independence with  no AD    Activities of Daily Living:  · Grooming: independence per patient report  · Lower Body Dressing: independence Donning and donning  shoes  · Toileting: independence per patient report and while simulating task during toilet transfer    Cognitive/Visual Perceptual:  Cognitive/Psychosocial Skills:     -       Oriented to: Person, Place, Time and Situation   -       Follows Commands/attention:Follows multistep  commands  -       Communication: clear/fluent  -       Memory: No Deficits noted  -       Safety awareness/insight to disability: intact   -       Mood/Affect/Coping skills/emotional control: Appropriate to situation  Visual/Perceptual:      -Intact      Physical Exam:  Upper Extremity Range of Motion:     -       Right Upper Extremity: WFL  -       Left Upper Extremity: WFL  Upper Extremity Strength:    -       Right Upper Extremity: WFL  -       Left Upper Extremity: WFL   Strength:    -       Right Upper Extremity: WFL  -       Left Upper Extremity: WFL  Fine Motor Coordination:    -       Intact  Gross motor coordination:   WFL    AMPAC 6 Click ADL:  AMPAC Total Score: 24    Treatment & Education:  Role of OT and POC  Safety  ADL retraining  Functional mobility training  Discharge planning  Importance of OOB activity    Co-treatment performed due to patient's multiple deficits requiring two skilled therapists to appropriately and safely assess patient's strength and endurance while facilitating functional tasks in addition to accommodating for patient's activity tolerance.   Education:    Patient left HOB elevated with call button in reach and all needs met.     GOALS:   Multidisciplinary Problems     Occupational Therapy Goals     Not on file          Multidisciplinary Problems (Resolved)        Problem: Occupational Therapy Goal    Goal Priority Disciplines Outcome Interventions   Occupational Therapy Goal   (Resolved)     OT, PT/OT Met                    History:     Past Medical History:   Diagnosis Date    Asthma     Hypertension     Sarcoidosis of lung     Sleep apnea     maintained on CPAP    Upper respiratory disease      URI (upper respiratory infection)        Past Surgical History:   Procedure Laterality Date    ABDOMINAL SURGERY      BREAST LUMPECTOMY Left 1996    BENIGN    BREAST SURGERY       SECTION      x3    COLON SURGERY      exploratory procedure      GASTRIC BYPASS      HYSTERECTOMY  2009    JOINT REPLACEMENT  2013    KNEE SURGERY      OOPHORECTOMY  2009    SMALL INTESTINE SURGERY      tummy tuck         Time Tracking:     OT Date of Treatment: 20  OT Start Time: 913  OT Stop Time: 920  OT Total Time (min): 7 min    Billable Minutes:Evaluation 7    CHANDAN Strauss  2020

## 2020-12-16 NOTE — SUBJECTIVE & OBJECTIVE
Interval History: Reports hemoptysis resolved this morning, still with some dyspnea.  On home oxygen.  Denies any worsening of cough, no new fever, chills, night sweats.     Review of Systems   Constitutional: Positive for fatigue. Negative for fever and unexpected weight change.   HENT: Negative for congestion and sore throat.    Respiratory: Positive for shortness of breath and wheezing. Negative for cough, chest tightness and stridor.    Cardiovascular: Negative for chest pain, palpitations and leg swelling.   Gastrointestinal: Negative for abdominal distention, blood in stool, constipation, diarrhea, nausea and vomiting.   Genitourinary: Negative for dysuria and frequency.   Musculoskeletal: Negative for arthralgias.   Neurological: Negative for weakness and light-headedness.   Hematological: Negative for adenopathy.   Psychiatric/Behavioral: Negative for agitation.     Objective:     Vital Signs (Most Recent):  Temp: 98.5 °F (36.9 °C) (12/16/20 1308)  Pulse: 103 (12/16/20 1308)  Resp: 18 (12/16/20 1308)  BP: (!) 127/59 (12/16/20 1308)  SpO2: (!) 90 % (12/16/20 1308) Vital Signs (24h Range):  Temp:  [96.6 °F (35.9 °C)-98.5 °F (36.9 °C)] 98.5 °F (36.9 °C)  Pulse:  [] 103  Resp:  [16-23] 18  SpO2:  [90 %-98 %] 90 %  BP: (116-150)/(59-94) 127/59     Weight: 83.8 kg (184 lb 11.9 oz)  Body mass index is 28.94 kg/m².  No intake or output data in the 24 hours ending 12/16/20 1524   Physical Exam  Constitutional:       General: She is not in acute distress.     Appearance: Normal appearance. She is not ill-appearing.   HENT:      Head: Normocephalic and atraumatic.      Mouth/Throat:      Mouth: Mucous membranes are moist.   Cardiovascular:      Rate and Rhythm: Normal rate and regular rhythm.      Pulses: Normal pulses.      Heart sounds: Normal heart sounds. No murmur. No friction rub. No gallop.    Pulmonary:      Effort: Pulmonary effort is normal. No respiratory distress.      Breath sounds: No stridor.  Wheezing (minimal end expiratory) present. No rales.   Abdominal:      General: Bowel sounds are normal.      Palpations: Abdomen is soft. There is no mass.      Tenderness: There is no abdominal tenderness.   Skin:     General: Skin is warm and dry.      Capillary Refill: Capillary refill takes less than 2 seconds.   Neurological:      General: No focal deficit present.      Mental Status: She is alert and oriented to person, place, and time.   Psychiatric:         Mood and Affect: Mood normal.         Behavior: Behavior normal.         Significant Labs:   ABGs: No results for input(s): PH, PCO2, HCO3, POCSATURATED, BE, TOTALHB, COHB, METHB, O2HB, POCFIO2 in the last 48 hours.  Blood Culture: No results for input(s): LABBLOO in the last 48 hours.  CBC:   Recent Labs   Lab 12/15/20  0901 12/15/20  0906 12/15/20  1927 12/16/20  0755   WBC 6.85  --  4.81 6.80   HGB 11.6*  --  11.8* 10.9*   HCT 41.1 40 41.2 38.7     --  269 250     CMP:   Recent Labs   Lab 12/15/20  0901 12/16/20  0551    141   K 4.1 3.8    105   CO2 29 31*   GLU 82 87   BUN 15 25*   CREATININE 0.9 1.1   CALCIUM 8.3* 8.2*   PROT 7.1 6.5   ALBUMIN 2.6* 2.4*   BILITOT 0.1 0.1   ALKPHOS 126 117   AST 21 19   ALT 12 11   ANIONGAP 6* 5*   EGFRNONAA >60.0 55.1*     Cardiac Markers:   Recent Labs   Lab 12/15/20  0901   *     Coagulation:   Recent Labs   Lab 12/15/20  0901   INR 0.9     Lactic Acid: No results for input(s): LACTATE in the last 48 hours.  Lipase: No results for input(s): LIPASE in the last 48 hours.  Lipid Panel: No results for input(s): CHOL, HDL, LDLCALC, TRIG, CHOLHDL in the last 48 hours.  Respiratory Culture: No results for input(s): GSRESP, RESPIRATORYC in the last 48 hours.  Troponin:   Recent Labs   Lab 12/15/20  0901 12/15/20  1927   TROPONINI 0.032* <0.006     TSH:   Recent Labs   Lab 08/05/20  0906   TSH 0.610     Urine Culture: No results for input(s): LABURIN in the last 48 hours.  Urine Studies: No  results for input(s): COLORU, APPEARANCEUA, PHUR, SPECGRAV, PROTEINUA, GLUCUA, KETONESU, BILIRUBINUA, OCCULTUA, NITRITE, UROBILINOGEN, LEUKOCYTESUR, RBCUA, WBCUA, BACTERIA, SQUAMEPITHEL, HYALINECASTS in the last 48 hours.    Invalid input(s): WRIGHTSUR    Significant Imaging:  Imaging Results          CTA Chest Non-Coronary (PE Study) (Final result)  Result time 12/15/20 11:52:51    Final result by Daxa Figueroa MD (12/15/20 11:52:51)                 Impression:      Patient motion during image acquisition degrades fine spatial resolution and contrast resolution.  Within the limits of the study I detect no pulmonary thromboembolism, pulmonary infarct or right heart strain.    In both lungs there is extensive mixed reticulation and ground-glass disease with some areas of janie parenchymal consolidation. This is upper lobe predominant and produces some volume loss in the right upper lobe resulting in shift of fissures.  Chest radiograph abnormality dates back to at least 11/25/2018 in this patient with a history of sarcoidosis.  The extent of the pulmonary disease is worse today than on chest radiograph of 06/05/2020 or chest CT of 09/17/2020. No cavitation identified. Findings are consistent with sarcoidosis although other processes including subacute hypersensitivity pneumonia could present in a similar fashion.    No pulmonary edema identified in this patient with aortic and coronary calcific atherosclerosis.    Additional findings are above.      Electronically signed by: Daxa Figueroa MD  Date:    12/15/2020  Time:    11:52             Narrative:    EXAMINATION:  CTA CHEST NON CORONARY    CLINICAL HISTORY:  PE suspected, intermediate prob, positive D-dimer;    TECHNIQUE:  During intravenous bolus injection of 100 ml of Omnipaque 350 contrast medium via the right upper extremity and using low dose technique, the chest was surveyed from above the pulmonary apices through the posterior costophrenic angles.   Images were acquired without gatingscratch.    Data was reconstructed for multiplanar images in axial, sagittal and coronal planes and for maximal intensity projection images in the axial plane.    Patient motion during image acquisition causes x-ray beam attenuation and scatter, degrading spatial and contrast resolution.    All CT scans at this facility use dose modulation, iterative reconstruction and/or weight based dosing when appropriate to reduce radiation dose to as low as reasonably achievable.    Xray dose: DLP = 165.05 mGy-cm.    COMPARISON:  Chest radiographs: 12/15/2020.  06/05/2020.  11/25/2018.    Chest CT: 09/17/2020.    FINDINGS:  Additional history: Sarcoidosis established by lung biopsy at 29 years of age.    Base of Neck: No significant abnormality.    Aorta: Left-sided aortic arch with 3 arterial branches.  The aorta maintains normal caliber, contour and course. There is calcification of the thoracic aorta and coronary arteries in this 60-year-old woman.    Heart/pericardium: Normal size. No right heart strain identified. No pericardial fluid or calcification.    Pulmonary arteries: Pulmonary arteries distribute normally.  Pulmonary arteries are sufficiently opacified for diagnostic assessment.  Patient motion degrades spatial and contrast resolution.  There is no pulmonary thromboembolism through the lobar arteries.  Pulmonary arterial hypertension is neither confirmed nor excluded.  There is no pulmonary thromboembolism.    Pulmonary veins, left atrium:    There are four pulmonary veins that return to the left atrium.    Sindi/Mediastinum: There is a mildly enlarged lymph node in the anterior mediastinum (axial series 2, image 161) with short axis of 1.1 cm; this is stable compared with 09/17/2020, axial series 2, image 43 no other definite mediastinal lymph node enlargement identified.  Hilar lymph nodes are not enlarged in this patient with a clinical diagnosis of sarcoidosis..    Pleura: No  pleural fluid.No pleural calcification.    Esophagus and upper abdomen: Postoperative changes are again identified at the gastroesophageal junction.  There is prior cholecystectomy.  No abnormality of the partially imaged upper abdomen.    Thoracic soft tissues: Normal. Both breasts are present.    Bones: No acute fracture. No suspicious lytic or sclerotic lesion.    Airways: Patent.    Lungs:    -In both lungs there is extensive mixed reticulation and ground-glass disease with some areas of janie parenchymal consolidation.  This lung disease is upper lobe predominant and produces some volume loss in the right upper lobe resulting in shift of right pleural fissures.  The extent of the disease is worse than on chest radiograph of 06/05/2020 or chest CT of 09/17/2020.  No cavitation identified.  Findings are consistent with sarcoidosis although other processes including subacute hypersensitivity pneumonia could present in a similar fashion.    -No pulmonary infarct.    -No pulmonary edema identified in this patient with aortic and coronary atherosclerosis.                               X-Ray Chest AP Portable (Final result)  Result time 12/15/20 08:54:43    Final result by Yuniel Casanova DO (12/15/20 08:54:43)                 Impression:      1. Chronic pleural-parenchymal changes in the bilateral upper lungs, likely related to patient's history of sarcoidosis.  2. Mild reticulonodular opacities in the right lower lung may be related to aspiration or atypical infection.      Electronically signed by: Yuniel Casanova  Date:    12/15/2020  Time:    08:54             Narrative:    EXAMINATION:  XR CHEST AP PORTABLE    CLINICAL HISTORY:  CHF.    TECHNIQUE:  Single frontal view of the chest was performed.    COMPARISON:  Multiple prior radiographs of the chest, most recent from 08/05/2020.  CT of the chest from 09/17/2020.    FINDINGS:  There is upper lung predominant fibrotic changes compatible with patient's history of  sarcoidosis.  Mild reticulonodular opacities within the right lung base may represent aspiration or atypical infection.  The pleural spaces are clear.  Cardiac silhouette is unremarkable.  There are calcifications of the aortic arch.  There a visualized osseous structures are unremarkable.  There are surgical clips in the superior mediastinum.  Epigastric surgical clips are also seen.

## 2020-12-16 NOTE — PROGRESS NOTES
[facility] Hospital Medicine  Progress Note    Patient Name: Salvatore Phillips  MRN: [id number]  Patient Class: IP- Inpatient   Admission Date: 12/15/2020  Length of Stay: 1 days  Attending Physician: Cal Francois MD  Primary Care Provider: Viviana Cuba MD    [facility] Medicine Team: [facility] 5 Saúl Keller MD    Subjective:     Principal Problem:Hemoptysis        HPI:  Ms. Salvatore Phillips is a 60 y.o. female with sarcoidosis (on prednisone, recently started on mycophenolate, on home 2L O2), asthma, HFpEF (60%EF, G1LVDD), HTN, NENA who presents today with hemoptysis.  Patient reports coughing up white sputum with scant blood several times this morning.  Patient states that she has had accompanied exertional dyspnea worse than baseline for the past 4 days, using home asthma medications with minimal relief.  She states that this dyspnea has waxed and waned over the past several months and is being followed by rheum/pulm who are adjusting her sarcoid medications.  States that she had a cough productive of green sputum briefly a couple weeks ago that she treated with OTC medications.  Denies any previous history of hemoptysis.  Denies any sick contacts or recent travel.  Recently tested negative for TB, denies any history of incarceration or homelessness.  Denies any fevers, chills, night sweats, recent weight loss, chest pain, abdominal pain, nausea, vomiting, changes in bowel or bladder habits.     Sarcoidosis without apparent extrathoracic manifestations first diagnosed at age 30, followed by Dr Forrester (rheum) and Dr Keating (pulm) for her sarcoidosis.  Currently on prednisone 2.5 mg, was started on mycophenolate ~1.5 weeks ago for worsening chronic dyspnea suspected to be 2/2 to worsening sarcoidosis.  Patient has smoked 0.5 ppd for past 30 years, quit several months ago.  Drinks a glass of wine twice a month, denies any previous history of recreational drug use/IVDU.  Lives with mother  (83) and sister in [place].  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     ED course: mild respiratory distress and wheezing on arrival, improved with breathing treatments.  Afebrile, HDS, O2 sats mid 90% on 3L.  WBC 6.8, Hgb 11.6 (baseline 12).  CXR showing possible RLL aspiration pneumonia, CTA negative for PE but showing worsening interstitial disease in bilateral upper lobes. Started on CAP coverage and steroids.      Overview/Hospital Course:  Admitted to  IM5 for evaluation of hemoptysis.  Pulm consulted.  Increased steroids to methylpred 60mg q6 for 24h, then decreasing to prednisone 40 mg qd.      Interval History: Reports hemoptysis resolved this morning, still with some dyspnea.  On home oxygen.  Denies any worsening of cough, no new fever, chills, night sweats.     Review of Systems   Constitutional: Positive for fatigue. Negative for fever and unexpected weight change.   HENT: Negative for congestion and sore throat.    Respiratory: Positive for shortness of breath and wheezing. Negative for cough, chest tightness and stridor.    Cardiovascular: Negative for chest pain, palpitations and leg swelling.   Gastrointestinal: Negative for abdominal distention, blood in stool, constipation, diarrhea, nausea and vomiting.   Genitourinary: Negative for dysuria and frequency.   Musculoskeletal: Negative for arthralgias.   Neurological: Negative for weakness and light-headedness.   Hematological: Negative for adenopathy.   Psychiatric/Behavioral: Negative for agitation.     Objective:     Vital Signs (Most Recent):  Temp: 98.5 °F (36.9 °C) (12/16/20 1308)  Pulse: 103 (12/16/20 1308)  Resp: 18 (12/16/20 1308)  BP: (!) 127/59 (12/16/20 1308)  SpO2: (!) 90 % (12/16/20 1308) Vital Signs (24h Range):  Temp:  [96.6 °F (35.9 °C)-98.5 °F (36.9 °C)] 98.5 °F (36.9 °C)  Pulse:  [] 103  Resp:  [16-23] 18  SpO2:  [90 %-98 %] 90 %  BP: (116-150)/(59-94) 127/59     Weight: 83.8 kg (184 lb 11.9 oz)  Body mass index is 28.94 kg/m².  No  intake or output data in the 24 hours ending 12/16/20 1524   Physical Exam  Constitutional:       General: She is not in acute distress.     Appearance: Normal appearance. She is not ill-appearing.   HENT:      Head: Normocephalic and atraumatic.      Mouth/Throat:      Mouth: Mucous membranes are moist.   Cardiovascular:      Rate and Rhythm: Normal rate and regular rhythm.      Pulses: Normal pulses.      Heart sounds: Normal heart sounds. No murmur. No friction rub. No gallop.    Pulmonary:      Effort: Pulmonary effort is normal. No respiratory distress.      Breath sounds: No stridor. Wheezing (minimal end expiratory) present. No rales.   Abdominal:      General: Bowel sounds are normal.      Palpations: Abdomen is soft. There is no mass.      Tenderness: There is no abdominal tenderness.   Skin:     General: Skin is warm and dry.      Capillary Refill: Capillary refill takes less than 2 seconds.   Neurological:      General: No focal deficit present.      Mental Status: She is alert and oriented to person, place, and time.   Psychiatric:         Mood and Affect: Mood normal.         Behavior: Behavior normal.         Significant Labs:   ABGs: No results for input(s): PH, PCO2, HCO3, POCSATURATED, BE, TOTALHB, COHB, METHB, O2HB, POCFIO2 in the last 48 hours.  Blood Culture: No results for input(s): LABBLOO in the last 48 hours.  CBC:   Recent Labs   Lab 12/15/20  0901 12/15/20  0906 12/15/20  1927 12/16/20  0755   WBC 6.85  --  4.81 6.80   HGB 11.6*  --  11.8* 10.9*   HCT 41.1 40 41.2 38.7     --  269 250     CMP:   Recent Labs   Lab 12/15/20  0901 12/16/20  0551    141   K 4.1 3.8    105   CO2 29 31*   GLU 82 87   BUN 15 25*   CREATININE 0.9 1.1   CALCIUM 8.3* 8.2*   PROT 7.1 6.5   ALBUMIN 2.6* 2.4*   BILITOT 0.1 0.1   ALKPHOS 126 117   AST 21 19   ALT 12 11   ANIONGAP 6* 5*   EGFRNONAA >60.0 55.1*     Cardiac Markers:   Recent Labs   Lab 12/15/20  0901   *     Coagulation:   Recent  Labs   Lab 12/15/20  0901   INR 0.9     Lactic Acid: No results for input(s): LACTATE in the last 48 hours.  Lipase: No results for input(s): LIPASE in the last 48 hours.  Lipid Panel: No results for input(s): CHOL, HDL, LDLCALC, TRIG, CHOLHDL in the last 48 hours.  Respiratory Culture: No results for input(s): GSRESP, RESPIRATORYC in the last 48 hours.  Troponin:   Recent Labs   Lab 12/15/20  0901 12/15/20  1927   TROPONINI 0.032* <0.006     TSH:   Recent Labs   Lab 08/05/20  0906   TSH 0.610     Urine Culture: No results for input(s): LABURIN in the last 48 hours.  Urine Studies: No results for input(s): COLORU, APPEARANCEUA, PHUR, SPECGRAV, PROTEINUA, GLUCUA, KETONESU, BILIRUBINUA, OCCULTUA, NITRITE, UROBILINOGEN, LEUKOCYTESUR, RBCUA, WBCUA, BACTERIA, SQUAMEPITHEL, HYALINECASTS in the last 48 hours.    Invalid input(s): WRIGHTSUR    Significant Imaging:  Imaging Results          CTA Chest Non-Coronary (PE Study) (Final result)  Result time 12/15/20 11:52:51    Final result by Daxa Figueroa MD (12/15/20 11:52:51)                 Impression:      Patient motion during image acquisition degrades fine spatial resolution and contrast resolution.  Within the limits of the study I detect no pulmonary thromboembolism, pulmonary infarct or right heart strain.    In both lungs there is extensive mixed reticulation and ground-glass disease with some areas of janie parenchymal consolidation. This is upper lobe predominant and produces some volume loss in the right upper lobe resulting in shift of fissures.  Chest radiograph abnormality dates back to at least 11/25/2018 in this patient with a history of sarcoidosis.  The extent of the pulmonary disease is worse today than on chest radiograph of 06/05/2020 or chest CT of 09/17/2020. No cavitation identified. Findings are consistent with sarcoidosis although other processes including subacute hypersensitivity pneumonia could present in a similar fashion.    No pulmonary  edema identified in this patient with aortic and coronary calcific atherosclerosis.    Additional findings are above.      Electronically signed by: Daxa Figueroa MD  Date:    12/15/2020  Time:    11:52             Narrative:    EXAMINATION:  CTA CHEST NON CORONARY    CLINICAL HISTORY:  PE suspected, intermediate prob, positive D-dimer;    TECHNIQUE:  During intravenous bolus injection of 100 ml of Omnipaque 350 contrast medium via the right upper extremity and using low dose technique, the chest was surveyed from above the pulmonary apices through the posterior costophrenic angles.  Images were acquired without gatingscratch.    Data was reconstructed for multiplanar images in axial, sagittal and coronal planes and for maximal intensity projection images in the axial plane.    Patient motion during image acquisition causes x-ray beam attenuation and scatter, degrading spatial and contrast resolution.    All CT scans at this facility use dose modulation, iterative reconstruction and/or weight based dosing when appropriate to reduce radiation dose to as low as reasonably achievable.    Xray dose: DLP = 165.05 mGy-cm.    COMPARISON:  Chest radiographs: 12/15/2020.  06/05/2020.  11/25/2018.    Chest CT: 09/17/2020.    FINDINGS:  Additional history: Sarcoidosis established by lung biopsy at 29 years of age.    Base of Neck: No significant abnormality.    Aorta: Left-sided aortic arch with 3 arterial branches.  The aorta maintains normal caliber, contour and course. There is calcification of the thoracic aorta and coronary arteries in this 60-year-old woman.    Heart/pericardium: Normal size. No right heart strain identified. No pericardial fluid or calcification.    Pulmonary arteries: Pulmonary arteries distribute normally.  Pulmonary arteries are sufficiently opacified for diagnostic assessment.  Patient motion degrades spatial and contrast resolution.  There is no pulmonary thromboembolism through the lobar  arteries.  Pulmonary arterial hypertension is neither confirmed nor excluded.  There is no pulmonary thromboembolism.    Pulmonary veins, left atrium:    There are four pulmonary veins that return to the left atrium.    Sindi/Mediastinum: There is a mildly enlarged lymph node in the anterior mediastinum (axial series 2, image 161) with short axis of 1.1 cm; this is stable compared with 09/17/2020, axial series 2, image 43 no other definite mediastinal lymph node enlargement identified.  Hilar lymph nodes are not enlarged in this patient with a clinical diagnosis of sarcoidosis..    Pleura: No pleural fluid.No pleural calcification.    Esophagus and upper abdomen: Postoperative changes are again identified at the gastroesophageal junction.  There is prior cholecystectomy.  No abnormality of the partially imaged upper abdomen.    Thoracic soft tissues: Normal. Both breasts are present.    Bones: No acute fracture. No suspicious lytic or sclerotic lesion.    Airways: Patent.    Lungs:    -In both lungs there is extensive mixed reticulation and ground-glass disease with some areas of janie parenchymal consolidation.  This lung disease is upper lobe predominant and produces some volume loss in the right upper lobe resulting in shift of right pleural fissures.  The extent of the disease is worse than on chest radiograph of 06/05/2020 or chest CT of 09/17/2020.  No cavitation identified.  Findings are consistent with sarcoidosis although other processes including subacute hypersensitivity pneumonia could present in a similar fashion.    -No pulmonary infarct.    -No pulmonary edema identified in this patient with aortic and coronary atherosclerosis.                               X-Ray Chest AP Portable (Final result)  Result time 12/15/20 08:54:43    Final result by Yuniel Casanova DO (12/15/20 08:54:43)                 Impression:      1. Chronic pleural-parenchymal changes in the bilateral upper lungs, likely related to  patient's history of sarcoidosis.  2. Mild reticulonodular opacities in the right lower lung may be related to aspiration or atypical infection.      Electronically signed by: Yuniel Casanova  Date:    12/15/2020  Time:    08:54             Narrative:    EXAMINATION:  XR CHEST AP PORTABLE    CLINICAL HISTORY:  CHF.    TECHNIQUE:  Single frontal view of the chest was performed.    COMPARISON:  Multiple prior radiographs of the chest, most recent from 08/05/2020.  CT of the chest from 09/17/2020.    FINDINGS:  There is upper lung predominant fibrotic changes compatible with patient's history of sarcoidosis.  Mild reticulonodular opacities within the right lung base may represent aspiration or atypical infection.  The pleural spaces are clear.  Cardiac silhouette is unremarkable.  There are calcifications of the aortic arch.  There a visualized osseous structures are unremarkable.  There are surgical clips in the superior mediastinum.  Epigastric surgical clips are also seen.                                    Assessment/Plan:      * Hemoptysis  60 year old female with history of sarcoidosis (on prednisone, mycophenolate) presents with four days of worsening dyspnea and one day of scant hemoptysis.  On prednisone 2.5 mg qd, recently started on mycophenolate 500 mg within the last 2 weeks per rheum, extensive smoking history, TB negative (10/13/2020).  Afebrile, mild respiratory distress upon arrival, improved with duonebs.  WBC 6.8, Hgb 11.6 (baseline 12).  D-dimer 0.62. CXR showing possible aspiration pneumonia/infection in R lung base.  CTA negative for PE but demonstrating extensive mixed reticulation/ground glass with some consolidation in bilateral upper lobes.  Hemoptysis with myriad etiology for immunocompromised patient including worsening sarcoid, pneumonia, oropharyngeal bleeding 2/2 to consistent cough, malignancy, fungal.      Plan:  - pulm consulted, appreciate recs  - no further hemoptysis noted, CBC qd  -  procal wnl  - resp sputum culture, respiratory infection panel, fungitel pending  - hold mycophenolate mofetil in setting of possible active infection  - continue ceftriaxone/azithromycin for CAP coverage, low threshold to broaden if clinically worsening  - change prednisone 40 mg to methylprednisolone 60 mg q6h for 24h per pulm, will switch back to prednisone 40 mg tomorrow   - hold anticoagulation with current bleeding    Asthma  On home DuoNeb, prednisone 2.5 mg, zafirlukast 20 mg, with albuterol rescue.  Received breathing treatments and methylprednisone 125 mg in ED with mild improvement.  Lungs with diffuse end expiratory wheeze on exam.      Plan:  - continue duonebs qh4  - switch prednisone to methylpred  - continuous oxygen with SpO2 goal >90%    Chronic heart failure with preserved ejection fraction  Last echo (7/10/2020): EF60%, grade I left ventricular diastolic dysfunction.  On home lasix prn swelling.  BNP in , trop 0.032.    Plan:  - repeat echo  - repeat trop wnl  - cardiac diet, salt restriction <2g  - monitor I/Os    Sarcoidosis of lung  Follows with Dr Forresetr (rheum) and Dr Keating (pulm).  Last seen on 11/20/2020, on prednisone 2.5 mg with plan to trial mycophenolate.    Plan:  - hold mycophenolate  - switch pred to methylpred  - start bactrim TIW      Hypertension  Not currently on any home antihypertensive medications.    Plan:   - patient mildly hypertensive, will hold antihypertensive treatment for now      VTE Risk Mitigation (From admission, onward)         Ordered     IP VTE LOW RISK PATIENT  Once      12/15/20 1358     Place sequential compression device  Until discontinued      12/15/20 1358                Discharge Planning   EDSON: 12/19/2020     Code Status: Full Code   Is the patient medically ready for discharge?:     Reason for patient still in hospital (select all that apply): Treatment  Discharge Plan A: Home                  Saúl Keller MD  Department of Hospital Medicine    [facility]

## 2020-12-16 NOTE — ASSESSMENT & PLAN NOTE
60 year old female with history of sarcoidosis (on prednisone, mycophenolate) presents with four days of worsening dyspnea and one day of scant hemoptysis.  On prednisone 2.5 mg qd, recently started on mycophenolate 500 mg within the last 2 weeks per rheum, extensive smoking history, TB negative (10/13/2020).  Afebrile, mild respiratory distress upon arrival, improved with duonebs.  WBC 6.8, Hgb 11.6 (baseline 12).  D-dimer 0.62. CXR showing possible aspiration pneumonia/infection in R lung base.  CTA negative for PE but demonstrating extensive mixed reticulation/ground glass with some consolidation in bilateral upper lobes.  Hemoptysis with myriad etiology for immunocompromised patient including worsening sarcoid, pneumonia, oropharyngeal bleeding 2/2 to consistent cough, malignancy, fungal.      Plan:  - pulm consulted, appreciate recs  - no further hemoptysis noted, CBC qd  - procal wnl  - resp sputum culture, respiratory infection panel, fungitel pending  - hold mycophenolate mofetil in setting of possible active infection  - continue ceftriaxone/azithromycin for CAP coverage, low threshold to broaden if clinically worsening  - change prednisone 40 mg to methylprednisolone 60 mg q6h for 24h per pulm, will switch back to prednisone 40 mg tomorrow   - hold anticoagulation with current bleeding

## 2020-12-16 NOTE — ASSESSMENT & PLAN NOTE
On home DuoNeb, prednisone 2.5 mg, zafirlukast 20 mg, with albuterol rescue.  Received breathing treatments and methylprednisone 125 mg in ED with mild improvement.  Lungs with diffuse end expiratory wheeze on exam.      Plan:  - continue duonebs qh4  - switch prednisone to methylpred  - continuous oxygen with SpO2 goal >90%

## 2020-12-16 NOTE — ASSESSMENT & PLAN NOTE
She does have increased ground glass on her new CT scan- concern for infectious process vs exacerbation.   Continue cellcept  Obtain sputum culture  Agree with CAP coverage  Steroid for one week at least.

## 2020-12-16 NOTE — ASSESSMENT & PLAN NOTE
Follows with Dr Forrester (rheum) and Dr Keating (pulm).  Last seen on 11/20/2020, on prednisone 2.5 mg with plan to trial mycophenolate.    Plan:  - hold mycophenolate  - start prednisone 40 mg qd for asthma exacerbation

## 2020-12-16 NOTE — PLAN OF CARE
Problem: Occupational Therapy Goal  Goal: Occupational Therapy Goal  Outcome: Met  Evaluation/Discharge only. No acute OT needs at this time. No goals established. Re-consult if situation changes.    CHANDAN Strauss  12/16/2020

## 2020-12-16 NOTE — PLAN OF CARE
12/16/20 1028   Discharge Assessment   Assessment Type Discharge Planning Assessment   Confirmed/corrected address and phone number on facesheet? Yes   Assessment information obtained from? Patient   Prior to hospitilization cognitive status: Alert/Oriented   Prior to hospitalization functional status: Independent   Current cognitive status: Alert/Oriented   Current Functional Status: Independent   Lives With other relative(s)   Able to Return to Prior Arrangements yes   Is patient able to care for self after discharge? Yes   Who are your caregiver(s) and their phone number(s)? Pham PhillipsRyxkf-rbjeuo-[phone], Amber ClemensUveyxd-lgaacs-[phone]   Readmission Within the Last 30 Days no previous admission in last 30 days   Patient currently being followed by outpatient case management? No   Patient currently receives any other outside agency services? No   Equipment Currently Used at Home none   Do you have any problems affording any of your prescribed medications? No   Is the patient taking medications as prescribed? yes   Does the patient have transportation home? Yes   Transportation Anticipated family or friend will provide   Discharge Plan A Home   Discharge Plan B Home;Home Health   DME Needed Upon Discharge  none   Patient/Family in Agreement with Plan yes   Viviana Cuba MD  [address] [place] [address]  / ORQUIDEA TURNER [id number]       [facility] [address] [place] [id number]  Phone: [phone] Fax: [phone]    Payor: [employer] [facility] / Plan: [facility] / Product Type: Commercial /     PCP F/U scheduled.

## 2020-12-16 NOTE — ASSESSMENT & PLAN NOTE
Follows with Dr Forrester (rheum) and Dr Keating (pulm).  Last seen on 11/20/2020, on prednisone 2.5 mg with plan to trial mycophenolate.    Plan:  - hold mycophenolate  - switch pred to methylpred  - start bactrim TIW

## 2020-12-17 LAB
ALBUMIN SERPL BCP-MCNC: 2.5 G/DL (ref 3.5–5.2)
ALP SERPL-CCNC: 111 U/L (ref 55–135)
ALT SERPL W/O P-5'-P-CCNC: 11 U/L (ref 10–44)
ANION GAP SERPL CALC-SCNC: 10 MMOL/L (ref 8–16)
ANION GAP SERPL CALC-SCNC: 11 MMOL/L (ref 8–16)
AST SERPL-CCNC: 18 U/L (ref 10–40)
BASOPHILS # BLD AUTO: 0.01 K/UL (ref 0–0.2)
BASOPHILS NFR BLD: 0.2 % (ref 0–1.9)
BILIRUB SERPL-MCNC: 0.1 MG/DL (ref 0.1–1)
BUN SERPL-MCNC: 25 MG/DL (ref 6–20)
BUN SERPL-MCNC: 35 MG/DL (ref 6–20)
CALCIUM SERPL-MCNC: 8.7 MG/DL (ref 8.7–10.5)
CALCIUM SERPL-MCNC: 8.8 MG/DL (ref 8.7–10.5)
CHLORIDE SERPL-SCNC: 105 MMOL/L (ref 95–110)
CHLORIDE SERPL-SCNC: 106 MMOL/L (ref 95–110)
CO2 SERPL-SCNC: 24 MMOL/L (ref 23–29)
CO2 SERPL-SCNC: 24 MMOL/L (ref 23–29)
CREAT SERPL-MCNC: 1.1 MG/DL (ref 0.5–1.4)
CREAT SERPL-MCNC: 1.5 MG/DL (ref 0.5–1.4)
DIFFERENTIAL METHOD: ABNORMAL
EOSINOPHIL # BLD AUTO: 0 K/UL (ref 0–0.5)
EOSINOPHIL NFR BLD: 0 % (ref 0–8)
ERYTHROCYTE [DISTWIDTH] IN BLOOD BY AUTOMATED COUNT: 15.5 % (ref 11.5–14.5)
EST. GFR  (AFRICAN AMERICAN): 43.6 ML/MIN/1.73 M^2
EST. GFR  (AFRICAN AMERICAN): >60 ML/MIN/1.73 M^2
EST. GFR  (NON AFRICAN AMERICAN): 37.9 ML/MIN/1.73 M^2
EST. GFR  (NON AFRICAN AMERICAN): 55.1 ML/MIN/1.73 M^2
GLUCOSE SERPL-MCNC: 143 MG/DL (ref 70–110)
GLUCOSE SERPL-MCNC: 158 MG/DL (ref 70–110)
HCT VFR BLD AUTO: 39.1 % (ref 37–48.5)
HGB BLD-MCNC: 11.2 G/DL (ref 12–16)
IMM GRANULOCYTES # BLD AUTO: 0.04 K/UL (ref 0–0.04)
IMM GRANULOCYTES NFR BLD AUTO: 0.7 % (ref 0–0.5)
LYMPHOCYTES # BLD AUTO: 0.4 K/UL (ref 1–4.8)
LYMPHOCYTES NFR BLD: 6.6 % (ref 18–48)
MAGNESIUM SERPL-MCNC: 2.4 MG/DL (ref 1.6–2.6)
MCH RBC QN AUTO: 25.3 PG (ref 27–31)
MCHC RBC AUTO-ENTMCNC: 28.6 G/DL (ref 32–36)
MCV RBC AUTO: 89 FL (ref 82–98)
MONOCYTES # BLD AUTO: 0 K/UL (ref 0.3–1)
MONOCYTES NFR BLD: 0.5 % (ref 4–15)
NEUTROPHILS # BLD AUTO: 5.3 K/UL (ref 1.8–7.7)
NEUTROPHILS NFR BLD: 92 % (ref 38–73)
NRBC BLD-RTO: 0 /100 WBC
PHOSPHATE SERPL-MCNC: 3.3 MG/DL (ref 2.7–4.5)
PLATELET # BLD AUTO: 282 K/UL (ref 150–350)
PMV BLD AUTO: 9.6 FL (ref 9.2–12.9)
POTASSIUM SERPL-SCNC: 4.6 MMOL/L (ref 3.5–5.1)
POTASSIUM SERPL-SCNC: 4.8 MMOL/L (ref 3.5–5.1)
PROT SERPL-MCNC: 7.1 G/DL (ref 6–8.4)
RBC # BLD AUTO: 4.42 M/UL (ref 4–5.4)
SODIUM SERPL-SCNC: 140 MMOL/L (ref 136–145)
SODIUM SERPL-SCNC: 140 MMOL/L (ref 136–145)
WBC # BLD AUTO: 5.73 K/UL (ref 3.9–12.7)

## 2020-12-17 PROCEDURE — 63600175 PHARM REV CODE 636 W HCPCS: Performed by: STUDENT IN AN ORGANIZED HEALTH CARE EDUCATION/TRAINING PROGRAM

## 2020-12-17 PROCEDURE — 25000242 PHARM REV CODE 250 ALT 637 W/ HCPCS: Performed by: STUDENT IN AN ORGANIZED HEALTH CARE EDUCATION/TRAINING PROGRAM

## 2020-12-17 PROCEDURE — 27000221 HC OXYGEN, UP TO 24 HOURS

## 2020-12-17 PROCEDURE — 99233 SBSQ HOSP IP/OBS HIGH 50: CPT | Mod: ,,, | Performed by: INTERNAL MEDICINE

## 2020-12-17 PROCEDURE — 99900035 HC TECH TIME PER 15 MIN (STAT)

## 2020-12-17 PROCEDURE — 11000001 HC ACUTE MED/SURG PRIVATE ROOM

## 2020-12-17 PROCEDURE — 94761 N-INVAS EAR/PLS OXIMETRY MLT: CPT

## 2020-12-17 PROCEDURE — 83735 ASSAY OF MAGNESIUM: CPT

## 2020-12-17 PROCEDURE — 85025 COMPLETE CBC W/AUTO DIFF WBC: CPT

## 2020-12-17 PROCEDURE — 94640 AIRWAY INHALATION TREATMENT: CPT

## 2020-12-17 PROCEDURE — 99233 PR SUBSEQUENT HOSPITAL CARE,LEVL III: ICD-10-PCS | Mod: ,,, | Performed by: INTERNAL MEDICINE

## 2020-12-17 PROCEDURE — 80053 COMPREHEN METABOLIC PANEL: CPT

## 2020-12-17 PROCEDURE — 80048 BASIC METABOLIC PNL TOTAL CA: CPT

## 2020-12-17 PROCEDURE — 63700000 PHARM REV CODE 250 ALT 637 W/O HCPCS: Performed by: STUDENT IN AN ORGANIZED HEALTH CARE EDUCATION/TRAINING PROGRAM

## 2020-12-17 PROCEDURE — 36415 COLL VENOUS BLD VENIPUNCTURE: CPT

## 2020-12-17 PROCEDURE — 84100 ASSAY OF PHOSPHORUS: CPT

## 2020-12-17 PROCEDURE — 25000003 PHARM REV CODE 250: Performed by: STUDENT IN AN ORGANIZED HEALTH CARE EDUCATION/TRAINING PROGRAM

## 2020-12-17 RX ORDER — FUROSEMIDE 10 MG/ML
40 INJECTION INTRAMUSCULAR; INTRAVENOUS ONCE
Status: COMPLETED | OUTPATIENT
Start: 2020-12-17 | End: 2020-12-17

## 2020-12-17 RX ORDER — SULFAMETHOXAZOLE AND TRIMETHOPRIM 800; 160 MG/1; MG/1
1 TABLET ORAL
Status: DISCONTINUED | OUTPATIENT
Start: 2020-12-18 | End: 2020-12-18 | Stop reason: HOSPADM

## 2020-12-17 RX ORDER — IPRATROPIUM BROMIDE AND ALBUTEROL SULFATE 2.5; .5 MG/3ML; MG/3ML
3 SOLUTION RESPIRATORY (INHALATION)
Status: DISCONTINUED | OUTPATIENT
Start: 2020-12-17 | End: 2020-12-18 | Stop reason: HOSPADM

## 2020-12-17 RX ORDER — FLUTICASONE PROPIONATE 50 MCG
2 SPRAY, SUSPENSION (ML) NASAL DAILY
Status: DISCONTINUED | OUTPATIENT
Start: 2020-12-17 | End: 2020-12-18 | Stop reason: HOSPADM

## 2020-12-17 RX ADMIN — IPRATROPIUM BROMIDE AND ALBUTEROL SULFATE 3 ML: .5; 2.5 SOLUTION RESPIRATORY (INHALATION) at 11:12

## 2020-12-17 RX ADMIN — IPRATROPIUM BROMIDE AND ALBUTEROL SULFATE 3 ML: .5; 2.5 SOLUTION RESPIRATORY (INHALATION) at 07:12

## 2020-12-17 RX ADMIN — CEFTRIAXONE SODIUM 1 G: 1 INJECTION, POWDER, FOR SOLUTION INTRAMUSCULAR; INTRAVENOUS at 12:12

## 2020-12-17 RX ADMIN — IPRATROPIUM BROMIDE AND ALBUTEROL SULFATE 3 ML: .5; 2.5 SOLUTION RESPIRATORY (INHALATION) at 08:12

## 2020-12-17 RX ADMIN — FUROSEMIDE 40 MG: 10 INJECTION, SOLUTION INTRAMUSCULAR; INTRAVENOUS at 12:12

## 2020-12-17 RX ADMIN — PREDNISONE 40 MG: 10 TABLET ORAL at 08:12

## 2020-12-17 RX ADMIN — METHYLPREDNISOLONE SODIUM SUCCINATE 60 MG: 40 INJECTION, POWDER, FOR SOLUTION INTRAMUSCULAR; INTRAVENOUS at 06:12

## 2020-12-17 RX ADMIN — TOPIRAMATE 50 MG: 25 TABLET, FILM COATED ORAL at 08:12

## 2020-12-17 RX ADMIN — IPRATROPIUM BROMIDE AND ALBUTEROL SULFATE 3 ML: .5; 2.5 SOLUTION RESPIRATORY (INHALATION) at 03:12

## 2020-12-17 RX ADMIN — METHYLPREDNISOLONE SODIUM SUCCINATE 60 MG: 40 INJECTION, POWDER, FOR SOLUTION INTRAMUSCULAR; INTRAVENOUS at 12:12

## 2020-12-17 RX ADMIN — FLUTICASONE PROPIONATE 100 MCG: 50 SPRAY, METERED NASAL at 02:12

## 2020-12-17 RX ADMIN — METHYLPREDNISOLONE SODIUM SUCCINATE 60 MG: 40 INJECTION, POWDER, FOR SOLUTION INTRAMUSCULAR; INTRAVENOUS at 02:12

## 2020-12-17 RX ADMIN — AZITHROMYCIN MONOHYDRATE 500 MG: 250 TABLET ORAL at 08:12

## 2020-12-17 NOTE — ASSESSMENT & PLAN NOTE
She does have increased ground glass on her new CT scan- concern for infectious process vs exacerbation.   Continue MMF  Await final results sputum culture  Agree with CAP coverage  Steroid for one week at least.

## 2020-12-17 NOTE — ASSESSMENT & PLAN NOTE
Patient reports additional episode of small volume hemoptysis on Wed 12/16    · Agree with antibiotics, and culture.   · Recommend additional day of IV steroids before transitioning to po tomorrow 12/18  · Recommend diuresis given her diastolic heart failure  · Not currently planning for bronchoscopy Fri 12/18 with BAL to rule out PJP unless patient worsens clinically after today's steroids and diuresis

## 2020-12-17 NOTE — PROGRESS NOTES
[facility]  Pulmonology  Progress Note    Patient Name: Salvatore Phillips  MRN: [id number]  Admission Date: 12/15/2020  Hospital Length of Stay: 2 days  Code Status: Full Code  Attending Provider: Cal Francois MD  Primary Care Provider: Viviana Cuba MD   Principal Problem: Hemoptysis    Subjective:     Interval History:   Patient reports episode of hemoptysis yesterday -- about half the volume of what she experienced prior to presentation (she quantifies at about a bottle cap's worth).  Increasing oxygen requirement to 4L NC overnight.  Reports ongoing headache.  Otherwise without complaints.    Objective:     Vital Signs (Most Recent):  Temp: 97.8 °F (36.6 °C) (12/17/20 0754)  Pulse: 86 (12/17/20 0754)  Resp: 18 (12/17/20 0754)  BP: 135/72 (12/17/20 0754)  SpO2: (!) 92 % (12/17/20 0754) Vital Signs (24h Range):  Temp:  [96.1 °F (35.6 °C)-98.5 °F (36.9 °C)] 97.8 °F (36.6 °C)  Pulse:  [] 86  Resp:  [18-20] 18  SpO2:  [89 %-97 %] 92 %  BP: (123-142)/(59-84) 135/72     Weight: 83.8 kg (184 lb 11.9 oz)  Body mass index is 28.94 kg/m².    No intake or output data in the 24 hours ending 12/17/20 0950    Physical Exam  Constitutional:       Appearance: Normal appearance. She is normal weight.   HENT:      Head: Normocephalic and atraumatic.      Nose: Nose normal.      Mouth/Throat:      Mouth: Mucous membranes are moist.      Pharynx: Oropharynx is clear.   Eyes:      Conjunctiva/sclera: Conjunctivae normal.      Pupils: Pupils are equal, round, and reactive to light.   Neck:      Musculoskeletal: Normal range of motion and neck supple.   Cardiovascular:      Rate and Rhythm: Normal rate and regular rhythm.      Heart sounds: No murmur.   Pulmonary:      Effort: Pulmonary effort is normal. No respiratory distress.      Breath sounds: Rales (upper lob predominant) present.   Abdominal:      General: Abdomen is flat. Bowel sounds are normal.      Palpations: Abdomen is soft.   Musculoskeletal:  Normal range of motion.         General: No swelling.   Skin:     General: Skin is warm and dry.   Neurological:      General: No focal deficit present.      Mental Status: She is alert and oriented to person, place, and time. Mental status is at baseline.   Psychiatric:         Mood and Affect: Mood normal.         Vents:       Lines/Drains/Airways     Peripheral Intravenous Line                 Peripheral IV - Single Lumen 12/15/20 0900 18 G Right Antecubital 2 days                Significant Labs:    CBC/Anemia Profile:  Recent Labs   Lab 12/15/20  1927 12/16/20  0755 12/17/20  0402   WBC 4.81 6.80 5.73   HGB 11.8* 10.9* 11.2*   HCT 41.2 38.7 39.1    250 282   MCV 89 89 89   RDW 15.3* 15.3* 15.5*        Chemistries:  Recent Labs   Lab 12/16/20  0551 12/17/20  0402    140   K 3.8 4.8    105   CO2 31* 24   BUN 25* 25*   CREATININE 1.1 1.1   CALCIUM 8.2* 8.7   ALBUMIN 2.4* 2.5*   PROT 6.5 7.1   BILITOT 0.1 0.1   ALKPHOS 117 111   ALT 11 11   AST 19 18   MG 2.2 2.4   PHOS 3.8 3.3       CMP:   Recent Labs   Lab 12/16/20  0551 12/17/20  0402    140   K 3.8 4.8    105   CO2 31* 24   GLU 87 143*   BUN 25* 25*   CREATININE 1.1 1.1   CALCIUM 8.2* 8.7   PROT 6.5 7.1   ALBUMIN 2.4* 2.5*   BILITOT 0.1 0.1   ALKPHOS 117 111   AST 19 18   ALT 11 11   ANIONGAP 5* 11   EGFRNONAA 55.1* 55.1*     All pertinent labs within the past 24 hours have been reviewed.    Significant Imaging:  Imaging Results          CTA Chest Non-Coronary (PE Study) (Final result)  Result time 12/15/20 11:52:51    Final result by Daxa Figueroa MD (12/15/20 11:52:51)                 Impression:      Patient motion during image acquisition degrades fine spatial resolution and contrast resolution.  Within the limits of the study I detect no pulmonary thromboembolism, pulmonary infarct or right heart strain.    In both lungs there is extensive mixed reticulation and ground-glass disease with some areas of janie parenchymal  consolidation. This is upper lobe predominant and produces some volume loss in the right upper lobe resulting in shift of fissures.  Chest radiograph abnormality dates back to at least 11/25/2018 in this patient with a history of sarcoidosis.  The extent of the pulmonary disease is worse today than on chest radiograph of 06/05/2020 or chest CT of 09/17/2020. No cavitation identified. Findings are consistent with sarcoidosis although other processes including subacute hypersensitivity pneumonia could present in a similar fashion.    No pulmonary edema identified in this patient with aortic and coronary calcific atherosclerosis.    Additional findings are above.      Electronically signed by: Daxa Fiugeroa MD  Date:    12/15/2020  Time:    11:52             Narrative:    EXAMINATION:  CTA CHEST NON CORONARY    CLINICAL HISTORY:  PE suspected, intermediate prob, positive D-dimer;    TECHNIQUE:  During intravenous bolus injection of 100 ml of Omnipaque 350 contrast medium via the right upper extremity and using low dose technique, the chest was surveyed from above the pulmonary apices through the posterior costophrenic angles.  Images were acquired without gatingscratch.    Data was reconstructed for multiplanar images in axial, sagittal and coronal planes and for maximal intensity projection images in the axial plane.    Patient motion during image acquisition causes x-ray beam attenuation and scatter, degrading spatial and contrast resolution.    All CT scans at this facility use dose modulation, iterative reconstruction and/or weight based dosing when appropriate to reduce radiation dose to as low as reasonably achievable.    Xray dose: DLP = 165.05 mGy-cm.    COMPARISON:  Chest radiographs: 12/15/2020.  06/05/2020.  11/25/2018.    Chest CT: 09/17/2020.    FINDINGS:  Additional history: Sarcoidosis established by lung biopsy at 29 years of age.    Base of Neck: No significant abnormality.    Aorta: Left-sided  aortic arch with 3 arterial branches.  The aorta maintains normal caliber, contour and course. There is calcification of the thoracic aorta and coronary arteries in this 60-year-old woman.    Heart/pericardium: Normal size. No right heart strain identified. No pericardial fluid or calcification.    Pulmonary arteries: Pulmonary arteries distribute normally.  Pulmonary arteries are sufficiently opacified for diagnostic assessment.  Patient motion degrades spatial and contrast resolution.  There is no pulmonary thromboembolism through the lobar arteries.  Pulmonary arterial hypertension is neither confirmed nor excluded.  There is no pulmonary thromboembolism.    Pulmonary veins, left atrium:    There are four pulmonary veins that return to the left atrium.    Sindi/Mediastinum: There is a mildly enlarged lymph node in the anterior mediastinum (axial series 2, image 161) with short axis of 1.1 cm; this is stable compared with 09/17/2020, axial series 2, image 43 no other definite mediastinal lymph node enlargement identified.  Hilar lymph nodes are not enlarged in this patient with a clinical diagnosis of sarcoidosis..    Pleura: No pleural fluid.No pleural calcification.    Esophagus and upper abdomen: Postoperative changes are again identified at the gastroesophageal junction.  There is prior cholecystectomy.  No abnormality of the partially imaged upper abdomen.    Thoracic soft tissues: Normal. Both breasts are present.    Bones: No acute fracture. No suspicious lytic or sclerotic lesion.    Airways: Patent.    Lungs:    -In both lungs there is extensive mixed reticulation and ground-glass disease with some areas of janie parenchymal consolidation.  This lung disease is upper lobe predominant and produces some volume loss in the right upper lobe resulting in shift of right pleural fissures.  The extent of the disease is worse than on chest radiograph of 06/05/2020 or chest CT of 09/17/2020.  No cavitation  identified.  Findings are consistent with sarcoidosis although other processes including subacute hypersensitivity pneumonia could present in a similar fashion.    -No pulmonary infarct.    -No pulmonary edema identified in this patient with aortic and coronary atherosclerosis.                               X-Ray Chest AP Portable (Final result)  Result time 12/15/20 08:54:43    Final result by Yuniel Casanova DO (12/15/20 08:54:43)                 Impression:      1. Chronic pleural-parenchymal changes in the bilateral upper lungs, likely related to patient's history of sarcoidosis.  2. Mild reticulonodular opacities in the right lower lung may be related to aspiration or atypical infection.      Electronically signed by: Yuniel Casanova  Date:    12/15/2020  Time:    08:54             Narrative:    EXAMINATION:  XR CHEST AP PORTABLE    CLINICAL HISTORY:  CHF.    TECHNIQUE:  Single frontal view of the chest was performed.    COMPARISON:  Multiple prior radiographs of the chest, most recent from 08/05/2020.  CT of the chest from 09/17/2020.    FINDINGS:  There is upper lung predominant fibrotic changes compatible with patient's history of sarcoidosis.  Mild reticulonodular opacities within the right lung base may represent aspiration or atypical infection.  The pleural spaces are clear.  Cardiac silhouette is unremarkable.  There are calcifications of the aortic arch.  There a visualized osseous structures are unremarkable.  There are surgical clips in the superior mediastinum.  Epigastric surgical clips are also seen.                                  Assessment/Plan:     * Hemoptysis  Patient reports additional episode of small volume hemoptysis on Wed 12/16    · Agree with antibiotics, and culture.   · Recommend additional day of IV steroids before transitioning to po tomorrow 12/18  · Recommend diuresis given her diastolic heart failure  · Not currently planning for bronchoscopy Fri 12/18 with BAL to rule out PJP  unless patient worsens clinically after today's steroids and diuresis    Sarcoidosis of lung  She does have increased ground glass on her new CT scan- concern for infectious process vs exacerbation.   Continue MMF  Await final results sputum culture  Agree with CAP coverage  Steroid for one week at least.         Ryne Lugo MD  Pulmonology  [facility]

## 2020-12-17 NOTE — ASSESSMENT & PLAN NOTE
Patient reports additional episode of small volume hemoptysis on Wed 12/16    · Agree with steroids, antibiotics, and culture.   · Recommend diuresis given her diastolic heart failure  · Will plan for bronchoscopy Fri 12/18 with BAL to rule out PJP.  Please ensure patient is NPO at midnight.

## 2020-12-17 NOTE — ASSESSMENT & PLAN NOTE
She does have increased ground glass on her new CT scan- concern for infectious process vs exacerbation.   Continue MMF  Sputum culture with rare GPCs, rare GNR.    Agree with CAP coverage  Steroid for one week at least.

## 2020-12-17 NOTE — SUBJECTIVE & OBJECTIVE
Interval History: one episode of scant hemoptysis yesterday, none this morning.  Overnight O2 sats to 89%, increased to 4L with resolution.  No subjective worsening of SOB, chest pain, cough, wheeze.  No fevers, chills, night sweats.      Review of Systems   Constitutional: Negative for activity change, appetite change and fever.   HENT: Negative for congestion, nosebleeds and rhinorrhea.    Eyes: Negative for pain and discharge.   Respiratory: Positive for cough (hemoptysis ). Negative for chest tightness, shortness of breath and wheezing.    Gastrointestinal: Negative for abdominal distention, constipation, diarrhea and nausea.   Endocrine: Negative.    Genitourinary: Negative for difficulty urinating, enuresis, hematuria and menstrual problem.   Musculoskeletal: Negative for arthralgias, joint swelling and myalgias.   Skin: Negative for color change and rash.   Allergic/Immunologic: Negative.    Neurological: Negative for dizziness, syncope and numbness.   Hematological: Negative for adenopathy.   Psychiatric/Behavioral: Negative for agitation and hallucinations.     Objective:     Vital Signs (Most Recent):  Temp: 97.8 °F (36.6 °C) (12/17/20 0754)  Pulse: 86 (12/17/20 0754)  Resp: 18 (12/17/20 0754)  BP: 135/72 (12/17/20 0754)  SpO2: (!) 92 % (12/17/20 0754) Vital Signs (24h Range):  Temp:  [96.1 °F (35.6 °C)-98.5 °F (36.9 °C)] 97.8 °F (36.6 °C)  Pulse:  [] 86  Resp:  [18-20] 18  SpO2:  [89 %-97 %] 92 %  BP: (123-142)/(59-84) 135/72     Weight: 83.8 kg (184 lb 11.9 oz)  Body mass index is 28.94 kg/m².  No intake or output data in the 24 hours ending 12/17/20 0947   Physical Exam  Constitutional:       Appearance: Normal appearance. She is normal weight.   HENT:      Head: Normocephalic and atraumatic.      Nose: Nose normal.      Mouth/Throat:      Mouth: Mucous membranes are moist.      Pharynx: Oropharynx is clear.   Eyes:      Conjunctiva/sclera: Conjunctivae normal.      Pupils: Pupils are equal,  round, and reactive to light.   Neck:      Musculoskeletal: Normal range of motion and neck supple.   Cardiovascular:      Rate and Rhythm: Normal rate and regular rhythm.      Heart sounds: No murmur.   Pulmonary:      Effort: Pulmonary effort is normal. No respiratory distress.      Breath sounds: Rales (upper lob predominant) present.   Abdominal:      General: Abdomen is flat. Bowel sounds are normal.      Palpations: Abdomen is soft.   Musculoskeletal: Normal range of motion.         General: No swelling.   Skin:     General: Skin is warm and dry.   Neurological:      General: No focal deficit present.      Mental Status: She is alert and oriented to person, place, and time. Mental status is at baseline.   Psychiatric:         Mood and Affect: Mood normal.         Significant Labs:   CBC:   Recent Labs   Lab 12/15/20  1927 12/16/20  0755 12/17/20  0402   WBC 4.81 6.80 5.73   HGB 11.8* 10.9* 11.2*   HCT 41.2 38.7 39.1    250 282     CMP:   Recent Labs   Lab 12/16/20  0551 12/17/20  0402    140   K 3.8 4.8    105   CO2 31* 24   GLU 87 143*   BUN 25* 25*   CREATININE 1.1 1.1   CALCIUM 8.2* 8.7   PROT 6.5 7.1   ALBUMIN 2.4* 2.5*   BILITOT 0.1 0.1   ALKPHOS 117 111   AST 19 18   ALT 11 11   ANIONGAP 5* 11   EGFRNONAA 55.1* 55.1*     All pertinent labs within the past 24 hours have been reviewed.    Significant Imaging:   Imaging Results          CTA Chest Non-Coronary (PE Study) (Final result)  Result time 12/15/20 11:52:51    Final result by Daxa Figueroa MD (12/15/20 11:52:51)                 Impression:      Patient motion during image acquisition degrades fine spatial resolution and contrast resolution.  Within the limits of the study I detect no pulmonary thromboembolism, pulmonary infarct or right heart strain.    In both lungs there is extensive mixed reticulation and ground-glass disease with some areas of janie parenchymal consolidation. This is upper lobe predominant and produces  some volume loss in the right upper lobe resulting in shift of fissures.  Chest radiograph abnormality dates back to at least 11/25/2018 in this patient with a history of sarcoidosis.  The extent of the pulmonary disease is worse today than on chest radiograph of 06/05/2020 or chest CT of 09/17/2020. No cavitation identified. Findings are consistent with sarcoidosis although other processes including subacute hypersensitivity pneumonia could present in a similar fashion.    No pulmonary edema identified in this patient with aortic and coronary calcific atherosclerosis.    Additional findings are above.      Electronically signed by: Daxa Figueroa MD  Date:    12/15/2020  Time:    11:52             Narrative:    EXAMINATION:  CTA CHEST NON CORONARY    CLINICAL HISTORY:  PE suspected, intermediate prob, positive D-dimer;    TECHNIQUE:  During intravenous bolus injection of 100 ml of Omnipaque 350 contrast medium via the right upper extremity and using low dose technique, the chest was surveyed from above the pulmonary apices through the posterior costophrenic angles.  Images were acquired without gatingscratch.    Data was reconstructed for multiplanar images in axial, sagittal and coronal planes and for maximal intensity projection images in the axial plane.    Patient motion during image acquisition causes x-ray beam attenuation and scatter, degrading spatial and contrast resolution.    All CT scans at this facility use dose modulation, iterative reconstruction and/or weight based dosing when appropriate to reduce radiation dose to as low as reasonably achievable.    Xray dose: DLP = 165.05 mGy-cm.    COMPARISON:  Chest radiographs: 12/15/2020.  06/05/2020.  11/25/2018.    Chest CT: 09/17/2020.    FINDINGS:  Additional history: Sarcoidosis established by lung biopsy at 29 years of age.    Base of Neck: No significant abnormality.    Aorta: Left-sided aortic arch with 3 arterial branches.  The aorta maintains  normal caliber, contour and course. There is calcification of the thoracic aorta and coronary arteries in this 60-year-old woman.    Heart/pericardium: Normal size. No right heart strain identified. No pericardial fluid or calcification.    Pulmonary arteries: Pulmonary arteries distribute normally.  Pulmonary arteries are sufficiently opacified for diagnostic assessment.  Patient motion degrades spatial and contrast resolution.  There is no pulmonary thromboembolism through the lobar arteries.  Pulmonary arterial hypertension is neither confirmed nor excluded.  There is no pulmonary thromboembolism.    Pulmonary veins, left atrium:    There are four pulmonary veins that return to the left atrium.    Sindi/Mediastinum: There is a mildly enlarged lymph node in the anterior mediastinum (axial series 2, image 161) with short axis of 1.1 cm; this is stable compared with 09/17/2020, axial series 2, image 43 no other definite mediastinal lymph node enlargement identified.  Hilar lymph nodes are not enlarged in this patient with a clinical diagnosis of sarcoidosis..    Pleura: No pleural fluid.No pleural calcification.    Esophagus and upper abdomen: Postoperative changes are again identified at the gastroesophageal junction.  There is prior cholecystectomy.  No abnormality of the partially imaged upper abdomen.    Thoracic soft tissues: Normal. Both breasts are present.    Bones: No acute fracture. No suspicious lytic or sclerotic lesion.    Airways: Patent.    Lungs:    -In both lungs there is extensive mixed reticulation and ground-glass disease with some areas of janie parenchymal consolidation.  This lung disease is upper lobe predominant and produces some volume loss in the right upper lobe resulting in shift of right pleural fissures.  The extent of the disease is worse than on chest radiograph of 06/05/2020 or chest CT of 09/17/2020.  No cavitation identified.  Findings are consistent with sarcoidosis although other  processes including subacute hypersensitivity pneumonia could present in a similar fashion.    -No pulmonary infarct.    -No pulmonary edema identified in this patient with aortic and coronary atherosclerosis.                               X-Ray Chest AP Portable (Final result)  Result time 12/15/20 08:54:43    Final result by Yuniel Casanova DO (12/15/20 08:54:43)                 Impression:      1. Chronic pleural-parenchymal changes in the bilateral upper lungs, likely related to patient's history of sarcoidosis.  2. Mild reticulonodular opacities in the right lower lung may be related to aspiration or atypical infection.      Electronically signed by: Yuniel Casanova  Date:    12/15/2020  Time:    08:54             Narrative:    EXAMINATION:  XR CHEST AP PORTABLE    CLINICAL HISTORY:  CHF.    TECHNIQUE:  Single frontal view of the chest was performed.    COMPARISON:  Multiple prior radiographs of the chest, most recent from 08/05/2020.  CT of the chest from 09/17/2020.    FINDINGS:  There is upper lung predominant fibrotic changes compatible with patient's history of sarcoidosis.  Mild reticulonodular opacities within the right lung base may represent aspiration or atypical infection.  The pleural spaces are clear.  Cardiac silhouette is unremarkable.  There are calcifications of the aortic arch.  There a visualized osseous structures are unremarkable.  There are surgical clips in the superior mediastinum.  Epigastric surgical clips are also seen.

## 2020-12-17 NOTE — ASSESSMENT & PLAN NOTE
60 year old female with history of sarcoidosis (on prednisone, mycophenolate) presents with four days of worsening dyspnea and one day of scant hemoptysis.  On prednisone 2.5 mg qd, recently started on mycophenolate 500 mg within the last 2 weeks per rheum, extensive smoking history, TB negative (10/13/2020).  Afebrile, mild respiratory distress upon arrival, improved with duonebs.  WBC 6.8, Hgb 11.6 (baseline 12).  D-dimer 0.62. CXR showing possible aspiration pneumonia/infection in R lung base.  CTA negative for PE but demonstrating extensive mixed reticulation/ground glass with some consolidation in bilateral upper lobes.  Hemoptysis with myriad etiology for immunocompromised patient including worsening sarcoid, pneumonia, oropharyngeal bleeding 2/2 to consistent cough, malignancy, fungal.      one episode scant hemoptysis yesterday afternoon, mild increase in O2 2L to 4L.  Pulm to bronch/BAL tomorrow.  NPO at midnight tonight    Plan:  - pulm consulted, appreciate recs  - CBC qd  - procal wnl  - resp sputum culture, respiratory infection panel, fungitel pending  - hold mycophenolate mofetil in setting of possible active infection  - continue ceftriaxone/azithromycin for CAP coverage, low threshold to broaden if clinically worsening  - s/p one day of methylprednisolone 60 mg q6h, will switch back to prednisone 40 mg later today  - hold anticoagulation with current bleeding

## 2020-12-17 NOTE — SUBJECTIVE & OBJECTIVE
Interval History:   Patient reports episode of hemoptysis yesterday -- about half the volume of what she experienced prior to presentation (she quantifies at about a bottle cap's worth).  Increasing oxygen requirement to 4L NC overnight.  Reports ongoing headache.  Otherwise without complaints.    Objective:     Vital Signs (Most Recent):  Temp: 97.8 °F (36.6 °C) (12/17/20 0754)  Pulse: 86 (12/17/20 0754)  Resp: 18 (12/17/20 0754)  BP: 135/72 (12/17/20 0754)  SpO2: (!) 92 % (12/17/20 0754) Vital Signs (24h Range):  Temp:  [96.1 °F (35.6 °C)-98.5 °F (36.9 °C)] 97.8 °F (36.6 °C)  Pulse:  [] 86  Resp:  [18-20] 18  SpO2:  [89 %-97 %] 92 %  BP: (123-142)/(59-84) 135/72     Weight: 83.8 kg (184 lb 11.9 oz)  Body mass index is 28.94 kg/m².    No intake or output data in the 24 hours ending 12/17/20 0950    Physical Exam  Constitutional:       Appearance: Normal appearance. She is normal weight.   HENT:      Head: Normocephalic and atraumatic.      Nose: Nose normal.      Mouth/Throat:      Mouth: Mucous membranes are moist.      Pharynx: Oropharynx is clear.   Eyes:      Conjunctiva/sclera: Conjunctivae normal.      Pupils: Pupils are equal, round, and reactive to light.   Neck:      Musculoskeletal: Normal range of motion and neck supple.   Cardiovascular:      Rate and Rhythm: Normal rate and regular rhythm.      Heart sounds: No murmur.   Pulmonary:      Effort: Pulmonary effort is normal. No respiratory distress.      Breath sounds: Rales (upper lob predominant) present.   Abdominal:      General: Abdomen is flat. Bowel sounds are normal.      Palpations: Abdomen is soft.   Musculoskeletal: Normal range of motion.         General: No swelling.   Skin:     General: Skin is warm and dry.   Neurological:      General: No focal deficit present.      Mental Status: She is alert and oriented to person, place, and time. Mental status is at baseline.   Psychiatric:         Mood and Affect: Mood normal.         Vents:        Lines/Drains/Airways     Peripheral Intravenous Line                 Peripheral IV - Single Lumen 12/15/20 0900 18 G Right Antecubital 2 days                Significant Labs:    CBC/Anemia Profile:  Recent Labs   Lab 12/15/20  1927 12/16/20  0755 12/17/20  0402   WBC 4.81 6.80 5.73   HGB 11.8* 10.9* 11.2*   HCT 41.2 38.7 39.1    250 282   MCV 89 89 89   RDW 15.3* 15.3* 15.5*        Chemistries:  Recent Labs   Lab 12/16/20  0551 12/17/20  0402    140   K 3.8 4.8    105   CO2 31* 24   BUN 25* 25*   CREATININE 1.1 1.1   CALCIUM 8.2* 8.7   ALBUMIN 2.4* 2.5*   PROT 6.5 7.1   BILITOT 0.1 0.1   ALKPHOS 117 111   ALT 11 11   AST 19 18   MG 2.2 2.4   PHOS 3.8 3.3       CMP:   Recent Labs   Lab 12/16/20  0551 12/17/20  0402    140   K 3.8 4.8    105   CO2 31* 24   GLU 87 143*   BUN 25* 25*   CREATININE 1.1 1.1   CALCIUM 8.2* 8.7   PROT 6.5 7.1   ALBUMIN 2.4* 2.5*   BILITOT 0.1 0.1   ALKPHOS 117 111   AST 19 18   ALT 11 11   ANIONGAP 5* 11   EGFRNONAA 55.1* 55.1*     All pertinent labs within the past 24 hours have been reviewed.    Significant Imaging:  Imaging Results          CTA Chest Non-Coronary (PE Study) (Final result)  Result time 12/15/20 11:52:51    Final result by Daxa Figueroa MD (12/15/20 11:52:51)                 Impression:      Patient motion during image acquisition degrades fine spatial resolution and contrast resolution.  Within the limits of the study I detect no pulmonary thromboembolism, pulmonary infarct or right heart strain.    In both lungs there is extensive mixed reticulation and ground-glass disease with some areas of janie parenchymal consolidation. This is upper lobe predominant and produces some volume loss in the right upper lobe resulting in shift of fissures.  Chest radiograph abnormality dates back to at least 11/25/2018 in this patient with a history of sarcoidosis.  The extent of the pulmonary disease is worse today than on chest radiograph of  06/05/2020 or chest CT of 09/17/2020. No cavitation identified. Findings are consistent with sarcoidosis although other processes including subacute hypersensitivity pneumonia could present in a similar fashion.    No pulmonary edema identified in this patient with aortic and coronary calcific atherosclerosis.    Additional findings are above.      Electronically signed by: Daxa Figueroa MD  Date:    12/15/2020  Time:    11:52             Narrative:    EXAMINATION:  CTA CHEST NON CORONARY    CLINICAL HISTORY:  PE suspected, intermediate prob, positive D-dimer;    TECHNIQUE:  During intravenous bolus injection of 100 ml of Omnipaque 350 contrast medium via the right upper extremity and using low dose technique, the chest was surveyed from above the pulmonary apices through the posterior costophrenic angles.  Images were acquired without gatingscratch.    Data was reconstructed for multiplanar images in axial, sagittal and coronal planes and for maximal intensity projection images in the axial plane.    Patient motion during image acquisition causes x-ray beam attenuation and scatter, degrading spatial and contrast resolution.    All CT scans at this facility use dose modulation, iterative reconstruction and/or weight based dosing when appropriate to reduce radiation dose to as low as reasonably achievable.    Xray dose: DLP = 165.05 mGy-cm.    COMPARISON:  Chest radiographs: 12/15/2020.  06/05/2020.  11/25/2018.    Chest CT: 09/17/2020.    FINDINGS:  Additional history: Sarcoidosis established by lung biopsy at 29 years of age.    Base of Neck: No significant abnormality.    Aorta: Left-sided aortic arch with 3 arterial branches.  The aorta maintains normal caliber, contour and course. There is calcification of the thoracic aorta and coronary arteries in this 60-year-old woman.    Heart/pericardium: Normal size. No right heart strain identified. No pericardial fluid or calcification.    Pulmonary arteries:  Pulmonary arteries distribute normally.  Pulmonary arteries are sufficiently opacified for diagnostic assessment.  Patient motion degrades spatial and contrast resolution.  There is no pulmonary thromboembolism through the lobar arteries.  Pulmonary arterial hypertension is neither confirmed nor excluded.  There is no pulmonary thromboembolism.    Pulmonary veins, left atrium:    There are four pulmonary veins that return to the left atrium.    Sindi/Mediastinum: There is a mildly enlarged lymph node in the anterior mediastinum (axial series 2, image 161) with short axis of 1.1 cm; this is stable compared with 09/17/2020, axial series 2, image 43 no other definite mediastinal lymph node enlargement identified.  Hilar lymph nodes are not enlarged in this patient with a clinical diagnosis of sarcoidosis..    Pleura: No pleural fluid.No pleural calcification.    Esophagus and upper abdomen: Postoperative changes are again identified at the gastroesophageal junction.  There is prior cholecystectomy.  No abnormality of the partially imaged upper abdomen.    Thoracic soft tissues: Normal. Both breasts are present.    Bones: No acute fracture. No suspicious lytic or sclerotic lesion.    Airways: Patent.    Lungs:    -In both lungs there is extensive mixed reticulation and ground-glass disease with some areas of janie parenchymal consolidation.  This lung disease is upper lobe predominant and produces some volume loss in the right upper lobe resulting in shift of right pleural fissures.  The extent of the disease is worse than on chest radiograph of 06/05/2020 or chest CT of 09/17/2020.  No cavitation identified.  Findings are consistent with sarcoidosis although other processes including subacute hypersensitivity pneumonia could present in a similar fashion.    -No pulmonary infarct.    -No pulmonary edema identified in this patient with aortic and coronary atherosclerosis.                               X-Ray Chest AP  Portable (Final result)  Result time 12/15/20 08:54:43    Final result by Yuniel Casanova DO (12/15/20 08:54:43)                 Impression:      1. Chronic pleural-parenchymal changes in the bilateral upper lungs, likely related to patient's history of sarcoidosis.  2. Mild reticulonodular opacities in the right lower lung may be related to aspiration or atypical infection.      Electronically signed by: Yuniel Casanova  Date:    12/15/2020  Time:    08:54             Narrative:    EXAMINATION:  XR CHEST AP PORTABLE    CLINICAL HISTORY:  CHF.    TECHNIQUE:  Single frontal view of the chest was performed.    COMPARISON:  Multiple prior radiographs of the chest, most recent from 08/05/2020.  CT of the chest from 09/17/2020.    FINDINGS:  There is upper lung predominant fibrotic changes compatible with patient's history of sarcoidosis.  Mild reticulonodular opacities within the right lung base may represent aspiration or atypical infection.  The pleural spaces are clear.  Cardiac silhouette is unremarkable.  There are calcifications of the aortic arch.  There a visualized osseous structures are unremarkable.  There are surgical clips in the superior mediastinum.  Epigastric surgical clips are also seen.

## 2020-12-17 NOTE — PLAN OF CARE
12/17/20 0843   Post-Acute Status   Post-Acute Authorization Other   Other Status No Post-Acute Service Needs

## 2020-12-17 NOTE — PROGRESS NOTES
[facility] Hospital Medicine  Progress Note    Patient Name: Salvatore Phillips  MRN: [id number]  Patient Class: IP- Inpatient   Admission Date: 12/15/2020  Length of Stay: 2 days  Attending Physician: Cal Francois MD  Primary Care Provider: Viviana Cuba MD    [facility]: [facility] [place] [facility] 5 Saúl Keller MD    Subjective:     Principal Problem:Hemoptysis        HPI:  Ms. Salvatore Phillips is a 60 y.o. female with sarcoidosis (on prednisone, recently started on mycophenolate, on home 2L O2), asthma, HFpEF (60%EF, G1LVDD), HTN, NENA who presents today with hemoptysis.  Patient reports coughing up white sputum with scant blood several times this morning.  Patient states that she has had accompanied exertional dyspnea worse than baseline for the past 4 days, using home asthma medications with minimal relief.  She states that this dyspnea has waxed and waned over the past several months and is being followed by rheum/pulm who are adjusting her sarcoid medications.  States that she had a cough productive of green sputum briefly a couple weeks ago that she treated with OTC medications.  Denies any previous history of hemoptysis.  Denies any sick contacts or recent travel.  Recently tested negative for TB, denies any history of incarceration or homelessness.  Denies any fevers, chills, night sweats, recent weight loss, chest pain, abdominal pain, nausea, vomiting, changes in bowel or bladder habits.     Sarcoidosis without apparent extrathoracic manifestations first diagnosed at age 30, followed by Dr Forrester (rheum) and Dr Keating (pulm) for her sarcoidosis.  Currently on prednisone 2.5 mg, was started on mycophenolate ~1.5 weeks ago for worsening chronic dyspnea suspected to be 2/2 to worsening sarcoidosis.  Patient has smoked 0.5 ppd for past 30 years, quit several months ago.  Drinks a glass of wine twice a month, denies any previous history of recreational drug use/IVDU.  Lives with mother  (83) and sister in [place].  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     ED course: mild respiratory distress and wheezing on arrival, improved with breathing treatments.  Afebrile, HDS, O2 sats mid 90% on 3L.  WBC 6.8, Hgb 11.6 (baseline 12).  CXR showing possible RLL aspiration pneumonia, CTA negative for PE but showing worsening interstitial disease in bilateral upper lobes. Started on CAP coverage and steroids.      Overview/Hospital Course:  Admitted to  IM5 for evaluation of hemoptysis.  Pulm consulted.  Increased steroids to methylpred 60mg q6 for 24h, then decreasing to prednisone 40 mg qd.  CAP coverage with ceftriaxone/azithomycin.  Increase in oxygen requirements overnight, 2L to 4L.  Pulm to bronch/BAL to rule out PJP.      Interval History: one episode of scant hemoptysis yesterday, none this morning.  Overnight O2 sats to 89%, increased to 4L with resolution.  No subjective worsening of SOB, chest pain, cough, wheeze.  No fevers, chills, night sweats.      Review of Systems   Constitutional: Negative for activity change, appetite change and fever.   HENT: Negative for congestion, nosebleeds and rhinorrhea.    Eyes: Negative for pain and discharge.   Respiratory: Positive for cough (hemoptysis ). Negative for chest tightness, shortness of breath and wheezing.    Gastrointestinal: Negative for abdominal distention, constipation, diarrhea and nausea.   Endocrine: Negative.    Genitourinary: Negative for difficulty urinating, enuresis, hematuria and menstrual problem.   Musculoskeletal: Negative for arthralgias, joint swelling and myalgias.   Skin: Negative for color change and rash.   Allergic/Immunologic: Negative.    Neurological: Negative for dizziness, syncope and numbness.   Hematological: Negative for adenopathy.   Psychiatric/Behavioral: Negative for agitation and hallucinations.     Objective:     Vital Signs (Most Recent):  Temp: 97.8 °F (36.6 °C) (12/17/20 0754)  Pulse: 86 (12/17/20 0754)  Resp: 18  (12/17/20 0754)  BP: 135/72 (12/17/20 0754)  SpO2: (!) 92 % (12/17/20 0754) Vital Signs (24h Range):  Temp:  [96.1 °F (35.6 °C)-98.5 °F (36.9 °C)] 97.8 °F (36.6 °C)  Pulse:  [] 86  Resp:  [18-20] 18  SpO2:  [89 %-97 %] 92 %  BP: (123-142)/(59-84) 135/72     Weight: 83.8 kg (184 lb 11.9 oz)  Body mass index is 28.94 kg/m².  No intake or output data in the 24 hours ending 12/17/20 0947   Physical Exam  Constitutional:       Appearance: Normal appearance. She is normal weight.   HENT:      Head: Normocephalic and atraumatic.      Nose: Nose normal.      Mouth/Throat:      Mouth: Mucous membranes are moist.      Pharynx: Oropharynx is clear.   Eyes:      Conjunctiva/sclera: Conjunctivae normal.      Pupils: Pupils are equal, round, and reactive to light.   Neck:      Musculoskeletal: Normal range of motion and neck supple.   Cardiovascular:      Rate and Rhythm: Normal rate and regular rhythm.      Heart sounds: No murmur.   Pulmonary:      Effort: Pulmonary effort is normal. No respiratory distress.      Breath sounds: Rales (upper lob predominant) present.   Abdominal:      General: Abdomen is flat. Bowel sounds are normal.      Palpations: Abdomen is soft.   Musculoskeletal: Normal range of motion.         General: No swelling.   Skin:     General: Skin is warm and dry.   Neurological:      General: No focal deficit present.      Mental Status: She is alert and oriented to person, place, and time. Mental status is at baseline.   Psychiatric:         Mood and Affect: Mood normal.         Significant Labs:   CBC:   Recent Labs   Lab 12/15/20  1927 12/16/20  0755 12/17/20  0402   WBC 4.81 6.80 5.73   HGB 11.8* 10.9* 11.2*   HCT 41.2 38.7 39.1    250 282     CMP:   Recent Labs   Lab 12/16/20  0551 12/17/20  0402    140   K 3.8 4.8    105   CO2 31* 24   GLU 87 143*   BUN 25* 25*   CREATININE 1.1 1.1   CALCIUM 8.2* 8.7   PROT 6.5 7.1   ALBUMIN 2.4* 2.5*   BILITOT 0.1 0.1   ALKPHOS 117 111   AST 19  18   ALT 11 11   ANIONGAP 5* 11   EGFRNONAA 55.1* 55.1*     All pertinent labs within the past 24 hours have been reviewed.    Significant Imaging:   Imaging Results          CTA Chest Non-Coronary (PE Study) (Final result)  Result time 12/15/20 11:52:51    Final result by Daxa Figueroa MD (12/15/20 11:52:51)                 Impression:      Patient motion during image acquisition degrades fine spatial resolution and contrast resolution.  Within the limits of the study I detect no pulmonary thromboembolism, pulmonary infarct or right heart strain.    In both lungs there is extensive mixed reticulation and ground-glass disease with some areas of janie parenchymal consolidation. This is upper lobe predominant and produces some volume loss in the right upper lobe resulting in shift of fissures.  Chest radiograph abnormality dates back to at least 11/25/2018 in this patient with a history of sarcoidosis.  The extent of the pulmonary disease is worse today than on chest radiograph of 06/05/2020 or chest CT of 09/17/2020. No cavitation identified. Findings are consistent with sarcoidosis although other processes including subacute hypersensitivity pneumonia could present in a similar fashion.    No pulmonary edema identified in this patient with aortic and coronary calcific atherosclerosis.    Additional findings are above.      Electronically signed by: Daxa Figueroa MD  Date:    12/15/2020  Time:    11:52             Narrative:    EXAMINATION:  CTA CHEST NON CORONARY    CLINICAL HISTORY:  PE suspected, intermediate prob, positive D-dimer;    TECHNIQUE:  During intravenous bolus injection of 100 ml of Omnipaque 350 contrast medium via the right upper extremity and using low dose technique, the chest was surveyed from above the pulmonary apices through the posterior costophrenic angles.  Images were acquired without gatingscratch.    Data was reconstructed for multiplanar images in axial, sagittal and coronal planes  and for maximal intensity projection images in the axial plane.    Patient motion during image acquisition causes x-ray beam attenuation and scatter, degrading spatial and contrast resolution.    All CT scans at this facility use dose modulation, iterative reconstruction and/or weight based dosing when appropriate to reduce radiation dose to as low as reasonably achievable.    Xray dose: DLP = 165.05 mGy-cm.    COMPARISON:  Chest radiographs: 12/15/2020.  06/05/2020.  11/25/2018.    Chest CT: 09/17/2020.    FINDINGS:  Additional history: Sarcoidosis established by lung biopsy at 29 years of age.    Base of Neck: No significant abnormality.    Aorta: Left-sided aortic arch with 3 arterial branches.  The aorta maintains normal caliber, contour and course. There is calcification of the thoracic aorta and coronary arteries in this 60-year-old woman.    Heart/pericardium: Normal size. No right heart strain identified. No pericardial fluid or calcification.    Pulmonary arteries: Pulmonary arteries distribute normally.  Pulmonary arteries are sufficiently opacified for diagnostic assessment.  Patient motion degrades spatial and contrast resolution.  There is no pulmonary thromboembolism through the lobar arteries.  Pulmonary arterial hypertension is neither confirmed nor excluded.  There is no pulmonary thromboembolism.    Pulmonary veins, left atrium:    There are four pulmonary veins that return to the left atrium.    Sindi/Mediastinum: There is a mildly enlarged lymph node in the anterior mediastinum (axial series 2, image 161) with short axis of 1.1 cm; this is stable compared with 09/17/2020, axial series 2, image 43 no other definite mediastinal lymph node enlargement identified.  Hilar lymph nodes are not enlarged in this patient with a clinical diagnosis of sarcoidosis..    Pleura: No pleural fluid.No pleural calcification.    Esophagus and upper abdomen: Postoperative changes are again identified at the  gastroesophageal junction.  There is prior cholecystectomy.  No abnormality of the partially imaged upper abdomen.    Thoracic soft tissues: Normal. Both breasts are present.    Bones: No acute fracture. No suspicious lytic or sclerotic lesion.    Airways: Patent.    Lungs:    -In both lungs there is extensive mixed reticulation and ground-glass disease with some areas of janie parenchymal consolidation.  This lung disease is upper lobe predominant and produces some volume loss in the right upper lobe resulting in shift of right pleural fissures.  The extent of the disease is worse than on chest radiograph of 06/05/2020 or chest CT of 09/17/2020.  No cavitation identified.  Findings are consistent with sarcoidosis although other processes including subacute hypersensitivity pneumonia could present in a similar fashion.    -No pulmonary infarct.    -No pulmonary edema identified in this patient with aortic and coronary atherosclerosis.                               X-Ray Chest AP Portable (Final result)  Result time 12/15/20 08:54:43    Final result by Yuniel Casanova DO (12/15/20 08:54:43)                 Impression:      1. Chronic pleural-parenchymal changes in the bilateral upper lungs, likely related to patient's history of sarcoidosis.  2. Mild reticulonodular opacities in the right lower lung may be related to aspiration or atypical infection.      Electronically signed by: Yuniel Casanova  Date:    12/15/2020  Time:    08:54             Narrative:    EXAMINATION:  XR CHEST AP PORTABLE    CLINICAL HISTORY:  CHF.    TECHNIQUE:  Single frontal view of the chest was performed.    COMPARISON:  Multiple prior radiographs of the chest, most recent from 08/05/2020.  CT of the chest from 09/17/2020.    FINDINGS:  There is upper lung predominant fibrotic changes compatible with patient's history of sarcoidosis.  Mild reticulonodular opacities within the right lung base may represent aspiration or atypical infection.   The pleural spaces are clear.  Cardiac silhouette is unremarkable.  There are calcifications of the aortic arch.  There a visualized osseous structures are unremarkable.  There are surgical clips in the superior mediastinum.  Epigastric surgical clips are also seen.                                    Assessment/Plan:      * Hemoptysis  60 year old female with history of sarcoidosis (on prednisone, mycophenolate) presents with four days of worsening dyspnea and one day of scant hemoptysis.  On prednisone 2.5 mg qd, recently started on mycophenolate 500 mg within the last 2 weeks per rheum, extensive smoking history, TB negative (10/13/2020).  Afebrile, mild respiratory distress upon arrival, improved with duonebs.  WBC 6.8, Hgb 11.6 (baseline 12).  D-dimer 0.62. CXR showing possible aspiration pneumonia/infection in R lung base.  CTA negative for PE but demonstrating extensive mixed reticulation/ground glass with some consolidation in bilateral upper lobes.  Hemoptysis with myriad etiology for immunocompromised patient including worsening sarcoid, pneumonia, oropharyngeal bleeding 2/2 to consistent cough, malignancy, fungal.      one episode scant hemoptysis yesterday afternoon, mild increase in O2 2L to 4L.  Pulm to bronch/BAL tomorrow.  NPO at midnight tonight    Plan:  - pulm consulted, appreciate recs  - CBC qd  - procal wnl  - resp sputum culture, respiratory infection panel, fungitel pending  - hold mycophenolate mofetil in setting of possible active infection  - continue ceftriaxone/azithromycin for CAP coverage, low threshold to broaden if clinically worsening  - s/p one day of methylprednisolone 60 mg q6h, will switch back to prednisone 40 mg later today  - hold anticoagulation with current bleeding    Asthma  On home DuoNeb, prednisone 2.5 mg, zafirlukast 20 mg, with albuterol rescue.  Received breathing treatments and methylprednisone 125 mg in ED with mild improvement.  Lungs with diffuse end expiratory  wheeze on exam.      Plan:  - continue duonebs qh4  - switch prednisone to methylpred  - continuous oxygen with SpO2 goal >90%    Chronic heart failure with preserved ejection fraction  Last echo (7/10/2020): EF60%, grade I left ventricular diastolic dysfunction.  On home lasix prn swelling.  BNP in , trop 0.032.    Plan:  - repeat echo  - repeat trop wnl  - cardiac diet, salt restriction <2g  - monitor I/Os    Sarcoidosis of lung  Follows with Dr Forrester (rheum) and Dr Keating (pulm).  Last seen on 11/20/2020, on prednisone 2.5 mg with plan to trial mycophenolate.    Plan:  - hold mycophenolate  - switch pred to methylpred  - start bactrim TIW      Hypertension  Not currently on any home antihypertensive medications.    Plan:   - patient mildly hypertensive, will hold antihypertensive treatment for now      VTE Risk Mitigation (From admission, onward)         Ordered     IP VTE LOW RISK PATIENT  Once      12/15/20 1358     Place sequential compression device  Until discontinued      12/15/20 1358                Discharge Planning   EDSON: 12/17/2020     Code Status: Full Code   Is the patient medically ready for discharge?:     Reason for patient still in hospital (select all that apply): Treatment  Discharge Plan A: Home                  Saúl Keller MD  Department of Hospital Medicine   [facility]

## 2020-12-18 VITALS
WEIGHT: 184.75 LBS | DIASTOLIC BLOOD PRESSURE: 81 MMHG | SYSTOLIC BLOOD PRESSURE: 135 MMHG | HEART RATE: 96 BPM | TEMPERATURE: 97 F | RESPIRATION RATE: 15 BRPM | HEIGHT: 67 IN | OXYGEN SATURATION: 98 % | BODY MASS INDEX: 29 KG/M2

## 2020-12-18 LAB
1,3 BETA GLUCAN SER-MCNC: 115 PG/ML
ALBUMIN SERPL BCP-MCNC: 2.7 G/DL (ref 3.5–5.2)
ALP SERPL-CCNC: 114 U/L (ref 55–135)
ALT SERPL W/O P-5'-P-CCNC: 12 U/L (ref 10–44)
ANION GAP SERPL CALC-SCNC: 14 MMOL/L (ref 8–16)
AST SERPL-CCNC: 17 U/L (ref 10–40)
BACTERIA SPEC AEROBE CULT: NORMAL
BACTERIA SPEC AEROBE CULT: NORMAL
BASOPHILS # BLD AUTO: 0.01 K/UL (ref 0–0.2)
BASOPHILS NFR BLD: 0.1 % (ref 0–1.9)
BILIRUB SERPL-MCNC: 0.1 MG/DL (ref 0.1–1)
BUN SERPL-MCNC: 42 MG/DL (ref 6–20)
CALCIUM SERPL-MCNC: 8.6 MG/DL (ref 8.7–10.5)
CHLORIDE SERPL-SCNC: 103 MMOL/L (ref 95–110)
CO2 SERPL-SCNC: 26 MMOL/L (ref 23–29)
CREAT SERPL-MCNC: 1.4 MG/DL (ref 0.5–1.4)
DIFFERENTIAL METHOD: ABNORMAL
EOSINOPHIL # BLD AUTO: 0 K/UL (ref 0–0.5)
EOSINOPHIL NFR BLD: 0 % (ref 0–8)
ERYTHROCYTE [DISTWIDTH] IN BLOOD BY AUTOMATED COUNT: 15.6 % (ref 11.5–14.5)
EST. GFR  (AFRICAN AMERICAN): 47.4 ML/MIN/1.73 M^2
EST. GFR  (NON AFRICAN AMERICAN): 41.2 ML/MIN/1.73 M^2
FUNGITELL COMMENTS: POSITIVE
GLUCOSE SERPL-MCNC: 88 MG/DL (ref 70–110)
GRAM STN SPEC: NORMAL
HCT VFR BLD AUTO: 42 % (ref 37–48.5)
HGB BLD-MCNC: 12 G/DL (ref 12–16)
IMM GRANULOCYTES # BLD AUTO: 0.1 K/UL (ref 0–0.04)
IMM GRANULOCYTES NFR BLD AUTO: 1 % (ref 0–0.5)
LYMPHOCYTES # BLD AUTO: 0.5 K/UL (ref 1–4.8)
LYMPHOCYTES NFR BLD: 4.9 % (ref 18–48)
MAGNESIUM SERPL-MCNC: 2.5 MG/DL (ref 1.6–2.6)
MCH RBC QN AUTO: 25.8 PG (ref 27–31)
MCHC RBC AUTO-ENTMCNC: 28.6 G/DL (ref 32–36)
MCV RBC AUTO: 90 FL (ref 82–98)
MONOCYTES # BLD AUTO: 0.1 K/UL (ref 0.3–1)
MONOCYTES NFR BLD: 1.3 % (ref 4–15)
NEUTROPHILS # BLD AUTO: 9 K/UL (ref 1.8–7.7)
NEUTROPHILS NFR BLD: 92.7 % (ref 38–73)
NRBC BLD-RTO: 0 /100 WBC
PHOSPHATE SERPL-MCNC: 4.1 MG/DL (ref 2.7–4.5)
PLATELET # BLD AUTO: 347 K/UL (ref 150–350)
PMV BLD AUTO: 10.1 FL (ref 9.2–12.9)
POTASSIUM SERPL-SCNC: 5.1 MMOL/L (ref 3.5–5.1)
PROT SERPL-MCNC: 7.6 G/DL (ref 6–8.4)
RBC # BLD AUTO: 4.65 M/UL (ref 4–5.4)
SODIUM SERPL-SCNC: 143 MMOL/L (ref 136–145)
WBC # BLD AUTO: 9.72 K/UL (ref 3.9–12.7)

## 2020-12-18 PROCEDURE — 99233 PR SUBSEQUENT HOSPITAL CARE,LEVL III: ICD-10-PCS | Mod: ,,, | Performed by: STUDENT IN AN ORGANIZED HEALTH CARE EDUCATION/TRAINING PROGRAM

## 2020-12-18 PROCEDURE — 99900035 HC TECH TIME PER 15 MIN (STAT)

## 2020-12-18 PROCEDURE — 99233 SBSQ HOSP IP/OBS HIGH 50: CPT | Mod: ,,, | Performed by: STUDENT IN AN ORGANIZED HEALTH CARE EDUCATION/TRAINING PROGRAM

## 2020-12-18 PROCEDURE — 94761 N-INVAS EAR/PLS OXIMETRY MLT: CPT

## 2020-12-18 PROCEDURE — 85025 COMPLETE CBC W/AUTO DIFF WBC: CPT

## 2020-12-18 PROCEDURE — 87449 NOS EACH ORGANISM AG IA: CPT

## 2020-12-18 PROCEDURE — 25000242 PHARM REV CODE 250 ALT 637 W/ HCPCS: Performed by: STUDENT IN AN ORGANIZED HEALTH CARE EDUCATION/TRAINING PROGRAM

## 2020-12-18 PROCEDURE — 94640 AIRWAY INHALATION TREATMENT: CPT

## 2020-12-18 PROCEDURE — 80053 COMPREHEN METABOLIC PANEL: CPT

## 2020-12-18 PROCEDURE — 36415 COLL VENOUS BLD VENIPUNCTURE: CPT

## 2020-12-18 PROCEDURE — 27000221 HC OXYGEN, UP TO 24 HOURS

## 2020-12-18 PROCEDURE — 83735 ASSAY OF MAGNESIUM: CPT

## 2020-12-18 PROCEDURE — 25000003 PHARM REV CODE 250: Performed by: STUDENT IN AN ORGANIZED HEALTH CARE EDUCATION/TRAINING PROGRAM

## 2020-12-18 PROCEDURE — 63600175 PHARM REV CODE 636 W HCPCS: Performed by: STUDENT IN AN ORGANIZED HEALTH CARE EDUCATION/TRAINING PROGRAM

## 2020-12-18 PROCEDURE — 84100 ASSAY OF PHOSPHORUS: CPT

## 2020-12-18 RX ADMIN — METHYLPREDNISOLONE SODIUM SUCCINATE 60 MG: 40 INJECTION, POWDER, FOR SOLUTION INTRAMUSCULAR; INTRAVENOUS at 12:12

## 2020-12-18 RX ADMIN — FLUTICASONE PROPIONATE 100 MCG: 50 SPRAY, METERED NASAL at 09:12

## 2020-12-18 RX ADMIN — VARENICLINE TARTRATE 1 MG: 1 TABLET, FILM COATED ORAL at 09:12

## 2020-12-18 RX ADMIN — METHYLPREDNISOLONE SODIUM SUCCINATE 60 MG: 40 INJECTION, POWDER, FOR SOLUTION INTRAMUSCULAR; INTRAVENOUS at 06:12

## 2020-12-18 RX ADMIN — SULFAMETHOXAZOLE AND TRIMETHOPRIM 1 TABLET: 800; 160 TABLET ORAL at 09:12

## 2020-12-18 RX ADMIN — IPRATROPIUM BROMIDE AND ALBUTEROL SULFATE 3 ML: .5; 2.5 SOLUTION RESPIRATORY (INHALATION) at 08:12

## 2020-12-18 RX ADMIN — IPRATROPIUM BROMIDE AND ALBUTEROL SULFATE 3 ML: .5; 2.5 SOLUTION RESPIRATORY (INHALATION) at 12:12

## 2020-12-18 RX ADMIN — CEFTRIAXONE SODIUM 1 G: 1 INJECTION, POWDER, FOR SOLUTION INTRAMUSCULAR; INTRAVENOUS at 12:12

## 2020-12-18 NOTE — PROGRESS NOTES
patient is alert and oriented, vss. No complaints of pain nor discomfort. Remains NPO for testing. Patient refused 4 am vitals. Bed in lowest position, call light within reach. Will cont to monitor.

## 2020-12-18 NOTE — PLAN OF CARE
Patient is AAOx4. Vital signs stable. No falls throughout shift. Patient ambulates independently. No complaints of pain. IV steroids and lasix administered.  Problem: Adult Inpatient Plan of Care  Goal: Plan of Care Review  Outcome: Ongoing, Progressing  Goal: Patient-Specific Goal (Individualization)  Outcome: Ongoing, Progressing  Goal: Absence of Hospital-Acquired Illness or Injury  Outcome: Ongoing, Progressing  Goal: Optimal Comfort and Wellbeing  Outcome: Ongoing, Progressing  Goal: Readiness for Transition of Care  Outcome: Ongoing, Progressing  Goal: Rounds/Family Conference  Outcome: Ongoing, Progressing     Problem: Fluid Imbalance (Pneumonia)  Goal: Fluid Balance  Outcome: Ongoing, Progressing     Problem: Infection (Pneumonia)  Goal: Resolution of Infection Signs/Symptoms  Outcome: Ongoing, Progressing     Problem: Respiratory Compromise (Pneumonia)  Goal: Effective Oxygenation and Ventilation  Outcome: Ongoing, Progressing

## 2020-12-18 NOTE — ASSESSMENT & PLAN NOTE
Last echo (7/10/2020): EF60%, grade I left ventricular diastolic dysfunction.  On home lasix prn swelling.  BNP in , trop 0.032.    Plan:  - repeat 2D echo showed EF 65% and indeterminate diastolic dysfunction

## 2020-12-18 NOTE — SUBJECTIVE & OBJECTIVE
Interval History:   No acute events.  Does not report additional hemoptysis.  Had 1700mL urine output with diuresis.  States she's breathing more comfortably than yesterday, SpO2 92-96% on 2L NC.      Objective:     Vital Signs (Most Recent):  Temp: 96.9 °F (36.1 °C) (12/18/20 0910)  Pulse: 78 (12/18/20 0910)  Resp: 16 (12/18/20 0910)  BP: 135/81 (12/18/20 0910)  SpO2: 99 % (12/18/20 0910) Vital Signs (24h Range):  Temp:  [96.9 °F (36.1 °C)-98.1 °F (36.7 °C)] 96.9 °F (36.1 °C)  Pulse:  [] 78  Resp:  [16-20] 16  SpO2:  [91 %-99 %] 99 %  BP: (128-141)/(66-81) 135/81     Weight: 83.8 kg (184 lb 11.9 oz)  Body mass index is 28.94 kg/m².      Intake/Output Summary (Last 24 hours) at 12/18/2020 1044  Last data filed at 12/18/2020 0600  Gross per 24 hour   Intake --   Output 1700 ml   Net -1700 ml       Physical Exam  Constitutional:       Appearance: Normal appearance. She is normal weight.   HENT:      Head: Normocephalic and atraumatic.      Nose: Nose normal.      Mouth/Throat:      Mouth: Mucous membranes are moist.      Pharynx: Oropharynx is clear.   Eyes:      Conjunctiva/sclera: Conjunctivae normal.      Pupils: Pupils are equal, round, and reactive to light.   Neck:      Musculoskeletal: Normal range of motion and neck supple.   Cardiovascular:      Rate and Rhythm: Normal rate and regular rhythm.      Heart sounds: No murmur.   Pulmonary:      Effort: Pulmonary effort is normal. No respiratory distress.      Breath sounds: Rales (upper lob predominant) present.   Abdominal:      General: Abdomen is flat. Bowel sounds are normal.      Palpations: Abdomen is soft.   Musculoskeletal: Normal range of motion.         General: No swelling.   Skin:     General: Skin is warm and dry.   Neurological:      General: No focal deficit present.      Mental Status: She is alert and oriented to person, place, and time. Mental status is at baseline.   Psychiatric:         Mood and Affect: Mood normal.          Vents:  Negative Inspiratory Force (cm H2O): (on hold, need ro get device) (12/18/20 0805)    Lines/Drains/Airways     Peripheral Intravenous Line                 Peripheral IV - Single Lumen 12/15/20 0900 18 G Right Antecubital 3 days                Significant Labs:    CBC/Anemia Profile:  Recent Labs   Lab 12/17/20  0402 12/18/20  0302   WBC 5.73 9.72   HGB 11.2* 12.0   HCT 39.1 42.0    347   MCV 89 90   RDW 15.5* 15.6*        Chemistries:  Recent Labs   Lab 12/17/20  0402 12/17/20  1542 12/18/20  0302    140 143   K 4.8 4.6 5.1    106 103   CO2 24 24 26   BUN 25* 35* 42*   CREATININE 1.1 1.5* 1.4   CALCIUM 8.7 8.8 8.6*   ALBUMIN 2.5*  --  2.7*   PROT 7.1  --  7.6   BILITOT 0.1  --  0.1   ALKPHOS 111  --  114   ALT 11  --  12   AST 18  --  17   MG 2.4  --  2.5   PHOS 3.3  --  4.1       CMP:   Recent Labs   Lab 12/17/20  0402 12/17/20  1542 12/18/20  0302    140 143   K 4.8 4.6 5.1    106 103   CO2 24 24 26   * 158* 88   BUN 25* 35* 42*   CREATININE 1.1 1.5* 1.4   CALCIUM 8.7 8.8 8.6*   PROT 7.1  --  7.6   ALBUMIN 2.5*  --  2.7*   BILITOT 0.1  --  0.1   ALKPHOS 111  --  114   AST 18  --  17   ALT 11  --  12   ANIONGAP 11 10 14   EGFRNONAA 55.1* 37.9* 41.2*     All pertinent labs within the past 24 hours have been reviewed.    Significant Imaging:  Imaging Results          CTA Chest Non-Coronary (PE Study) (Final result)  Result time 12/15/20 11:52:51    Final result by Daxa Figueroa MD (12/15/20 11:52:51)                 Impression:      Patient motion during image acquisition degrades fine spatial resolution and contrast resolution.  Within the limits of the study I detect no pulmonary thromboembolism, pulmonary infarct or right heart strain.    In both lungs there is extensive mixed reticulation and ground-glass disease with some areas of janie parenchymal consolidation. This is upper lobe predominant and produces some volume loss in the right upper lobe  resulting in shift of fissures.  Chest radiograph abnormality dates back to at least 11/25/2018 in this patient with a history of sarcoidosis.  The extent of the pulmonary disease is worse today than on chest radiograph of 06/05/2020 or chest CT of 09/17/2020. No cavitation identified. Findings are consistent with sarcoidosis although other processes including subacute hypersensitivity pneumonia could present in a similar fashion.    No pulmonary edema identified in this patient with aortic and coronary calcific atherosclerosis.    Additional findings are above.      Electronically signed by: Daxa Figueroa MD  Date:    12/15/2020  Time:    11:52             Narrative:    EXAMINATION:  CTA CHEST NON CORONARY    CLINICAL HISTORY:  PE suspected, intermediate prob, positive D-dimer;    TECHNIQUE:  During intravenous bolus injection of 100 ml of Omnipaque 350 contrast medium via the right upper extremity and using low dose technique, the chest was surveyed from above the pulmonary apices through the posterior costophrenic angles.  Images were acquired without gatingscratch.    Data was reconstructed for multiplanar images in axial, sagittal and coronal planes and for maximal intensity projection images in the axial plane.    Patient motion during image acquisition causes x-ray beam attenuation and scatter, degrading spatial and contrast resolution.    All CT scans at this facility use dose modulation, iterative reconstruction and/or weight based dosing when appropriate to reduce radiation dose to as low as reasonably achievable.    Xray dose: DLP = 165.05 mGy-cm.    COMPARISON:  Chest radiographs: 12/15/2020.  06/05/2020.  11/25/2018.    Chest CT: 09/17/2020.    FINDINGS:  Additional history: Sarcoidosis established by lung biopsy at 29 years of age.    Base of Neck: No significant abnormality.    Aorta: Left-sided aortic arch with 3 arterial branches.  The aorta maintains normal caliber, contour and course. There is  calcification of the thoracic aorta and coronary arteries in this 60-year-old woman.    Heart/pericardium: Normal size. No right heart strain identified. No pericardial fluid or calcification.    Pulmonary arteries: Pulmonary arteries distribute normally.  Pulmonary arteries are sufficiently opacified for diagnostic assessment.  Patient motion degrades spatial and contrast resolution.  There is no pulmonary thromboembolism through the lobar arteries.  Pulmonary arterial hypertension is neither confirmed nor excluded.  There is no pulmonary thromboembolism.    Pulmonary veins, left atrium:    There are four pulmonary veins that return to the left atrium.    Sindi/Mediastinum: There is a mildly enlarged lymph node in the anterior mediastinum (axial series 2, image 161) with short axis of 1.1 cm; this is stable compared with 09/17/2020, axial series 2, image 43 no other definite mediastinal lymph node enlargement identified.  Hilar lymph nodes are not enlarged in this patient with a clinical diagnosis of sarcoidosis..    Pleura: No pleural fluid.No pleural calcification.    Esophagus and upper abdomen: Postoperative changes are again identified at the gastroesophageal junction.  There is prior cholecystectomy.  No abnormality of the partially imaged upper abdomen.    Thoracic soft tissues: Normal. Both breasts are present.    Bones: No acute fracture. No suspicious lytic or sclerotic lesion.    Airways: Patent.    Lungs:    -In both lungs there is extensive mixed reticulation and ground-glass disease with some areas of janie parenchymal consolidation.  This lung disease is upper lobe predominant and produces some volume loss in the right upper lobe resulting in shift of right pleural fissures.  The extent of the disease is worse than on chest radiograph of 06/05/2020 or chest CT of 09/17/2020.  No cavitation identified.  Findings are consistent with sarcoidosis although other processes including subacute  hypersensitivity pneumonia could present in a similar fashion.    -No pulmonary infarct.    -No pulmonary edema identified in this patient with aortic and coronary atherosclerosis.                               X-Ray Chest AP Portable (Final result)  Result time 12/15/20 08:54:43    Final result by Yuniel Casanova DO (12/15/20 08:54:43)                 Impression:      1. Chronic pleural-parenchymal changes in the bilateral upper lungs, likely related to patient's history of sarcoidosis.  2. Mild reticulonodular opacities in the right lower lung may be related to aspiration or atypical infection.      Electronically signed by: Yuniel Casanova  Date:    12/15/2020  Time:    08:54             Narrative:    EXAMINATION:  XR CHEST AP PORTABLE    CLINICAL HISTORY:  CHF.    TECHNIQUE:  Single frontal view of the chest was performed.    COMPARISON:  Multiple prior radiographs of the chest, most recent from 08/05/2020.  CT of the chest from 09/17/2020.    FINDINGS:  There is upper lung predominant fibrotic changes compatible with patient's history of sarcoidosis.  Mild reticulonodular opacities within the right lung base may represent aspiration or atypical infection.  The pleural spaces are clear.  Cardiac silhouette is unremarkable.  There are calcifications of the aortic arch.  There a visualized osseous structures are unremarkable.  There are surgical clips in the superior mediastinum.  Epigastric surgical clips are also seen.

## 2020-12-18 NOTE — DISCHARGE SUMMARY
[facility] Hospital Medicine  Discharge Summary      Patient Name: Salvatore Phillips  MRN: [id number]  Patient Class: IP- Inpatient  Admission Date: 12/15/2020  Hospital Length of Stay: 3 days  Discharge Date and Time:  12/18/2020 5:23 PM  Attending Physician: No att. providers found   Discharging Provider: Ana Gonzalez MD  Primary Care Provider: Viviana Cuba MD  [facility] Medicine Team: [facility] HOSP MED 5 Ana Gonzalez MD    HPI:   Ms. Salvatore Phillips is a 60 y.o. female with sarcoidosis (on prednisone, recently started on mycophenolate, on home 2L O2), asthma, HFpEF (60%EF, G1LVDD), HTN, NENA who presents today with hemoptysis.  Patient reports coughing up white sputum with scant blood several times this morning.  Patient states that she has had accompanied exertional dyspnea worse than baseline for the past 4 days, using home asthma medications with minimal relief.  She states that this dyspnea has waxed and waned over the past several months and is being followed by rheum/pulm who are adjusting her sarcoid medications.  States that she had a cough productive of green sputum briefly a couple weeks ago that she treated with OTC medications.  Denies any previous history of hemoptysis.  Denies any sick contacts or recent travel.  Recently tested negative for TB, denies any history of incarceration or homelessness.  Denies any fevers, chills, night sweats, recent weight loss, chest pain, abdominal pain, nausea, vomiting, changes in bowel or bladder habits.     Sarcoidosis without apparent extrathoracic manifestations first diagnosed at age 30, followed by Dr Forrester (rheum) and Dr Keating (pulm) for her sarcoidosis.  Currently on prednisone 2.5 mg, was started on mycophenolate ~1.5 weeks ago for worsening chronic dyspnea suspected to be 2/2 to worsening sarcoidosis.  Patient has smoked 0.5 ppd for past 30 years, quit several months ago.  Drinks a glass of wine twice a month, denies any previous  history of recreational drug use/IVDU.  Lives with mother (83) and sister in [place].  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     ED course: mild respiratory distress and wheezing on arrival, improved with breathing treatments.  Afebrile, HDS, O2 sats mid 90% on 3L.  WBC 6.8, Hgb 11.6 (baseline 12).  CXR showing possible RLL aspiration pneumonia, CTA negative for PE but showing worsening interstitial disease in bilateral upper lobes. Started on CAP coverage and steroids.      * No surgery found *      Hospital Course:   Patient was admitted to hospital medicine for evaluation and management of hemoptysis. Pulmonology was consulted and recommended to increase steroids to methylpred 60mg q6h for 2 days and then transition to prednisone as well as continue cellcept. Bactrim was started due to high dosage of steroids. They agreed with CAP coverage with ceftriaxone and azithromycin, which she finished upon discharge. She had an increase in oxygen requirements from 2L to 4L and was given one time dose of lasix 40mg IV with good response and able to be titrated down to home 2L NC. Her renal function worsened slightly 12/18 (Cr peaked to 1.5 from 1.0 but slightly improved to 1.4) likely due to contrasted study done on 12/15/2020. Although fungitell assay was positive, Pulmonology did not recommend treating for PJP as patient was clinically improving. Patient will be discharged on home prednisone 2.5mg daily and encouraged to follow up with PCP and Pulmonologist.      Consults:   Consults (From admission, onward)        Status Ordering Provider     Inpatient consult to Pulmonology  Once     Provider:  (Not yet assigned)    Completed SHAMA GUTIERREZ     Inpatient consult to Respiratory Care  Once     Provider:  (Not yet assigned)    Acknowledged SHAMA GUTIERREZ        Review of Systems   Constitutional: Negative for activity change, appetite change and fever.   HENT: Negative for congestion, nosebleeds and rhinorrhea.    Eyes: Negative for pain  and discharge.   Respiratory: Positive for cough (improved since admission). Negative for chest tightness, shortness of breath and wheezing.    Gastrointestinal: Negative for abdominal distention, constipation, diarrhea and nausea.   Endocrine: Negative.    Genitourinary: Negative for difficulty urinating, enuresis, hematuria and menstrual problem.   Musculoskeletal: Negative for arthralgias, joint swelling and myalgias.   Skin: Negative for color change and rash.   Allergic/Immunologic: Negative.    Neurological: Negative for dizziness, syncope and numbness.   Hematological: Negative for adenopathy.   Psychiatric/Behavioral: Negative for agitation and hallucinations.     Vitals:    12/17/20 2318 12/18/20 0805 12/18/20 0910 12/18/20 1257   BP: (!) 141/68  135/81    BP Location:   Left arm    Patient Position: Sitting  Lying    Pulse: 91 75 78 96   Resp: 18 16 16 15   Temp: 98 °F (36.7 °C)  96.9 °F (36.1 °C)    TempSrc: Oral  Oral    SpO2: (!) 94% 98% 99% 98%   Weight:       Height:         Physical Exam  Constitutional:       Appearance: Normal appearance. She is normal weight.   HENT:      Head: Normocephalic and atraumatic.      Nose: Nose normal.      Mouth/Throat:      Mouth: Mucous membranes are moist.      Pharynx: Oropharynx is clear.   Eyes:      Conjunctiva/sclera: Conjunctivae normal.      Pupils: Pupils are equal, round, and reactive to light.   Neck:      Musculoskeletal: Normal range of motion and neck supple.   Cardiovascular:      Rate and Rhythm: Normal rate and regular rhythm.      Heart sounds: No murmur.   Pulmonary:      Effort: Pulmonary effort is normal. No respiratory distress.      Breath sounds: Rales (upper lob predominant) present.   Abdominal:      General: Abdomen is flat. Bowel sounds are normal.      Palpations: Abdomen is soft.   Musculoskeletal: Normal range of motion.         General: No swelling.   Skin:     General: Skin is warm and dry.   Neurological:      General: No focal  deficit present.      Mental Status: She is alert and oriented to person, place, and time. Mental status is at baseline.   Psychiatric:         Mood and Affect: Mood and behavior normal.     * Hemoptysis  60 year old female with history of sarcoidosis (on prednisone, mycophenolate) presents with four days of worsening dyspnea and one day of scant hemoptysis.  On prednisone 2.5 mg qd, recently started on mycophenolate 500 mg within the last 2 weeks per rheum, extensive smoking history, TB negative (10/13/2020).  Afebrile, mild respiratory distress upon arrival, improved with duonebs.  WBC 6.8, Hgb 11.6 (baseline 12).  D-dimer 0.62. CXR showing possible aspiration pneumonia/infection in R lung base.  CTA negative for PE but demonstrating extensive mixed reticulation/ground glass with some consolidation in bilateral upper lobes.  Hemoptysis with myriad etiology for immunocompromised patient including worsening sarcoid, pneumonia, bronchiectasis     Plan:  - pulm consulted, appreciate recs: continue steroids, cellcept and follow up with outpatient Pulmonologist  - s/p ceftriaxone/azithromycin for CAP coverage  - s/p two days of methylprednisolone 60 mg q6h, will switch back to prednisone     Pulmonary hypertension  Noted PA pressure of 44 mmHg on recent 2D ECHO      Chronic heart failure with preserved ejection fraction  Last echo (7/10/2020): EF60%, grade I left ventricular diastolic dysfunction.  On home lasix prn swelling.  BNP in , trop 0.032.    Plan:  - repeat 2D echo showed EF 65% and indeterminate diastolic dysfunction    Sarcoidosis of lung  Follows with Dr Forrester (rheum) and Dr Keating (pulm).  Last seen on 11/20/2020, on prednisone 2.5 mg with plan to trial mycophenolate.    Plan:  - continue mycophenolate, follow up with Pulmonologist  - continue prednisone    Hypertension  Not currently on any home antihypertensive medications.    Plan:   - patient mildly hypertensive, will hold antihypertensive  treatment for now    Asthma  On home DuoNeb, prednisone 2.5 mg, zafirlukast 20 mg, with albuterol rescue.  Received breathing treatments and methylprednisone 125 mg in ED with mild improvement.  Lungs with diffuse end expiratory wheeze on exam.      Plan:  - continue duonebs qh4  - s/p 2 days of methylpred - discharge with prednisone  - stable on home 2L NC    Final Active Diagnoses:    Diagnosis Date Noted POA    PRINCIPAL PROBLEM:  Hemoptysis [R04.2] 12/15/2020 Yes    Pulmonary hypertension [I27.20] 12/16/2020 Yes    Pneumonitis [J18.9] 12/15/2020 Yes    Chronic heart failure with preserved ejection fraction [I50.32] 08/13/2020 Yes    Asthma [J45.909] 09/07/2016 Yes    Hypertension [I10] 09/07/2016 Yes    Sarcoidosis of lung [D86.0] 09/07/2016 Yes      Problems Resolved During this Admission:       Discharged Condition: fair    Disposition: Home or Self Care    Follow Up:  Follow-up Information     Viviana Cuba MD On 1/6/2021.    Specialty: Internal Medicine  Why: scheduled 1640  Contact information:  [id number] [facility] [address] [place] [address]  [phone]             Adrian Keating MD In 1 week.    Specialty: Pulmonary Disease  Why: Hospital discharge follow up  Contact information:  [id number]  [place] [address]  [phone]                 Patient Instructions:      Ambulatory referral/consult to Pulmonology   Standing Status: Future   Referral Priority: Routine Referral Type: Consultation   Referral Reason: Specialty Services Required   Requested Specialty: Pulmonary Disease   Number of Visits Requested: 1       Significant Diagnostic Studies: Labs:   CMP   Recent Labs   Lab 12/17/20  0402 12/17/20  1542 12/18/20  0302    140 143   K 4.8 4.6 5.1    106 103   CO2 24 24 26   * 158* 88   BUN 25* 35* 42*   CREATININE 1.1 1.5* 1.4   CALCIUM 8.7 8.8 8.6*   PROT 7.1  --  7.6   ALBUMIN 2.5*  --  2.7*   BILITOT 0.1  --  0.1   ALKPHOS 111  --  114   AST 18  --  17    ALT 11  --  12   ANIONGAP 11 10 14   ESTGFRAFRICA >60.0 43.6* 47.4*   EGFRNONAA 55.1* 37.9* 41.2*    and CBC   Recent Labs   Lab 12/17/20  0402 12/18/20  0302   WBC 5.73 9.72   HGB 11.2* 12.0   HCT 39.1 42.0    347       Pending Diagnostic Studies:     Procedure Component Value Units Date/Time    Fungitell Assay For (1.3)-B-D-Glucans [[id number]] Collected: 12/18/20 1625    Order Status: Sent Lab Status: In process Updated: 12/18/20 1631    Specimen: Blood          Medications:  Reconciled Home Medications:      Medication List      CHANGE how you take these medications    predniSONE 2.5 MG tablet  Commonly known as: DELTASONE  Take 2 tablets (5 mg total) by mouth once daily.  What changed: Another medication with the same name was removed. Continue taking this medication, and follow the directions you see here.        CONTINUE taking these medications    albuterol 90 mcg/actuation inhaler  Commonly known as: PROVENTIL/VENTOLIN HFA  Inhale 2 puffs into the lungs every 4 (four) hours as needed for Wheezing or Shortness of Breath.     albuterol-ipratropium 2.5 mg-0.5 mg/3 mL nebulizer solution  Commonly known as: DUO-NEB  Take 3 mLs by nebulization every 6 (six) hours as needed for Wheezing or Shortness of Breath. Rescue. Corrected prescription with refills     budesonide 0.5 mg/2 mL nebulizer solution  Commonly known as: PULMICORT  Take 2 mLs (0.5 mg total) by nebulization 2 (two) times daily. Controller     CHANTIX 1 mg Tab  Generic drug: varenicline  Take 1 tablet (1 mg total) by mouth 2 (two) times daily.     fluticasone furoate-vilanteroL 200-25 mcg/dose Dsdv diskus inhaler  Commonly known as: BREO ELLIPTA  Inhale 1 puff into the lungs once daily. Controller     fluticasone propionate 50 mcg/actuation nasal spray  Commonly known as: FLONASE  2 sprays (100 mcg total) by Each Nostril route once daily. Corrected prescription with refills     furosemide 40 MG tablet  Commonly known as: LASIX  Take 1 tablet  (40 mg total) by mouth daily as needed (for leg swelling).     ipratropium 17 mcg/actuation inhaler  Commonly known as: ATROVENT HFA  Inhale 2 puffs into the lungs every 4 to 6 hours as needed for Wheezing. Rescue     mycophenolate 500 mg Tab  Commonly known as: CELLCEPT  Take 1 tablet (500 mg total) by mouth once daily for 14 days, THEN 1 tablet (500 mg total) 2 (two) times daily.  Start taking on: November 20, 2020     nicotine polacrilex 2 MG Lozg  Take 1 lozenge (2 mg total) by mouth as needed.     oxybutynin 5 MG Tab  Commonly known as: DITROPAN  Take 1 tablet (5 mg total) by mouth every evening.     potassium chloride SA 20 MEQ tablet  Commonly known as: K-DUR,KLOR-CON  Take 1 tablet (20 mEq total) by mouth daily as needed (take with lasix).     QUEtiapine 50 MG tablet  Commonly known as: SEROQUEL  Take 50 mg by mouth every evening.     topiramate 50 MG tablet  Commonly known as: TOPAMAX  Take 50 mg by mouth every evening.     traMADoL 50 mg tablet  Commonly known as: ULTRAM  Take 1 tablet (50 mg total) by mouth every 6 (six) hours as needed for Pain.     zafirlukast 20 MG tablet  Commonly known as: ACCOLATE  Take 1 tablet (20 mg total) by mouth 2 (two) times daily.        STOP taking these medications    butalbital-acetaminophen-caffeine -40 mg -40 mg per tablet  Commonly known as: FIORICET, ESGIC     zolpidem 5 MG Tab  Commonly known as: AMBIEN            Indwelling Lines/Drains at time of discharge:   Lines/Drains/Airways     None                 Time spent on the discharge of patient: 35 minutes  Patient was seen and examined on the date of discharge and determined to be suitable for discharge.         Ana Gonzalez MD  Department of Hospital Medicine  [facility]

## 2020-12-18 NOTE — ASSESSMENT & PLAN NOTE
On home DuoNeb, prednisone 2.5 mg, zafirlukast 20 mg, with albuterol rescue.  Received breathing treatments and methylprednisone 125 mg in ED with mild improvement.  Lungs with diffuse end expiratory wheeze on exam.      Plan:  - continue duonebs qh4  - s/p 2 days of methylpred - discharge with prednisone  - stable on home 2L NC

## 2020-12-18 NOTE — ASSESSMENT & PLAN NOTE
60 year old female with history of sarcoidosis (on prednisone, mycophenolate) presents with four days of worsening dyspnea and one day of scant hemoptysis.  On prednisone 2.5 mg qd, recently started on mycophenolate 500 mg within the last 2 weeks per rheum, extensive smoking history, TB negative (10/13/2020).  Afebrile, mild respiratory distress upon arrival, improved with duonebs.  WBC 6.8, Hgb 11.6 (baseline 12).  D-dimer 0.62. CXR showing possible aspiration pneumonia/infection in R lung base.  CTA negative for PE but demonstrating extensive mixed reticulation/ground glass with some consolidation in bilateral upper lobes.  Hemoptysis with myriad etiology for immunocompromised patient including worsening sarcoid, pneumonia, bronchiectasis     Plan:  - pulm consulted, appreciate recs: continue steroids, cellcept and follow up with outpatient Pulmonologist  - s/p ceftriaxone/azithromycin for CAP coverage  - s/p two days of methylprednisolone 60 mg q6h, will switch back to prednisone

## 2020-12-18 NOTE — PLAN OF CARE
12/18/20 0832   Post-Acute Status   Post-Acute Authorization Other   Other Status No Post-Acute Service Needs

## 2020-12-18 NOTE — ASSESSMENT & PLAN NOTE
Follows with Dr Forrester (rheum) and Dr Keating (pulm).  Last seen on 11/20/2020, on prednisone 2.5 mg with plan to trial mycophenolate.    Plan:  - continue mycophenolate, follow up with Pulmonologist  - continue prednisone

## 2020-12-20 NOTE — PLAN OF CARE
12/20/20 1141   Final Note   Assessment Type Final Discharge Note   Anticipated Discharge Disposition Home   What phone number can be called within the next 1-3 days to see how you are doing after discharge? [phone]   Hospital Follow Up  Appt(s) scheduled? Yes   Right Care Referral Info   Post Acute Recommendation No Care   Post-Acute Status   Other Status No Post-Acute Service Needs   Discharge Delays None known at this time

## 2020-12-22 LAB
1,3 BETA GLUCAN SER-MCNC: 297 PG/ML
FUNGITELL COMMENTS: POSITIVE

## 2021-01-07 ENCOUNTER — TELEPHONE (OUTPATIENT)
Dept: PULMONOLOGY | Facility: CLINIC | Age: 61
End: 2021-01-07

## 2021-01-07 DIAGNOSIS — D86.0 SARCOIDOSIS OF LUNG: ICD-10-CM

## 2021-01-07 DIAGNOSIS — J44.89 ASTHMA WITH COPD: ICD-10-CM

## 2021-01-07 DIAGNOSIS — J98.4 PNEUMONITIS: Primary | ICD-10-CM

## 2021-01-07 RX ORDER — SULFAMETHOXAZOLE AND TRIMETHOPRIM 800; 160 MG/1; MG/1
1 TABLET ORAL 2 TIMES DAILY
Qty: 28 TABLET | Refills: 0 | Status: SHIPPED | OUTPATIENT
Start: 2021-01-07 | End: 2021-01-21

## 2021-01-07 RX ORDER — LIDOCAINE HYDROCHLORIDE 40 MG/ML
4 SOLUTION TOPICAL ONCE
Status: CANCELLED | OUTPATIENT
Start: 2021-01-07 | End: 2021-01-07

## 2021-01-07 RX ORDER — SULFAMETHOXAZOLE AND TRIMETHOPRIM 800; 160 MG/1; MG/1
1 TABLET ORAL 3 TIMES DAILY
Qty: 63 TABLET | Refills: 0 | OUTPATIENT
Start: 2021-01-07 | End: 2021-01-28

## 2021-01-12 ENCOUNTER — LAB VISIT (OUTPATIENT)
Dept: LAB | Facility: HOSPITAL | Age: 61
End: 2021-01-12
Attending: INTERNAL MEDICINE
Payer: COMMERCIAL

## 2021-01-12 ENCOUNTER — LAB VISIT (OUTPATIENT)
Dept: OTOLARYNGOLOGY | Facility: CLINIC | Age: 61
End: 2021-01-12
Payer: COMMERCIAL

## 2021-01-12 DIAGNOSIS — J44.89 ASTHMA WITH COPD: ICD-10-CM

## 2021-01-12 DIAGNOSIS — J98.4 PNEUMONITIS: ICD-10-CM

## 2021-01-12 DIAGNOSIS — D86.0 SARCOIDOSIS OF LUNG: ICD-10-CM

## 2021-01-12 LAB
ALBUMIN SERPL BCP-MCNC: 3 G/DL (ref 3.5–5.2)
ALP SERPL-CCNC: 135 U/L (ref 55–135)
ALT SERPL W/O P-5'-P-CCNC: 14 U/L (ref 10–44)
AST SERPL-CCNC: 27 U/L (ref 10–40)
BILIRUB DIRECT SERPL-MCNC: 0.1 MG/DL (ref 0.1–0.3)
BILIRUB SERPL-MCNC: 0.2 MG/DL (ref 0.1–1)
PROT SERPL-MCNC: 6.8 G/DL (ref 6–8.4)

## 2021-01-12 PROCEDURE — 87385 HISTOPLASMA CAPSUL AG IA: CPT

## 2021-01-12 PROCEDURE — U0003 INFECTIOUS AGENT DETECTION BY NUCLEIC ACID (DNA OR RNA); SEVERE ACUTE RESPIRATORY SYNDROME CORONAVIRUS 2 (SARS-COV-2) (CORONAVIRUS DISEASE [COVID-19]), AMPLIFIED PROBE TECHNIQUE, MAKING USE OF HIGH THROUGHPUT TECHNOLOGIES AS DESCRIBED BY CMS-2020-01-R: HCPCS

## 2021-01-12 PROCEDURE — 86003 ALLG SPEC IGE CRUDE XTRC EA: CPT

## 2021-01-12 PROCEDURE — 80076 HEPATIC FUNCTION PANEL: CPT

## 2021-01-12 PROCEDURE — 86606 ASPERGILLUS ANTIBODY: CPT

## 2021-01-13 ENCOUNTER — TELEPHONE (OUTPATIENT)
Dept: ENDOSCOPY | Facility: HOSPITAL | Age: 61
End: 2021-01-13

## 2021-01-13 LAB — SARS-COV-2 RNA RESP QL NAA+PROBE: NOT DETECTED

## 2021-01-14 ENCOUNTER — ANESTHESIA EVENT (OUTPATIENT)
Dept: ENDOSCOPY | Facility: HOSPITAL | Age: 61
End: 2021-01-14
Payer: COMMERCIAL

## 2021-01-15 ENCOUNTER — ANESTHESIA (OUTPATIENT)
Dept: ENDOSCOPY | Facility: HOSPITAL | Age: 61
End: 2021-01-15
Payer: COMMERCIAL

## 2021-01-15 ENCOUNTER — HOSPITAL ENCOUNTER (OUTPATIENT)
Facility: HOSPITAL | Age: 61
Discharge: HOME | End: 2021-01-15
Attending: INTERNAL MEDICINE | Admitting: INTERNAL MEDICINE
Payer: COMMERCIAL

## 2021-01-15 VITALS
TEMPERATURE: 98 F | HEART RATE: 86 BPM | SYSTOLIC BLOOD PRESSURE: 129 MMHG | OXYGEN SATURATION: 93 % | RESPIRATION RATE: 18 BRPM | DIASTOLIC BLOOD PRESSURE: 75 MMHG

## 2021-01-15 DIAGNOSIS — D86.0 SARCOIDOSIS OF LUNG: ICD-10-CM

## 2021-01-15 DIAGNOSIS — J98.4 PNEUMONITIS: ICD-10-CM

## 2021-01-15 PROBLEM — B49 FUNGAL PNEUMONIA: Status: ACTIVE | Noted: 2021-01-15

## 2021-01-15 PROBLEM — J16.8 FUNGAL PNEUMONIA: Status: ACTIVE | Noted: 2021-01-15

## 2021-01-15 LAB
A FUMIGATUS IGE QN: <0.1 KU/L
APPEARANCE FLD: CLEAR
BODY FLD TYPE: NORMAL
COLOR FLD: COLORLESS
DEPRECATED A FUMIGATUS IGE RAST QL: NORMAL
EOSINOPHIL NFR FLD MANUAL: 4 %
KOH PREP SPEC: NORMAL
LYMPHOCYTES NFR FLD MANUAL: 5 %
MONOS+MACROS NFR FLD MANUAL: 21 %
NEUTROPHILS NFR FLD MANUAL: 70 %
WBC # FLD: 290 /CU MM

## 2021-01-15 PROCEDURE — 87015 SPECIMEN INFECT AGNT CONCNTJ: CPT | Mod: 59

## 2021-01-15 PROCEDURE — 88305 TISSUE EXAM BY PATHOLOGIST: CPT | Mod: 59 | Performed by: PATHOLOGY

## 2021-01-15 PROCEDURE — 88305 TISSUE EXAM BY PATHOLOGIST: ICD-10-PCS | Mod: 26,,, | Performed by: PATHOLOGY

## 2021-01-15 PROCEDURE — 27200944 HC BRONCH FORCEPS DISPOSABLE: Performed by: INTERNAL MEDICINE

## 2021-01-15 PROCEDURE — 88312 PR  SPECIAL STAINS,GROUP I: ICD-10-PCS | Mod: 26,,, | Performed by: PATHOLOGY

## 2021-01-15 PROCEDURE — 88112 CYTOPATH CELL ENHANCE TECH: CPT | Mod: 26,,, | Performed by: PATHOLOGY

## 2021-01-15 PROCEDURE — 87149 DNA/RNA DIRECT PROBE: CPT

## 2021-01-15 PROCEDURE — 31623 DX BRONCHOSCOPE/BRUSH: CPT | Mod: 59,LT | Performed by: INTERNAL MEDICINE

## 2021-01-15 PROCEDURE — 88312 SPECIAL STAINS GROUP 1: CPT | Mod: 26,,, | Performed by: PATHOLOGY

## 2021-01-15 PROCEDURE — 31632 PR BRONCHOSCOPY/LUNG BX, ADD'L: ICD-10-PCS | Mod: ,,, | Performed by: INTERNAL MEDICINE

## 2021-01-15 PROCEDURE — 87186 SC STD MICRODIL/AGAR DIL: CPT | Performed by: INTERNAL MEDICINE

## 2021-01-15 PROCEDURE — 88312 SPECIAL STAINS GROUP 1: CPT | Performed by: PATHOLOGY

## 2021-01-15 PROCEDURE — 63600175 PHARM REV CODE 636 W HCPCS: Performed by: NURSE ANESTHETIST, CERTIFIED REGISTERED

## 2021-01-15 PROCEDURE — 88312 SPECIAL STAINS GROUP 1: CPT | Mod: 59 | Performed by: PATHOLOGY

## 2021-01-15 PROCEDURE — 88112 CYTOPATH CELL ENHANCE TECH: CPT | Mod: 59 | Performed by: PATHOLOGY

## 2021-01-15 PROCEDURE — 31628 PR BRONCHOSCOPY,TRANSBRONCH BIOPSY: ICD-10-PCS | Mod: RT,,, | Performed by: INTERNAL MEDICINE

## 2021-01-15 PROCEDURE — 87070 CULTURE OTHR SPECIMN AEROBIC: CPT | Mod: 59

## 2021-01-15 PROCEDURE — 88112 CYTOPATH CELL ENHANCE TECH: CPT | Performed by: PATHOLOGY

## 2021-01-15 PROCEDURE — 31624 DX BRONCHOSCOPE/LAVAGE: CPT | Mod: 59,RT,, | Performed by: INTERNAL MEDICINE

## 2021-01-15 PROCEDURE — 31632 BRONCHOSCOPY/LUNG BX ADDL: CPT | Performed by: INTERNAL MEDICINE

## 2021-01-15 PROCEDURE — 88305 TISSUE EXAM BY PATHOLOGIST: CPT | Mod: 26,,, | Performed by: PATHOLOGY

## 2021-01-15 PROCEDURE — 87102 FUNGUS ISOLATION CULTURE: CPT | Mod: 59

## 2021-01-15 PROCEDURE — 88112 PR  CYTOPATH, CELL ENHANCE TECH: ICD-10-PCS | Mod: 26,,, | Performed by: PATHOLOGY

## 2021-01-15 PROCEDURE — 31624 DX BRONCHOSCOPE/LAVAGE: CPT | Mod: 51,RT | Performed by: INTERNAL MEDICINE

## 2021-01-15 PROCEDURE — 87116 MYCOBACTERIA CULTURE: CPT

## 2021-01-15 PROCEDURE — 31623 DX BRONCHOSCOPE/BRUSH: CPT | Mod: 51,LT,, | Performed by: INTERNAL MEDICINE

## 2021-01-15 PROCEDURE — 37000008 HC ANESTHESIA 1ST 15 MINUTES: Performed by: INTERNAL MEDICINE

## 2021-01-15 PROCEDURE — 31632 BRONCHOSCOPY/LUNG BX ADDL: CPT | Mod: ,,, | Performed by: INTERNAL MEDICINE

## 2021-01-15 PROCEDURE — 87206 SMEAR FLUORESCENT/ACID STAI: CPT

## 2021-01-15 PROCEDURE — 87205 SMEAR GRAM STAIN: CPT

## 2021-01-15 PROCEDURE — 37000009 HC ANESTHESIA EA ADD 15 MINS: Performed by: INTERNAL MEDICINE

## 2021-01-15 PROCEDURE — 87118 MYCOBACTERIC IDENTIFICATION: CPT | Mod: 59

## 2021-01-15 PROCEDURE — 31628 BRONCHOSCOPY/LUNG BX EACH: CPT | Mod: RT,,, | Performed by: INTERNAL MEDICINE

## 2021-01-15 PROCEDURE — 31623 PR BRONCHOSCOPY,DIAGNOSTIC W BRUSH: ICD-10-PCS | Mod: 51,LT,, | Performed by: INTERNAL MEDICINE

## 2021-01-15 PROCEDURE — 25000003 PHARM REV CODE 250: Performed by: NURSE ANESTHETIST, CERTIFIED REGISTERED

## 2021-01-15 PROCEDURE — 31624 PR BRONCHOSCOPY,DIAG2STIC W LAVAGE: ICD-10-PCS | Mod: 59,RT,, | Performed by: INTERNAL MEDICINE

## 2021-01-15 PROCEDURE — 89051 BODY FLUID CELL COUNT: CPT

## 2021-01-15 PROCEDURE — 31628 BRONCHOSCOPY/LUNG BX EACH: CPT | Mod: LT | Performed by: INTERNAL MEDICINE

## 2021-01-15 PROCEDURE — 87210 SMEAR WET MOUNT SALINE/INK: CPT | Mod: 91

## 2021-01-15 RX ORDER — LIDOCAINE HYDROCHLORIDE 40 MG/ML
4 SOLUTION TOPICAL ONCE
Status: DISCONTINUED | OUTPATIENT
Start: 2021-01-15 | End: 2021-01-15 | Stop reason: HOSPADM

## 2021-01-15 RX ORDER — SUCCINYLCHOLINE CHLORIDE 20 MG/ML
INJECTION INTRAMUSCULAR; INTRAVENOUS
Status: DISCONTINUED | OUTPATIENT
Start: 2021-01-15 | End: 2021-01-15

## 2021-01-15 RX ORDER — PROPOFOL 10 MG/ML
VIAL (ML) INTRAVENOUS
Status: DISCONTINUED | OUTPATIENT
Start: 2021-01-15 | End: 2021-01-15

## 2021-01-15 RX ORDER — LIDOCAINE HYDROCHLORIDE 10 MG/ML
INJECTION, SOLUTION EPIDURAL; INFILTRATION; INTRACAUDAL; PERINEURAL
Status: DISCONTINUED | OUTPATIENT
Start: 2021-01-15 | End: 2021-01-15

## 2021-01-15 RX ADMIN — PROPOFOL 50 MG: 10 INJECTION, EMULSION INTRAVENOUS at 02:01

## 2021-01-15 RX ADMIN — SUCCINYLCHOLINE CHLORIDE 140 MG: 20 INJECTION, SOLUTION INTRAMUSCULAR; INTRAVENOUS at 02:01

## 2021-01-15 RX ADMIN — LIDOCAINE HYDROCHLORIDE 50 MG: 10 INJECTION, SOLUTION EPIDURAL; INFILTRATION; INTRACAUDAL; PERINEURAL at 02:01

## 2021-01-15 RX ADMIN — PROPOFOL 150 MG: 10 INJECTION, EMULSION INTRAVENOUS at 02:01

## 2021-01-15 RX ADMIN — GLYCOPYRROLATE 0.2 MG: 0.2 INJECTION, SOLUTION INTRAMUSCULAR; INTRAVITREAL at 02:01

## 2021-01-16 LAB
KOH PREP SPEC: NORMAL
KOH PREP SPEC: NORMAL

## 2021-01-17 LAB
ASPERGILLUS AB SER QL ID: NORMAL
B DERMAT AB SER QL ID: NORMAL
C IMMITIS AB SER QL ID: NORMAL
H CAPSUL AB SER QL ID: NORMAL

## 2021-01-18 LAB
BACTERIA SPEC AEROBE CULT: NO GROWTH
BACTERIA SPEC AEROBE CULT: NO GROWTH
BACTERIA SPEC AEROBE CULT: NORMAL
BACTERIA SPEC AEROBE CULT: NORMAL
GRAM STN SPEC: NORMAL
HISTOPLASMA AG INTERP.: NEGATIVE
HISTOPLASMA RESULT: NORMAL NG/ML

## 2021-01-19 LAB — PATH INTERP FLD-IMP: NORMAL

## 2021-01-21 ENCOUNTER — PATIENT MESSAGE (OUTPATIENT)
Dept: PULMONOLOGY | Facility: CLINIC | Age: 61
End: 2021-01-21

## 2021-01-21 LAB
FINAL PATHOLOGIC DIAGNOSIS: NORMAL
FINAL PATHOLOGIC DIAGNOSIS: NORMAL
GROSS: NORMAL
Lab: NORMAL

## 2021-01-25 LAB — FINAL PATHOLOGIC DIAGNOSIS: NORMAL

## 2021-01-26 ENCOUNTER — TELEPHONE (OUTPATIENT)
Dept: PULMONOLOGY | Facility: CLINIC | Age: 61
End: 2021-01-26

## 2021-01-27 ENCOUNTER — PATIENT OUTREACH (OUTPATIENT)
Dept: ADMINISTRATIVE | Facility: OTHER | Age: 61
End: 2021-01-27

## 2021-01-27 DIAGNOSIS — Z12.31 ENCOUNTER FOR SCREENING MAMMOGRAM FOR MALIGNANT NEOPLASM OF BREAST: Primary | ICD-10-CM

## 2021-01-29 ENCOUNTER — OFFICE VISIT (OUTPATIENT)
Dept: CARDIOLOGY | Facility: CLINIC | Age: 61
End: 2021-01-29
Payer: COMMERCIAL

## 2021-01-29 ENCOUNTER — LAB VISIT (OUTPATIENT)
Dept: LAB | Facility: HOSPITAL | Age: 61
End: 2021-01-29
Attending: INTERNAL MEDICINE
Payer: COMMERCIAL

## 2021-01-29 ENCOUNTER — OFFICE VISIT (OUTPATIENT)
Dept: INTERNAL MEDICINE | Facility: CLINIC | Age: 61
End: 2021-01-29
Payer: COMMERCIAL

## 2021-01-29 ENCOUNTER — OFFICE VISIT (OUTPATIENT)
Dept: OPHTHALMOLOGY | Facility: CLINIC | Age: 61
End: 2021-01-29
Payer: COMMERCIAL

## 2021-01-29 VITALS
SYSTOLIC BLOOD PRESSURE: 142 MMHG | RESPIRATION RATE: 16 BRPM | WEIGHT: 176.81 LBS | HEIGHT: 67 IN | DIASTOLIC BLOOD PRESSURE: 80 MMHG | HEART RATE: 84 BPM | BODY MASS INDEX: 27.75 KG/M2 | OXYGEN SATURATION: 97 %

## 2021-01-29 VITALS
OXYGEN SATURATION: 95 % | WEIGHT: 177.5 LBS | BODY MASS INDEX: 27.86 KG/M2 | DIASTOLIC BLOOD PRESSURE: 68 MMHG | TEMPERATURE: 98 F | HEART RATE: 84 BPM | SYSTOLIC BLOOD PRESSURE: 136 MMHG | HEIGHT: 67 IN | RESPIRATION RATE: 18 BRPM

## 2021-01-29 DIAGNOSIS — Z09 HOSPITAL DISCHARGE FOLLOW-UP: Primary | ICD-10-CM

## 2021-01-29 DIAGNOSIS — I50.32 CHRONIC HEART FAILURE WITH PRESERVED EJECTION FRACTION: ICD-10-CM

## 2021-01-29 DIAGNOSIS — I10 ESSENTIAL HYPERTENSION: ICD-10-CM

## 2021-01-29 DIAGNOSIS — D86.0 SARCOIDOSIS OF LUNG: ICD-10-CM

## 2021-01-29 DIAGNOSIS — J44.89 NOCTURNAL HYPOXEMIA DUE TO OBSTRUCTIVE CHRONIC BRONCHITIS: ICD-10-CM

## 2021-01-29 DIAGNOSIS — J45.40 MODERATE PERSISTENT ASTHMA WITHOUT COMPLICATION: ICD-10-CM

## 2021-01-29 DIAGNOSIS — M79.89 SWELLING OF BOTH LOWER EXTREMITIES: ICD-10-CM

## 2021-01-29 DIAGNOSIS — G47.34 NOCTURNAL HYPOXEMIA: ICD-10-CM

## 2021-01-29 DIAGNOSIS — I27.20 PULMONARY HYPERTENSION: ICD-10-CM

## 2021-01-29 DIAGNOSIS — H25.13 CATARACT, NUCLEAR SCLEROTIC SENILE, BILATERAL: Primary | ICD-10-CM

## 2021-01-29 DIAGNOSIS — R79.89 ELEVATED BRAIN NATRIURETIC PEPTIDE (BNP) LEVEL: ICD-10-CM

## 2021-01-29 DIAGNOSIS — R06.02 SHORTNESS OF BREATH: ICD-10-CM

## 2021-01-29 DIAGNOSIS — I10 ESSENTIAL HYPERTENSION: Primary | ICD-10-CM

## 2021-01-29 DIAGNOSIS — G47.36 NOCTURNAL HYPOXEMIA DUE TO OBSTRUCTIVE CHRONIC BRONCHITIS: ICD-10-CM

## 2021-01-29 DIAGNOSIS — H52.7 REFRACTIVE ERROR: ICD-10-CM

## 2021-01-29 DIAGNOSIS — F17.200 HEAVY TOBACCO SMOKER: ICD-10-CM

## 2021-01-29 DIAGNOSIS — J98.4 PNEUMONITIS: ICD-10-CM

## 2021-01-29 LAB
ALBUMIN SERPL BCP-MCNC: 3.3 G/DL (ref 3.5–5.2)
ALP SERPL-CCNC: 105 U/L (ref 55–135)
ALT SERPL W/O P-5'-P-CCNC: 27 U/L (ref 10–44)
ANION GAP SERPL CALC-SCNC: 7 MMOL/L (ref 8–16)
AST SERPL-CCNC: 34 U/L (ref 10–40)
BASOPHILS # BLD AUTO: 0.05 K/UL (ref 0–0.2)
BASOPHILS NFR BLD: 0.9 % (ref 0–1.9)
BILIRUB SERPL-MCNC: 0.2 MG/DL (ref 0.1–1)
BUN SERPL-MCNC: 20 MG/DL (ref 6–20)
CALCIUM SERPL-MCNC: 8.4 MG/DL (ref 8.7–10.5)
CHLORIDE SERPL-SCNC: 105 MMOL/L (ref 95–110)
CO2 SERPL-SCNC: 29 MMOL/L (ref 23–29)
CREAT SERPL-MCNC: 1 MG/DL (ref 0.5–1.4)
CRP SERPL-MCNC: 1.4 MG/L (ref 0–8.2)
DIFFERENTIAL METHOD: ABNORMAL
EOSINOPHIL # BLD AUTO: 0.5 K/UL (ref 0–0.5)
EOSINOPHIL NFR BLD: 9.5 % (ref 0–8)
ERYTHROCYTE [DISTWIDTH] IN BLOOD BY AUTOMATED COUNT: 17.1 % (ref 11.5–14.5)
ERYTHROCYTE [SEDIMENTATION RATE] IN BLOOD BY WESTERGREN METHOD: 3 MM/HR (ref 0–20)
EST. GFR  (AFRICAN AMERICAN): >60 ML/MIN/1.73 M^2
EST. GFR  (NON AFRICAN AMERICAN): >60 ML/MIN/1.73 M^2
GLUCOSE SERPL-MCNC: 77 MG/DL (ref 70–110)
HCT VFR BLD AUTO: 43 % (ref 37–48.5)
HGB BLD-MCNC: 12.5 G/DL (ref 12–16)
IMM GRANULOCYTES # BLD AUTO: 0.02 K/UL (ref 0–0.04)
IMM GRANULOCYTES NFR BLD AUTO: 0.4 % (ref 0–0.5)
LYMPHOCYTES # BLD AUTO: 1.4 K/UL (ref 1–4.8)
LYMPHOCYTES NFR BLD: 25.6 % (ref 18–48)
MCH RBC QN AUTO: 25.4 PG (ref 27–31)
MCHC RBC AUTO-ENTMCNC: 29.1 G/DL (ref 32–36)
MCV RBC AUTO: 87 FL (ref 82–98)
MONOCYTES # BLD AUTO: 0.5 K/UL (ref 0.3–1)
MONOCYTES NFR BLD: 9.1 % (ref 4–15)
NEUTROPHILS # BLD AUTO: 2.9 K/UL (ref 1.8–7.7)
NEUTROPHILS NFR BLD: 54.5 % (ref 38–73)
NRBC BLD-RTO: 0 /100 WBC
PLATELET # BLD AUTO: 242 K/UL (ref 150–350)
PMV BLD AUTO: 8.6 FL (ref 9.2–12.9)
POTASSIUM SERPL-SCNC: 4.6 MMOL/L (ref 3.5–5.1)
PROT SERPL-MCNC: 6.8 G/DL (ref 6–8.4)
RBC # BLD AUTO: 4.93 M/UL (ref 4–5.4)
SODIUM SERPL-SCNC: 141 MMOL/L (ref 136–145)
WBC # BLD AUTO: 5.27 K/UL (ref 3.9–12.7)

## 2021-01-29 PROCEDURE — 92015 PR REFRACTION: ICD-10-PCS | Mod: S$GLB,,, | Performed by: OPTOMETRIST

## 2021-01-29 PROCEDURE — 99214 PR OFFICE/OUTPT VISIT, EST, LEVL IV, 30-39 MIN: ICD-10-PCS | Mod: S$GLB,,, | Performed by: FAMILY MEDICINE

## 2021-01-29 PROCEDURE — 82164 ANGIOTENSIN I ENZYME TEST: CPT

## 2021-01-29 PROCEDURE — 80053 COMPREHEN METABOLIC PANEL: CPT

## 2021-01-29 PROCEDURE — 99214 OFFICE O/P EST MOD 30 MIN: CPT | Mod: S$GLB,,, | Performed by: INTERNAL MEDICINE

## 2021-01-29 PROCEDURE — 92310 CONTACT LENS FITTING OU: CPT | Mod: CSM,S$GLB,, | Performed by: OPTOMETRIST

## 2021-01-29 PROCEDURE — 3008F BODY MASS INDEX DOCD: CPT | Mod: S$GLB,,, | Performed by: FAMILY MEDICINE

## 2021-01-29 PROCEDURE — 85651 RBC SED RATE NONAUTOMATED: CPT

## 2021-01-29 PROCEDURE — 3008F BODY MASS INDEX DOCD: CPT | Mod: S$GLB,,, | Performed by: INTERNAL MEDICINE

## 2021-01-29 PROCEDURE — 99999 PR PBB SHADOW E&M-EST. PATIENT-LVL III: CPT | Mod: PBBFAC,,, | Performed by: OPTOMETRIST

## 2021-01-29 PROCEDURE — 1126F PR PAIN SEVERITY QUANTIFIED, NO PAIN PRESENT: ICD-10-PCS | Mod: S$GLB,,, | Performed by: FAMILY MEDICINE

## 2021-01-29 PROCEDURE — 92014 COMPRE OPH EXAM EST PT 1/>: CPT | Mod: S$GLB,,, | Performed by: OPTOMETRIST

## 2021-01-29 PROCEDURE — 3008F PR BODY MASS INDEX (BMI) DOCUMENTED: ICD-10-PCS | Mod: S$GLB,,, | Performed by: FAMILY MEDICINE

## 2021-01-29 PROCEDURE — 99214 PR OFFICE/OUTPT VISIT, EST, LEVL IV, 30-39 MIN: ICD-10-PCS | Mod: S$GLB,,, | Performed by: INTERNAL MEDICINE

## 2021-01-29 PROCEDURE — 86140 C-REACTIVE PROTEIN: CPT

## 2021-01-29 PROCEDURE — 3008F PR BODY MASS INDEX (BMI) DOCUMENTED: ICD-10-PCS | Mod: S$GLB,,, | Performed by: INTERNAL MEDICINE

## 2021-01-29 PROCEDURE — 92014 PR EYE EXAM, EST PATIENT,COMPREHESV: ICD-10-PCS | Mod: S$GLB,,, | Performed by: OPTOMETRIST

## 2021-01-29 PROCEDURE — 99999 PR PBB SHADOW E&M-EST. PATIENT-LVL III: ICD-10-PCS | Mod: PBBFAC,,, | Performed by: OPTOMETRIST

## 2021-01-29 PROCEDURE — 99999 PR PBB SHADOW E&M-EST. PATIENT-LVL IV: ICD-10-PCS | Mod: PBBFAC,,, | Performed by: FAMILY MEDICINE

## 2021-01-29 PROCEDURE — 1126F AMNT PAIN NOTED NONE PRSNT: CPT | Mod: S$GLB,,, | Performed by: FAMILY MEDICINE

## 2021-01-29 PROCEDURE — 92015 DETERMINE REFRACTIVE STATE: CPT | Mod: S$GLB,,, | Performed by: OPTOMETRIST

## 2021-01-29 PROCEDURE — 99999 PR PBB SHADOW E&M-EST. PATIENT-LVL IV: CPT | Mod: PBBFAC,,, | Performed by: FAMILY MEDICINE

## 2021-01-29 PROCEDURE — 99999 PR PBB SHADOW E&M-EST. PATIENT-LVL IV: CPT | Mod: PBBFAC,,, | Performed by: INTERNAL MEDICINE

## 2021-01-29 PROCEDURE — 85025 COMPLETE CBC W/AUTO DIFF WBC: CPT

## 2021-01-29 PROCEDURE — 99999 PR PBB SHADOW E&M-EST. PATIENT-LVL IV: ICD-10-PCS | Mod: PBBFAC,,, | Performed by: INTERNAL MEDICINE

## 2021-01-29 PROCEDURE — 92310 PR CONTACT LENS FITTING (NO CHANGE): ICD-10-PCS | Mod: CSM,S$GLB,, | Performed by: OPTOMETRIST

## 2021-01-29 PROCEDURE — 99214 OFFICE O/P EST MOD 30 MIN: CPT | Mod: S$GLB,,, | Performed by: FAMILY MEDICINE

## 2021-01-29 RX ORDER — POTASSIUM CHLORIDE 20 MEQ/1
20 TABLET, EXTENDED RELEASE ORAL DAILY PRN
Qty: 60 TABLET | Refills: 3 | Status: SHIPPED | OUTPATIENT
Start: 2021-01-29

## 2021-01-29 RX ORDER — ALBUTEROL SULFATE 90 UG/1
AEROSOL, METERED RESPIRATORY (INHALATION)
COMMUNITY
End: 2021-01-31

## 2021-01-29 RX ORDER — ALBUTEROL SULFATE 90 UG/1
AEROSOL, METERED RESPIRATORY (INHALATION)
COMMUNITY

## 2021-01-29 RX ORDER — ZOLPIDEM TARTRATE 10 MG/1
TABLET ORAL
COMMUNITY
End: 2021-03-24 | Stop reason: SDUPTHER

## 2021-01-29 RX ORDER — FUROSEMIDE 40 MG/1
40 TABLET ORAL DAILY PRN
Qty: 30 TABLET | Refills: 3 | Status: SHIPPED | OUTPATIENT
Start: 2021-01-29 | End: 2022-01-29

## 2021-02-01 ENCOUNTER — PATIENT OUTREACH (OUTPATIENT)
Dept: ADMINISTRATIVE | Facility: HOSPITAL | Age: 61
End: 2021-02-01

## 2021-02-02 LAB — ACE SERPL-CCNC: 43 U/L (ref 16–85)

## 2021-02-17 LAB
FUNGUS SPEC CULT: NORMAL

## 2021-02-19 LAB
ACID FAST MOD KINY STN SPEC: ABNORMAL
MYCOBACTERIUM SPEC QL CULT: ABNORMAL

## 2021-03-11 ENCOUNTER — PATIENT OUTREACH (OUTPATIENT)
Dept: ADMINISTRATIVE | Facility: OTHER | Age: 61
End: 2021-03-11

## 2021-03-12 ENCOUNTER — OFFICE VISIT (OUTPATIENT)
Dept: PULMONOLOGY | Facility: CLINIC | Age: 61
End: 2021-03-12
Payer: COMMERCIAL

## 2021-03-12 ENCOUNTER — PATIENT MESSAGE (OUTPATIENT)
Dept: CARDIOLOGY | Facility: HOSPITAL | Age: 61
End: 2021-03-12

## 2021-03-12 ENCOUNTER — TELEPHONE (OUTPATIENT)
Dept: INTERNAL MEDICINE | Facility: CLINIC | Age: 61
End: 2021-03-12

## 2021-03-12 ENCOUNTER — PATIENT MESSAGE (OUTPATIENT)
Dept: INTERNAL MEDICINE | Facility: CLINIC | Age: 61
End: 2021-03-12

## 2021-03-12 ENCOUNTER — HOSPITAL ENCOUNTER (OUTPATIENT)
Dept: RADIOLOGY | Facility: HOSPITAL | Age: 61
Discharge: HOME | End: 2021-03-12
Attending: INTERNAL MEDICINE
Payer: COMMERCIAL

## 2021-03-12 VITALS
WEIGHT: 179.25 LBS | HEART RATE: 82 BPM | SYSTOLIC BLOOD PRESSURE: 146 MMHG | HEIGHT: 67 IN | OXYGEN SATURATION: 97 % | BODY MASS INDEX: 28.13 KG/M2 | RESPIRATION RATE: 17 BRPM | DIASTOLIC BLOOD PRESSURE: 106 MMHG

## 2021-03-12 DIAGNOSIS — J16.8 FUNGAL PNEUMONIA: ICD-10-CM

## 2021-03-12 DIAGNOSIS — G47.34 NOCTURNAL HYPOXEMIA: ICD-10-CM

## 2021-03-12 DIAGNOSIS — I50.32 CHRONIC HEART FAILURE WITH PRESERVED EJECTION FRACTION: ICD-10-CM

## 2021-03-12 DIAGNOSIS — I10 ESSENTIAL HYPERTENSION: ICD-10-CM

## 2021-03-12 DIAGNOSIS — I27.20 PULMONARY HYPERTENSION: ICD-10-CM

## 2021-03-12 DIAGNOSIS — B49 FUNGAL PNEUMONIA: ICD-10-CM

## 2021-03-12 DIAGNOSIS — F51.04 PSYCHOPHYSIOLOGICAL INSOMNIA: ICD-10-CM

## 2021-03-12 DIAGNOSIS — D86.0 SARCOIDOSIS OF LUNG: ICD-10-CM

## 2021-03-12 DIAGNOSIS — G47.36 NOCTURNAL HYPOXEMIA DUE TO OBSTRUCTIVE CHRONIC BRONCHITIS: ICD-10-CM

## 2021-03-12 DIAGNOSIS — J44.89 NOCTURNAL HYPOXEMIA DUE TO OBSTRUCTIVE CHRONIC BRONCHITIS: ICD-10-CM

## 2021-03-12 DIAGNOSIS — J45.40 MODERATE PERSISTENT ASTHMA WITHOUT COMPLICATION: Primary | ICD-10-CM

## 2021-03-12 PROBLEM — J98.4 PNEUMONITIS: Status: RESOLVED | Noted: 2020-12-15 | Resolved: 2021-03-12

## 2021-03-12 PROBLEM — R04.2 HEMOPTYSIS: Status: RESOLVED | Noted: 2020-12-15 | Resolved: 2021-03-12

## 2021-03-12 PROCEDURE — 99214 PR OFFICE/OUTPT VISIT, EST, LEVL IV, 30-39 MIN: ICD-10-PCS | Mod: S$GLB,,, | Performed by: INTERNAL MEDICINE

## 2021-03-12 PROCEDURE — 71046 X-RAY EXAM CHEST 2 VIEWS: CPT | Mod: TC

## 2021-03-12 PROCEDURE — 3008F BODY MASS INDEX DOCD: CPT | Mod: S$GLB,,, | Performed by: INTERNAL MEDICINE

## 2021-03-12 PROCEDURE — 99214 OFFICE O/P EST MOD 30 MIN: CPT | Mod: S$GLB,,, | Performed by: INTERNAL MEDICINE

## 2021-03-12 PROCEDURE — 3008F PR BODY MASS INDEX (BMI) DOCUMENTED: ICD-10-PCS | Mod: S$GLB,,, | Performed by: INTERNAL MEDICINE

## 2021-03-12 PROCEDURE — 99999 PR PBB SHADOW E&M-EST. PATIENT-LVL IV: ICD-10-PCS | Mod: PBBFAC,,, | Performed by: INTERNAL MEDICINE

## 2021-03-12 PROCEDURE — 71046 X-RAY EXAM CHEST 2 VIEWS: CPT | Mod: 26,,, | Performed by: RADIOLOGY

## 2021-03-12 PROCEDURE — 99999 PR PBB SHADOW E&M-EST. PATIENT-LVL IV: CPT | Mod: PBBFAC,,, | Performed by: INTERNAL MEDICINE

## 2021-03-12 PROCEDURE — 71046 XR CHEST PA AND LATERAL: ICD-10-PCS | Mod: 26,,, | Performed by: RADIOLOGY

## 2021-03-12 RX ORDER — ITRACONAZOLE 100 MG/1
CAPSULE ORAL
COMMUNITY
Start: 2021-01-09 | End: 2021-03-12 | Stop reason: SDUPTHER

## 2021-03-12 RX ORDER — IPRATROPIUM BROMIDE 17 UG/1
AEROSOL, METERED RESPIRATORY (INHALATION)
COMMUNITY

## 2021-03-12 RX ORDER — ITRACONAZOLE 100 MG/1
CAPSULE ORAL
Qty: 60 CAPSULE | Refills: 2 | Status: SHIPPED | OUTPATIENT
Start: 2021-03-12 | End: 2021-05-12

## 2021-03-12 RX ORDER — SULFAMETHOXAZOLE AND TRIMETHOPRIM 800; 160 MG/1; MG/1
1 TABLET ORAL
Qty: 12 TABLET | Refills: 3 | Status: SHIPPED | OUTPATIENT
Start: 2021-03-12 | End: 2021-04-11

## 2021-03-20 LAB
ACID FAST MOD KINY STN SPEC: NORMAL
ACID FAST MOD KINY STN SPEC: NORMAL
MYCOBACTERIUM SPEC QL CULT: NORMAL
MYCOBACTERIUM SPEC QL CULT: NORMAL

## 2021-03-23 ENCOUNTER — PATIENT MESSAGE (OUTPATIENT)
Dept: INTERNAL MEDICINE | Facility: CLINIC | Age: 61
End: 2021-03-23

## 2021-03-24 RX ORDER — ZOLPIDEM TARTRATE 10 MG/1
TABLET ORAL
Qty: 30 TABLET | Refills: 0 | Status: SHIPPED | OUTPATIENT
Start: 2021-03-24 | End: 2021-04-29

## 2021-03-25 ENCOUNTER — PATIENT MESSAGE (OUTPATIENT)
Dept: INTERNAL MEDICINE | Facility: CLINIC | Age: 61
End: 2021-03-25

## 2021-04-01 ENCOUNTER — TELEPHONE (OUTPATIENT)
Dept: PULMONOLOGY | Facility: CLINIC | Age: 61
End: 2021-04-01

## 2021-04-01 DIAGNOSIS — A31.0 ATYPICAL MYCOBACTERIAL INFECTION OF LUNG: Primary | ICD-10-CM

## 2021-04-01 LAB
ACID FAST SUSCEPTIBILITY, SLOW GROWER: ABNORMAL
SPECIMEN SOURCE AND ORGANISM NAME: ABNORMAL

## 2021-04-15 ENCOUNTER — PATIENT OUTREACH (OUTPATIENT)
Dept: ADMINISTRATIVE | Facility: HOSPITAL | Age: 61
End: 2021-04-15

## 2021-04-27 ENCOUNTER — PATIENT OUTREACH (OUTPATIENT)
Dept: ADMINISTRATIVE | Facility: OTHER | Age: 61
End: 2021-04-27

## 2021-04-29 ENCOUNTER — OFFICE VISIT (OUTPATIENT)
Dept: RHEUMATOLOGY | Facility: CLINIC | Age: 61
End: 2021-04-29
Payer: COMMERCIAL

## 2021-04-29 ENCOUNTER — PATIENT MESSAGE (OUTPATIENT)
Dept: INTERNAL MEDICINE | Facility: CLINIC | Age: 61
End: 2021-04-29

## 2021-04-29 ENCOUNTER — HOSPITAL ENCOUNTER (OUTPATIENT)
Dept: RADIOLOGY | Facility: HOSPITAL | Age: 61
Discharge: HOME | End: 2021-04-29
Attending: INTERNAL MEDICINE
Payer: COMMERCIAL

## 2021-04-29 VITALS
DIASTOLIC BLOOD PRESSURE: 100 MMHG | BODY MASS INDEX: 26.83 KG/M2 | WEIGHT: 181.13 LBS | HEART RATE: 72 BPM | HEIGHT: 69 IN | SYSTOLIC BLOOD PRESSURE: 168 MMHG

## 2021-04-29 DIAGNOSIS — Z71.89 COUNSELING ON HEALTH PROMOTION AND DISEASE PREVENTION: ICD-10-CM

## 2021-04-29 DIAGNOSIS — D86.0 SARCOIDOSIS OF LUNG: ICD-10-CM

## 2021-04-29 DIAGNOSIS — L03.90 CELLULITIS, UNSPECIFIED CELLULITIS SITE: ICD-10-CM

## 2021-04-29 DIAGNOSIS — Z79.52 CURRENT CHRONIC USE OF SYSTEMIC STEROIDS: ICD-10-CM

## 2021-04-29 DIAGNOSIS — L03.90 CELLULITIS, UNSPECIFIED CELLULITIS SITE: Primary | ICD-10-CM

## 2021-04-29 PROCEDURE — 99999 PR PBB SHADOW E&M-EST. PATIENT-LVL V: ICD-10-PCS | Mod: PBBFAC,,, | Performed by: INTERNAL MEDICINE

## 2021-04-29 PROCEDURE — 73562 X-RAY EXAM OF KNEE 3: CPT | Mod: 26,,, | Performed by: RADIOLOGY

## 2021-04-29 PROCEDURE — 1125F AMNT PAIN NOTED PAIN PRSNT: CPT | Mod: S$GLB,,, | Performed by: INTERNAL MEDICINE

## 2021-04-29 PROCEDURE — 73562 XR KNEE ORTHO BILAT: ICD-10-PCS | Mod: 26,,, | Performed by: RADIOLOGY

## 2021-04-29 PROCEDURE — 3008F BODY MASS INDEX DOCD: CPT | Mod: S$GLB,,, | Performed by: INTERNAL MEDICINE

## 2021-04-29 PROCEDURE — 99214 PR OFFICE/OUTPT VISIT, EST, LEVL IV, 30-39 MIN: ICD-10-PCS | Mod: S$GLB,,, | Performed by: INTERNAL MEDICINE

## 2021-04-29 PROCEDURE — 3008F PR BODY MASS INDEX (BMI) DOCUMENTED: ICD-10-PCS | Mod: S$GLB,,, | Performed by: INTERNAL MEDICINE

## 2021-04-29 PROCEDURE — 73562 X-RAY EXAM OF KNEE 3: CPT | Mod: TC,50

## 2021-04-29 PROCEDURE — 99999 PR PBB SHADOW E&M-EST. PATIENT-LVL V: CPT | Mod: PBBFAC,,, | Performed by: INTERNAL MEDICINE

## 2021-04-29 PROCEDURE — 1125F PR PAIN SEVERITY QUANTIFIED, PAIN PRESENT: ICD-10-PCS | Mod: S$GLB,,, | Performed by: INTERNAL MEDICINE

## 2021-04-29 PROCEDURE — 99214 OFFICE O/P EST MOD 30 MIN: CPT | Mod: S$GLB,,, | Performed by: INTERNAL MEDICINE

## 2021-04-29 RX ORDER — SULFAMETHOXAZOLE AND TRIMETHOPRIM 800; 160 MG/1; MG/1
1 TABLET ORAL 2 TIMES DAILY
Qty: 20 TABLET | Refills: 0 | Status: SHIPPED | OUTPATIENT
Start: 2021-04-29 | End: 2021-05-09

## 2021-04-29 RX ORDER — NAPROXEN 500 MG/1
500 TABLET ORAL 2 TIMES DAILY
COMMUNITY
Start: 2021-03-17

## 2021-04-29 RX ORDER — METHOCARBAMOL 500 MG/1
1000 TABLET, FILM COATED ORAL 4 TIMES DAILY PRN
COMMUNITY
Start: 2021-03-17

## 2021-04-29 RX ORDER — ZOLPIDEM TARTRATE 10 MG/1
TABLET ORAL
Qty: 30 TABLET | Refills: 0 | Status: SHIPPED | OUTPATIENT
Start: 2021-04-29 | End: 2021-06-21 | Stop reason: SDUPTHER

## 2021-04-30 ENCOUNTER — OFFICE VISIT (OUTPATIENT)
Dept: CARDIOLOGY | Facility: CLINIC | Age: 61
End: 2021-04-30
Payer: COMMERCIAL

## 2021-04-30 ENCOUNTER — HOSPITAL ENCOUNTER (OUTPATIENT)
Dept: RADIOLOGY | Facility: HOSPITAL | Age: 61
Discharge: HOME | End: 2021-04-30
Attending: FAMILY MEDICINE
Payer: COMMERCIAL

## 2021-04-30 ENCOUNTER — LAB VISIT (OUTPATIENT)
Dept: LAB | Facility: HOSPITAL | Age: 61
End: 2021-04-30
Attending: INTERNAL MEDICINE
Payer: COMMERCIAL

## 2021-04-30 VITALS
BODY MASS INDEX: 26.84 KG/M2 | HEIGHT: 69 IN | DIASTOLIC BLOOD PRESSURE: 102 MMHG | HEART RATE: 76 BPM | OXYGEN SATURATION: 98 % | WEIGHT: 181.19 LBS | RESPIRATION RATE: 16 BRPM | SYSTOLIC BLOOD PRESSURE: 162 MMHG

## 2021-04-30 DIAGNOSIS — I27.20 PULMONARY HYPERTENSION: ICD-10-CM

## 2021-04-30 DIAGNOSIS — R79.89 ELEVATED BRAIN NATRIURETIC PEPTIDE (BNP) LEVEL: ICD-10-CM

## 2021-04-30 DIAGNOSIS — F17.200 HEAVY TOBACCO SMOKER: ICD-10-CM

## 2021-04-30 DIAGNOSIS — I10 ESSENTIAL HYPERTENSION: ICD-10-CM

## 2021-04-30 DIAGNOSIS — D86.0 SARCOIDOSIS OF LUNG: ICD-10-CM

## 2021-04-30 DIAGNOSIS — G47.36 NOCTURNAL HYPOXEMIA DUE TO OBSTRUCTIVE CHRONIC BRONCHITIS: ICD-10-CM

## 2021-04-30 DIAGNOSIS — Z12.31 ENCOUNTER FOR SCREENING MAMMOGRAM FOR MALIGNANT NEOPLASM OF BREAST: ICD-10-CM

## 2021-04-30 DIAGNOSIS — I50.32 CHRONIC HEART FAILURE WITH PRESERVED EJECTION FRACTION: ICD-10-CM

## 2021-04-30 DIAGNOSIS — I50.32 CHRONIC HEART FAILURE WITH PRESERVED EJECTION FRACTION: Primary | ICD-10-CM

## 2021-04-30 DIAGNOSIS — J44.89 NOCTURNAL HYPOXEMIA DUE TO OBSTRUCTIVE CHRONIC BRONCHITIS: ICD-10-CM

## 2021-04-30 LAB
ANION GAP SERPL CALC-SCNC: 7 MMOL/L (ref 8–16)
BNP SERPL-MCNC: 236 PG/ML (ref 0–99)
BUN SERPL-MCNC: 20 MG/DL (ref 6–20)
CALCIUM SERPL-MCNC: 8.3 MG/DL (ref 8.7–10.5)
CHLORIDE SERPL-SCNC: 107 MMOL/L (ref 95–110)
CO2 SERPL-SCNC: 26 MMOL/L (ref 23–29)
CREAT SERPL-MCNC: 1 MG/DL (ref 0.5–1.4)
EST. GFR  (AFRICAN AMERICAN): >60 ML/MIN/1.73 M^2
EST. GFR  (NON AFRICAN AMERICAN): >60 ML/MIN/1.73 M^2
GLUCOSE SERPL-MCNC: 81 MG/DL (ref 70–110)
MAGNESIUM SERPL-MCNC: 2.1 MG/DL (ref 1.6–2.6)
POTASSIUM SERPL-SCNC: 4.7 MMOL/L (ref 3.5–5.1)
SODIUM SERPL-SCNC: 140 MMOL/L (ref 136–145)

## 2021-04-30 PROCEDURE — 1126F PR PAIN SEVERITY QUANTIFIED, NO PAIN PRESENT: ICD-10-PCS | Mod: S$GLB,,, | Performed by: INTERNAL MEDICINE

## 2021-04-30 PROCEDURE — 83735 ASSAY OF MAGNESIUM: CPT | Performed by: INTERNAL MEDICINE

## 2021-04-30 PROCEDURE — 99214 PR OFFICE/OUTPT VISIT, EST, LEVL IV, 30-39 MIN: ICD-10-PCS | Mod: S$GLB,,, | Performed by: INTERNAL MEDICINE

## 2021-04-30 PROCEDURE — 77067 MAMMO DIGITAL SCREENING BILAT WITH TOMO: ICD-10-PCS | Mod: 26,,, | Performed by: RADIOLOGY

## 2021-04-30 PROCEDURE — 1126F AMNT PAIN NOTED NONE PRSNT: CPT | Mod: S$GLB,,, | Performed by: INTERNAL MEDICINE

## 2021-04-30 PROCEDURE — 3008F PR BODY MASS INDEX (BMI) DOCUMENTED: ICD-10-PCS | Mod: S$GLB,,, | Performed by: INTERNAL MEDICINE

## 2021-04-30 PROCEDURE — 3008F BODY MASS INDEX DOCD: CPT | Mod: S$GLB,,, | Performed by: INTERNAL MEDICINE

## 2021-04-30 PROCEDURE — 77063 MAMMO DIGITAL SCREENING BILAT WITH TOMO: ICD-10-PCS | Mod: 26,,, | Performed by: RADIOLOGY

## 2021-04-30 PROCEDURE — 77067 SCR MAMMO BI INCL CAD: CPT | Mod: TC

## 2021-04-30 PROCEDURE — 99999 PR PBB SHADOW E&M-EST. PATIENT-LVL V: ICD-10-PCS | Mod: PBBFAC,,, | Performed by: INTERNAL MEDICINE

## 2021-04-30 PROCEDURE — 77067 SCR MAMMO BI INCL CAD: CPT | Mod: 26,,, | Performed by: RADIOLOGY

## 2021-04-30 PROCEDURE — 80048 BASIC METABOLIC PNL TOTAL CA: CPT | Performed by: INTERNAL MEDICINE

## 2021-04-30 PROCEDURE — 36415 COLL VENOUS BLD VENIPUNCTURE: CPT | Performed by: INTERNAL MEDICINE

## 2021-04-30 PROCEDURE — 77063 BREAST TOMOSYNTHESIS BI: CPT | Mod: 26,,, | Performed by: RADIOLOGY

## 2021-04-30 PROCEDURE — 83880 ASSAY OF NATRIURETIC PEPTIDE: CPT | Performed by: INTERNAL MEDICINE

## 2021-04-30 PROCEDURE — 99999 PR PBB SHADOW E&M-EST. PATIENT-LVL V: CPT | Mod: PBBFAC,,, | Performed by: INTERNAL MEDICINE

## 2021-04-30 PROCEDURE — 99214 OFFICE O/P EST MOD 30 MIN: CPT | Mod: S$GLB,,, | Performed by: INTERNAL MEDICINE

## 2021-04-30 RX ORDER — HYDROCHLOROTHIAZIDE 12.5 MG/1
12.5 TABLET ORAL DAILY
Qty: 30 TABLET | Refills: 3 | OUTPATIENT
Start: 2021-04-30 | End: 2023-05-09

## 2021-05-02 PROBLEM — R06.2 WHEEZING: Status: RESOLVED | Noted: 2019-08-09 | Resolved: 2021-05-02

## 2021-05-03 ENCOUNTER — TELEPHONE (OUTPATIENT)
Dept: PULMONOLOGY | Facility: CLINIC | Age: 61
End: 2021-05-03

## 2021-05-03 ENCOUNTER — OFFICE VISIT (OUTPATIENT)
Dept: PULMONOLOGY | Facility: CLINIC | Age: 61
End: 2021-05-03
Payer: COMMERCIAL

## 2021-05-03 VITALS — BODY MASS INDEX: 26.58 KG/M2 | WEIGHT: 180 LBS

## 2021-05-03 DIAGNOSIS — D86.0 SARCOIDOSIS OF LUNG: ICD-10-CM

## 2021-05-03 DIAGNOSIS — A31.9 ATYPICAL MYCOBACTERIAL DISEASE: ICD-10-CM

## 2021-05-03 DIAGNOSIS — I27.20 PULMONARY HYPERTENSION: ICD-10-CM

## 2021-05-03 DIAGNOSIS — J45.40 MODERATE PERSISTENT ASTHMA WITHOUT COMPLICATION: Primary | ICD-10-CM

## 2021-05-03 DIAGNOSIS — J45.40 MODERATE PERSISTENT ASTHMA WITHOUT COMPLICATION: ICD-10-CM

## 2021-05-03 DIAGNOSIS — G47.34 NOCTURNAL HYPOXEMIA: Primary | ICD-10-CM

## 2021-05-03 DIAGNOSIS — J44.89 NOCTURNAL HYPOXEMIA DUE TO OBSTRUCTIVE CHRONIC BRONCHITIS: ICD-10-CM

## 2021-05-03 DIAGNOSIS — G47.36 NOCTURNAL HYPOXEMIA DUE TO OBSTRUCTIVE CHRONIC BRONCHITIS: ICD-10-CM

## 2021-05-03 PROCEDURE — 99213 OFFICE O/P EST LOW 20 MIN: CPT | Mod: 95,,, | Performed by: INTERNAL MEDICINE

## 2021-05-03 PROCEDURE — 3008F PR BODY MASS INDEX (BMI) DOCUMENTED: ICD-10-PCS | Mod: 95,,, | Performed by: INTERNAL MEDICINE

## 2021-05-03 PROCEDURE — 3008F BODY MASS INDEX DOCD: CPT | Mod: 95,,, | Performed by: INTERNAL MEDICINE

## 2021-05-03 PROCEDURE — 99213 PR OFFICE/OUTPT VISIT, EST, LEVL III, 20-29 MIN: ICD-10-PCS | Mod: 95,,, | Performed by: INTERNAL MEDICINE

## 2021-05-04 ENCOUNTER — OFFICE VISIT (OUTPATIENT)
Dept: INFECTIOUS DISEASES | Facility: CLINIC | Age: 61
End: 2021-05-04
Payer: COMMERCIAL

## 2021-05-04 VITALS — DIASTOLIC BLOOD PRESSURE: 98 MMHG | SYSTOLIC BLOOD PRESSURE: 150 MMHG | HEART RATE: 81 BPM

## 2021-05-04 DIAGNOSIS — J45.40 MODERATE PERSISTENT ASTHMA WITHOUT COMPLICATION: ICD-10-CM

## 2021-05-04 DIAGNOSIS — I10 ESSENTIAL HYPERTENSION: ICD-10-CM

## 2021-05-04 DIAGNOSIS — A31.0 ATYPICAL MYCOBACTERIAL INFECTION OF LUNG: ICD-10-CM

## 2021-05-04 DIAGNOSIS — D86.0 SARCOIDOSIS OF LUNG: ICD-10-CM

## 2021-05-04 DIAGNOSIS — A31.0 MYCOBACTERIUM AVIUM-INTRACELLULARE COMPLEX: Primary | ICD-10-CM

## 2021-05-04 DIAGNOSIS — A31.0 MYCOBACTERIUM AVIUM-INTRACELLULARE COMPLEX: ICD-10-CM

## 2021-05-04 PROBLEM — B49 FUNGAL PNEUMONIA: Status: RESOLVED | Noted: 2021-01-15 | Resolved: 2021-05-04

## 2021-05-04 PROBLEM — J16.8 FUNGAL PNEUMONIA: Status: RESOLVED | Noted: 2021-01-15 | Resolved: 2021-05-04

## 2021-05-04 PROCEDURE — 99999 PR PBB SHADOW E&M-EST. PATIENT-LVL IV: CPT | Mod: PBBFAC,,, | Performed by: INTERNAL MEDICINE

## 2021-05-04 PROCEDURE — 99204 PR OFFICE/OUTPT VISIT, NEW, LEVL IV, 45-59 MIN: ICD-10-PCS | Mod: S$GLB,,, | Performed by: INTERNAL MEDICINE

## 2021-05-04 PROCEDURE — 99204 OFFICE O/P NEW MOD 45 MIN: CPT | Mod: S$GLB,,, | Performed by: INTERNAL MEDICINE

## 2021-05-04 PROCEDURE — 99999 PR PBB SHADOW E&M-EST. PATIENT-LVL IV: ICD-10-PCS | Mod: PBBFAC,,, | Performed by: INTERNAL MEDICINE

## 2021-05-04 RX ORDER — ETHAMBUTOL HYDROCHLORIDE 400 MG/1
1600 TABLET, FILM COATED ORAL
Qty: 144 TABLET | Refills: 4 | Status: SHIPPED | OUTPATIENT
Start: 2021-05-05 | End: 2022-05-05

## 2021-05-04 RX ORDER — AZITHROMYCIN 500 MG/1
500 TABLET, FILM COATED ORAL
Qty: 36 TABLET | Refills: 4 | Status: SHIPPED | OUTPATIENT
Start: 2021-05-05 | End: 2021-08-09

## 2021-05-04 RX ORDER — RIFAMPIN 300 MG/1
600 CAPSULE ORAL
Qty: 72 CAPSULE | Refills: 4 | Status: SHIPPED | OUTPATIENT
Start: 2021-05-05 | End: 2021-08-09

## 2021-05-06 ENCOUNTER — PATIENT MESSAGE (OUTPATIENT)
Dept: RESEARCH | Facility: HOSPITAL | Age: 61
End: 2021-05-06

## 2021-06-10 ENCOUNTER — PATIENT MESSAGE (OUTPATIENT)
Dept: PULMONOLOGY | Facility: CLINIC | Age: 61
End: 2021-06-10

## 2021-06-10 ENCOUNTER — TELEPHONE (OUTPATIENT)
Dept: PULMONOLOGY | Facility: CLINIC | Age: 61
End: 2021-06-10

## 2021-06-23 ENCOUNTER — CLINICAL SUPPORT (OUTPATIENT)
Dept: SMOKING CESSATION | Facility: CLINIC | Age: 61
End: 2021-06-23
Payer: COMMERCIAL

## 2021-06-23 DIAGNOSIS — F17.200 NICOTINE DEPENDENCE: Primary | ICD-10-CM

## 2021-06-23 PROCEDURE — 99407 PR TOBACCO USE CESSATION INTENSIVE >10 MINUTES: ICD-10-PCS | Mod: S$GLB,,,

## 2021-06-23 PROCEDURE — 99407 BEHAV CHNG SMOKING > 10 MIN: CPT | Mod: S$GLB,,,

## 2021-07-12 ENCOUNTER — PATIENT MESSAGE (OUTPATIENT)
Dept: PULMONOLOGY | Facility: CLINIC | Age: 61
End: 2021-07-12

## 2021-07-18 ENCOUNTER — PATIENT MESSAGE (OUTPATIENT)
Dept: PULMONOLOGY | Facility: CLINIC | Age: 61
End: 2021-07-18

## 2021-07-19 ENCOUNTER — OFFICE VISIT (OUTPATIENT)
Dept: INTERNAL MEDICINE | Facility: CLINIC | Age: 61
End: 2021-07-19

## 2021-07-19 VITALS
HEIGHT: 67 IN | RESPIRATION RATE: 18 BRPM | DIASTOLIC BLOOD PRESSURE: 84 MMHG | BODY MASS INDEX: 28.72 KG/M2 | OXYGEN SATURATION: 93 % | HEART RATE: 73 BPM | SYSTOLIC BLOOD PRESSURE: 138 MMHG | TEMPERATURE: 96 F | WEIGHT: 183 LBS

## 2021-07-19 DIAGNOSIS — Z00.00 ROUTINE PHYSICAL EXAMINATION: Primary | ICD-10-CM

## 2021-07-19 DIAGNOSIS — J45.41 MODERATE PERSISTENT ASTHMA WITH ACUTE EXACERBATION: ICD-10-CM

## 2021-07-19 PROCEDURE — 99396 PR PREVENTIVE VISIT,EST,40-64: ICD-10-PCS | Mod: S$PBB,,, | Performed by: FAMILY MEDICINE

## 2021-07-19 PROCEDURE — 99999 PR PBB SHADOW E&M-EST. PATIENT-LVL V: ICD-10-PCS | Mod: PBBFAC,,, | Performed by: FAMILY MEDICINE

## 2021-07-19 PROCEDURE — 99215 OFFICE O/P EST HI 40 MIN: CPT | Mod: PBBFAC | Performed by: FAMILY MEDICINE

## 2021-07-19 PROCEDURE — 99999 PR PBB SHADOW E&M-EST. PATIENT-LVL V: CPT | Mod: PBBFAC,,, | Performed by: FAMILY MEDICINE

## 2021-07-19 PROCEDURE — 96372 THER/PROPH/DIAG INJ SC/IM: CPT | Mod: PBBFAC

## 2021-07-19 PROCEDURE — 94640 AIRWAY INHALATION TREATMENT: CPT | Mod: PBBFAC,59

## 2021-07-19 PROCEDURE — 99396 PREV VISIT EST AGE 40-64: CPT | Mod: S$PBB,,, | Performed by: FAMILY MEDICINE

## 2021-07-19 RX ORDER — IPRATROPIUM BROMIDE 17 UG/1
2 AEROSOL, METERED RESPIRATORY (INHALATION)
Qty: 12.9 G | Refills: 3 | Status: SHIPPED | OUTPATIENT
Start: 2021-07-19

## 2021-07-19 RX ORDER — METHYLPREDNISOLONE ACETATE 80 MG/ML
80 INJECTION, SUSPENSION INTRA-ARTICULAR; INTRALESIONAL; INTRAMUSCULAR; SOFT TISSUE
Status: COMPLETED | OUTPATIENT
Start: 2021-07-19 | End: 2021-07-19

## 2021-07-19 RX ORDER — IPRATROPIUM BROMIDE AND ALBUTEROL SULFATE 2.5; .5 MG/3ML; MG/3ML
3 SOLUTION RESPIRATORY (INHALATION)
Status: COMPLETED | OUTPATIENT
Start: 2021-07-19 | End: 2021-07-19

## 2021-07-19 RX ORDER — ALBUTEROL SULFATE 90 UG/1
2 AEROSOL, METERED RESPIRATORY (INHALATION) EVERY 4 HOURS PRN
Qty: 54 G | Refills: 3 | Status: SHIPPED | OUTPATIENT
Start: 2021-07-19

## 2021-07-19 RX ADMIN — METHYLPREDNISOLONE ACETATE 80 MG: 80 INJECTION, SUSPENSION INTRALESIONAL; INTRAMUSCULAR; INTRASYNOVIAL; SOFT TISSUE at 04:07

## 2021-07-19 RX ADMIN — IPRATROPIUM BROMIDE AND ALBUTEROL SULFATE 3 ML: .5; 3 SOLUTION RESPIRATORY (INHALATION) at 04:07

## 2021-07-22 ENCOUNTER — PATIENT OUTREACH (OUTPATIENT)
Dept: ADMINISTRATIVE | Facility: OTHER | Age: 61
End: 2021-07-22

## 2021-07-23 RX ORDER — ZOLPIDEM TARTRATE 10 MG/1
TABLET ORAL
Qty: 30 TABLET | Refills: 0 | Status: SHIPPED | OUTPATIENT
Start: 2021-07-23 | End: 2021-12-21 | Stop reason: SDUPTHER

## 2021-07-26 ENCOUNTER — OFFICE VISIT (OUTPATIENT)
Dept: INFECTIOUS DISEASES | Facility: CLINIC | Age: 61
End: 2021-07-26

## 2021-07-26 ENCOUNTER — LAB VISIT (OUTPATIENT)
Dept: LAB | Facility: HOSPITAL | Age: 61
End: 2021-07-26
Attending: INTERNAL MEDICINE

## 2021-07-26 VITALS
TEMPERATURE: 99 F | BODY MASS INDEX: 27.26 KG/M2 | SYSTOLIC BLOOD PRESSURE: 101 MMHG | DIASTOLIC BLOOD PRESSURE: 70 MMHG | HEART RATE: 78 BPM | WEIGHT: 174.06 LBS

## 2021-07-26 DIAGNOSIS — A31.0 MYCOBACTERIUM AVIUM-INTRACELLULARE COMPLEX: ICD-10-CM

## 2021-07-26 DIAGNOSIS — G47.36 NOCTURNAL HYPOXEMIA DUE TO OBSTRUCTIVE CHRONIC BRONCHITIS: ICD-10-CM

## 2021-07-26 DIAGNOSIS — I10 ESSENTIAL HYPERTENSION: ICD-10-CM

## 2021-07-26 DIAGNOSIS — J45.40 MODERATE PERSISTENT ASTHMA WITHOUT COMPLICATION: ICD-10-CM

## 2021-07-26 DIAGNOSIS — I50.32 CHRONIC HEART FAILURE WITH PRESERVED EJECTION FRACTION: ICD-10-CM

## 2021-07-26 DIAGNOSIS — J44.89 NOCTURNAL HYPOXEMIA DUE TO OBSTRUCTIVE CHRONIC BRONCHITIS: ICD-10-CM

## 2021-07-26 DIAGNOSIS — D86.0 SARCOIDOSIS OF LUNG: ICD-10-CM

## 2021-07-26 PROCEDURE — 99214 OFFICE O/P EST MOD 30 MIN: CPT | Mod: S$PBB,,, | Performed by: INTERNAL MEDICINE

## 2021-07-26 PROCEDURE — 99214 PR OFFICE/OUTPT VISIT, EST, LEVL IV, 30-39 MIN: ICD-10-PCS | Mod: S$PBB,,, | Performed by: INTERNAL MEDICINE

## 2021-07-26 PROCEDURE — 99999 PR PBB SHADOW E&M-EST. PATIENT-LVL IV: ICD-10-PCS | Mod: PBBFAC,,, | Performed by: INTERNAL MEDICINE

## 2021-07-26 PROCEDURE — 87070 CULTURE OTHR SPECIMN AEROBIC: CPT | Performed by: INTERNAL MEDICINE

## 2021-07-26 PROCEDURE — 99214 OFFICE O/P EST MOD 30 MIN: CPT | Mod: PBBFAC | Performed by: INTERNAL MEDICINE

## 2021-07-26 PROCEDURE — 99999 PR PBB SHADOW E&M-EST. PATIENT-LVL IV: CPT | Mod: PBBFAC,,, | Performed by: INTERNAL MEDICINE

## 2021-07-26 PROCEDURE — 87205 SMEAR GRAM STAIN: CPT | Performed by: INTERNAL MEDICINE

## 2021-07-29 LAB
BACTERIA SPEC AEROBE CULT: NORMAL
BACTERIA SPEC AEROBE CULT: NORMAL
GRAM STN SPEC: NORMAL

## 2021-12-22 RX ORDER — ZOLPIDEM TARTRATE 10 MG/1
TABLET ORAL
Qty: 30 TABLET | Refills: 0 | Status: SHIPPED | OUTPATIENT
Start: 2021-12-22

## 2022-02-18 NOTE — TELEPHONE ENCOUNTER
No new care gaps identified.  Powered by [employer]. Reference number: [id number].   2/17/2022 6:05:02 PM CST

## 2022-02-21 RX ORDER — ZOLPIDEM TARTRATE 10 MG/1
TABLET ORAL
Qty: 30 TABLET | Refills: 0 | OUTPATIENT
Start: 2022-02-21

## 2022-02-21 NOTE — TELEPHONE ENCOUNTER
Spoke to pt about needing appt for med refill.  Pt advised she is going out of town and stated she would call office to schedule when she gets back into town.

## 2022-07-27 ENCOUNTER — PATIENT MESSAGE (OUTPATIENT)
Dept: ADMINISTRATIVE | Facility: HOSPITAL | Age: 62
End: 2022-07-27
Payer: COMMERCIAL

## 2022-08-08 ENCOUNTER — PATIENT OUTREACH (OUTPATIENT)
Dept: ADMINISTRATIVE | Facility: HOSPITAL | Age: 62
End: 2022-08-08
Payer: COMMERCIAL

## 2022-08-08 NOTE — PROGRESS NOTES
Working HTN Report.    Chart review, indicated pt had been in the hosp in [place] recently.  Called to confirm pcp and schedule annual exam.  She states she has moved to [place].   Chart updated.

## 2022-10-27 ENCOUNTER — PATIENT MESSAGE (OUTPATIENT)
Dept: INTERNAL MEDICINE | Facility: CLINIC | Age: 62
End: 2022-10-27
Payer: COMMERCIAL

## 2022-10-27 ENCOUNTER — PATIENT MESSAGE (OUTPATIENT)
Dept: PULMONOLOGY | Facility: CLINIC | Age: 62
End: 2022-10-27
Payer: COMMERCIAL

## 2023-05-09 ENCOUNTER — HOSPITAL ENCOUNTER (EMERGENCY)
Facility: HOSPITAL | Age: 63
Discharge: HOME | End: 2023-05-09
Attending: EMERGENCY MEDICINE
Payer: COMMERCIAL

## 2023-05-09 VITALS
DIASTOLIC BLOOD PRESSURE: 89 MMHG | HEART RATE: 74 BPM | WEIGHT: 153.88 LBS | SYSTOLIC BLOOD PRESSURE: 155 MMHG | HEIGHT: 67 IN | OXYGEN SATURATION: 95 % | RESPIRATION RATE: 18 BRPM | BODY MASS INDEX: 24.15 KG/M2 | TEMPERATURE: 98 F

## 2023-05-09 DIAGNOSIS — S49.91XA RIGHT SHOULDER INJURY, INITIAL ENCOUNTER: ICD-10-CM

## 2023-05-09 DIAGNOSIS — R06.02 SOB (SHORTNESS OF BREATH): Primary | ICD-10-CM

## 2023-05-09 LAB
ALBUMIN SERPL BCP-MCNC: 2.9 G/DL (ref 3.5–5.2)
ALP SERPL-CCNC: 130 U/L (ref 55–135)
ALT SERPL W/O P-5'-P-CCNC: 15 U/L (ref 10–44)
ANION GAP SERPL CALC-SCNC: 8 MMOL/L (ref 8–16)
AST SERPL-CCNC: 27 U/L (ref 10–40)
BASOPHILS # BLD AUTO: 0.08 K/UL (ref 0–0.2)
BASOPHILS NFR BLD: 1.3 % (ref 0–1.9)
BILIRUB SERPL-MCNC: 0.3 MG/DL (ref 0.1–1)
BNP SERPL-MCNC: 308 PG/ML (ref 0–99)
BUN SERPL-MCNC: 42 MG/DL (ref 8–23)
CALCIUM SERPL-MCNC: 8.1 MG/DL (ref 8.7–10.5)
CHLORIDE SERPL-SCNC: 111 MMOL/L (ref 95–110)
CO2 SERPL-SCNC: 20 MMOL/L (ref 23–29)
CREAT SERPL-MCNC: 2.3 MG/DL (ref 0.5–1.4)
DIFFERENTIAL METHOD: ABNORMAL
EOSINOPHIL # BLD AUTO: 0.3 K/UL (ref 0–0.5)
EOSINOPHIL NFR BLD: 5 % (ref 0–8)
ERYTHROCYTE [DISTWIDTH] IN BLOOD BY AUTOMATED COUNT: 16.1 % (ref 11.5–14.5)
EST. GFR  (NO RACE VARIABLE): 24 ML/MIN/1.73 M^2
GLUCOSE SERPL-MCNC: 84 MG/DL (ref 70–110)
HCT VFR BLD AUTO: 34.9 % (ref 37–48.5)
HCV AB SERPL QL IA: NEGATIVE
HEP C VIRUS HOLD SPECIMEN: NORMAL
HGB BLD-MCNC: 10.2 G/DL (ref 12–16)
HIV 1+2 AB+HIV1 P24 AG SERPL QL IA: NEGATIVE
IMM GRANULOCYTES # BLD AUTO: 0.11 K/UL (ref 0–0.04)
IMM GRANULOCYTES NFR BLD AUTO: 1.8 % (ref 0–0.5)
INFLUENZA A, MOLECULAR: NEGATIVE
INFLUENZA B, MOLECULAR: NEGATIVE
LYMPHOCYTES # BLD AUTO: 1.1 K/UL (ref 1–4.8)
LYMPHOCYTES NFR BLD: 17.7 % (ref 18–48)
MCH RBC QN AUTO: 26.8 PG (ref 27–31)
MCHC RBC AUTO-ENTMCNC: 29.2 G/DL (ref 32–36)
MCV RBC AUTO: 92 FL (ref 82–98)
MONOCYTES # BLD AUTO: 0.5 K/UL (ref 0.3–1)
MONOCYTES NFR BLD: 8.5 % (ref 4–15)
NEUTROPHILS # BLD AUTO: 4.1 K/UL (ref 1.8–7.7)
NEUTROPHILS NFR BLD: 65.7 % (ref 38–73)
NRBC BLD-RTO: 0 /100 WBC
PLATELET # BLD AUTO: 219 K/UL (ref 150–450)
PMV BLD AUTO: 9.4 FL (ref 9.2–12.9)
POTASSIUM SERPL-SCNC: 4.3 MMOL/L (ref 3.5–5.1)
PROT SERPL-MCNC: 6.6 G/DL (ref 6–8.4)
RBC # BLD AUTO: 3.8 M/UL (ref 4–5.4)
SARS-COV-2 RDRP RESP QL NAA+PROBE: NEGATIVE
SODIUM SERPL-SCNC: 139 MMOL/L (ref 136–145)
SPECIMEN SOURCE: NORMAL
TROPONIN I SERPL DL<=0.01 NG/ML-MCNC: <0.006 NG/ML (ref 0–0.03)
WBC # BLD AUTO: 6.22 K/UL (ref 3.9–12.7)

## 2023-05-09 PROCEDURE — 86803 HEPATITIS C AB TEST: CPT | Performed by: EMERGENCY MEDICINE

## 2023-05-09 PROCEDURE — 84484 ASSAY OF TROPONIN QUANT: CPT | Performed by: PHYSICIAN ASSISTANT

## 2023-05-09 PROCEDURE — 93005 ELECTROCARDIOGRAM TRACING: CPT

## 2023-05-09 PROCEDURE — 99285 EMERGENCY DEPT VISIT HI MDM: CPT | Mod: 25

## 2023-05-09 PROCEDURE — 80053 COMPREHEN METABOLIC PANEL: CPT | Performed by: PHYSICIAN ASSISTANT

## 2023-05-09 PROCEDURE — 87389 HIV-1 AG W/HIV-1&-2 AB AG IA: CPT | Performed by: EMERGENCY MEDICINE

## 2023-05-09 PROCEDURE — 93010 ELECTROCARDIOGRAM REPORT: CPT | Mod: ,,, | Performed by: INTERNAL MEDICINE

## 2023-05-09 PROCEDURE — 85025 COMPLETE CBC W/AUTO DIFF WBC: CPT | Performed by: PHYSICIAN ASSISTANT

## 2023-05-09 PROCEDURE — 93010 EKG 12-LEAD: ICD-10-PCS | Mod: ,,, | Performed by: INTERNAL MEDICINE

## 2023-05-09 PROCEDURE — 83880 ASSAY OF NATRIURETIC PEPTIDE: CPT | Performed by: PHYSICIAN ASSISTANT

## 2023-05-09 PROCEDURE — U0002 COVID-19 LAB TEST NON-CDC: HCPCS | Performed by: EMERGENCY MEDICINE

## 2023-05-09 PROCEDURE — 87502 INFLUENZA DNA AMP PROBE: CPT | Performed by: EMERGENCY MEDICINE

## 2023-05-09 RX ORDER — HYDROCHLOROTHIAZIDE 12.5 MG/1
12.5 TABLET ORAL DAILY
Qty: 30 TABLET | Refills: 11 | Status: SHIPPED | OUTPATIENT
Start: 2023-05-09 | End: 2024-05-08

## 2023-05-09 RX ORDER — HYDROCODONE BITARTRATE AND ACETAMINOPHEN 5; 325 MG/1; MG/1
1 TABLET ORAL EVERY 4 HOURS PRN
Qty: 11 TABLET | Refills: 0 | Status: SHIPPED | OUTPATIENT
Start: 2023-05-09 | End: 2023-05-14

## 2023-05-09 NOTE — ED NOTES
Bed: 23  Expected date:   Expected time:   Means of arrival: Personal Transportation  Comments:  When clean

## 2023-05-09 NOTE — FIRST PROVIDER EVALUATION
Medical screening examination initiated.  I have conducted a focused provider triage encounter, findings are as follows:    Brief history of present illness:  Shortness of breath for a week.  Diagnosed with sarcoidosis in her 20s.  Also right shoulder pain for 3 weeks since lifting a child and hearing a pop.  Denies chest pain, fever.    Vitals:    05/09/23 1143   BP: (!) 141/83   BP Location: Right arm   Patient Position: Sitting   Pulse: 82   Resp: (!) 22   Temp: 98.6 °F (37 °C)   TempSrc: Oral   SpO2: 96%   Weight: 69.8 kg (153 lb 14.1 oz)       Pertinent physical exam:  nad, alert    Brief workup plan:  labs imaging    Preliminary workup initiated; this workup will be continued and followed by the physician or advanced practice provider that is assigned to the patient when roomed.

## 2023-05-09 NOTE — ED PROVIDER NOTES
SCRIBE #1 NOTE: I, An Salmeron, am scribing for, and in the presence of, Delfino Moulton MD. I have scribed the entire note.      History      Chief Complaint   Patient presents with    Shortness of Breath     Pt reports SOB x 1 week and R. Shoulder pain x 3 weeks. Symptoms have worsened. Pt went to  in [place] and was send to the ER. Pt denies any CP, dizziness, N/V, headaches.       Review of patient's allergies indicates:  No Known Allergies     HPI   HPI    5/9/2023, 2:07 PM   History obtained from the patient      History of Present Illness: Salvatore Phillips is a 62 y.o. female patient with a PMHx of asthma, chronic heart failure, HTN, and sarcoidosis of lung who presents to the Emergency Department for SOB which onset gradually 1.5 weeks ago. Symptoms are constant and moderate in severity. No mitigating or exacerbating factors reported. Associated sxs include a productive cough with green sputum. Patient denies any fever, chills, leg swelling, CP, weakness, N/V/D, headaches, and all other sxs at this time. Pt reports that she was seen at an Urgent Care for this complaint, but was advised to come to the ER. No further complaints or concerns at this time.           Arrival mode: Personal vehicle     PCP: Provider [facility]       Past Medical History:  Past Medical History:   Diagnosis Date    Asthma     Chronic heart failure with preserved ejection fraction 8/13/2020    Hypertension     Pulmonary hypertension 12/16/2020    Sarcoidosis of lung     Sleep apnea     maintained on CPAP    Upper respiratory disease     URI (upper respiratory infection)        Past Surgical History:  Past Surgical History:   Procedure Laterality Date    ABDOMINAL SURGERY      BREAST LUMPECTOMY Left 1996    BENIGN    BREAST SURGERY      BRONCHOSCOPY Bilateral 1/15/2021    Procedure: Bronchoscopy - insert lighted tube into airway to take a biopsy of lung;  Surgeon: Adrian Keating MD;  Location: [facility];  Service:  Endoscopy;  Laterality: Bilateral;     SECTION      x3    COLON SURGERY      exploratory procedure      GASTRIC BYPASS      HYSTERECTOMY  2009    JOINT REPLACEMENT  2013    KNEE SURGERY      OOPHORECTOMY  2009    SMALL INTESTINE SURGERY      tummy tuck           Family History:  Family History   Problem Relation Age of Onset    Alzheimer's disease Mother     Cancer Father         lung    Crohn's disease Brother     Hypertension Brother     Kidney disease Brother     Arthritis Sister     Hypertension Sister     Hypertension Son        Social History:  Social History     Tobacco Use    Smoking status: Former     Packs/day: 1.00     Years: 35.00     Pack years: 35.00     Types: Cigarettes     Start date: 1979     Quit date: 2020     Years since quittin.7    Smokeless tobacco: Never    Tobacco comments:     started age 17   Substance and Sexual Activity    Alcohol use: Yes     Comment: occ    Drug use: Never    Sexual activity: Yes       ROS   Review of Systems   Constitutional:  Negative for chills and fever.   HENT:  Negative for sore throat.    Respiratory:  Positive for cough (productive with green sputum) and shortness of breath.    Cardiovascular:  Negative for chest pain and leg swelling.   Gastrointestinal:  Negative for diarrhea, nausea and vomiting.   Genitourinary:  Negative for dysuria.   Musculoskeletal:  Negative for back pain.   Skin:  Negative for rash.   Neurological:  Negative for weakness and headaches.   Hematological:  Does not bruise/bleed easily.   All other systems reviewed and are negative.    Physical Exam      Initial Vitals [23 1143]   BP Pulse Resp Temp SpO2   (!) 141/83 82 (!) 22 98.6 °F (37 °C) 96 %      MAP       --          Physical Exam  Nursing Notes and Vital Signs Reviewed.  Constitutional: Patient is in no acute distress. Well-developed and well-nourished.  Head: Atraumatic. Normocephalic.  Eyes: PERRL. EOM intact. Conjunctivae are not pale. No scleral  "icterus.  ENT: Mucous membranes are moist. Oropharynx is clear and symmetric.    Neck: Supple. Full ROM. No lymphadenopathy.  Cardiovascular: Regular rate. Regular rhythm. No murmurs, rubs, or gallops. Distal pulses are 2+ and symmetric.  Pulmonary/Chest: Tachypneic. No respiratory distress. Clear to auscultation bilaterally. No wheezing or rales.  Abdominal: Soft and non-distended.  There is no tenderness.  No rebound, guarding, or rigidity.   Musculoskeletal: Moves all extremities. No obvious deformities. No edema.  Skin: Warm and dry.  Neurological:  Alert, awake, and appropriate.  Normal speech.  No acute focal neurological deficits are appreciated.  Psychiatric: Normal affect. Good eye contact. Appropriate in content.    ED Course    Procedures  ED Vital Signs:  Vitals:    05/09/23 1143 05/09/23 1416 05/09/23 1430   BP: (!) 141/83  (!) 157/91   Pulse: 82  74   Resp: (!) 22  (!) 24   Temp: 98.6 °F (37 °C)  98.5 °F (36.9 °C)   TempSrc: Oral  Oral   SpO2: 96%  96%   Weight: 69.8 kg (153 lb 14.1 oz)     Height:  5' 7" (1.702 m)        Abnormal Lab Results:  Labs Reviewed   CBC W/ AUTO DIFFERENTIAL - Abnormal; Notable for the following components:       Result Value    RBC 3.80 (*)     Hemoglobin 10.2 (*)     Hematocrit 34.9 (*)     MCH 26.8 (*)     MCHC 29.2 (*)     RDW 16.1 (*)     Immature Granulocytes 1.8 (*)     Immature Grans (Abs) 0.11 (*)     Lymph % 17.7 (*)     All other components within normal limits    Narrative:     Release to patient->Immediate   COMPREHENSIVE METABOLIC PANEL - Abnormal; Notable for the following components:    Chloride 111 (*)     CO2 20 (*)     BUN 42 (*)     Creatinine 2.3 (*)     Calcium 8.1 (*)     Albumin 2.9 (*)     eGFR 24 (*)     All other components within normal limits    Narrative:     Release to patient->Immediate   B-TYPE NATRIURETIC PEPTIDE - Abnormal; Notable for the following components:     (*)     All other components within normal limits    Narrative:     " Release to patient->Immediate   INFLUENZA A & B BY MOLECULAR   HIV 1 / 2 ANTIBODY    Narrative:     Release to patient->Immediate   HEPATITIS C ANTIBODY    Narrative:     Release to patient->Immediate   HEP C VIRUS HOLD SPECIMEN    Narrative:     Release to patient->Immediate   TROPONIN I    Narrative:     Release to patient->Immediate   SARS-COV-2 RNA AMPLIFICATION, QUAL        All Lab Results:  Results for orders placed or performed during the hospital encounter of 05/09/23   Influenza A & B by Molecular    Specimen: Nasopharyngeal Swab   Result Value Ref Range    Influenza A, Molecular Negative Negative    Influenza B, Molecular Negative Negative    Flu A & B Source Nasal swab    HIV 1/2 Ag/Ab (4th Gen)   Result Value Ref Range    HIV 1/2 Ag/Ab Negative Negative   Hepatitis C Antibody   Result Value Ref Range    Hepatitis C Ab Negative Negative   HCV Virus Hold Specimen   Result Value Ref Range    HEP C Virus Hold Specimen Hold for HCV sendout    CBC auto differential   Result Value Ref Range    WBC 6.22 3.90 - 12.70 K/uL    RBC 3.80 (L) 4.00 - 5.40 M/uL    Hemoglobin 10.2 (L) 12.0 - 16.0 g/dL    Hematocrit 34.9 (L) 37.0 - 48.5 %    MCV 92 82 - 98 fL    MCH 26.8 (L) 27.0 - 31.0 pg    MCHC 29.2 (L) 32.0 - 36.0 g/dL    RDW 16.1 (H) 11.5 - 14.5 %    Platelets 219 150 - 450 K/uL    MPV 9.4 9.2 - 12.9 fL    Immature Granulocytes 1.8 (H) 0.0 - 0.5 %    Gran # (ANC) 4.1 1.8 - 7.7 K/uL    Immature Grans (Abs) 0.11 (H) 0.00 - 0.04 K/uL    Lymph # 1.1 1.0 - 4.8 K/uL    Mono # 0.5 0.3 - 1.0 K/uL    Eos # 0.3 0.0 - 0.5 K/uL    Baso # 0.08 0.00 - 0.20 K/uL    nRBC 0 0 /100 WBC    Gran % 65.7 38.0 - 73.0 %    Lymph % 17.7 (L) 18.0 - 48.0 %    Mono % 8.5 4.0 - 15.0 %    Eosinophil % 5.0 0.0 - 8.0 %    Basophil % 1.3 0.0 - 1.9 %    Differential Method Automated    Comprehensive metabolic panel   Result Value Ref Range    Sodium 139 136 - 145 mmol/L    Potassium 4.3 3.5 - 5.1 mmol/L    Chloride 111 (H) 95 - 110 mmol/L    CO2 20  (L) 23 - 29 mmol/L    Glucose 84 70 - 110 mg/dL    BUN 42 (H) 8 - 23 mg/dL    Creatinine 2.3 (H) 0.5 - 1.4 mg/dL    Calcium 8.1 (L) 8.7 - 10.5 mg/dL    Total Protein 6.6 6.0 - 8.4 g/dL    Albumin 2.9 (L) 3.5 - 5.2 g/dL    Total Bilirubin 0.3 0.1 - 1.0 mg/dL    Alkaline Phosphatase 130 55 - 135 U/L    AST 27 10 - 40 U/L    ALT 15 10 - 44 U/L    Anion Gap 8 8 - 16 mmol/L    eGFR 24 (A) >60 mL/min/1.73 m^2   Troponin I #1   Result Value Ref Range    Troponin I <0.006 0.000 - 0.026 ng/mL   BNP   Result Value Ref Range     (H) 0 - 99 pg/mL   COVID-19 Rapid Screening   Result Value Ref Range    SARS-CoV-2 RNA, Amplification, Qual Negative Negative         Imaging Results:  Imaging Results              X-Ray Shoulder Complete 2 View Right (Final result)  Result time 05/09/23 12:21:51      Final result by Artis Cota MD (05/09/23 12:21:51)                   Narrative:    EXAMINATION:  XR SHOULDER COMPLETE 2 OR MORE VIEWS RIGHT    CLINICAL HISTORY:  Unspecified injury of right shoulder and upper arm, initial encounter    TECHNIQUE:  Two or three views of the right shoulder were performed.    COMPARISON:  There is no fracture or dislocation.  Osteopenia.  Mild AC joint arthropathy.    FINDINGS:  See above      Electronically signed by: Artis Cota MD  Date:    05/09/2023  Time:    12:21                                     X-Ray Chest AP Portable (Final result)  Result time 05/09/23 12:23:06      Final result by Artis Cota MD (05/09/23 12:23:06)                   Impression:      Stable abnormal chest x-ray with chronic lung disease sequela.      Electronically signed by: Artis Cota MD  Date:    05/09/2023  Time:    12:23               Narrative:    EXAMINATION:  XR CHEST AP PORTABLE    CLINICAL HISTORY:  Chest Pain;    TECHNIQUE:  Single frontal view of the chest was performed.    COMPARISON:  03/12/2021    FINDINGS:  No change.  Normal size heart.  Chronic lung disease sequela with redemonstration of  bilateral perihilar and upper lobe and left lower lobe scarring with bilateral hilar retraction.  No active infiltrate or pleural fluid collection.  Surgical clips right aspect thoracic inlet.                                     The EKG was ordered, reviewed, and independently interpreted by the ED provider.  Interpretation time: 11:43  Rate: 83 BPM  Rhythm: normal sinus rhythm  Interpretation: Possible Left atrial enlargement. ST elevation, consider early repolarization, pericarditis, or injury. No STEMI.             The Emergency Provider reviewed the vital signs and test results, which are outlined above.    ED Discussion     3:22 PM: Reassessed pt at this time.  Discussed with pt all pertinent ED information and results. Discussed pt dx and plan of tx. Gave pt all f/u and return to the ED instructions. All questions and concerns were addressed at this time. Pt expresses understanding of information and instructions, and is comfortable with plan to discharge. Pt is stable for discharge.    I discussed with patient and/or family/caretaker that evaluation in the ED does not suggest any emergent or life threatening medical conditions requiring immediate intervention beyond what was provided in the ED, and I believe patient is safe for discharge.  Regardless, an unremarkable evaluation in the ED does not preclude the development or presence of a serious of life threatening condition. As such, patient was instructed to return immediately for any worsening or change in current symptoms.           ED Medication(s):  Medications - No data to display     Follow-up Information       [facility] In 2 days.    Contact information:  [address] [address] [phone]                             New Prescriptions    HYDROCHLOROTHIAZIDE (HYDRODIURIL) 12.5 MG TAB    Take 1 tablet (12.5 mg total) by mouth once daily.    HYDROCODONE-ACETAMINOPHEN (NORCO) 5-325 MG PER TABLET    Take 1 tablet by  mouth every 4 (four) hours as needed.        Medication List        START taking these medications      HYDROcodone-acetaminophen 5-325 mg per tablet  Commonly known as: NORCO  Take 1 tablet by mouth every 4 (four) hours as needed.            CONTINUE taking these medications      hydroCHLOROthiazide 12.5 MG Tab  Commonly known as: HYDRODIURIL  Take 1 tablet (12.5 mg total) by mouth once daily.            ASK your doctor about these medications      albuterol-ipratropium 2.5 mg-0.5 mg/3 mL nebulizer solution  Commonly known as: DUO-NEB  Take 3 mLs by nebulization every 6 (six) hours as needed for Wheezing or Shortness of Breath. Rescue. Corrected prescription with refills     * ATROVENT HFA 17 mcg/actuation inhaler  Generic drug: ipratropium     * ATROVENT HFA 17 mcg/actuation inhaler  Generic drug: ipratropium  Inhale 2 puffs into the lungs every 4 to 6 hours as needed for Wheezing. Rescue     CHANTIX 1 mg Tab  Generic drug: varenicline  Take 1 tablet (1 mg total) by mouth 2 (two) times daily.     ethambutoL 400 MG Tab  Commonly known as: MYAMBUTOL  Take 4 tablets (1,600 mg total) by mouth every Mon, Wed, Fri.     fluticasone propionate 50 mcg/actuation nasal spray  Commonly known as: FLONASE  2 sprays (100 mcg total) by Each Nostril route once daily. Corrected prescription with refills     fluticasone-umeclidin-vilanter 200-62.5-25 mcg inhaler  Commonly known as: TRELEGY ELLIPTA  Inhale 1 puff into the lungs once daily.     furosemide 40 MG tablet  Commonly known as: LASIX  Take 1 tablet (40 mg total) by mouth daily as needed (for leg swelling).     methocarbamoL 500 MG Tab  Commonly known as: ROBAXIN     mycophenolate 500 mg Tab  Commonly known as: CELLCEPT  Take 1 tablet (500 mg total) by mouth once daily for 14 days, THEN 1 tablet (500 mg total) 2 (two) times daily.  Start taking on: November 20, 2020     naproxen 500 MG tablet  Commonly known as: NAPROSYN     oxybutynin 5 MG Tab  Commonly known as:  "DITROPAN  Take 1 tablet (5 mg total) by mouth every evening.     potassium chloride SA 20 MEQ tablet  Commonly known as: K-DUR,KLOR-CON  Take 1 tablet (20 mEq total) by mouth daily as needed (take with lasix).     predniSONE 2.5 MG tablet  Commonly known as: DELTASONE  Take 2 tablets (5 mg total) by mouth once daily.     QUEtiapine 50 MG tablet  Commonly known as: SEROQUEL     topiramate 50 MG tablet  Commonly known as: TOPAMAX     traMADoL 50 mg tablet  Commonly known as: ULTRAM  Take 1 tablet (50 mg total) by mouth every 6 (six) hours as needed for Pain.     * VENTOLIN HFA 90 mcg/actuation inhaler  Generic drug: albuterol     * albuterol 90 mcg/actuation inhaler  Commonly known as: PROVENTIL/VENTOLIN HFA  Inhale 2 puffs into the lungs every 4 (four) hours as needed for Wheezing or Shortness of Breath.     zafirlukast 20 MG tablet  Commonly known as: ACCOLATE  Take 1 tablet (20 mg total) by mouth 2 (two) times daily.     zolpidem 10 mg Tab  Commonly known as: AMBIEN  TAKE 1 TABLET BY MOUTH AT BEDTIME AS NEEDED           * This list has 4 medication(s) that are the same as other medications prescribed for you. Read the directions carefully, and ask your doctor or other care provider to review them with you.                   Where to Get Your Medications        These medications were sent to [facility] [address] [place] [address] [address] [address]      Phone: [phone]   hydroCHLOROthiazide 12.5 MG Tab  HYDROcodone-acetaminophen 5-325 mg per tablet           Medical Decision Making    Medical Decision Making:   Initial Assessment:   Mildly worsening sob over the last week.  Took herself off of lasix, because she was "urinating too much"  Differential Diagnosis:   Chf, sob, shoulder pain  Clinical Tests:   Lab Tests: Ordered and Reviewed  Radiological Study: Ordered and Reviewed  Medical Tests: Ordered and Reviewed  ED Management:  Labs and imaging reviewed by me.  No " acute findings except for mildly elevated BNP.  Considered admission, but patient is ready to go home.  Will switch lasix to HCTZ at a low dose in hopes for better compliance.           Scribe Attestation:   Scribe #1: I performed the above scribed service and the documentation accurately describes the services I performed. I attest to the accuracy of the note.    Attending:   Physician Attestation Statement for Scribe #1: I, Delfino Moulton MD, personally performed the services described in this documentation, as scribed by An Salmeron, in my presence, and it is both accurate and complete.          Clinical Impression       ICD-10-CM ICD-9-CM   1. SOB (shortness of breath)  R06.02 786.05   2. Right shoulder injury, initial encounter  S49.91XA 959.2       Disposition:   Disposition: Discharged  Condition: Stable       Delfino Moulton MD  05/09/23 1520